# Patient Record
Sex: MALE | Race: BLACK OR AFRICAN AMERICAN | NOT HISPANIC OR LATINO | URBAN - METROPOLITAN AREA
[De-identification: names, ages, dates, MRNs, and addresses within clinical notes are randomized per-mention and may not be internally consistent; named-entity substitution may affect disease eponyms.]

---

## 2017-01-15 ENCOUNTER — INPATIENT (INPATIENT)
Facility: HOSPITAL | Age: 62
LOS: 7 days | Discharge: NURSING, REHAB OR LONG-TERM CARE | DRG: 55 | End: 2017-01-23
Attending: INTERNAL MEDICINE | Admitting: INTERNAL MEDICINE
Payer: COMMERCIAL

## 2017-01-15 VITALS
HEART RATE: 69 BPM | RESPIRATION RATE: 19 BRPM | SYSTOLIC BLOOD PRESSURE: 177 MMHG | DIASTOLIC BLOOD PRESSURE: 92 MMHG | OXYGEN SATURATION: 97 % | TEMPERATURE: 98 F

## 2017-01-15 DIAGNOSIS — Z90.5 ACQUIRED ABSENCE OF KIDNEY: Chronic | ICD-10-CM

## 2017-01-15 PROCEDURE — 71020: CPT | Mod: 26

## 2017-01-15 PROCEDURE — 73620 X-RAY EXAM OF FOOT: CPT | Mod: 26,LT

## 2017-01-15 PROCEDURE — 99284 EMERGENCY DEPT VISIT MOD MDM: CPT | Mod: 25

## 2017-01-15 PROCEDURE — 93010 ELECTROCARDIOGRAM REPORT: CPT | Mod: NC

## 2017-01-15 RX ORDER — SODIUM CHLORIDE 9 MG/ML
3 INJECTION INTRAMUSCULAR; INTRAVENOUS; SUBCUTANEOUS ONCE
Qty: 0 | Refills: 0 | Status: COMPLETED | OUTPATIENT
Start: 2017-01-15 | End: 2017-01-15

## 2017-01-15 RX ADMIN — SODIUM CHLORIDE 3 MILLILITER(S): 9 INJECTION INTRAMUSCULAR; INTRAVENOUS; SUBCUTANEOUS at 21:40

## 2017-01-15 NOTE — ED PROVIDER NOTE - PROGRESS NOTE DETAILS
Attempted to contact Dr. Ya office/cell number no answer. Pt with worsening weakness on Inlyta, labs notable for dehydration. Will discharge home.

## 2017-01-15 NOTE — ED PROVIDER NOTE - OBJECTIVE STATEMENT
61M with hx of renal cell CA with brain mets presenting with weakness, L heel pain, inability to sit up and walk . Pt started oral chemotherapy with Inlyta, was sent in by his heme onc physician Dr. Kishan Ya. Pt denies cp/sob, admits to decreased PO intake.

## 2017-01-15 NOTE — ED ADULT NURSE REASSESSMENT NOTE - NS ED NURSE REASSESS COMMENT FT1
lab results reviewed with patient, dr jimenez at bedside to review plan with patient. urine studies sent

## 2017-01-15 NOTE — ED PROVIDER NOTE - MUSCULOSKELETAL, MLM
Spine appears normal, range of motion is not limited, no muscle or joint tenderness; ttp over L heel

## 2017-01-15 NOTE — ED ADULT NURSE NOTE - PSH
History of nephrectomy, unilateral  right nephrectomy  Other postprocedural status  S/P tonsillectomy  Other postprocedural status  S/P craniotomy

## 2017-01-15 NOTE — ED ADULT NURSE NOTE - CHPI ED SYMPTOMS NEG
no fever/no dizziness/no decreased eating/drinking/no chills/no numbness/no vomiting/no nausea/no tingling

## 2017-01-15 NOTE — ED ADULT NURSE NOTE - PMH
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

## 2017-01-15 NOTE — ED ADULT NURSE NOTE - OBJECTIVE STATEMENT
RN note: aaox2 male with generalized weakness, pain to left heel and left leg X several days, worsening today to the point where "he couldn't walk" "he couldn't sit up straight" as per wife. at baseline, the patient ambulates with a cane. s/p chemo jan 6th (nine days ago), and also on trial drug inlyta 5mg twice a day since jan 4th for renal cancer. pt denies nausea, vomiting, diarrhea, abdominal pain, fevers, chills, hematuria or dysuria. denies cough. unable to recall last seizure date "a long time ago". does not have a mediport. noted with left calf warm, left heel tender to touch. no redness or swelling noted to left heel or foot. pt denies trauma. will monitor and reassess. -ariana hearn RN RN note: aaox4 male with generalized weakness, pain to left heel and left leg weakness X several days, worsening today to the point where "he couldn't walk" "he couldn't sit up straight" as per wife. at baseline, the patient ambulates with a cane. s/p chemo jan 6th (nine days ago), and also on trial drug inlyta 5mg twice a day since jan 4th for renal cancer. pt denies nausea, vomiting, diarrhea, abdominal pain, fevers, chills, hematuria or dysuria. denies cough. unable to recall last seizure date "a long time ago". does not have a mediport. noted with left calf warm, left heel tender to touch. no redness or swelling noted to left heel or foot. pt denies trauma. will monitor and reassess. -ariana hearn RN RN note: aaox4 male with generalized weakness, pain to left heel and left leg weakness X several days, worsening today to the point where "he couldn't walk" "he couldn't sit up straight" as per wife. at baseline, the patient ambulates with a cane. s/p chemo jan 6th (nine days ago), and also on trial drug inlyta 5mg twice a day since jan 4th for renal cancer. pt denies nausea, vomiting, diarrhea, abdominal pain, fevers, chills, hematuria or dysuria. denies cough. unable to recall last seizure date "a long time ago". does not have a mediport. noted with left calf warm, left heel tender to touch. no redness or swelling noted to left heel or foot. pt denies trauma. will monitor and reassess. -ariana hearn RN    addendum: pt noted with high BP upon arrival, and reports not taken his night medications doses. dr. jimenez aware of high BP. OK as per dr. jimenez for pt to take own night medications. pt took own medications at 2230. -ariana hearn RN

## 2017-01-15 NOTE — ED PROVIDER NOTE - MEDICAL DECISION MAKING DETAILS
61M with hx of metstatic kidney ca presenting with weakness, decreased PO intake heel pain. Concern for side effect from new chemotherapy Inlyta. Pt denies fever/cough/chest pain/shortness of breath. Sent in by Dr. Ya, unable to get a hold of Dr. Ya at this time but given pt's degree of weakness will admit for IV fluids as well as heme onc consultation in AM. Labs notable for elevated creatinine, low platelets, no neutropenia, no fever. Will admit for IV fluids given pt's weakness.

## 2017-01-16 DIAGNOSIS — E78.5 HYPERLIPIDEMIA, UNSPECIFIED: ICD-10-CM

## 2017-01-16 DIAGNOSIS — R53.1 WEAKNESS: ICD-10-CM

## 2017-01-16 DIAGNOSIS — Z41.8 ENCOUNTER FOR OTHER PROCEDURES FOR PURPOSES OTHER THAN REMEDYING HEALTH STATE: ICD-10-CM

## 2017-01-16 DIAGNOSIS — H40.9 UNSPECIFIED GLAUCOMA: ICD-10-CM

## 2017-01-16 DIAGNOSIS — C64.1 MALIGNANT NEOPLASM OF RIGHT KIDNEY, EXCEPT RENAL PELVIS: ICD-10-CM

## 2017-01-16 DIAGNOSIS — R63.8 OTHER SYMPTOMS AND SIGNS CONCERNING FOOD AND FLUID INTAKE: ICD-10-CM

## 2017-01-16 DIAGNOSIS — I16.0 HYPERTENSIVE URGENCY: ICD-10-CM

## 2017-01-16 DIAGNOSIS — N17.9 ACUTE KIDNEY FAILURE, UNSPECIFIED: ICD-10-CM

## 2017-01-16 DIAGNOSIS — D64.9 ANEMIA, UNSPECIFIED: ICD-10-CM

## 2017-01-16 DIAGNOSIS — C79.31 SECONDARY MALIGNANT NEOPLASM OF BRAIN: ICD-10-CM

## 2017-01-16 DIAGNOSIS — G40.909 EPILEPSY, UNSPECIFIED, NOT INTRACTABLE, WITHOUT STATUS EPILEPTICUS: ICD-10-CM

## 2017-01-16 DIAGNOSIS — M79.605 PAIN IN LEFT LEG: ICD-10-CM

## 2017-01-16 DIAGNOSIS — N18.9 CHRONIC KIDNEY DISEASE, UNSPECIFIED: ICD-10-CM

## 2017-01-16 LAB
ANION GAP SERPL CALC-SCNC: 11 MMOL/L — SIGNIFICANT CHANGE UP (ref 9–16)
BUN SERPL-MCNC: 36 MG/DL — HIGH (ref 7–23)
CALCIUM SERPL-MCNC: 7.8 MG/DL — LOW (ref 8.5–10.5)
CHLORIDE SERPL-SCNC: 108 MMOL/L — SIGNIFICANT CHANGE UP (ref 96–108)
CHOLEST SERPL-MCNC: 149 MG/DL — SIGNIFICANT CHANGE UP
CO2 SERPL-SCNC: 22 MMOL/L — SIGNIFICANT CHANGE UP (ref 22–31)
CREAT SERPL-MCNC: 1.79 MG/DL — HIGH (ref 0.5–1.3)
FERRITIN SERPL-MCNC: 209 NG/ML — SIGNIFICANT CHANGE UP (ref 26–388)
FOLATE SERPL-MCNC: 6.6 NG/ML — SIGNIFICANT CHANGE UP (ref 4.8–24.2)
GLUCOSE SERPL-MCNC: 91 MG/DL — SIGNIFICANT CHANGE UP (ref 70–99)
HCT VFR BLD CALC: 37.6 % — LOW (ref 39–50)
HDLC SERPL-MCNC: 72 MG/DL — SIGNIFICANT CHANGE UP
HGB BLD-MCNC: 12 G/DL — LOW (ref 13–17)
IRON SATN MFR SERPL: 19 % — LOW (ref 26–39)
IRON SATN MFR SERPL: 50 UG/DL — LOW (ref 65–175)
LIPID PNL WITH DIRECT LDL SERPL: 58 MG/DL — SIGNIFICANT CHANGE UP
MAGNESIUM SERPL-MCNC: 2.1 MG/DL — SIGNIFICANT CHANGE UP (ref 1.6–2.4)
MCHC RBC-ENTMCNC: 24.5 PG — LOW (ref 27–34)
MCHC RBC-ENTMCNC: 31.9 G/DL — LOW (ref 32–36)
MCV RBC AUTO: 76.7 FL — LOW (ref 80–100)
PHOSPHATE SERPL-MCNC: 4.6 MG/DL — HIGH (ref 2.5–4.5)
PLATELET # BLD AUTO: 90 K/UL — LOW (ref 150–400)
POTASSIUM SERPL-MCNC: 4.5 MMOL/L — SIGNIFICANT CHANGE UP (ref 3.5–5.3)
POTASSIUM SERPL-SCNC: 4.5 MMOL/L — SIGNIFICANT CHANGE UP (ref 3.5–5.3)
RBC # BLD: 4.9 M/UL — SIGNIFICANT CHANGE UP (ref 4.2–5.8)
RBC # FLD: 15.3 % — SIGNIFICANT CHANGE UP (ref 10.3–16.9)
SODIUM SERPL-SCNC: 141 MMOL/L — SIGNIFICANT CHANGE UP (ref 135–145)
TIBC SERPL-MCNC: 257 UG/DL — SIGNIFICANT CHANGE UP (ref 250–450)
TOTAL CHOLESTEROL/HDL RATIO MEASUREMENT: 2.1 RATIO — SIGNIFICANT CHANGE UP
TRIGL SERPL-MCNC: 94 MG/DL — SIGNIFICANT CHANGE UP
TSH SERPL-MCNC: 10.3 UIU/ML — HIGH (ref 0.35–4.94)
VIT B12 SERPL-MCNC: 855 PG/ML — SIGNIFICANT CHANGE UP (ref 243–894)
WBC # BLD: 7 K/UL — SIGNIFICANT CHANGE UP (ref 3.8–10.5)
WBC # FLD AUTO: 7 K/UL — SIGNIFICANT CHANGE UP (ref 3.8–10.5)

## 2017-01-16 PROCEDURE — 71250 CT THORAX DX C-: CPT | Mod: 26

## 2017-01-16 PROCEDURE — 99223 1ST HOSP IP/OBS HIGH 75: CPT | Mod: GC

## 2017-01-16 PROCEDURE — 72170 X-RAY EXAM OF PELVIS: CPT | Mod: 26

## 2017-01-16 PROCEDURE — 99282 EMERGENCY DEPT VISIT SF MDM: CPT | Mod: GC

## 2017-01-16 PROCEDURE — 93970 EXTREMITY STUDY: CPT | Mod: 26

## 2017-01-16 PROCEDURE — 70450 CT HEAD/BRAIN W/O DYE: CPT | Mod: 26

## 2017-01-16 PROCEDURE — 70553 MRI BRAIN STEM W/O & W/DYE: CPT | Mod: 26

## 2017-01-16 RX ORDER — QUETIAPINE FUMARATE 200 MG/1
25 TABLET, FILM COATED ORAL EVERY 12 HOURS
Qty: 0 | Refills: 0 | Status: DISCONTINUED | OUTPATIENT
Start: 2017-01-16 | End: 2017-01-23

## 2017-01-16 RX ORDER — CARVEDILOL PHOSPHATE 80 MG/1
25 CAPSULE, EXTENDED RELEASE ORAL EVERY 12 HOURS
Qty: 0 | Refills: 0 | Status: DISCONTINUED | OUTPATIENT
Start: 2017-01-16 | End: 2017-01-23

## 2017-01-16 RX ORDER — DEXAMETHASONE 0.5 MG/5ML
4 ELIXIR ORAL EVERY 6 HOURS
Qty: 0 | Refills: 0 | Status: DISCONTINUED | OUTPATIENT
Start: 2017-01-16 | End: 2017-01-23

## 2017-01-16 RX ORDER — VALPROIC ACID (AS SODIUM SALT) 250 MG/5ML
250 SOLUTION, ORAL ORAL
Qty: 0 | Refills: 0 | Status: DISCONTINUED | OUTPATIENT
Start: 2017-01-16 | End: 2017-01-23

## 2017-01-16 RX ORDER — LEVOTHYROXINE SODIUM 125 MCG
75 TABLET ORAL DAILY
Qty: 0 | Refills: 0 | Status: DISCONTINUED | OUTPATIENT
Start: 2017-01-17 | End: 2017-01-17

## 2017-01-16 RX ORDER — LEVOTHYROXINE SODIUM 125 MCG
50 TABLET ORAL DAILY
Qty: 0 | Refills: 0 | Status: DISCONTINUED | OUTPATIENT
Start: 2017-01-16 | End: 2017-01-16

## 2017-01-16 RX ORDER — INSULIN LISPRO 100/ML
VIAL (ML) SUBCUTANEOUS
Qty: 0 | Refills: 0 | Status: DISCONTINUED | OUTPATIENT
Start: 2017-01-16 | End: 2017-01-23

## 2017-01-16 RX ORDER — LATANOPROST 0.05 MG/ML
1 SOLUTION/ DROPS OPHTHALMIC; TOPICAL AT BEDTIME
Qty: 0 | Refills: 0 | Status: DISCONTINUED | OUTPATIENT
Start: 2017-01-16 | End: 2017-01-23

## 2017-01-16 RX ORDER — LACOSAMIDE 50 MG/1
200 TABLET ORAL
Qty: 0 | Refills: 0 | Status: DISCONTINUED | OUTPATIENT
Start: 2017-01-16 | End: 2017-01-23

## 2017-01-16 RX ORDER — LABETALOL HCL 100 MG
10 TABLET ORAL ONCE
Qty: 0 | Refills: 0 | Status: DISCONTINUED | OUTPATIENT
Start: 2017-01-16 | End: 2017-01-16

## 2017-01-16 RX ORDER — CARVEDILOL PHOSPHATE 80 MG/1
25 CAPSULE, EXTENDED RELEASE ORAL EVERY 12 HOURS
Qty: 0 | Refills: 0 | Status: DISCONTINUED | OUTPATIENT
Start: 2017-01-16 | End: 2017-01-16

## 2017-01-16 RX ORDER — RISPERIDONE 4 MG/1
1 TABLET ORAL AT BEDTIME
Qty: 0 | Refills: 0 | Status: DISCONTINUED | OUTPATIENT
Start: 2017-01-16 | End: 2017-01-17

## 2017-01-16 RX ORDER — FUROSEMIDE 40 MG
20 TABLET ORAL ONCE
Qty: 0 | Refills: 0 | Status: COMPLETED | OUTPATIENT
Start: 2017-01-16 | End: 2017-01-16

## 2017-01-16 RX ORDER — LEVOTHYROXINE SODIUM 125 MCG
25 TABLET ORAL ONCE
Qty: 0 | Refills: 0 | Status: COMPLETED | OUTPATIENT
Start: 2017-01-16 | End: 2017-01-16

## 2017-01-16 RX ORDER — DOCUSATE SODIUM 100 MG
100 CAPSULE ORAL
Qty: 0 | Refills: 0 | Status: DISCONTINUED | OUTPATIENT
Start: 2017-01-16 | End: 2017-01-23

## 2017-01-16 RX ORDER — DEXAMETHASONE 0.5 MG/5ML
10 ELIXIR ORAL ONCE
Qty: 0 | Refills: 0 | Status: COMPLETED | OUTPATIENT
Start: 2017-01-16 | End: 2017-01-16

## 2017-01-16 RX ORDER — SENNA PLUS 8.6 MG/1
2 TABLET ORAL AT BEDTIME
Qty: 0 | Refills: 0 | Status: DISCONTINUED | OUTPATIENT
Start: 2017-01-16 | End: 2017-01-23

## 2017-01-16 RX ORDER — PANTOPRAZOLE SODIUM 20 MG/1
40 TABLET, DELAYED RELEASE ORAL
Qty: 0 | Refills: 0 | Status: DISCONTINUED | OUTPATIENT
Start: 2017-01-16 | End: 2017-01-23

## 2017-01-16 RX ORDER — ATORVASTATIN CALCIUM 80 MG/1
40 TABLET, FILM COATED ORAL AT BEDTIME
Qty: 0 | Refills: 0 | Status: DISCONTINUED | OUTPATIENT
Start: 2017-01-16 | End: 2017-01-23

## 2017-01-16 RX ORDER — ASPIRIN/CALCIUM CARB/MAGNESIUM 324 MG
81 TABLET ORAL DAILY
Qty: 0 | Refills: 0 | Status: DISCONTINUED | OUTPATIENT
Start: 2017-01-16 | End: 2017-01-16

## 2017-01-16 RX ORDER — AMLODIPINE BESYLATE 2.5 MG/1
10 TABLET ORAL DAILY
Qty: 0 | Refills: 0 | Status: DISCONTINUED | OUTPATIENT
Start: 2017-01-16 | End: 2017-01-23

## 2017-01-16 RX ADMIN — Medication 100 MILLIGRAM(S): at 07:19

## 2017-01-16 RX ADMIN — QUETIAPINE FUMARATE 25 MILLIGRAM(S): 200 TABLET, FILM COATED ORAL at 07:16

## 2017-01-16 RX ADMIN — Medication 25 MICROGRAM(S): at 15:35

## 2017-01-16 RX ADMIN — Medication 10 MILLIGRAM(S): at 03:45

## 2017-01-16 RX ADMIN — PANTOPRAZOLE SODIUM 40 MILLIGRAM(S): 20 TABLET, DELAYED RELEASE ORAL at 07:17

## 2017-01-16 RX ADMIN — LATANOPROST 1 DROP(S): 0.05 SOLUTION/ DROPS OPHTHALMIC; TOPICAL at 22:04

## 2017-01-16 RX ADMIN — Medication 100 MILLIGRAM(S): at 18:07

## 2017-01-16 RX ADMIN — ATORVASTATIN CALCIUM 40 MILLIGRAM(S): 80 TABLET, FILM COATED ORAL at 22:04

## 2017-01-16 RX ADMIN — QUETIAPINE FUMARATE 25 MILLIGRAM(S): 200 TABLET, FILM COATED ORAL at 23:34

## 2017-01-16 RX ADMIN — Medication 250 MILLIGRAM(S): at 07:17

## 2017-01-16 RX ADMIN — RISPERIDONE 1 MILLIGRAM(S): 4 TABLET ORAL at 22:04

## 2017-01-16 RX ADMIN — LACOSAMIDE 200 MILLIGRAM(S): 50 TABLET ORAL at 10:29

## 2017-01-16 RX ADMIN — Medication 81 MILLIGRAM(S): at 12:53

## 2017-01-16 RX ADMIN — Medication 250 MILLIGRAM(S): at 22:04

## 2017-01-16 RX ADMIN — Medication 102 MILLIGRAM(S): at 23:34

## 2017-01-16 RX ADMIN — Medication 50 MICROGRAM(S): at 07:17

## 2017-01-16 RX ADMIN — SENNA PLUS 2 TABLET(S): 8.6 TABLET ORAL at 22:04

## 2017-01-16 RX ADMIN — Medication 20 MILLIGRAM(S): at 12:53

## 2017-01-16 RX ADMIN — CARVEDILOL PHOSPHATE 25 MILLIGRAM(S): 80 CAPSULE, EXTENDED RELEASE ORAL at 07:16

## 2017-01-16 RX ADMIN — LACOSAMIDE 200 MILLIGRAM(S): 50 TABLET ORAL at 18:17

## 2017-01-16 RX ADMIN — AMLODIPINE BESYLATE 10 MILLIGRAM(S): 2.5 TABLET ORAL at 07:16

## 2017-01-16 RX ADMIN — CARVEDILOL PHOSPHATE 25 MILLIGRAM(S): 80 CAPSULE, EXTENDED RELEASE ORAL at 18:07

## 2017-01-16 NOTE — H&P ADULT. - PROBLEM SELECTOR PLAN 6
chronic, microcytic; no active bleeding; likely 2/2 chemotherapeutic regimen   - f/u iron studies   - trend CBC No recent reported seizure activity. Con't home anti-epileptic therapy.

## 2017-01-16 NOTE — CONSULT NOTE ADULT - PROBLEM SELECTOR RECOMMENDATION 3
on dilators and alpha/beta blockers but will need to address volume issue. have suggested start with lasix 20 mg iv , follow up re creatinine and , exam and  blood pressure. Most recent bp 175/105. also further eval proteinuria.

## 2017-01-16 NOTE — CONSULT NOTE ADULT - ASSESSMENT
Bp probably related to fluid and distress. May have high- grade proteinuria. This can occur with neoplasm.

## 2017-01-16 NOTE — H&P ADULT. - PROBLEM SELECTOR PLAN 5
s/p R. neprectomy, w/ mets to the brain s/p resection x2   - hold current chemotherapeutic regimen 2/2 side effects   - Dr. Ya contacted, awaiting to hear back regarding plan of care s/p R. nephrectomy, w/ mets to the brain s/p resection x2   - hold current chemotherapeutic regimen 2/2 side effects   - Dr. Ya contacted, awaiting to hear back regarding plan of care

## 2017-01-16 NOTE — PHYSICAL THERAPY INITIAL EVALUATION ADULT - PERTINENT HX OF CURRENT PROBLEM, REHAB EVAL
Pt is a 60yo M w/ PMH renal cell carcinoma w/ mets to the brain (s/p R. nephrectomy) seizure d/o, presenting with weakness and L. groin and heel pain. He recently began a new oral chemotherapy called Inlyta on January 4th, and was taking it twice daily. On January 6th, he began experiencing L. groin pain that did not inhibit his daily activities. At baseline he walks with the occasional assistance of a rollator.

## 2017-01-16 NOTE — H&P ADULT. - PROBLEM SELECTOR PLAN 2
generalized weakness, possibly contributed to by l groin pain.    - holding chemotherapeutic regimen   - check TSH, B12   - f/u head CT   - PT consult generalized weakness, possibly contributed to by chemotherapy and recent immobility from heel and groin pain.   - holding chemotherapeutic regimen   - check TSH, B12   - f/u head CT   - PT consult

## 2017-01-16 NOTE — PATIENT PROFILE ADULT. - VISION (WITH CORRECTIVE LENSES IF THE PATIENT USUALLY WEARS THEM):
wears eyeglasses/Partially impaired: cannot see medication labels or newsprint, but can see obstacles in path, and the surrounding layout; can count fingers at arm's length

## 2017-01-16 NOTE — PHYSICAL THERAPY INITIAL EVALUATION ADULT - IMPAIRMENTS FOUND, PT EVAL
aerobic capacity/endurance/muscle strength/ergonomics and body mechanics/gait, locomotion, and balance

## 2017-01-16 NOTE — PHYSICAL THERAPY INITIAL EVALUATION ADULT - GAIT DEVIATIONS NOTED, PT EVAL
decreased step length/increased time in double stance/increased stride width/decreased stride length/decreased blanka/decreased weight-shifting ability

## 2017-01-16 NOTE — PHYSICAL THERAPY INITIAL EVALUATION ADULT - CRITERIA FOR SKILLED THERAPEUTIC INTERVENTIONS
rehab potential/risk reduction/prevention/therapy frequency/functional limitations in following categories/impairments found

## 2017-01-16 NOTE — PROGRESS NOTE ADULT - SUBJECTIVE AND OBJECTIVE BOX
60 yo m with pmhx of RCC w/ mets to brain s/p R nephrectomy, seizure disorder presenting with L heel and groin pain x3 days. States pain worse when bearing weight on it. States sharp pain, mainly located in heel, non-radiating, 9/10 in severity. Denies any other symptoms, no headache, vision change, CP, SOB/VARGAS, change in urinary habits, hematuria, abdominal pain, or n/v/d/c. Feels that he is urinating the normal amount. Has noted feels that his legs have been more swollen.             	  MEDICATIONS:  carvedilol 25milliGRAM(s) Oral every 12 hours  amLODIPine   Tablet 10milliGRAM(s) Oral daily        valproic  acid Syrup 250milliGRAM(s) Oral two times a day  lacosamide 200milliGRAM(s) Oral two times a day  QUEtiapine 25milliGRAM(s) Oral every 12 hours  risperiDONE   Tablet 1milliGRAM(s) Oral at bedtime  oxyCODONE  5 mG/acetaminophen 325 mG 1Tablet(s) Oral every 4 hours PRN    docusate sodium 100milliGRAM(s) Oral two times a day  senna 2Tablet(s) Oral at bedtime  pantoprazole    Tablet 40milliGRAM(s) Oral before breakfast    atorvastatin 40milliGRAM(s) Oral at bedtime    aspirin  chewable 81milliGRAM(s) Oral daily  latanoprost 0.005% Ophthalmic Solution 1Drop(s) Both EYES at bedtime      Complaint: B/L swelling of LE's    Otherwise 12 point review of systems is normal.    Vital Signs Last 24 Hrs  T(C): 36.9, Max: 36.9 (01-16 @ 18:54)  T(F): 98.4, Max: 98.4 (01-16 @ 18:54)  HR: 89 (70 - 92)  BP: 173/98 (148/81 - 190/119)  BP(mean): 136 (136 - 143)  RR: 19 (16 - 19)  SpO2: 97% (95% - 98%)      ECG:      CHEST X RAY    CT    MRI    INTERPRETATION:  PROCEDURE: MRI Brain with and without contrast    INDICATION: Brain metastases    TECHNIQUE: Sagittal T1, axial T1, T2, FLAIR, diffusion as well as coronal   FLAIR  images of the brain were obtained. Following the intravenous   administration of gadolinium sagittal, axial, and coronalT1-weighted   images of the brain were obtained.    COMPARISON: MRI 5/19/2016    FINDINGS: The MRI examination of the brain demonstrates interval   improvement in the multiple enhancing lesions seen on the prior study   compatible with patient's history of metastases. There has been interval   improvement in the multiple metastases within the left temporal lobe. The   lesion within the anterior superior left temporal lobe just below the   sylvian fissure now measures approximately 0.4 cm whereas it previously   measured approximately 1.2 cm (series 13 image 47 versus series 10 image   47). Many of previously noted lesions demonstrates hypointense signal on   the GRE images which may be secondary to hemorrhagic products.    There is however new ring-enhancing lesion within the posterior left   frontal lobe which measures approximately 0.6 cm width x 0.8 cm AP x 0.5   cm height (series 14 image 25 and series 13 image 70). There is   surrounding FLAIR/T2 signal abnormality compatible with edema.    The patient is status post right parieto-occipital craniotomy with mesh   cranioplasty as well as left frontal craniotomy. The ventricles are   stable in size. The FLAIR/T2-weighted images demonstrate scattered   punctate and confluent foci ofincreased signal within the   periventricular and subcortical white matter which is nonspecific. This   may represent the sequela of small vessel ischemic disease or   alternatively may be treatment related.     There is normal vascular flow-voids. The visualized paranasal sinuses are   free of mucosal disease. The mastoid air cells are well-aerated.    IMPRESSION: Interval improvement in the multiple metastases with new   metastatic lesion within the left frontal lobe.  MRA    CT ANGIO    CAROTID DUPLEX    DUPLEX     Echocardiogram    Catheterization:    Stress Test:     LABS:	 	  CARDIAC MARKERS:                              12.0   7.0   )-----------( 90       ( 16 Jan 2017 11:14 )             37.6     16 Jan 2017 11:14    141    |  108    |  36     ----------------------------<  91     4.5     |  22     |  1.79     Ca    7.8        16 Jan 2017 11:14  Phos  4.6       16 Jan 2017 11:14  Mg     2.1       16 Jan 2017 11:14    TPro  6.5    /  Alb  2.9    /  TBili  0.4    /  DBili  x      /  AST  27     /  ALT  35     /  AlkPhos  166    15 Duc 2017 21:46    TSH: Thyroid Stimulating Hormone, Serum: 10.303 uIU/mL (01-16 @ 11:14)            ASSESSMENT/PLAN: 	  60 yo m with pmhx of RCC w/ mets to brain s/p R nephrectomy, seizure disorder presenting with L heel and groin pain x3 days found to have L heel spur and degenerative changes on pelvic XR also with hypertensive urgency/emergency possibly 2/2 Inlyta.     Problem/Recommendation - 1:  Problem: Hypertensive urgency. Recommendation: SBP peaked at 190. Asymptomatic currently. EKG at baseline. Only sign of potential end organ damage is JULIAN however pt with CKD and with Cr that has fluctuated in previous hospitalizations between 1.3-2. Now Cr 1.8. Would trend Cr and send work-up for nephrotic syndrome given proteinuria and peripheral edema on exam. SBP possibly elevated as well in setting of uncontrolled pain from heel spur and osteoarthritis. Would recommend pain control. Currently after only oral meds, SBP now improved to 174/100.   - Given improvement in SBP and currently asymptomatic, no need for telemetry monitoring at this time.   - Repeat BMP in AM.   - Pain control.

## 2017-01-16 NOTE — H&P ADULT. - ATTENDING COMMENTS
pt seen and examined on 1/16/17  reviewed h&p, vs, labs, ekg, xrays  pt a/f weakness, found to have elevated BP, left plantar pain, proteinuria, JULIAN on CKD ; sxs occuring in setting of inlyta use  PE as above  a/p:   1. hypertensive urgency: given labetolol IV push; c/w Home bp meds; start clonidine 0.1mg bid if BP remains elevated ; appreciate ICU consult; holding inlyta  2. proteinuria/ edema/ JULIAN: possible nephrotic syndrome; plan as above; will need renal consult  3. RCC w/ mets: holding inlyta; unable to reach pt's PCP / oncologist Dr. Ya after numerous attempts overnight. will continue to follow patient for now  4. left leg pain: agreee w/ dopplers  5. left plantar pain: pt w/ a heel spur on xray, official report pending

## 2017-01-16 NOTE — H&P ADULT. - MUSCULOSKELETAL
detailed exam details… decreased ROM due to pain/no joint erythema/no calf tenderness/no joint swelling

## 2017-01-16 NOTE — ED ADULT NURSE REASSESSMENT NOTE - NS ED NURSE REASSESS COMMENT FT1
spoke to dr. black, aware of current BP, will medicate as per emar. spoke to dr. black, aware of current BP, awaiting order for medication

## 2017-01-16 NOTE — CONSULT NOTE ADULT - PROBLEM SELECTOR RECOMMENDATION 9
MRI C/T/L spine w/ w/o contrast STAT  check bladder for post-void residual to r/o retention.  Will follow   aware MRI C/T/L spine w/ w/o contrast STAT  check bladder for post-void residual to r/o retention.  decadron  Will follow   aware MRI C/T/L spine w/ w/o contrast STAT  check bladder for post-void residual to r/o retention.  decadron  neurochecks q4hr.  to review imaging in AM for further plan.   No need to transfer to neurosurgery service overnight.   hold aspirin  Discussed with

## 2017-01-16 NOTE — PHYSICAL THERAPY INITIAL EVALUATION ADULT - ADDITIONAL COMMENTS
Patient has been declining in function over the last week and a half. Patient ambulates with a walker at baseline. Patient has 9 stairs to enter home. Over last week patient has been having trouble getting in and out of bed and also having trouble with transfers. Upon treat patient had pain in Left Groin but pain in Left Heel had diminished.

## 2017-01-16 NOTE — H&P ADULT. - CRANIAL NERVE
pupils with post-surgical changes; EOMI, visual acuity 20/60 B/L, sensation intact B/L, symmetric smile, tongue midline, CN XI and XII intact and equal

## 2017-01-16 NOTE — H&P ADULT. - HISTORY OF PRESENT ILLNESS
Pt is a 60yo M w/ PMH renal cell carcinoma w/ mets to the brain (s/p R. nephrectomy) seizure d/o, presenting with weakness and L. groin and heel pain. He recently began a new oral chemotherapy called Inlyta on January 4th, and was taking it twice daily. On January 6th, he began experiencing L. groin pain that did not inhibit his daily activities. At baseline he walks with the occasional assistance of a rollator. Beginning January 13th, he started experiencing fatigue and left heel pain, and had to stay in bed. His weakness worsening on Saturday, and he was not able to get up to go to the bathroom. He describes the weakness as associated with left groin and heel pain, stating he cannot walk on the left leg, so he stayed in bed. His wife describes a "puffiness" in his feet, hands and face that was not previously present. He denies fever, chills, headache, vision changes, difficulty speaking or swallowing, confusion, chest pain, palpitations, SOB, nausea, change in bowel habits, dysuria, change in urinary color or frequency, or rash.

## 2017-01-16 NOTE — H&P ADULT. - FAMILY HISTORY
<<-----Click on this checkbox to enter Family History Family history of malignant neoplasm of gastrointestinal tract, Family history of gastric cancer     Family history of malignant neoplasm of gastrointestinal tract, Family history of pancreatic cancer     Mother  Still living? Unknown  Family history of malignant neoplasm of breast, Age at diagnosis: Age Unknown

## 2017-01-16 NOTE — H&P ADULT. - MOTOR
increased tone B/L UE; normal tone B/L LE; 2+ brachial and brachioradialis reflexes B/L; 1+ patellar and achilles reflexes B/L, toes downgoing;  strength 5/5 B/L, R hip flexor and extensor 5/5; 5/5 R knee flexion and extension; L hip and knee movements 3/5 with significant pain on exertion; plantarflexion and dorsiflexion 4/5 B/L

## 2017-01-16 NOTE — H&P ADULT. - MUSCULOSKELETAL COMMENTS
pain on active hip flexion absent on passive hip flexion; full ROM of R. foot with point tenderness to palpation of plantar surface of calcaneus

## 2017-01-16 NOTE — H&P ADULT. - PROBLEM SELECTOR PLAN 10
avoid pharmacologic ppx due to brain mets; SCDs once DVT ruled out avoid pharmacologic ppx due to brain mets; SCDs once DVT ruled out    11. Plantar fasciitis - patient with point tenderness to palpation of plantar surface of calcaneus w/ evidence of heel spur on x-ray. Will order PT, pain control. Consider podiatry consult in AM for evaluation for orthotics.

## 2017-01-16 NOTE — H&P ADULT. - PROBLEM SELECTOR PLAN 7
Con't home medications chronic, microcytic; no active bleeding; likely 2/2 chemotherapeutic regimen   - f/u iron studies   - trend CBC

## 2017-01-16 NOTE — H&P ADULT. - NEUROLOGICAL DETAILS
alert and oriented x 3/responds to pain/responds to verbal commands/cranial nerves intact/deep reflexes intact/sensation intact

## 2017-01-16 NOTE — CONSULT NOTE ADULT - PROBLEM SELECTOR RECOMMENDATION 9
SBP peaked at 190. Asymptomatic currently. EKG at baseline. Only sign of potential end organ damage is JULIAN however pt with CKD and with Cr that has fluctuated in previous hospitalizations between 1.3-2. Now Cr 1.8. Would trend Cr and send work-up for nephrotic syndrome given proteinuria and peripheral edema on exam. SBP possibly elevated as well in setting of uncontrolled pain from heel spur and osteoarthritis. Would recommend pain control. Currently after only oral meds, SBP now improved to 174/100.   - Given improvement in SBP and currently asymptomatic, no need for telemetry monitoring at this time.   - Repeat BMP in AM.   - Pain control.

## 2017-01-16 NOTE — H&P ADULT. - PROBLEM SELECTOR PLAN 1
uncontrolled, likely side effect of Inlyta, as patient reports his blood pressure was controlled prior to starting medication. Asymptomatic.   - Admit to RMF   - hold Inlyta   - Consult ICU to r/o need for antihypertensive drip and increased monitoring in setting of elevated creatinine uncontrolled, likely side effect of Inlyta, as patient reports his blood pressure was controlled prior to starting medication. Asymptomatic.   - Admit to RMF   - hold Inlyta   - Start clonidine 0.1mg PO BID with hold parameters; will need to closely monitor BP as effects of Inlyta wear off; half-life elimination reportedly 2.1-6.5 hours uncontrolled, likely side effect of Inlyta, as patient reports his blood pressure was controlled prior to starting medication. Asymptomatic.   - Admit to RMF   - hold Inlyta   - Start clonidine 0.1mg PRN for persistently elevated BP; will need to closely monitor BP as effects of Inlyta wear off; half-life elimination reportedly 2.1-6.5 hours

## 2017-01-16 NOTE — CONSULT NOTE ADULT - SUBJECTIVE AND OBJECTIVE BOX
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 metastases  Malignant neoplasm of kidney: Renal cell cancer  Glaucoma: Dx 2014, s/p b/l surgical repair  Essential hypertension: HTN (hypertension)  History of nephrectomy, unilateral: right nephrectomy  Other postprocedural status: S/P craniotomy  Other postprocedural status: S/P tonsillectomy    FAMILY HISTORY:  Family history of malignant neoplasm of breast (Mother): Family history of breast cancer in sister  Family history of malignant neoplasm of gastrointestinal tract: Family history of gastric cancer  Family history of malignant neoplasm of gastrointestinal tract: Family history of pancreatic cancer      SOCIAL HISTORY:  Tobacco Use:  EtOH use:   Substance:    Allergies    No Known Allergies    Intolerances        REVIEW OF SYSTEMS  General:	  Skin/Breast:  Ophthalmologic:  ENMT:	  Respiratory and Thorax:  Cardiovascular:	  Gastrointestinal:	  Genitourinary:	  Musculoskeletal:	  Neurological:	  Psychiatric:	  Hematology/Lymphatics:	  Endocrine:	  Allergic/Immunologic:	      MEDICATIONS:  Antibiotics:    Neuro:  valproic  acid Syrup 250milliGRAM(s) Oral two times a day  lacosamide 200milliGRAM(s) Oral two times a day  QUEtiapine 25milliGRAM(s) Oral every 12 hours  risperiDONE   Tablet 1milliGRAM(s) Oral at bedtime  oxyCODONE  5 mG/acetaminophen 325 mG 1Tablet(s) Oral every 4 hours PRN    Anticoagulation:  aspirin  chewable 81milliGRAM(s) Oral daily    OTHER:  atorvastatin 40milliGRAM(s) Oral at bedtime  carvedilol 25milliGRAM(s) Oral every 12 hours  amLODIPine   Tablet 10milliGRAM(s) Oral daily  docusate sodium 100milliGRAM(s) Oral two times a day  senna 2Tablet(s) Oral at bedtime  latanoprost 0.005% Ophthalmic Solution 1Drop(s) Both EYES at bedtime  pantoprazole    Tablet 40milliGRAM(s) Oral before breakfast    IVF:      Vital Signs Last 24 Hrs  T(C): 36.9, Max: 36.9 ( @ 18:54)  T(F): 98.4, Max: 98.4 ( @ 18:54)  HR: 89 (70 - 92)  BP: 173/98 (148/81 - 190/119)  BP(mean): 136 (136 - 143)  RR: 19 (16 - 19)  SpO2: 97% (95% - 98%)    PHYSICAL EXAM:  Constitutional:  Eyes:  ENMT:  Neck:  Back:  Respiratory:  Cardiovascular:  Gastrointestinal:  Genitourinary:  Rectal:  Vascular:  Neurological:  Skin:    LABS:                        12.0   7.0   )-----------( 90       ( 2017 11:14 )             37.6     2017 11:14    141    |  108    |  36     ----------------------------<  91     4.5     |  22     |  1.79     Ca    7.8        2017 11:14  Phos  4.6       2017 11:14  Mg     2.1       2017 11:14    TPro  6.5    /  Alb  2.9    /  TBili  0.4    /  DBili  x      /  AST  27     /  ALT  35     /  AlkPhos  166    15 Duc 2017 21:46      Urinalysis Basic - ( 15 Duc 2017 23:15 )    Color: Yellow / Appearance: Clear / S.020 / pH: x  Gluc: x / Ketone: NEGATIVE  / Bili: NEGATIVE / Urobili: 0.2 E.U./dL   Blood: x / Protein: >=300 mg/dL / Nitrite: NEGATIVE   Leuk Esterase: NEGATIVE / RBC: 5-10 /HPF / WBC < 5 /HPF   Sq Epi: x / Non Sq Epi: Few /HPF / Bacteria: x      CULTURES:  Culture Results:   No growth at 12 hours ( @ 06:27)  Culture Results:   No growth at 12 hours ( @ 06:27)      RADIOLOGY & ADDITIONAL STUDIES:  MRI Brain w/wo :  Interval improvement in the multiple metastases with new   metastatic lesion within the left frontal lobe. HISTORY OF PRESENT ILLNESS:   62yo male with renal cell CA metastasis to brain s/p SOC and L occipital craniotomy for tumor resection 5/6/15 by . ,s/p R. nephrectomy, seizure d/o, presenting with weakness, neck pain radiating to b/l shoulders, L. groin and L  heel pain since Saturday. He recently began a new oral chemotherapy called Inlyta on , and was taking it twice daily. On , he began experiencing L. groin pain that did not inhibit his daily activities. At baseline he walks with the occasional assistance of a rollator. Beginning , he started experiencing fatigue and left heel pain, and had to stay in bed. His weakness worsening on Saturday, and he was not able to get up to go to the bathroom. Pt also c/o difficulty writing and using his hands. Denies saddle anesthesia or urinary incontinence.Pt admits to numbness b/l feet. Denies radiculopathy, recent trauma, vision changes, headaches, seizure.    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 metastases  Malignant neoplasm of kidney: Renal cell cancer  Glaucoma: Dx , s/p b/l surgical repair  Essential hypertension: HTN (hypertension)  History of nephrectomy, unilateral: right nephrectomy  Other postprocedural status: S/P craniotomy  Other postprocedural status: S/P tonsillectomy    FAMILY HISTORY:  Family history of malignant neoplasm of breast (Mother): Family history of breast cancer in sister  Family history of malignant neoplasm of gastrointestinal tract: Family history of gastric cancer  Family history of malignant neoplasm of gastrointestinal tract: Family history of pancreatic cancer    Allergies    No Known Allergies    Intolerances      MEDICATIONS:  Antibiotics:    Neuro:  valproic  acid Syrup 250milliGRAM(s) Oral two times a day  lacosamide 200milliGRAM(s) Oral two times a day  QUEtiapine 25milliGRAM(s) Oral every 12 hours  risperiDONE   Tablet 1milliGRAM(s) Oral at bedtime  oxyCODONE  5 mG/acetaminophen 325 mG 1Tablet(s) Oral every 4 hours PRN    Anticoagulation:  aspirin  chewable 81milliGRAM(s) Oral daily    OTHER:  atorvastatin 40milliGRAM(s) Oral at bedtime  carvedilol 25milliGRAM(s) Oral every 12 hours  amLODIPine   Tablet 10milliGRAM(s) Oral daily  docusate sodium 100milliGRAM(s) Oral two times a day  senna 2Tablet(s) Oral at bedtime  latanoprost 0.005% Ophthalmic Solution 1Drop(s) Both EYES at bedtime  pantoprazole    Tablet 40milliGRAM(s) Oral before breakfast      Vital Signs Last 24 Hrs  T(C): 36.9, Max: 36.9 ( @ 18:54)  T(F): 98.4, Max: 98.4 ( @ 18:54)  HR: 89 (70 - 92)  BP: 173/98 (148/81 - 190/119)  BP(mean): 136 (136 - 143)  RR: 19 (16 - 19)  SpO2: 97% (95% - 98%)    PHYSICAL EXAM:  Neurological:  Awake and alert, AxOx3, speech coherent  b/l hands spasticity and increased reflexes b/l elbow. Motor UE 4/5, hands 3/5  B/L LE 3/5 + clonus b/l , increased DTR   No sensory level    LABS:                        12.0   7.0   )-----------( 90       ( 2017 11:14 )             37.6     2017 11:14    141    |  108    |  36     ----------------------------<  91     4.5     |  22     |  1.79     Ca    7.8        2017 11:14  Phos  4.6       2017 11:14  Mg     2.1       2017 11:14    TPro  6.5    /  Alb  2.9    /  TBili  0.4    /  DBili  x      /  AST  27     /  ALT  35     /  AlkPhos  166    15 Duc 2017 21:46      Urinalysis Basic - ( 15 Duc 2017 23:15 )    Color: Yellow / Appearance: Clear / S.020 / pH: x  Gluc: x / Ketone: NEGATIVE  / Bili: NEGATIVE / Urobili: 0.2 E.U./dL   Blood: x / Protein: >=300 mg/dL / Nitrite: NEGATIVE   Leuk Esterase: NEGATIVE / RBC: 5-10 /HPF / WBC < 5 /HPF   Sq Epi: x / Non Sq Epi: Few /HPF / Bacteria: x      CULTURES:  Culture Results:   No growth at 12 hours ( @ 06:27)  Culture Results:   No growth at 12 hours ( @ 06:27)      RADIOLOGY & ADDITIONAL STUDIES:  MRI Brain w/wo :  Interval improvement in the multiple metastases with new   metastatic lesion within the left frontal lobe.

## 2017-01-16 NOTE — H&P ADULT. - PROBLEM SELECTOR PLAN 3
L. groin pain, without evidence of hernia, worse with passive and active hip flexion   - x-ray pelvis to r/o atraumatic fracture   - US lower extremities to r/o DVT L. groin pain, without evidence of hernia, worse with active hip flexion   - x-ray pelvis to r/o atraumatic fracture   - US lower extremities to r/o DVT   - pain control, PT

## 2017-01-16 NOTE — ED ADULT NURSE REASSESSMENT NOTE - NS ED NURSE REASSESS COMMENT FT1
pt medicated with IV labetolol, tolerated well. dr cabral from medicine at bedside. aware of BP. pending ICU c/s

## 2017-01-16 NOTE — H&P ADULT. - ASSESSMENT
60yo M w/ PMH renal cell carcinoma w/ mets to the brain (s/p R. nephrectomy) seizure d/o, presenting with weakness, hypertensive urgency, and proteinuria in setting of Inlyta use.

## 2017-01-16 NOTE — CONSULT NOTE ADULT - SUBJECTIVE AND OBJECTIVE BOX
Patient is a 61y Male admitted for  weakness , hx problems with cancer med. 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 metastases  Malignant neoplasm of kidney: Renal cell cancer  Glaucoma: Dx , s/p b/l surgical repair  Essential hypertension: HTN (hypertension)  History of nephrectomy, unilateral: right nephrectomy  Other postprocedural status: S/P craniotomy  Other postprocedural status: S/P tonsillectomy      MEDICATIONS  (STANDING):  aspirin  chewable 81milliGRAM(s) Oral daily  valproic  acid Syrup 250milliGRAM(s) Oral two times a day  lacosamide 200milliGRAM(s) Oral two times a day  atorvastatin 40milliGRAM(s) Oral at bedtime  QUEtiapine 25milliGRAM(s) Oral every 12 hours  risperiDONE   Tablet 1milliGRAM(s) Oral at bedtime  carvedilol 25milliGRAM(s) Oral every 12 hours  amLODIPine   Tablet 10milliGRAM(s) Oral daily  docusate sodium 100milliGRAM(s) Oral two times a day  senna 2Tablet(s) Oral at bedtime  latanoprost 0.005% Ophthalmic Solution 1Drop(s) Both EYES at bedtime  pantoprazole    Tablet 40milliGRAM(s) Oral before breakfast  levothyroxine 50MICROGram(s) Oral daily    MEDICATIONS  (PRN):  oxyCODONE  5 mG/acetaminophen 325 mG 1Tablet(s) Oral every 4 hours PRN Severe Pain (7 - 10)      Allergies    No Known Allergies    Intolerances        SOCIAL HISTORY:    FAMILY HISTORY:  Family history of malignant neoplasm of breast (Mother): Family history of breast cancer in sister  Family history of malignant neoplasm of gastrointestinal tract: Family history of gastric cancer  Family history of malignant neoplasm of gastrointestinal tract: Family history of pancreatic cancer      T(C): , Max: 36.9 (01-15 @ 20:30)  T(F): , Max: 98.4 (01-15 @ 20:30)  HR: 78  BP: 175/105  BP(mean): 136  RR: 18  SpO2: 96%  Wt(kg): --        LABS:                        12.0   7.0   )-----------( 90       ( 2017 11:14 )             37.6     2017 11:14    141    |  108    |  36     ----------------------------<  91     4.5     |  22     |  1.79     Ca    7.8        2017 11:14  Phos  4.6       2017 11:14  Mg     2.1       2017 11:14    TPro  6.5    /  Alb  2.9    /  TBili  0.4    /  DBili  x      /  AST  27     /  ALT  35     /  AlkPhos  166    15 Duc 2017 21:46    Cholesterol, Serum: 149 mg/dL ( @ 11:14)      Urinalysis Basic - ( 15 Duc 2017 23:15 )    Color: Yellow / Appearance: Clear / S.020 / pH: x  Gluc: x / Ketone: NEGATIVE  / Bili: NEGATIVE / Urobili: 0.2 E.U./dL   Blood: x / Protein: >=300 mg/dL / Nitrite: NEGATIVE   Leuk Esterase: NEGATIVE / RBC: 5-10 /HPF / WBC < 5 /HPF   Sq Epi: x / Non Sq Epi: Few /HPF / Bacteria: x            RADIOLOGY & ADDITIONAL STUDIES:

## 2017-01-16 NOTE — CONSULT NOTE ADULT - ASSESSMENT
60 yo m with pmhx of RCC w/ mets to brain s/p R nephrectomy, seizure disorder presenting with L heel and groin pain x3 days found to have L heel spur and degenerative changes on pelvic XR also with hypertensive urgency. 60 yo m with pmhx of RCC w/ mets to brain s/p R nephrectomy, seizure disorder presenting with L heel and groin pain x3 days found to have L heel spur and degenerative changes on pelvic XR also with hypertensive urgency/emergency possibly 2/2 Inlyta.

## 2017-01-16 NOTE — ED ADULT NURSE REASSESSMENT NOTE - NS ED NURSE REASSESS COMMENT FT1
noted with elevated BP, dr jimenez aware. pt pending CT head noted with elevated BP, dr jimenez aware. pt pending CT head before intervention

## 2017-01-16 NOTE — CONSULT NOTE ADULT - ATTENDING COMMENTS
61M ho nephrectomy for RCC with HTN, pain due to heell spur. elevated Cr due to nephrectomy state. no organ damage due to acute HTN. BP reduced with ivp labetalol and oral HTN meds. no indication for tele/icu at this time.

## 2017-01-16 NOTE — CONSULT NOTE ADULT - ASSESSMENT
60yo male w/ metastatic renal CA. New small L frontal brain tumor, unlikely the cause of quadriparesis. Pt myelopathic R/O upper motor cord compression and spine metastasis 62yo male w/ metastatic renal CA. New small L frontal brain tumor, unlikely the cause of quadriparesis. Pt myelopathic, R/O upper motor cord compression and spine metastasis

## 2017-01-16 NOTE — CONSULT NOTE ADULT - PROBLEM SELECTOR RECOMMENDATION 9
Creatinine elevated due to loss of kidney and probably underlying disease from prior metabolic issues. lytes good. albumin 2.9. not eating, however.

## 2017-01-16 NOTE — CONSULT NOTE ADULT - SUBJECTIVE AND OBJECTIVE BOX
62 yo m with pmhx of RCC w/ mets to brain s/p R nephrectomy, seizure disorder presenting with L heel and groin pain x3 days. States pain worse when bearing weight on it. States sharp pain, mainly located in heel, non-radiating, 9/10 in severity. Denies any other symptoms, no headache, vision change, CP, SOB/VARGAS, change in urinary habits, hematuria. 60 yo m with pmhx of RCC w/ mets to brain s/p R nephrectomy, seizure disorder presenting with L heel and groin pain x3 days. States pain worse when bearing weight on it. States sharp pain, mainly located in heel, non-radiating, 9/10 in severity. Denies any other symptoms, no headache, vision change, CP, SOB/VARGAS, change in urinary habits, hematuria, abdominal pain, or n/v/d/c. Feels that he is urinating the normal amount. Has noted feels that his legs have been more swollen.   In ED, . Pt took own Norvasc and Coreg PO.   PMHx: Above.  MEDICATIONS  (STANDING):  aspirin  chewable 81milliGRAM(s) Oral daily  valproic  acid Syrup 250milliGRAM(s) Oral two times a day  lacosamide 200milliGRAM(s) Oral two times a day  atorvastatin 40milliGRAM(s) Oral at bedtime  QUEtiapine 25milliGRAM(s) Oral every 12 hours  risperiDONE   Tablet 1milliGRAM(s) Oral at bedtime  carvedilol 25milliGRAM(s) Oral every 12 hours  amLODIPine   Tablet 10milliGRAM(s) Oral daily  docusate sodium 100milliGRAM(s) Oral two times a day  senna 2Tablet(s) Oral at bedtime  latanoprost 0.005% Ophthalmic Solution 1Drop(s) Both EYES at bedtime  pantoprazole    Tablet 40milliGRAM(s) Oral before breakfast  levothyroxine 50MICROGram(s) Oral daily  All: NKA.  Sx: Craniotomy, Nephrectomy.     Vital Signs Last 24 Hrs  T(C): 36.4, Max: 36.9 (01-15 @ 20:30)  T(F): 97.5, Max: 98.4 (01-15 @ 20:30)  HR: 82 (69 - 92)  BP: 174/106 (174/106 - 190/119)  BP(mean): 136 (136 - 143)  RR: 18 (16 - 19)  SpO2: 97% (95% - 97%)    GEN: NAD.  HEENT: Dry MM.  Neck: No JVD.  CV: RRR, no g/r/m.  Pulm: CTA b/l.  Abd: Soft, NTND, normoactive BS.  Ext: 1+ pitting edema b/l, symmetric. In tact distal pulses.  Neuro: CN II-XI in tact. 5/5 strength x4 extremities. Sensation in tact x4 extremities.    15 Duc 2017 21:46    141    |  108    |  39     ----------------------------<  94     4.6     |  24     |  1.86     Ca    7.8        15 Duc 2017 21:46    TPro  6.5    /  Alb  2.9    /  TBili  0.4    /  DBili  x      /  AST  27     /  ALT  35     /  AlkPhos  166    15 Duc 2017 21:46                          12.2   7.6   )-----------( 115      ( 15 Duc 2017 21:46 )             37.7

## 2017-01-16 NOTE — H&P ADULT. - PROBLEM SELECTOR PLAN 4
JULIAN on CKD - likely 2/2 dehydration, however need to r/o hypertensive emergency. suspicion for nephrotic syndrome given proteinuria and edema   - protein/Cr ratio   - check lipids   - urine electrolytes   - trend Cr, avoid nephrotoxic medications JULIAN on CKD - likely 2/2 dehydration vs. nephrotic syndrome given proteinuria and edema   - protein/Cr ratio   - check lipids   - urine electrolytes   - trend Cr, avoid nephrotoxic medications

## 2017-01-16 NOTE — CONSULT NOTE ADULT - SUBJECTIVE AND OBJECTIVE BOX
Patient is a 61y old  Male who presents with a chief complaint of weakness (16 Jan 2017 02:17)        HPI:  Pt is a 60yo M w/ PMH renal cell carcinoma w/ mets to the brain (s/p R. nephrectomy) seizure d/o, presenting with weakness and L. groin and heel pain. He recently began a new oral chemotherapy called Inlyta on January 4th, and was taking it twice daily. On January 6th, he began experiencing L. groin pain that did not inhibit his daily activities. At baseline he walks with the occasional assistance of a rollator. Beginning January 13th, he started experiencing fatigue and left heel pain, and had to stay in bed. His weakness worsening on Saturday, and he was not able to get up to go to the bathroom. He describes the weakness as associated with left groin and heel pain, stating he cannot walk on the left leg, so he stayed in bed. His wife describes a "puffiness" in his feet, hands and face that was not previously present. He denies fever, chills, headache, vision changes, difficulty speaking or swallowing, confusion, chest pain, palpitations, SOB, nausea, change in bowel habits, dysuria, change in urinary color or frequency, or rash. (16 Jan 2017 02:17)    Weakness in both lower extremities- no known spinal lesions      Allergies  No Known Allergies      Health Issues  WEAKNESS  No h/o HF  Family history of malignant neoplasm of breast (Mother)  Family history of malignant neoplasm of gastrointestinal tract  Family history of malignant neoplasm of gastrointestinal tract  Handoff  MEWS Score  Secondary malignant neoplasm of brain and spinal cord  Malignant neoplasm of kidney  Glaucoma  Essential hypertension  Weakness  Chronic kidney disease  Seizure disorder  Need for prophylactic measure  Nutrition, metabolism, and development symptoms  Hyperlipidemia  Glaucoma  Anemia  Malignant neoplasm of right kidney  JULIAN (acute kidney injury)  Leg pain, left  Weakness  Hypertensive urgency  History of nephrectomy, unilateral  Other postprocedural status  Other postprocedural status  WEAKNESS  90+        FAMILY HISTORY:  Family history of malignant neoplasm of breast (Mother): Family history of breast cancer in sister  Family history of malignant neoplasm of gastrointestinal tract: Family history of gastric cancer  Family history of malignant neoplasm of gastrointestinal tract: Family history of pancreatic cancer      MEDICATIONS  (STANDING):  aspirin  chewable 81milliGRAM(s) Oral daily  valproic  acid Syrup 250milliGRAM(s) Oral two times a day  lacosamide 200milliGRAM(s) Oral two times a day  atorvastatin 40milliGRAM(s) Oral at bedtime  QUEtiapine 25milliGRAM(s) Oral every 12 hours  risperiDONE   Tablet 1milliGRAM(s) Oral at bedtime  carvedilol 25milliGRAM(s) Oral every 12 hours  amLODIPine   Tablet 10milliGRAM(s) Oral daily  docusate sodium 100milliGRAM(s) Oral two times a day  senna 2Tablet(s) Oral at bedtime  latanoprost 0.005% Ophthalmic Solution 1Drop(s) Both EYES at bedtime  pantoprazole    Tablet 40milliGRAM(s) Oral before breakfast    MEDICATIONS  (PRN):  oxyCODONE  5 mG/acetaminophen 325 mG 1Tablet(s) Oral every 4 hours PRN Severe Pain (7 - 10)      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 metastases  Malignant neoplasm of kidney: Renal cell cancer  Glaucoma: Dx 2014, s/p b/l surgical repair  Essential hypertension: HTN (hypertension)  History of nephrectomy, unilateral: right nephrectomy  Other postprocedural status: S/P craniotomy  Other postprocedural status: S/P tonsillectomy      Labs                          12.0   7.0   )-----------( 90       ( 16 Jan 2017 11:14 )             37.6     16 Jan 2017 11:14    141    |  108    |  36     ----------------------------<  91     4.5     |  22     |  1.79     Ca    7.8        16 Jan 2017 11:14  Phos  4.6       16 Jan 2017 11:14  Mg     2.1       16 Jan 2017 11:14    TPro  6.5    /  Alb  2.9    /  TBili  0.4    /  DBili  x      /  AST  27     /  ALT  35     /  AlkPhos  166    15 Duc 2017 21:46      Radiology:    Physical Exam    MENTAL STATUS  -Level of Consciousness- awake    Orientation- person, place time  Language- aphasia/ dysarthria  Memory- recent and remote      Cranial Nerve 1- 12  Pupils- equal and reactive  Eye movements- full EOM  Facial - no asymmetry   Lower CN-nl    Gait and Station-n/a    MOTOR  Upper-nl  Lower- bilateral LE weakness    Reflexes- absent LE    Sensation- no sensory level    Cerebellar- no tremors    vascular -    Assessment- Bilateral LE weakness-- new since Saturday   MRI brain- new left frontal lesion with the others improved after RT    Plan MR- Thoracic and Lumbar spine STAT   Neurosurgery consult

## 2017-01-17 ENCOUNTER — RESULT REVIEW (OUTPATIENT)
Age: 62
End: 2017-01-17

## 2017-01-17 DIAGNOSIS — I10 ESSENTIAL (PRIMARY) HYPERTENSION: ICD-10-CM

## 2017-01-17 DIAGNOSIS — I42.8 OTHER CARDIOMYOPATHIES: ICD-10-CM

## 2017-01-17 DIAGNOSIS — C64.9 MALIGNANT NEOPLASM OF UNSPECIFIED KIDNEY, EXCEPT RENAL PELVIS: ICD-10-CM

## 2017-01-17 DIAGNOSIS — N18.3 CHRONIC KIDNEY DISEASE, STAGE 3 (MODERATE): ICD-10-CM

## 2017-01-17 LAB
ANION GAP SERPL CALC-SCNC: 11 MMOL/L — SIGNIFICANT CHANGE UP (ref 9–16)
APPEARANCE CSF: CLEAR — SIGNIFICANT CHANGE UP
APPEARANCE SPUN FLD: COLORLESS — SIGNIFICANT CHANGE UP
BUN SERPL-MCNC: 29 MG/DL — HIGH (ref 7–23)
CALCIUM SERPL-MCNC: 8.5 MG/DL — SIGNIFICANT CHANGE UP (ref 8.5–10.5)
CHLORIDE SERPL-SCNC: 107 MMOL/L — SIGNIFICANT CHANGE UP (ref 96–108)
CK SERPL-CCNC: 107 U/L — SIGNIFICANT CHANGE UP (ref 39–308)
CK SERPL-CCNC: 95 U/L — SIGNIFICANT CHANGE UP (ref 39–308)
CO2 SERPL-SCNC: 22 MMOL/L — SIGNIFICANT CHANGE UP (ref 22–31)
COLOR CSF: SIGNIFICANT CHANGE UP
CREAT SERPL-MCNC: 1.8 MG/DL — HIGH (ref 0.5–1.3)
CRYPTOC AG CSF-ACNC: NEGATIVE — SIGNIFICANT CHANGE UP
GLUCOSE CSF-MCNC: 72 MG/DL — HIGH (ref 40–70)
GLUCOSE SERPL-MCNC: 111 MG/DL — HIGH (ref 70–99)
GRAM STN FLD: SIGNIFICANT CHANGE UP
HBA1C BLD-MCNC: 5.7 % — HIGH (ref 4.8–5.6)
HCT VFR BLD CALC: 36.4 % — LOW (ref 39–50)
HGB BLD-MCNC: 11.8 G/DL — LOW (ref 13–17)
LYMPHOCYTES # CSF: 1 % — LOW (ref 40–80)
MAGNESIUM SERPL-MCNC: 2.1 MG/DL — SIGNIFICANT CHANGE UP (ref 1.6–2.4)
MCHC RBC-ENTMCNC: 24.4 PG — LOW (ref 27–34)
MCHC RBC-ENTMCNC: 32.4 G/DL — SIGNIFICANT CHANGE UP (ref 32–36)
MCV RBC AUTO: 75.4 FL — LOW (ref 80–100)
MONOS+MACROS NFR CSF: 1 % — LOW (ref 15–45)
NEUTROPHILS # CSF: 0 % — SIGNIFICANT CHANGE UP (ref 0–6)
NRBC NFR CSF: 2 /UL — SIGNIFICANT CHANGE UP (ref 0–5)
PHOSPHATE SERPL-MCNC: 3.8 MG/DL — SIGNIFICANT CHANGE UP (ref 2.5–4.5)
PLATELET # BLD AUTO: 111 K/UL — LOW (ref 150–400)
POTASSIUM SERPL-MCNC: 4.4 MMOL/L — SIGNIFICANT CHANGE UP (ref 3.5–5.3)
POTASSIUM SERPL-SCNC: 4.4 MMOL/L — SIGNIFICANT CHANGE UP (ref 3.5–5.3)
PROT CSF-MCNC: 50 MG/DL — HIGH (ref 15–45)
RBC # BLD: 4.83 M/UL — SIGNIFICANT CHANGE UP (ref 4.2–5.8)
RBC # CSF: 0 /UL — SIGNIFICANT CHANGE UP (ref 0–0)
RBC # FLD: 15.1 % — SIGNIFICANT CHANGE UP (ref 10.3–16.9)
SODIUM SERPL-SCNC: 140 MMOL/L — SIGNIFICANT CHANGE UP (ref 135–145)
SPECIMEN SOURCE: SIGNIFICANT CHANGE UP
T3FREE SERPL-MCNC: 2.04 PG/ML — SIGNIFICANT CHANGE UP (ref 1.71–3.71)
T4 AB SER-ACNC: 6.32 UG/DL — SIGNIFICANT CHANGE UP (ref 3.17–11.72)
TUBE TYPE: SIGNIFICANT CHANGE UP
URATE SERPL-MCNC: 8.9 MG/DL — HIGH (ref 3.5–7.6)
VIT B12 SERPL-MCNC: 1040 PG/ML — HIGH (ref 243–894)
WBC # BLD: 13 K/UL — HIGH (ref 3.8–10.5)
WBC # FLD AUTO: 13 K/UL — HIGH (ref 3.8–10.5)

## 2017-01-17 PROCEDURE — 99253 IP/OBS CNSLTJ NEW/EST LOW 45: CPT | Mod: GC

## 2017-01-17 PROCEDURE — 72148 MRI LUMBAR SPINE W/O DYE: CPT | Mod: 26

## 2017-01-17 PROCEDURE — 62270 DX LMBR SPI PNXR: CPT

## 2017-01-17 PROCEDURE — 99232 SBSQ HOSP IP/OBS MODERATE 35: CPT | Mod: GC

## 2017-01-17 PROCEDURE — 72146 MRI CHEST SPINE W/O DYE: CPT | Mod: 26

## 2017-01-17 PROCEDURE — 72141 MRI NECK SPINE W/O DYE: CPT | Mod: 26

## 2017-01-17 RX ORDER — LEVOTHYROXINE SODIUM 125 MCG
100 TABLET ORAL DAILY
Qty: 0 | Refills: 0 | Status: DISCONTINUED | OUTPATIENT
Start: 2017-01-18 | End: 2017-01-23

## 2017-01-17 RX ORDER — AMPICILLIN TRIHYDRATE 250 MG
2 CAPSULE ORAL EVERY 6 HOURS
Qty: 0 | Refills: 0 | Status: DISCONTINUED | OUTPATIENT
Start: 2017-01-17 | End: 2017-01-19

## 2017-01-17 RX ORDER — ACYCLOVIR SODIUM 500 MG
650 VIAL (EA) INTRAVENOUS EVERY 12 HOURS
Qty: 0 | Refills: 0 | Status: DISCONTINUED | OUTPATIENT
Start: 2017-01-17 | End: 2017-01-19

## 2017-01-17 RX ORDER — FUROSEMIDE 40 MG
40 TABLET ORAL ONCE
Qty: 0 | Refills: 0 | Status: COMPLETED | OUTPATIENT
Start: 2017-01-17 | End: 2017-01-17

## 2017-01-17 RX ORDER — CEFTRIAXONE 500 MG/1
2 INJECTION, POWDER, FOR SOLUTION INTRAMUSCULAR; INTRAVENOUS EVERY 12 HOURS
Qty: 0 | Refills: 0 | Status: DISCONTINUED | OUTPATIENT
Start: 2017-01-17 | End: 2017-01-19

## 2017-01-17 RX ADMIN — ATORVASTATIN CALCIUM 40 MILLIGRAM(S): 80 TABLET, FILM COATED ORAL at 22:39

## 2017-01-17 RX ADMIN — Medication 4 MILLIGRAM(S): at 13:13

## 2017-01-17 RX ADMIN — AMLODIPINE BESYLATE 10 MILLIGRAM(S): 2.5 TABLET ORAL at 04:33

## 2017-01-17 RX ADMIN — Medication 250 MILLIGRAM(S): at 09:51

## 2017-01-17 RX ADMIN — Medication 4 MILLIGRAM(S): at 06:21

## 2017-01-17 RX ADMIN — CARVEDILOL PHOSPHATE 25 MILLIGRAM(S): 80 CAPSULE, EXTENDED RELEASE ORAL at 05:23

## 2017-01-17 RX ADMIN — CARVEDILOL PHOSPHATE 25 MILLIGRAM(S): 80 CAPSULE, EXTENDED RELEASE ORAL at 18:14

## 2017-01-17 RX ADMIN — LACOSAMIDE 200 MILLIGRAM(S): 50 TABLET ORAL at 06:22

## 2017-01-17 RX ADMIN — QUETIAPINE FUMARATE 25 MILLIGRAM(S): 200 TABLET, FILM COATED ORAL at 09:52

## 2017-01-17 RX ADMIN — QUETIAPINE FUMARATE 25 MILLIGRAM(S): 200 TABLET, FILM COATED ORAL at 22:40

## 2017-01-17 RX ADMIN — SENNA PLUS 2 TABLET(S): 8.6 TABLET ORAL at 22:40

## 2017-01-17 RX ADMIN — Medication 216 GRAM(S): at 19:15

## 2017-01-17 RX ADMIN — Medication 113 MILLIGRAM(S): at 18:18

## 2017-01-17 RX ADMIN — Medication 40 MILLIGRAM(S): at 14:56

## 2017-01-17 RX ADMIN — Medication 4 MILLIGRAM(S): at 18:14

## 2017-01-17 RX ADMIN — PANTOPRAZOLE SODIUM 40 MILLIGRAM(S): 20 TABLET, DELAYED RELEASE ORAL at 07:56

## 2017-01-17 RX ADMIN — LATANOPROST 1 DROP(S): 0.05 SOLUTION/ DROPS OPHTHALMIC; TOPICAL at 22:40

## 2017-01-17 RX ADMIN — LACOSAMIDE 200 MILLIGRAM(S): 50 TABLET ORAL at 18:28

## 2017-01-17 RX ADMIN — Medication 75 MICROGRAM(S): at 06:21

## 2017-01-17 RX ADMIN — Medication 100 MILLIGRAM(S): at 06:21

## 2017-01-17 RX ADMIN — Medication 250 MILLIGRAM(S): at 22:40

## 2017-01-17 RX ADMIN — Medication 100 MILLIGRAM(S): at 18:28

## 2017-01-17 RX ADMIN — CEFTRIAXONE 100 GRAM(S): 500 INJECTION, POWDER, FOR SOLUTION INTRAMUSCULAR; INTRAVENOUS at 22:53

## 2017-01-17 NOTE — CONSULT NOTE ADULT - ASSESSMENT
Met Renal cell CA  On immunotherapy  Concern for CNS infection but no evidence of infectious process.    However pt at risk for relapse of fungal infection, listeriosis and herpes virus reactivation.    RECOMMEND  Agree with plans for LP  Please send CSF for cell count and diff, prt, glu, RPR, crypto Ag, Beta-D-Glucan (Fungitell), PCR for herpes viruses (HSV, CMV, VZV, EBV, HHV-6), bacterial, viral, AFB and Fungal cultures.  Keep some CSF for additional studies if needed  Check blood crypto Ag, Beta-D-Glucan, CPK  Draw fungal blood culture  Check nasopharyngeal swab for RVP  OK to start Cefriaxone 2 gm IV q 12 hours, IV Ampicillin (for listeria) and IV Acyclovir (for CNS Herpes) adjusted to Cr CL  Search for noninfectious causes, e.g. GBS

## 2017-01-17 NOTE — PROGRESS NOTE ADULT - PROBLEM SELECTOR PLAN 3
L. groin pain, without evidence of hernia, worse with active hip flexion  - pain control, PT  - f/u U/S doppler and xray

## 2017-01-17 NOTE — CONSULT NOTE ADULT - SUBJECTIVE AND OBJECTIVE BOX
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 The patient abruptly became confused and weak over the weekend and was sent to the emergency room where he was admitted. Although a CT scan was nondiagnostic an MRI demonstrated a new lesion in the frontal lobe. The patient was confined to bed but was seen by neurology.    Examination of the head and neck demonstrates well-healed surgical scars. There is no palpable lymphadenopathy in the neck. The lungs are clear to percussion and auscultation.The heart sounds are regular withou murmur, rub or ectopy. The patient has no truncal obesity and has no palpable or percussible hepatomegaly or splenomegaly. The patient has no chronic venostasis changes in the lower extremity although there is edema of the foot and ankles. Neurologically he is oriented to person and place but not time. He is confabulating at times. He is diffusely rigid. He has no nuchal rigidity. Deep tendon reflexes are equal. The patient has reduced strength in the lower extremities.

## 2017-01-17 NOTE — CHART NOTE - NSCHARTNOTEFT_GEN_A_CORE
Patient seen and examined at bedside. Patient seen and examined at bedside since I was notified by the neurosurgery PA for Neuro exam concerning for myelopathy of the cervical spine and possible cord compression.  When seen at bedside, patient neuro exam appeared clinically worse than what was originally signed out on admission. PE significant for 3/5 muscle in the LLE with 4/5 muscle strength in the RLE. Patient was hyperreflexive in the Upper extrmites (biceps and brachioradialis) with 3/5 muscle strength in the upper arms B/L with limitations in performing finger to nose testing. Babinskis was normal and anal tone was present. Sensation was intact grossly with no saddle anesthesia or urinary or bowel incontinence reported by the patient. Bladder scan done showed 140 cc of urine with patient having emptied out his bladder voluntarily 400 cc an hour prior. Due to  worsening neuro exam and discussion with Dr. Reynoso, it was decided to start patient on decadron to reduce inflammation for a possible cord compression in the spine and to call in an MRI technician STAT to obtain imaging of the cervix thoracic and lumbar spine. ICU was also consulted regarding more frequent monitoring of  the patients Neuro exam. Furthermore the case was discussed with the Neurosurgery PA on call regarding need to transfer the patient under neurosurgery for possible intervention however neurosurgery felt they needed imaging of the spine before a decision was made.

## 2017-01-17 NOTE — PROGRESS NOTE ADULT - PROBLEM SELECTOR PLAN 1
Uncontrolled, likely side effect of Inlyta, as patient reports his blood pressure was controlled prior to starting medication. Asymptomatic.   - hold Inlyta   - C/w clonidine 0.1mg PRN for persistently elevated BP; will need to closely monitor BP as effects of Inlyta wear off

## 2017-01-17 NOTE — PROGRESS NOTE ADULT - SUBJECTIVE AND OBJECTIVE BOX
Subjective: Neurosurgery consult Follow up.    Admitted with [id number] LE weakness since introduction of new medication -   Inlyta..  No new complaits    T(C): 36.6, Max: 37.2 (01-17 @ 03:31)  HR: 92 (70 - 104)  BP: 169/103 (148/81 - 182/109)  RR: 18 (18 - 19)  SpO2: 96% (94% - 97%)  Wt(kg): --    Exam :Alert  awake  and cooperative. Speech is slow but easily understood   L.E: 2/5 plantar and dorsiflexion bilateral, 2/5  R knee bend, 3/5 L. Knee bend.  CBC Full  -  ( 17 Jan 2017 08:07 )  WBC Count : 13.0 K/uL  Hemoglobin : 11.8 g/dL  Hematocrit : 36.4 %  Platelet Count - Automated : 111 K/uL  Mean Cell Volume : 75.4 fL  Mean Cell Hemoglobin : 24.4 pg  Mean Cell Hemoglobin Concentration : 32.4 g/dL  Auto Neutrophil # : x  Auto Lymphocyte # : x  Auto Monocyte # : x  Auto Eosinophil # : x  Auto Basophil # : x  Auto Neutrophil % : x  Auto Lymphocyte % : x  Auto Monocyte % : x  Auto Eosinophil % : x  Auto Basophil % : x    17 Jan 2017 08:07    140    |  107    |  29     ----------------------------<  111    4.4     |  22     |  1.80     Ca    8.5        17 Jan 2017 08:07  Phos  4.6       16 Jan 2017 11:14  Mg     2.1       17 Jan 2017 08:07    TPro  6.5    /  Alb  2.9    /  TBili  0.4    /  DBili  x      /  AST  27     /  ALT  35     /  AlkPhos  166    15 Duc 2017 21:46          Wound:    Imaging: Spine MRI no cord compression    Assessment/Plan: neurology for LP

## 2017-01-17 NOTE — PROGRESS NOTE ADULT - PROBLEM SELECTOR PLAN 4
JULIAN on CKD - likely 2/2 dehydration vs. nephrotic syndrome given proteinuria and edema   - trend Cr, avoid nephrotoxic medications

## 2017-01-17 NOTE — PROGRESS NOTE ADULT - PROBLEM SELECTOR PLAN 3
L. groin pain, without evidence of hernia, worse with active hip flexion   - x-ray pelvis to r/o atraumatic fracture   - US lower extremities to r/o DVT   - pain control, PT L. groin pain, without evidence of hernia, worse with active hip flexion  - pain control, PT  - f/u U/S doppler and xray

## 2017-01-17 NOTE — PROGRESS NOTE ADULT - PROBLEM SELECTOR PLAN 2
Generalized weakness w/ spasticity and hyperreflexia in UE (B/L).  New mets in brain on MRI; MRI spine negative for acute pathology.  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 Reynoso following  - Dr. Salazar following

## 2017-01-17 NOTE — PROGRESS NOTE ADULT - PROBLEM SELECTOR PLAN 5
s/p R. nephrectomy, w/ mets to the brain s/p resection x2   - hold current chemotherapeutic regimen 2/2 side effects   - Dr. Bhakti kelly

## 2017-01-17 NOTE — CONSULT NOTE ADULT - ATTENDING COMMENTS
This is a 60 y/o male with RCC and mets to the brain treated surgically.  Now on chemo, experiencing hip pain, evaluated by neuro.  Hemodynamically stable, on oxycodone and MRI ordered for further evaluation.  To be followed up by neurosurgery this am.  He does not need ICU or [facility] currently, you may reconsult us as needed. This is a 60 y/o male with RCC and mets to the brain treated surgically.  Now on chemo, experiencing hip pain, evaluated by neuro.  Hemodynamically stable, on oxycodone and MRI ordered for further evaluation.  To be followed up by neurosurgery this am. F/u T3, T4, imaging of thyroid  He does not need ICU or [facility] currently, you may reconsult us as needed.

## 2017-01-17 NOTE — PROGRESS NOTE ADULT - SUBJECTIVE AND OBJECTIVE BOX
Symptoms concerning for spinal cord compression; MRI spine negative; decadron started      VITALS  Vital Signs Last 24 Hrs  T(C): 36.6, Max: 37.2 ( @ 03:31)  T(F): 97.8, Max: 99 ( @ 04:45)  HR: 92 (70 - 104)  BP: 169/103 (148/81 - 182/109)  BP(mean): --  RR: 18 (18 - 19)  SpO2: 96% (94% - 97%)    CAPILLARY BLOOD GLUCOSE  106 (2017 22:23)    PHYSICAL EXAM  General: AAOx3; NAD  Head: NC/AT; MMM  Eyes: EOMI  Neck: Supple;    Respiratory: CTA B/L;   Cardiovascular: Regular rhythm/rate; S1/S2;  Gastrointestinal: Soft; NTND  Extremities: WWP; no edema or cyanosis; radial/pedal pulses palpable  Neurological:  CNII-XII grossly intact; B/L UE spasticity and hyperreflexia w/ 1/5 strength; B/L w/ clonus on ankle jerk, 2/5 strength    MEDICATIONS  (STANDING):  valproic  acid Syrup 250milliGRAM(s) Oral two times a day  lacosamide 200milliGRAM(s) Oral two times a day  atorvastatin 40milliGRAM(s) Oral at bedtime  QUEtiapine 25milliGRAM(s) Oral every 12 hours  carvedilol 25milliGRAM(s) Oral every 12 hours  amLODIPine   Tablet 10milliGRAM(s) Oral daily  docusate sodium 100milliGRAM(s) Oral two times a day  senna 2Tablet(s) Oral at bedtime  latanoprost 0.005% Ophthalmic Solution 1Drop(s) Both EYES at bedtime  pantoprazole    Tablet 40milliGRAM(s) Oral before breakfast  dexamethasone  Injectable 4milliGRAM(s) IV Push every 6 hours  insulin lispro (HumaLOG) corrective regimen sliding scale  SubCutaneous Before meals and at bedtime    MEDICATIONS  (PRN):  oxyCODONE  5 mG/acetaminophen 325 mG 1Tablet(s) Oral every 4 hours PRN Severe Pain (7 - 10)      No Known Allergies      LABS                        11.8   13.0  )-----------( 111      ( 2017 08:07 )             36.4     2017 08:07    140    |  107    |  29     ----------------------------<  111    4.4     |  22     |  1.80     Ca    8.5        2017 08:07  Phos  4.6       2017 11:14  Mg     2.1       2017 08:07    TPro  6.5    /  Alb  2.9    /  TBili  0.4    /  DBili  x      /  AST  27     /  ALT  35     /  AlkPhos  166    15 Duc 2017 21:46      Urinalysis Basic - ( 15 Duc 2017 23:15 )    Color: Yellow / Appearance: Clear / S.020 / pH: x  Gluc: x / Ketone: NEGATIVE  / Bili: NEGATIVE / Urobili: 0.2 E.U./dL   Blood: x / Protein: >=300 mg/dL / Nitrite: NEGATIVE   Leuk Esterase: NEGATIVE / RBC: 5-10 /HPF / WBC < 5 /HPF   Sq Epi: x / Non Sq Epi: Few /HPF / Bacteria: x      CARDIAC MARKERS ( 2017 08:07 )  x     / x     / 107 U/L / x     / x      CARDIAC MARKERS ( 2017 11:14 )  x     / x     / 95 U/L / x     / x            IMAGING/EKG/ETC  MRI: Cervical INTERPRETATION:  1. Slight straightening of the normal curvature of the cervical spine,   which may be on the basis of muscle pain and/or spasm and/or positioning.  2. Multilevel degenerative osteoarthritis with bilateral foraminal   narrowing from the C3-C4 level through to the C6-C7 level inclusive.  3. Multilevel degenerative disc disease: central disc protrusion at C2-C3   level, central disc-osteophyte complex C3-C4 level, left   subarticular-foraminal disc protrusion C4-C5 level, disc-osteophyte   complex eccentric to the right at C5-C6 level, and diffuse   disc-osteophyte complex C6-C7 and C7-T1 levels.   4. If there remains concern for metastatic disease, consideration could   be given to bone scan, PET scan, or postcontrast MR imaging of the   cervicalspine, as these may be more sensitive studies for the detection   of osseous metastatic disease.  5. Mild central canal stenosis C4-C5 level which relates to acquired   etiologies from disc disease and osteoarthritis.    Thoracic INTERPRETATION:  1. There appears to be a rounded, subtle focus of decreased signal   intensity on T1 and T2 within T7. There are couple of benign intraosseous   hemangiomata noted which appear as foci of T1 shortening and T2   prolongation. There is a probable benign intraosseous hemangioma within   T9 demonstrating atypical hypointensity on T1 with some peripheral T1   shortening. Postcontrast throacic spine MR imaging may be helpful for   further evaluation and/or nuclear medicine bone scan and/or PET scan, as   these are more sensitive studies for the detection of osseous metastatic   disease.   2. Evaluation is limited by patient motion, particularly the sagittal   STIR sequence. This creates apparent signal within the thoracic cord at   the T11 level which is not confirmed on the sagittal T2. However, this   appears to be present on the axial T2 images, which are also   significantly degraded by motion. This could represent prominence of the   central canal. Consideration could be given to repeating thoracic spine   MR imaging with contrast when the patient is able to cooperate with the   exam.   3. Multilevel degenerative osteoarthritis including marginal osteophytes   anteriorly.  4. Multilevel degenerative disc disease.  5. Kyphosis in the thoracic spine.  6. Round focus of abnormal soft tissue signal intensity in the right lung   parenchyma (series 7, image #25) may represent a metastatic focus.   Reference should be made to the chest CT performed on 2017.    Lumbar INTERPRETATION:  IMadhu M.D., have reviewed the   images and agree with the report with the following modifications:    1. Multilevel degenerative disc disease including discogenic sclerosis at   L5-S1 level. Given that the patient could not tolerate axial imaging,   disc protrusions cannot be excluded.  2. Multilevel facet arthropathy.  3. Unremarkable conus medullaris and cauda equina.

## 2017-01-17 NOTE — PROGRESS NOTE ADULT - SUBJECTIVE AND OBJECTIVE BOX
INTERVAL HISTORY:  very lethargic, BP remains elevated  	  MEDICATIONS:  carvedilol 25milliGRAM(s) Oral every 12 hours  amLODIPine   Tablet 10milliGRAM(s) Oral daily        valproic  acid Syrup 250milliGRAM(s) Oral two times a day  lacosamide 200milliGRAM(s) Oral two times a day  QUEtiapine 25milliGRAM(s) Oral every 12 hours  oxyCODONE  5 mG/acetaminophen 325 mG 1Tablet(s) Oral every 4 hours PRN    docusate sodium 100milliGRAM(s) Oral two times a day  senna 2Tablet(s) Oral at bedtime  pantoprazole    Tablet 40milliGRAM(s) Oral before breakfast    atorvastatin 40milliGRAM(s) Oral at bedtime  levothyroxine 75MICROGram(s) Oral daily  dexamethasone  Injectable 4milliGRAM(s) IV Push every 6 hours  insulin lispro (HumaLOG) corrective regimen sliding scale  SubCutaneous Before meals and at bedtime    latanoprost 0.005% Ophthalmic Solution 1Drop(s) Both EYES at bedtime        PHYSICAL EXAM:  T(C): 37.2, Max: 37.2 (01-17 @ 03:31)  HR: 104 (70 - 104)  BP: 168/112 (148/81 - 182/109)  RR: 18 (17 - 19)  SpO2: 96% (94% - 97%)  Wt(kg): --  I&O's Summary    Height (cm): 182.2 (01-16 @ 17:18)  Weight (kg): 66.9 (01-16 @ 17:18)  BMI (kg/m2): 20.2 (01-16 @ 17:18)  BSA (m2): 1.87 (01-16 @ 17:18)    Appearance: Lethargic  HEENT:   Normal oral mucosa,   Cardiovascular: Normal S1 S2, No JVD, No murmurs, trace edema  Respiratory: Lungs clear to auscultation	  Psychiatry: Not A & O x 3, very lethargic, can follow commands  Gastrointestinal:  Soft, Non-tender, + BS	  Skin: No rashes, No ecchymoses, No cyanosis  Neurologic: Non-focal  Extremities: Normal range of motion, No clubbing, cyanosis, + edema LLE > RLE  Vascular: Peripheral pulses palpable 2+ bilaterally    TELEMETRY: 	    ECG:  	  RADIOLOGY:   DIAGNOSTIC TESTING:  [ ] Echocardiogram:  [ ]  Catheterization:  [ ] Stress Test:    OTHER: 	    LABS:	 	    CARDIAC MARKERS:                                  11.8   13.0  )-----------( 111      ( 17 Jan 2017 08:07 )             36.4     16 Jan 2017 11:14    141    |  108    |  36     ----------------------------<  91     4.5     |  22     |  1.79     Ca    7.8        16 Jan 2017 11:14  Phos  4.6       16 Jan 2017 11:14  Mg     2.1       16 Jan 2017 11:14    TPro  6.5    /  Alb  2.9    /  TBili  0.4    /  DBili  x      /  AST  27     /  ALT  35     /  AlkPhos  166    15 Duc 2017 21:46    proBNP:   Lipid Profile:   HgA1c:   TSH: Thyroid Stimulating Hormone, Serum: 10.303 uIU/mL (01-16 @ 11:14)      ASSESSMENT/PLAN:

## 2017-01-17 NOTE — CONSULT NOTE ADULT - PROBLEM SELECTOR RECOMMENDATION 9
Unclear etiology. Possible spinal cord compression especially in light of cancer history w/ CNS metastases already however exam and history not entirely consistent as pt with good rectal tone, bladder scan (-) for urinary retention), no bowel/bladder incontinence, no saddle anesthesia, and weakness is more generalized with notable rigidity throughout. Sensory exam normal.   - Appreciate Neurosurgery recs, MRI cervical/thoracic/lumbrosacral spine.   - Pt currently going down to MRI now, f/u results.   - Per Neurosurgical team, ok for q4h neuro checks as symptoms appear chronic in nature.   - Would consider discontinuing Risperdone as many of pt's neurological symptoms exhibited seem Parkinsonian-like in nature, if MRI negative, would consider antipsychotic as possible cause of rigidity.  - Check CK level r/o myopathy.   - TSH also elevated, would consider checking T3/T4 and evaluation for ?thyroid myopathy.   - Plan per Neurosurgical team- no need for medical telemetry monitoring at this time however will f/u MRI results.   - Strict bedrest until MRI obtained. Unclear etiology. Possible spinal cord compression especially in light of cancer history w/ CNS metastases already however exam and history not entirely consistent as pt with good rectal tone, bladder scan (-) for urinary retention), no bowel/bladder incontinence, no saddle anesthesia, and weakness is more generalized with notable rigidity throughout. Sensory exam normal.   - Appreciate Neurosurgery recs, MRI cervical/thoracic/lumbrosacral spine.   - Pt currently going down to MRI now, f/u results.   - Per Neurosurgical team, ok for q4h neuro checks as symptoms appear chronic in nature.   - Would consider discontinuing Risperdone as many of pt's neurological symptoms exhibited seem Parkinsonian-like in nature, if MRI negative, would consider antipsychotic as possible cause of rigidity.  - Check CK level r/o myopathy.   - TSH also elevated, would consider checking T3/T4 and evaluation for ?thyroid myopathy.   - Strict bedrest until MRI obtained.  - Decadron per Neurosurgery.   - Plan per Neurosurgical team- no need for medical telemetry monitoring at this time however will f/u MRI results.

## 2017-01-17 NOTE — PROGRESS NOTE ADULT - PROBLEM SELECTOR PLAN 1
uncontrolled, likely side effect of Inlyta, as patient reports his blood pressure was controlled prior to starting medication. Asymptomatic.   - Admit to [facility]   - hold Inlyta   - Start clonidine 0.1mg PRN for persistently elevated BP; will need to closely monitor BP as effects of Inlyta wear off; half-life elimination reportedly 2.1-6.5 hours Uncontrolled, likely side effect of Inlyta, as patient reports his blood pressure was controlled prior to starting medication. Asymptomatic.   - hold Inlyta   - C/w clonidine 0.1mg PRN for persistently elevated BP; will need to closely monitor BP as effects of Inlyta wear off

## 2017-01-17 NOTE — PROGRESS NOTE ADULT - ASSESSMENT
60yo M w/ PMH renal cell carcinoma w/ mets to the brain (s/p R. nephrectomy) seizure d/o, presenting with weakness, hypertensive urgency, and proteinuria in setting of Inlyta use found to have new brain mets on head MRI and symptoms concerning for meningitis/cord compression.

## 2017-01-17 NOTE — PROGRESS NOTE ADULT - PROBLEM SELECTOR PLAN 5
s/p R. nephrectomy, w/ mets to the brain s/p resection x2   - hold current chemotherapeutic regimen 2/2 side effects   - Dr. Ya contacted, awaiting to hear back regarding plan of care s/p R. nephrectomy, w/ mets to the brain s/p resection x2   - hold current chemotherapeutic regimen 2/2 side effects   - Dr. Bhakti kelly

## 2017-01-17 NOTE — PROGRESS NOTE ADULT - ASSESSMENT
62yo M w/ PMH renal cell carcinoma w/ mets to the brain (s/p R. nephrectomy) seizure d/o, presenting with weakness, hypertensive urgency, and proteinuria in setting of Inlyta use. 60yo M w/ PMH renal cell carcinoma w/ mets to the brain (s/p R. nephrectomy) seizure d/o, presenting with weakness, hypertensive urgency, and proteinuria in setting of Inlyta use found to have new brain mets on head MRI and symptoms concerning for meningitis/cord compression.

## 2017-01-17 NOTE — PROGRESS NOTE ADULT - PROBLEM SELECTOR PLAN 4
JULIAN on CKD - likely 2/2 dehydration vs. nephrotic syndrome given proteinuria and edema   - protein/Cr ratio   - check lipids   - urine electrolytes   - trend Cr, avoid nephrotoxic medications JULIAN on CKD - likely 2/2 dehydration vs. nephrotic syndrome given proteinuria and edema   - trend Cr, avoid nephrotoxic medications

## 2017-01-17 NOTE — PROGRESS NOTE ADULT - PROBLEM SELECTOR PLAN 4
October 2015 pt found to have a reduced EF of 30-35% by echo and Nuc.  Also had anterior and AL ischemia but we thought his presentation was most c/w a Takutsubo CM... He also had MARIZA and a provocable outflow gradient of 50mm Hg with the Valsalva maneuver.  Would update echo.

## 2017-01-17 NOTE — CONSULT NOTE ADULT - ASSESSMENT
62 yo m with pmhx of RCC s/p R nephrectomy w/ brain mets s/p resection on oral chemotherapy (Inlyta), seizure d/o now with UE and LE weakness (roughly 4-/5) with noted rigidity of UE and LEs, concern for possible cord compression.

## 2017-01-17 NOTE — CONSULT NOTE ADULT - SUBJECTIVE AND OBJECTIVE BOX
CORIN PERDOMO    [id number]    Patient is a 61y old  Male who presents with a chief complaint of weakness (16 Jan 2017 02:17)      HPI:  Pt is a 62yo M w/ PMH renal cell carcinoma w/ mets to the brain (s/p R. nephrectomy) seizure d/o, presenting with weakness and L. groin and heel pain. He recently began a new oral chemotherapy called Inlyta on January 4th, and was taking it twice daily. On January 6th, he began experiencing L. groin pain that did not inhibit his daily activities. At baseline he walks with the occasional assistance of a rollator. Beginning January 13th, he started experiencing fatigue and left heel pain, and had to stay in bed. His weakness worsening on Saturday, and he was not able to get up to go to the bathroom. He describes the weakness as associated with left groin and heel pain, stating he cannot walk on the left leg, so he stayed in bed. His wife describes a "puffiness" in his feet, hands and face that was not previously present. He denies fever, chills, headache, vision changes, difficulty speaking or swallowing, confusion, chest pain, palpitations, SOB, nausea, change in bowel habits, dysuria, change in urinary color or frequency, or rash. (16 Jan 2017 02:17)      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 metastases  Malignant neoplasm of kidney: Renal cell cancer  Glaucoma: Dx 2014, s/p b/l surgical repair  Essential hypertension: HTN (hypertension)  History of nephrectomy, unilateral: right nephrectomy  Other postprocedural status: S/P craniotomy  Other postprocedural status: S/P tonsillectomy        Social History:    FAMILY HISTORY:  Family history of malignant neoplasm of breast (Mother): Family history of breast cancer in sister  Family history of malignant neoplasm of gastrointestinal tract: Family history of gastric cancer  Family history of malignant neoplasm of gastrointestinal tract: Family history of pancreatic cancer      Functional Level Prior to Admission:                          11.8   13.0  )-----------( 111      ( 17 Jan 2017 08:07 )             36.4       17 Jan 2017 08:07    140    |  107    |  29     ----------------------------<  111    4.4     |  22     |  1.80     Ca    8.5        17 Jan 2017 08:07  Phos  4.6       16 Jan 2017 11:14  Mg     2.1       17 Jan 2017 08:07    TPro  6.5    /  Alb  2.9    /  TBili  0.4    /  DBili  x      /  AST  27     /  ALT  35     /  AlkPhos  166    15 Duc 2017 21:46      Vital Signs Last 24 Hrs  T(C): 36.6, Max: 37.2 (01-17 @ 03:31)  T(F): 97.8, Max: 99 (01-17 @ 04:45)  HR: 92 (78 - 104)  BP: 169/103 (149/92 - 182/109)  BP(mean): --  RR: 18 (18 - 19)  SpO2: 96% (94% - 97%)      PHYSICAL EXAM:This 61 y-o meale was admitted on 1/16/17 with worsening weakness and limited function. h/o renal Ca, mets to brain. Seizure. Lives with wife. ambulation with rollator, has HHA 8 hrs/5 days. 1/16/17 x-ray pelvis no acute fracture.1/16/17 CT head' No acute pathology. MRI head; interval improvement in the multiple metastases with new lesion within Lt. frontal lobe.      Constitutional:lying in bed. asleep. poorly responsive at this time.    Eyes:eyes closed.    ENMT:    Neck:    Breasts:    Back:    Respiratory:    Cardiovascular:    Gastrointestinal:    Genitourinary:    Rectal:    Extremities: spastic/? rigid in uppers. also, mildly spastic lowers. Muscle strenth, unable to assess at this time due to his mental status.    Vascular:    Neurological:    Skin:    Lymph Nodes:    Musculoskeletal:    Psychiatric:            Impression:1. Deconditioned. 2. Renal Ca, mets to brain.    Recommendations:1. PT initiated. 2. Subacute rehab. placement.

## 2017-01-17 NOTE — CONSULT NOTE ADULT - SUBJECTIVE AND OBJECTIVE BOX
HPI:  Pt is a 62yo M w/ PMH renal cell carcinoma w/ mets to the brain (s/p R. nephrectomy) seizure d/o, presenting with weakness and L. groin and heel pain. He recently began a new oral chemotherapy called Inlyta on , and was taking it twice daily. On , he began experiencing L. groin pain that did not inhibit his daily activities. At baseline he walks with the occasional assistance of a rollator. Beginning , he started experiencing fatigue and left heel pain, and had to stay in bed. His weakness worsening on Saturday, and he was not able to get up to go to the bathroom. He describes the weakness as associated with left groin and heel pain, stating he cannot walk on the left leg, so he stayed in bed. His wife describes a "puffiness" in his feet, hands and face that was not previously present. He denies fever, chills, headache, vision changes, difficulty speaking or swallowing, confusion, chest pain, palpitations, SOB, nausea, change in bowel habits, dysuria, change in urinary color or frequency, or rash. 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  Wife states pt completed Voriconazole therapy last August.  No HA, photophobia or stiff neck.  No sick contacts  Otherwise ROS neg      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 metastases  Malignant neoplasm of kidney: Renal cell cancer  Glaucoma: Dx , s/p b/l surgical repair  Essential hypertension: HTN (hypertension)  History of nephrectomy, unilateral: right nephrectomy  Other postprocedural status: S/P craniotomy  Other postprocedural status: S/P tonsillectomy      MEDICATIONS  (STANDING):  valproic  acid Syrup 250milliGRAM(s) Oral two times a day  lacosamide 200milliGRAM(s) Oral two times a day  atorvastatin 40milliGRAM(s) Oral at bedtime  QUEtiapine 25milliGRAM(s) Oral every 12 hours  carvedilol 25milliGRAM(s) Oral every 12 hours  amLODIPine   Tablet 10milliGRAM(s) Oral daily  docusate sodium 100milliGRAM(s) Oral two times a day  senna 2Tablet(s) Oral at bedtime  latanoprost 0.005% Ophthalmic Solution 1Drop(s) Both EYES at bedtime  pantoprazole    Tablet 40milliGRAM(s) Oral before breakfast  dexamethasone  Injectable 4milliGRAM(s) IV Push every 6 hours  insulin lispro (HumaLOG) corrective regimen sliding scale  SubCutaneous Before meals and at bedtime  cefTRIAXone   IVPB 2Gram(s) IV Intermittent every 24 hours  acyclovir IVPB 650milliGRAM(s) IV Intermittent every 12 hours  ampicillin  IVPB 2Gram(s) IV Intermittent every 6 hours    MEDICATIONS  (PRN):  oxyCODONE  5 mG/acetaminophen 325 mG 1Tablet(s) Oral every 4 hours PRN Severe Pain (7 - 10)      Allergies    No Known Allergies      SOCIAL HISTORY:    FAMILY HISTORY:  Family history of malignant neoplasm of breast (Mother): Family history of breast cancer in sister  Family history of malignant neoplasm of gastrointestinal tract: Family history of gastric cancer  Family history of malignant neoplasm of gastrointestinal tract: Family history of pancreatic cancer    EXAM    Vital Signs Last 24 Hrs  T(C): 36.5, Max: 37.2 ( @ 03:31)  T(F): 97.7, Max: 99 ( @ 04:45)  HR: 89 (89 - 104)  BP: 154/97 (154/97 - 182/109)  BP(mean): --  RR: 18 (18 - 19)  SpO2: 94% (94% - 97%)  Awake and alert  Speech somewhat slurred  Neck supple  Face symmetrical  EOMI  No rash or jaundice/icterus  RRR  Chest CTA  Abd soft NT  LE with slight edema LLE  No groin tenderness  No muscle/focal tenderness      LABS:                        11.8   13.0  )-----------( 111      ( 2017 08:07 )             36.4     2017 08:07    140    |  107    |  29     ----------------------------<  111    4.4     |  22     |  1.80     Ca    8.5        2017 08:07  Phos  3.8       2017 08:07  Mg     2.1       2017 08:07    TPro  6.5    /  Alb  2.9    /  TBili  0.4    /  DBili  x      /  AST  27     /  ALT  35     /  AlkPhos  166    15 Duc 2017 21:46      Urinalysis Basic - ( 15 Duc 2017 23:15 )    Color: Yellow / Appearance: Clear / S.020 / pH: x  Gluc: x / Ketone: NEGATIVE  / Bili: NEGATIVE / Urobili: 0.2 E.U./dL   Blood: x / Protein: >=300 mg/dL / Nitrite: NEGATIVE   Leuk Esterase: NEGATIVE / RBC: 5-10 /HPF / WBC < 5 /HPF   Sq Epi: x / Non Sq Epi: Few /HPF / Bacteria: x    Culture - Urine (17 @ 08:03)    Specimen Source: .Urine Clean Catch (Midstream)    Culture Results:   No growth    Culture - Blood (17 @ 06:27)    Specimen Source: .Blood Blood-Venous    Culture Results:   No growth at 1 day.    Culture - Blood (17 @ 06:27)    Specimen Source: .Blood Blood-Peripheral    Culture Results:   No growth at 1 day.    RADIOLOGY & ADDITIONAL STUDIES:  EXAM:  MR CERVICAL SPINE WO CONTRAST                           PROCEDURE DATE:  2017       INTERPRETATION:  IMadhu M.D., have reviewed the   images and agree with the report with the following modifications:    1. Slight straightening of the normal curvature of the cervical spine,   which may be on the basis of muscle pain and/or spasm and/or positioning.  2. Multilevel degenerative osteoarthritis with bilateral foraminal   narrowing from the C3-C4 level through to the C6-C7 level inclusive.  3. Multilevel degenerative disc disease: central disc protrusion at C2-C3   level, central disc-osteophyte complex C3-C4 level, left   subarticular-foraminal disc protrusion C4-C5 level, disc-osteophyte   complex eccentric to the right at C5-C6 level, and diffuse   disc-osteophyte complex C6-C7 and C7-T1 levels.   4. If there remains concern for metastatic disease, consideration could   be given to bone scan, PET scan, or postcontrast MR imaging of the   cervicalspine, as these may be more sensitive studies for the detection   of osseous metastatic disease.  5. Mild central canal stenosis C4-C5 level which relates to acquired   etiologies from disc disease and osteoarthritis.          [facility]  Preliminary Radiology Report     EXAM:  MR Cervical Spine Without Intravenous Contrast.  CLINICAL HISTORY:    61 years old, male; Signs and symptoms and condition or disease; Cancer,   metastatic/secondary; Bone; Other: New onset weakness. Rcc mets to brain;   Additional info: R/O spinal mets , cord compression    TECHNIQUE:  Magnetic resonance images of the cervical spine without intravenous   contrast in multiple planes.    COMPARISON:  CT CHEST 2017 1:59:45 PM    FINDINGS:  Vertebrae: Unremarkable. No acute fracture.  Marrow: No abnormal bone marrow signal seen to suggest metastatic disease.  Spinal cord: Unremarkable. Normal signal.  Soft tissues: Unremarkable.  DISCS/SPINAL CANAL/NEURAL FORAMINA:  5 mm central disc protrusion at C2-C3 with mild canal stenosis.   Degenerative disc disease with 3- 4mm osteophytosis C3-C4, C4-C5, C5-C6   and C6-C7. No stenosis.    IMPRESSION:  No abnormal bone marrow signal seen to suggest metastatic disease.      EXAM:  MR THORACIC SPINE WO CONTRAST                           PROCEDURE DATE:  2017                 INTERPRETATION:  IMadhu MD, have reviewed the images   and agree with the report with the following modifications:    1. There appears to be a rounded, subtle focus of decreased signal   intensity on T1 and T2 within T7. There are couple of benign intraosseous   hemangiomata noted which appear as foci of T1 shortening and T2   prolongation. There is a probable benign intraosseous hemangioma within   T9 demonstrating atypical hypointensity on T1 with some peripheral T1   shortening. Postcontrast throacic spine MR imaging may be helpful for   further evaluation and/or nuclear medicine bone scan and/or PET scan, as   these are more sensitive studies for the detection of osseous metastatic   disease.   2. Evaluation is limited by patient motion, particularly the sagittal   STIR sequence. This creates apparent signal within the thoracic cord at   the T11 level which is not confirmed on the sagittal T2. However, this   appears to be present on the axial T2 images, which are also   significantly degraded by motion. This could represent prominence of the   central canal. Consideration could be given to repeating thoracic spine   MR imaging with contrast when the patient is able to cooperate with the   exam.   3. Multilevel degenerative osteoarthritis including marginal osteophytes   anteriorly.  4. Multilevel degenerative disc disease.  5. Kyphosis in the thoracic spine.  6. Round focus of abnormal soft tissue signal intensity in the right lung   parenchyma (series 7, image #25) may represent a metastatic focus.   Reference should be made to the chest CT performed on 2017.    [facility]  Preliminary Radiology Report     EXAM:  MR Thoracic Spine Without Intravenous Contrast.    CLINICAL HISTORY:  61 years old, male; Condition or disease; Cancer, metastatic/secondary to   thoracic bone; Patient HX:Spinal mets , cord compression;    TECHNIQUE:  Magnetic resonance images of the thoracic spine without intravenous   contrast in multiple planes.    COMPARISON:  CT CHEST 2017 1:59:45 PM    FINDINGS:  Vertebrae: Unremarkable. No acute fracture.  Marrow: No abnormal bone marrow signal is seen to suggest metastatic   disease.  Discs/spinal canal/neural foramina: No evidence of acute pathology. No   significant disc disease.  No spinal canal stenosis.  Spinal cord: Unremarkable. Normal signal.  Soft tissues: Unremarkable.    IMPRESSION:  No abnormal bone marrow signal is seen to suggest metastatic disease.    EXAM:  MR LUMBAR SPINE WO CONTRAST                           PROCEDURE DATE:  2017                 INTERPRETATION:  Madhu RICH M.D., have reviewed the   images and agree with the report with the following modifications:    1. Multilevel degenerative disc disease including discogenic sclerosis at   L5-S1 level. Given that the patient could not tolerate axial imaging,   disc protrusions cannot be excluded.  2. Multilevel facet arthropathy.  3. Unremarkable conus medullaris and cauda equina.    [facility]  Preliminary Radiology Report     EXAM:  MR Lumbar Spine Without Intravenous Contrast.    CLINICAL HISTORY:  61 years old, male; Condition or disease; Cancer, metastatic (secondary   site lumbar); Additional info:  No ax images du to patient stopped exam.    TECHNIQUE:  Magnetic resonance images of the lumbar spine without intravenous   contrast in multiple planes.    COMPARISON:  CT CHEST ABD PELVIS # 2015 5:37:36 PM    FINDINGS:  Vertebrae: Unremarkable. No acute fracture.  Marrow: No abnormal bone marrow lesion is seen to suggest metastatic   disease.  Spinal cord: Unremarkable. Normal signal.  Soft tissues: Unremarkable.    DISCS/SPINAL CANAL/NEURAL FORAMINA:  4 mm diffuse bulge at L5-S1 with mild loss of disc height. 3 mm diffuse   bulge at L2-L3 and L4-L5.  No disc protrusion or extrusion. No significant canal stenosis.    IMPRESSION:  No evidence of metastatic disease. No axial images available as patient   was unable to tolerate complete study.    EXAM:  MR BRAIN WO - W CONTRAST                           PROCEDURE DATE:  2017    Quantity of Contrast in Vial in ml: 7.5 Contrast Used: Gadovist  Quantity of Contrast Wasted in ml: 0       INTERPRETATION:  PROCEDURE: MRI Brain with and without contrast    INDICATION: Brain metastases    TECHNIQUE: Sagittal T1, axial T1, T2, FLAIR, diffusion as well as coronal   FLAIR  images of the brain were obtained. Following the intravenous   administration of gadolinium sagittal, axial, and coronalT1-weighted   images of the brain were obtained.    COMPARISON: MRI 2016    FINDINGS: The MRI examination of the brain demonstrates interval   improvement in the multiple enhancing lesions seen on the prior study   compatible with patient's history of metastases. There has been interval   improvement in the multiple metastases within the left temporal lobe. The   lesion within the anterior superior left temporal lobe just below the   sylvian fissure now measures approximately 0.4 cm whereas it previously   measured approximately 1.2 cm (series 13 image 47 versus series 10 image   47). Many of previously noted lesions demonstrates hypointense signal on   the GRE images which may be secondary to hemorrhagic products.    There is however new ring-enhancing lesion within the posterior left   frontal lobe which measures approximately 0.6 cm width x 0.8 cm AP x 0.5   cm height (series 14 image 25 and series 13 image 70). There is   surrounding FLAIR/T2 signal abnormality compatible with edema.    The patient is status post right parieto-occipital craniotomy with mesh   cranioplasty as well as left frontal craniotomy. The ventricles are   stable in size. The FLAIR/T2-weighted images demonstrate scattered   punctate and confluent foci ofincreased signal within the   periventricular and subcortical white matter which is nonspecific. This   may represent the sequela of small vessel ischemic disease or   alternatively may be treatment related.     There is normal vascular flow-voids. The visualized paranasal sinuses are   free of mucosal disease. The mastoid air cells are well-aerated.    IMPRESSION: Interval improvement in the multiple metastases with new   metastatic lesion within the left frontal lobe.

## 2017-01-17 NOTE — PROGRESS NOTE ADULT - SUBJECTIVE AND OBJECTIVE BOX
Patient seen and examined by me at bedside.  ROS: No chest pain, no sob, no abd pain. No n/v/d    Interval events:  HTN - BP elevated  Malignancy   CKD - stable cr    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 metastases  Malignant neoplasm of kidney: Renal cell cancer  Glaucoma: Dx , s/p b/l surgical repair  Essential hypertension: HTN (hypertension)  History of nephrectomy, unilateral: right nephrectomy  Other postprocedural status: S/P craniotomy  Other postprocedural status: S/P tonsillectomy    PHYSICAL EXAM:  T(C): 36.6, Max: 37.2 ( @ 03:31)  HR: 92  BP: 169/103 (149/92 - 182/109)  RR: 18  SpO2: 96%  Wt(kg): --    I & Os for current day (as of  @ 13:01)  =============================================  IN:    Total IN: 0 ml  ---------------------------------------------  OUT:    Voided: 400 ml    Total OUT: 400 ml  ---------------------------------------------  Total NET: -400 ml    Weight 66.9 ( @ 17:18)  General: AAO x 3,  NAD.  HEENT: moist mucous membranes, no pallor/cyanosis.  Neck: no JVD visible.  Cardiac: S1, S2. RRR.   Respratory: CTA b/l, no access muscle use.   Abdomen: soft. nontender. nondistended  Extremities: trace LE edema b/l        DATA:                        11.8<L>  13.0<H> )-----------( 111<L>    ( 2017 08:07 )             36.4<L>    Ferritin, Serum: 209.0 ng/mL ( @ 11:14)   Iron Total, Serum: 50 ug/dL <L> ( @ 11:14)     140    |  107    |  29<H>  ----------------------------<  111<H>  Ca:8.5   (2017 08:07)  4.4     |  22     |  1.80<H>      eGFR if Non : 39 <L>  eGFR if : 45 <L>    TPro  6.5 g/dL  /  Alb  2.9 g/dL<L>  /  TBili  0.4 mg/dL  /  DBili  x      /  AST  27 U/L  /  ALT  35 U/L  /  AlkPhos  166 U/L<H>  15 Duc 2017 21:46        Urinalysis Basic - ( 15 Duc 2017 23:15 )  Color: Yellow / Appearance: Clear / S.020 / pH: x  Gluc: x / Ketone: NEGATIVE  / Bili: NEGATIVE / Urobili: 0.2 E.U./dL   Blood: x / Protein: >=300 mg/dL<!!> / Nitrite: NEGATIVE   Leuk Esterase: NEGATIVE / RBC: 5-10 /HPF<!!> / WBC < 5 /HPF   Sq Epi: x / Non Sq Epi: Few /HPF / Bacteria: x                MEDICATIONS  (STANDING):  valproic  acid Syrup 250milliGRAM(s) Oral two times a day  lacosamide 200milliGRAM(s) Oral two times a day  atorvastatin 40milliGRAM(s) Oral at bedtime  QUEtiapine 25milliGRAM(s) Oral every 12 hours  carvedilol 25milliGRAM(s) Oral every 12 hours  amLODIPine   Tablet 10milliGRAM(s) Oral daily  docusate sodium 100milliGRAM(s) Oral two times a day  senna 2Tablet(s) Oral at bedtime  latanoprost 0.005% Ophthalmic Solution 1Drop(s) Both EYES at bedtime  pantoprazole    Tablet 40milliGRAM(s) Oral before breakfast  dexamethasone  Injectable 4milliGRAM(s) IV Push every 6 hours  insulin lispro (HumaLOG) corrective regimen sliding scale  SubCutaneous Before meals and at bedtime    MEDICATIONS  (PRN):  oxyCODONE  5 mG/acetaminophen 325 mG 1Tablet(s) Oral every 4 hours PRN Severe Pain (7 - 10)

## 2017-01-17 NOTE — PROGRESS NOTE ADULT - PROBLEM SELECTOR PLAN 2
generalized weakness, possibly contributed to by chemotherapy and recent immobility from heel and groin pain.   - holding chemotherapeutic regimen   - check TSH, B12   - f/u head CT   - PT consult Generalized weakness w/ spasticity and hyperreflexia in UE (B/L).  New mets in brain on MRI; MRI spine negative for acute pathology.  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 Reynoso following  - Dr. Salazar following

## 2017-01-17 NOTE — PROGRESS NOTE ADULT - PROBLEM SELECTOR PLAN 1
Can add Hydralazine for further BP control.  In 10/2015 he had a reduced EF and a provocable outflow gradient of 50mm Hg with Valsalva maneuver.

## 2017-01-17 NOTE — CONSULT NOTE ADULT - SUBJECTIVE AND OBJECTIVE BOX
ICU Reconsult Note   S: 60 yo m with pmhx of RCC s/p R nephrectomy w/ brain mets s/p resection on oral chemotherapy (Inlyta), seizure d/o presenting initially with L groin and heel pain admitted to [facility] medicine for hypertensive urgency and JULIAN possibly 2/2 Inlyta now with LE weakness/pain. Of note, pt is poor historian however states has been feeling generally weak for a few weeks, worsened over the last week to the point where he has trouble getting out of bed. Denies any focal weakness however does state he has difficulty moving his LLE due to severe 10/10 groin pain and difficulty ambulating/bearing weight on his L foot due to heel pain. Denies any other symptoms, no bowel/bladder incontinence, difficulty urinating or change in urinary habits, numbness, tingling, vision changes, headaches, CP, SOB, abdominal pain, n/v/d/c.   MEDICATIONS  (STANDING):  valproic  acid Syrup 250milliGRAM(s) Oral two times a day  lacosamide 200milliGRAM(s) Oral two times a day  atorvastatin 40milliGRAM(s) Oral at bedtime  QUEtiapine 25milliGRAM(s) Oral every 12 hours  carvedilol 25milliGRAM(s) Oral every 12 hours  amLODIPine   Tablet 10milliGRAM(s) Oral daily  docusate sodium 100milliGRAM(s) Oral two times a day  senna 2Tablet(s) Oral at bedtime  latanoprost 0.005% Ophthalmic Solution 1Drop(s) Both EYES at bedtime  pantoprazole    Tablet 40milliGRAM(s) Oral before breakfast  levothyroxine 75MICROGram(s) Oral daily  dexamethasone  Injectable 4milliGRAM(s) IV Push every 6 hours  insulin lispro (HumaLOG) corrective regimen sliding scale  SubCutaneous Before meals and at bedtime    MEDICATIONS  (PRN):  oxyCODONE  5 mG/acetaminophen 325 mG 1Tablet(s) Oral every 4 hours PRN Severe Pain (7 - 10)    O:  Vital Signs Last 24 Hrs  T(C): 36.6, Max: 36.9 (01-16 @ 18:54)  T(F): 97.9, Max: 98.4 (01-16 @ 18:54)  HR: 100 (70 - 100)  BP: 170/101 (148/81 - 185/111)  BP(mean): --  RR: 18 (16 - 19)  SpO2: 94% (94% - 98%)    GEN: NAD.   HEENT: Dry MM.  Neck: No JVD.  CV: RRR, no g/r/m.   Pulm: CTA b/l.  Abd: Normoactive BS, soft, NTND.  Ext: LLE>RLE edema.  Neuro: CNII-XII in tact. 4-/5 strength RLE (able to raise against some resistance). LLE 1/5 strength 2/2 L groin pain. Patellar reflexes 2+, equal. Sensation to light touch in tact all extremities. R and L UEs with 4-/5 strength (able to raise against some resistance). Brachioradialis and biceps reflexes 2+, symmetric. Cogwheel rigidity noted b/l, more pronounced with UEs.   Rectal: Normal rectal tone.     16 Jan 2017 11:14    141    |  108    |  36     ----------------------------<  91     4.5     |  22     |  1.79     Ca    7.8        16 Jan 2017 11:14  Phos  4.6       16 Jan 2017 11:14  Mg     2.1       16 Jan 2017 11:14    TPro  6.5    /  Alb  2.9    /  TBili  0.4    /  DBili  x      /  AST  27     /  ALT  35     /  AlkPhos  166    15 Duc 2017 21:46                          12.0   7.0   )-----------( 90       ( 16 Jan 2017 11:14 )             37.6

## 2017-01-17 NOTE — PROGRESS NOTE ADULT - SUBJECTIVE AND OBJECTIVE BOX
O/N Events:  Subjective/ROS: Denies HA, CP, SOB, n/v, changes in bowel/urinary habits    VITALS  Vital Signs Last 24 Hrs  T(C): 36.6, Max: 37.2 ( @ 03:31)  T(F): 97.8, Max: 99 ( @ 04:45)  HR: 92 (70 - 104)  BP: 169/103 (148/81 - 182/109)  BP(mean): --  RR: 18 (18 - 19)  SpO2: 96% (94% - 97%)    CAPILLARY BLOOD GLUCOSE  106 (2017 22:23)      PHYSICAL EXAM  General: AAOx3; NAD  Head: NC/AT; MMM  Eyes: PERRL; EOMI  Neck: Supple; no JVD  Respiratory: CTA B/L; no wheezes/crackles/rales auscultated w/ good air movement  Cardiovascular: Regular rhythm/rate; S1/S2; no gallops or murmurs auscultated  Gastrointestinal: Soft; NTND w/out rebound tenderness or guarding; bowel sounds normal  Extremities: WWP; no edema or cyanosis; radial/pedal pulses palpable  Neurological:  CNII-XII grossly intact; no obvious focal deficits    MEDICATIONS  (STANDING):  valproic  acid Syrup 250milliGRAM(s) Oral two times a day  lacosamide 200milliGRAM(s) Oral two times a day  atorvastatin 40milliGRAM(s) Oral at bedtime  QUEtiapine 25milliGRAM(s) Oral every 12 hours  carvedilol 25milliGRAM(s) Oral every 12 hours  amLODIPine   Tablet 10milliGRAM(s) Oral daily  docusate sodium 100milliGRAM(s) Oral two times a day  senna 2Tablet(s) Oral at bedtime  latanoprost 0.005% Ophthalmic Solution 1Drop(s) Both EYES at bedtime  pantoprazole    Tablet 40milliGRAM(s) Oral before breakfast  dexamethasone  Injectable 4milliGRAM(s) IV Push every 6 hours  insulin lispro (HumaLOG) corrective regimen sliding scale  SubCutaneous Before meals and at bedtime    MEDICATIONS  (PRN):  oxyCODONE  5 mG/acetaminophen 325 mG 1Tablet(s) Oral every 4 hours PRN Severe Pain (7 - 10)      No Known Allergies      LABS                        11.8   13.0  )-----------( 111      ( 2017 08:07 )             36.4     2017 08:07    140    |  107    |  29     ----------------------------<  111    4.4     |  22     |  1.80     Ca    8.5        2017 08:07  Phos  4.6       2017 11:14  Mg     2.1       2017 08:07    TPro  6.5    /  Alb  2.9    /  TBili  0.4    /  DBili  x      /  AST  27     /  ALT  35     /  AlkPhos  166    15 Duc 2017 21:46      Urinalysis Basic - ( 15 Duc 2017 23:15 )    Color: Yellow / Appearance: Clear / S.020 / pH: x  Gluc: x / Ketone: NEGATIVE  / Bili: NEGATIVE / Urobili: 0.2 E.U./dL   Blood: x / Protein: >=300 mg/dL / Nitrite: NEGATIVE   Leuk Esterase: NEGATIVE / RBC: 5-10 /HPF / WBC < 5 /HPF   Sq Epi: x / Non Sq Epi: Few /HPF / Bacteria: x      CARDIAC MARKERS ( 2017 08:07 )  x     / x     / 107 U/L / x     / x      CARDIAC MARKERS ( 2017 11:14 )  x     / x     / 95 U/L / x     / x            IMAGING/EKG/ETC  EKG:  Xray:  CT:  MRI: Cervical INTERPRETATION:  1. Slight straightening of the normal curvature of the cervical spine,   which may be on the basis of muscle pain and/or spasm and/or positioning.  2. Multilevel degenerative osteoarthritis with bilateral foraminal   narrowing from the C3-C4 level through to the C6-C7 level inclusive.  3. Multilevel degenerative disc disease: central disc protrusion at C2-C3   level, central disc-osteophyte complex C3-C4 level, left   subarticular-foraminal disc protrusion C4-C5 level, disc-osteophyte   complex eccentric to the right at C5-C6 level, and diffuse   disc-osteophyte complex C6-C7 and C7-T1 levels.   4. If there remains concern for metastatic disease, consideration could   be given to bone scan, PET scan, or postcontrast MR imaging of the   cervicalspine, as these may be more sensitive studies for the detection   of osseous metastatic disease.  5. Mild central canal stenosis C4-C5 level which relates to acquired   etiologies from disc disease and osteoarthritis. O/N Events: Symptoms concerning for spinal cord compression; MRI spine negative; decadron started  Subjective/ROS: Denies HA, CP, SOB, n/v    VITALS  Vital Signs Last 24 Hrs  T(C): 36.6, Max: 37.2 ( @ 03:31)  T(F): 97.8, Max: 99 ( @ 04:45)  HR: 92 (70 - 104)  BP: 169/103 (148/81 - 182/109)  BP(mean): --  RR: 18 (18 - 19)  SpO2: 96% (94% - 97%)    CAPILLARY BLOOD GLUCOSE  106 (2017 22:23)    PHYSICAL EXAM  General: AAOx3; NAD  Head: NC/AT; MMM  Eyes: EOMI  Neck: Supple;    Respiratory: CTA B/L;   Cardiovascular: Regular rhythm/rate; S1/S2;  Gastrointestinal: Soft; NTND  Extremities: WWP; no edema or cyanosis; radial/pedal pulses palpable  Neurological:  CNII-XII grossly intact; B/L UE spasticity and hyperreflexia w/ 1/5 strength; B/L w/ clonus on ankle jerk, 2/5 strength    MEDICATIONS  (STANDING):  valproic  acid Syrup 250milliGRAM(s) Oral two times a day  lacosamide 200milliGRAM(s) Oral two times a day  atorvastatin 40milliGRAM(s) Oral at bedtime  QUEtiapine 25milliGRAM(s) Oral every 12 hours  carvedilol 25milliGRAM(s) Oral every 12 hours  amLODIPine   Tablet 10milliGRAM(s) Oral daily  docusate sodium 100milliGRAM(s) Oral two times a day  senna 2Tablet(s) Oral at bedtime  latanoprost 0.005% Ophthalmic Solution 1Drop(s) Both EYES at bedtime  pantoprazole    Tablet 40milliGRAM(s) Oral before breakfast  dexamethasone  Injectable 4milliGRAM(s) IV Push every 6 hours  insulin lispro (HumaLOG) corrective regimen sliding scale  SubCutaneous Before meals and at bedtime    MEDICATIONS  (PRN):  oxyCODONE  5 mG/acetaminophen 325 mG 1Tablet(s) Oral every 4 hours PRN Severe Pain (7 - 10)      No Known Allergies      LABS                        11.8   13.0  )-----------( 111      ( 2017 08:07 )             36.4     2017 08:07    140    |  107    |  29     ----------------------------<  111    4.4     |  22     |  1.80     Ca    8.5        2017 08:07  Phos  4.6       2017 11:14  Mg     2.1       2017 08:07    TPro  6.5    /  Alb  2.9    /  TBili  0.4    /  DBili  x      /  AST  27     /  ALT  35     /  AlkPhos  166    15 Duc 2017 21:46      Urinalysis Basic - ( 15 Duc 2017 23:15 )    Color: Yellow / Appearance: Clear / S.020 / pH: x  Gluc: x / Ketone: NEGATIVE  / Bili: NEGATIVE / Urobili: 0.2 E.U./dL   Blood: x / Protein: >=300 mg/dL / Nitrite: NEGATIVE   Leuk Esterase: NEGATIVE / RBC: 5-10 /HPF / WBC < 5 /HPF   Sq Epi: x / Non Sq Epi: Few /HPF / Bacteria: x      CARDIAC MARKERS ( 2017 08:07 )  x     / x     / 107 U/L / x     / x      CARDIAC MARKERS ( 2017 11:14 )  x     / x     / 95 U/L / x     / x            IMAGING/EKG/ETC  MRI: Cervical INTERPRETATION:  1. Slight straightening of the normal curvature of the cervical spine,   which may be on the basis of muscle pain and/or spasm and/or positioning.  2. Multilevel degenerative osteoarthritis with bilateral foraminal   narrowing from the C3-C4 level through to the C6-C7 level inclusive.  3. Multilevel degenerative disc disease: central disc protrusion at C2-C3   level, central disc-osteophyte complex C3-C4 level, left   subarticular-foraminal disc protrusion C4-C5 level, disc-osteophyte   complex eccentric to the right at C5-C6 level, and diffuse   disc-osteophyte complex C6-C7 and C7-T1 levels.   4. If there remains concern for metastatic disease, consideration could   be given to bone scan, PET scan, or postcontrast MR imaging of the   cervicalspine, as these may be more sensitive studies for the detection   of osseous metastatic disease.  5. Mild central canal stenosis C4-C5 level which relates to acquired   etiologies from disc disease and osteoarthritis.    Thoracic INTERPRETATION:  1. There appears to be a rounded, subtle focus of decreased signal   intensity on T1 and T2 within T7. There are couple of benign intraosseous   hemangiomata noted which appear as foci of T1 shortening and T2   prolongation. There is a probable benign intraosseous hemangioma within   T9 demonstrating atypical hypointensity on T1 with some peripheral T1   shortening. Postcontrast throacic spine MR imaging may be helpful for   further evaluation and/or nuclear medicine bone scan and/or PET scan, as   these are more sensitive studies for the detection of osseous metastatic   disease.   2. Evaluation is limited by patient motion, particularly the sagittal   STIR sequence. This creates apparent signal within the thoracic cord at   the T11 level which is not confirmed on the sagittal T2. However, this   appears to be present on the axial T2 images, which are also   significantly degraded by motion. This could represent prominence of the   central canal. Consideration could be given to repeating thoracic spine   MR imaging with contrast when the patient is able to cooperate with the   exam.   3. Multilevel degenerative osteoarthritis including marginal osteophytes   anteriorly.  4. Multilevel degenerative disc disease.  5. Kyphosis in the thoracic spine.  6. Round focus of abnormal soft tissue signal intensity in the right lung   parenchyma (series 7, image #25) may represent a metastatic focus.   Reference should be made to the chest CT performed on 2017.    Lumbar INTERPRETATION:  I, Madhu Robles M.D., have reviewed the   images and agree with the report with the following modifications:    1. Multilevel degenerative disc disease including discogenic sclerosis at   L5-S1 level. Given that the patient could not tolerate axial imaging,   disc protrusions cannot be excluded.  2. Multilevel facet arthropathy.  3. Unremarkable conus medullaris and cauda equina.

## 2017-01-17 NOTE — CONSULT NOTE ADULT - ATTENDING COMMENTS
After control of the patient's hypertension the patient should have a lumbar puncture performed at interventional radiology. Radiation therapy should be consulted to treat the new frontal lobe lesion.

## 2017-01-17 NOTE — PROGRESS NOTE ADULT - SUBJECTIVE AND OBJECTIVE BOX
Neurology Follow up note    Name  CORIN PERDOMO    HPI:  Pt is a 60yo M w/ PMH renal cell carcinoma w/ mets to the brain (s/p R. nephrectomy) seizure d/o, presenting with weakness and L. groin and heel pain. He recently began a new oral chemotherapy called Inlyta on January 4th, and was taking it twice daily. On January 6th, he began experiencing L. groin pain that did not inhibit his daily activities. At baseline he walks with the occasional assistance of a rollator. Beginning January 13th, he started experiencing fatigue and left heel pain, and had to stay in bed. His weakness worsening on Saturday, and he was not able to get up to go to the bathroom. He describes the weakness as associated with left groin and heel pain, stating he cannot walk on the left leg, so he stayed in bed. His wife describes a "puffiness" in his feet, hands and face that was not previously present. He denies fever, chills, headache, vision changes, difficulty speaking or swallowing, confusion, chest pain, palpitations, SOB, nausea, change in bowel habits, dysuria, change in urinary color or frequency, or rash. (16 Jan 2017 02:17)      Interval History - continues to have weakness in his legs greater than arms        REVIEW OF SYSTEMS    Vital Signs Last 24 Hrs  T(C): 37.2, Max: 37.2 (01-17 @ 03:31)  T(F): 99, Max: 99 (01-17 @ 04:45)  HR: 104 (70 - 104)  BP: 168/112 (148/81 - 182/109)  BP(mean): --  RR: 18 (17 - 19)  SpO2: 96% (94% - 97%)    Physical Exam-     Mental Status-lethargic    Cranial Nerves- full lateral EOM    Gait and station-n/a    Motor- significant weakness both LE    Reflexes- decreased    Sensation-    Coordination- no tremors    Vascular -    Medications  valproic  acid Syrup 250milliGRAM(s) Oral two times a day  lacosamide 200milliGRAM(s) Oral two times a day  atorvastatin 40milliGRAM(s) Oral at bedtime  QUEtiapine 25milliGRAM(s) Oral every 12 hours  carvedilol 25milliGRAM(s) Oral every 12 hours  amLODIPine   Tablet 10milliGRAM(s) Oral daily  docusate sodium 100milliGRAM(s) Oral two times a day  senna 2Tablet(s) Oral at bedtime  latanoprost 0.005% Ophthalmic Solution 1Drop(s) Both EYES at bedtime  pantoprazole    Tablet 40milliGRAM(s) Oral before breakfast  oxyCODONE  5 mG/acetaminophen 325 mG 1Tablet(s) Oral every 4 hours PRN  levothyroxine 75MICROGram(s) Oral daily  dexamethasone  Injectable 4milliGRAM(s) IV Push every 6 hours  insulin lispro (HumaLOG) corrective regimen sliding scale  SubCutaneous Before meals and at bedtime      Lab      Radiology    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

## 2017-01-18 DIAGNOSIS — E79.0 HYPERURICEMIA WITHOUT SIGNS OF INFLAMMATORY ARTHRITIS AND TOPHACEOUS DISEASE: ICD-10-CM

## 2017-01-18 DIAGNOSIS — C79.31 SECONDARY MALIGNANT NEOPLASM OF BRAIN: ICD-10-CM

## 2017-01-18 LAB
ANION GAP SERPL CALC-SCNC: 9 MMOL/L — SIGNIFICANT CHANGE UP (ref 9–16)
APTT BLD: 31.8 SEC — SIGNIFICANT CHANGE UP (ref 27.5–37.4)
BUN SERPL-MCNC: 39 MG/DL — HIGH (ref 7–23)
CALCIUM SERPL-MCNC: 8.6 MG/DL — SIGNIFICANT CHANGE UP (ref 8.5–10.5)
CHLORIDE SERPL-SCNC: 108 MMOL/L — SIGNIFICANT CHANGE UP (ref 96–108)
CO2 SERPL-SCNC: 23 MMOL/L — SIGNIFICANT CHANGE UP (ref 22–31)
CREAT SERPL-MCNC: 1.94 MG/DL — HIGH (ref 0.5–1.3)
GLUCOSE SERPL-MCNC: 126 MG/DL — HIGH (ref 70–99)
HCT VFR BLD CALC: 33.3 % — LOW (ref 39–50)
HGB BLD-MCNC: 11.1 G/DL — LOW (ref 13–17)
INR BLD: 0.98 — SIGNIFICANT CHANGE UP (ref 0.88–1.16)
LDH CSF L TO P-CCNC: 20 U/L — SIGNIFICANT CHANGE UP
LDH FLD-CCNC: 20 U/L — SIGNIFICANT CHANGE UP
MAGNESIUM SERPL-MCNC: 2.1 MG/DL — SIGNIFICANT CHANGE UP (ref 1.6–2.4)
MCHC RBC-ENTMCNC: 25.1 PG — LOW (ref 27–34)
MCHC RBC-ENTMCNC: 33.3 G/DL — SIGNIFICANT CHANGE UP (ref 32–36)
MCV RBC AUTO: 75.2 FL — LOW (ref 80–100)
NIGHT BLUE STAIN TISS: SIGNIFICANT CHANGE UP
PLATELET # BLD AUTO: 113 K/UL — LOW (ref 150–400)
POTASSIUM SERPL-MCNC: 4.6 MMOL/L — SIGNIFICANT CHANGE UP (ref 3.5–5.3)
POTASSIUM SERPL-SCNC: 4.6 MMOL/L — SIGNIFICANT CHANGE UP (ref 3.5–5.3)
PROTHROM AB SERPL-ACNC: 10.9 SEC — SIGNIFICANT CHANGE UP (ref 10–13.1)
RBC # BLD: 4.43 M/UL — SIGNIFICANT CHANGE UP (ref 4.2–5.8)
RBC # FLD: 15.4 % — SIGNIFICANT CHANGE UP (ref 10.3–16.9)
SODIUM SERPL-SCNC: 140 MMOL/L — SIGNIFICANT CHANGE UP (ref 135–145)
SPECIMEN SOURCE: SIGNIFICANT CHANGE UP
WBC # BLD: 10.6 K/UL — HIGH (ref 3.8–10.5)
WBC # FLD AUTO: 10.6 K/UL — HIGH (ref 3.8–10.5)

## 2017-01-18 PROCEDURE — 99232 SBSQ HOSP IP/OBS MODERATE 35: CPT | Mod: GC

## 2017-01-18 RX ADMIN — PANTOPRAZOLE SODIUM 40 MILLIGRAM(S): 20 TABLET, DELAYED RELEASE ORAL at 07:19

## 2017-01-18 RX ADMIN — ATORVASTATIN CALCIUM 40 MILLIGRAM(S): 80 TABLET, FILM COATED ORAL at 22:42

## 2017-01-18 RX ADMIN — Medication 216 GRAM(S): at 07:18

## 2017-01-18 RX ADMIN — Medication 216 GRAM(S): at 00:45

## 2017-01-18 RX ADMIN — Medication 4 MILLIGRAM(S): at 12:59

## 2017-01-18 RX ADMIN — LACOSAMIDE 200 MILLIGRAM(S): 50 TABLET ORAL at 06:17

## 2017-01-18 RX ADMIN — SENNA PLUS 2 TABLET(S): 8.6 TABLET ORAL at 22:42

## 2017-01-18 RX ADMIN — CARVEDILOL PHOSPHATE 25 MILLIGRAM(S): 80 CAPSULE, EXTENDED RELEASE ORAL at 18:02

## 2017-01-18 RX ADMIN — CARVEDILOL PHOSPHATE 25 MILLIGRAM(S): 80 CAPSULE, EXTENDED RELEASE ORAL at 06:16

## 2017-01-18 RX ADMIN — CEFTRIAXONE 100 GRAM(S): 500 INJECTION, POWDER, FOR SOLUTION INTRAMUSCULAR; INTRAVENOUS at 22:42

## 2017-01-18 RX ADMIN — Medication 4 MILLIGRAM(S): at 18:02

## 2017-01-18 RX ADMIN — Medication 216 GRAM(S): at 18:54

## 2017-01-18 RX ADMIN — Medication 100 MILLIGRAM(S): at 18:02

## 2017-01-18 RX ADMIN — Medication 4 MILLIGRAM(S): at 06:17

## 2017-01-18 RX ADMIN — CEFTRIAXONE 100 GRAM(S): 500 INJECTION, POWDER, FOR SOLUTION INTRAMUSCULAR; INTRAVENOUS at 10:31

## 2017-01-18 RX ADMIN — QUETIAPINE FUMARATE 25 MILLIGRAM(S): 200 TABLET, FILM COATED ORAL at 22:44

## 2017-01-18 RX ADMIN — Medication 1: at 12:59

## 2017-01-18 RX ADMIN — Medication 100 MICROGRAM(S): at 06:16

## 2017-01-18 RX ADMIN — Medication 113 MILLIGRAM(S): at 18:02

## 2017-01-18 RX ADMIN — Medication 100 MILLIGRAM(S): at 06:17

## 2017-01-18 RX ADMIN — LACOSAMIDE 200 MILLIGRAM(S): 50 TABLET ORAL at 18:02

## 2017-01-18 RX ADMIN — Medication 1: at 18:01

## 2017-01-18 RX ADMIN — Medication 250 MILLIGRAM(S): at 10:31

## 2017-01-18 RX ADMIN — Medication 250 MILLIGRAM(S): at 22:44

## 2017-01-18 RX ADMIN — Medication 216 GRAM(S): at 12:59

## 2017-01-18 RX ADMIN — LATANOPROST 1 DROP(S): 0.05 SOLUTION/ DROPS OPHTHALMIC; TOPICAL at 21:22

## 2017-01-18 RX ADMIN — QUETIAPINE FUMARATE 25 MILLIGRAM(S): 200 TABLET, FILM COATED ORAL at 10:31

## 2017-01-18 RX ADMIN — Medication 113 MILLIGRAM(S): at 06:16

## 2017-01-18 RX ADMIN — Medication 4 MILLIGRAM(S): at 00:45

## 2017-01-18 RX ADMIN — AMLODIPINE BESYLATE 10 MILLIGRAM(S): 2.5 TABLET ORAL at 06:16

## 2017-01-18 NOTE — PROGRESS NOTE ADULT - PROBLEM SELECTOR PLAN 2
- elevated  - component could be central as patient with brain mets  -  lasix 40 mg IV x 1 given yesterday. This AM SBP in 130s.   - c/w coreg and norvasc for now  - will monitor bp and add additional BP meds as needed

## 2017-01-18 NOTE — CONSULT NOTE ADULT - ASSESSMENT
61M with PMHx of RCC with brain metastasis who presented for weakness and is now significantly improved after d/c of InLyta and BP control. 61M with PMHx of RCC with brain metastasis who presented for left-sided weakness and is now significantly improved after d/c of InLyta and BP control. MRI brain shows a small left posterior frontal metastasis that is new. There are no symptoms associated with the brain metastases.

## 2017-01-18 NOTE — PROGRESS NOTE ADULT - ASSESSMENT
62yo M w/ PMH renal cell carcinoma w/ mets to the brain (s/p R. nephrectomy) seizure d/o, presenting with weakness, hypertensive urgency, and proteinuria in setting of Inlyta use found to have new brain mets on head MRI and symptoms concerning for meningitis/cord compression.

## 2017-01-18 NOTE — PROGRESS NOTE ADULT - SUBJECTIVE AND OBJECTIVE BOX
Patient seen and examined by me at bedside.  ROS: No chest pain, no sob, no abd pain. 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  HEENT: dry mucous membranes, no pallor/cyanosis.  Neck: no JVD visible.  Cardiac: S1, S2. RRR.   Respratory: CTA b/l, no access muscle use.   Abdomen: soft. nontender. nondistended  Extremities: trace pedal edema b/l      Weight 66.9 ( @ 17:18)      DATA:                        11.1<L>  10.6<H> )-----------( 113<L>    ( 2017 07:13 )             33.3<L>    Ferritin, Serum: 209.0 ng/mL ( @ 11:14)      140    |  108    |  39<H>  ----------------------------<  126<H>  Ca:8.6   (2017 07:13)  4.6     |  23     |  1.94<H>      eGFR if Non : 36 <L>  eGFR if : 41 <L>    TPro  6.5 g/dL  /  Alb  2.9 g/dL<L>  /  TBili  0.4 mg/dL  /  DBili  x      /  AST  27 U/L  /  ALT  35 U/L  /  AlkPhos  166 U/L<H>  15 Duc 2017 21:46        Urinalysis Basic - ( 15 Duc 2017 23:15 )  Color: Yellow / Appearance: Clear / S.020 / pH: x  Gluc: x / Ketone: NEGATIVE  / Bili: NEGATIVE / Urobili: 0.2 E.U./dL   Blood: x / Protein: >=300 mg/dL<!!> / Nitrite: NEGATIVE   Leuk Esterase: NEGATIVE / RBC: 5-10 /HPF<!!> / WBC < 5 /HPF   Sq Epi: x / Non Sq Epi: Few /HPF / Bacteria: x                MEDICATIONS  (STANDING):  valproic  acid Syrup 250milliGRAM(s) Oral two times a day  lacosamide 200milliGRAM(s) Oral two times a day  atorvastatin 40milliGRAM(s) Oral at bedtime  QUEtiapine 25milliGRAM(s) Oral every 12 hours  carvedilol 25milliGRAM(s) Oral every 12 hours  amLODIPine   Tablet 10milliGRAM(s) Oral daily  docusate sodium 100milliGRAM(s) Oral two times a day  senna 2Tablet(s) Oral at bedtime  latanoprost 0.005% Ophthalmic Solution 1Drop(s) Both EYES at bedtime  pantoprazole    Tablet 40milliGRAM(s) Oral before breakfast  dexamethasone  Injectable 4milliGRAM(s) IV Push every 6 hours  insulin lispro (HumaLOG) corrective regimen sliding scale  SubCutaneous Before meals and at bedtime  levothyroxine 100MICROGram(s) Oral daily  cefTRIAXone   IVPB 2Gram(s) IV Intermittent every 12 hours  acyclovir IVPB 650milliGRAM(s) IV Intermittent every 12 hours  ampicillin  IVPB 2Gram(s) IV Intermittent every 6 hours    MEDICATIONS  (PRN):  oxyCODONE  5 mG/acetaminophen 325 mG 1Tablet(s) Oral every 4 hours PRN Severe Pain (7 - 10)

## 2017-01-18 NOTE — CONSULT NOTE ADULT - PROBLEM SELECTOR PROBLEM 1
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
Brain metastasis
Hypertensive urgency
Weakness
Chronic kidney disease

## 2017-01-18 NOTE — CONSULT NOTE ADULT - SUBJECTIVE AND OBJECTIVE BOX
Hematology Oncology Consult Note    The patient was seen and examined    61M with PMHx of RCC with brain metastasis who came to the hospital with complaints of new onset weakness that has significantly improved since discontinuation of Inlyta and control of his blood pressure. He feels like he is back to his baseline strength. He denies numbness/tingling, bowel or urinary incontinence, changes in vision, hearing, taste, smell. No CP or SOB. Endorses a few episodes of mild confusion while in the hospital.     Radiation History: He was initially operated on by Dr. Ibarra in 2015 who surgically resected a left cerebellar RCC met and R occipital meningioma. Nephrectomy was performed by Dr. Nath. He was then referred to Dr. Yevgeniy Simmons for further management of brain metastasis. GK SRS was performed on 8/3/15 when a total of seven brain metastatic sites were treated with a dose of 20 Gy each. Surveillance MRI in April, 2016 showed 5 new lesions while the previously treated lesions were smaller or no longer seen. These new lesions were again treated with GK SRS and lesions are smaller than previous.      ROS is otherwise negative.    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 metastases  Malignant neoplasm of kidney: Renal cell cancer  Glaucoma: Dx 2014, s/p b/l surgical repair  Essential hypertension: HTN (hypertension)  History of nephrectomy, unilateral: right nephrectomy  Other postprocedural status: S/P craniotomy  Other postprocedural status: S/P tonsillectomy      Allergies:      Medications:  MEDICATIONS  (STANDING):  valproic  acid Syrup 250milliGRAM(s) Oral two times a day  lacosamide 200milliGRAM(s) Oral two times a day  atorvastatin 40milliGRAM(s) Oral at bedtime  QUEtiapine 25milliGRAM(s) Oral every 12 hours  carvedilol 25milliGRAM(s) Oral every 12 hours  amLODIPine   Tablet 10milliGRAM(s) Oral daily  docusate sodium 100milliGRAM(s) Oral two times a day  senna 2Tablet(s) Oral at bedtime  latanoprost 0.005% Ophthalmic Solution 1Drop(s) Both EYES at bedtime  pantoprazole    Tablet 40milliGRAM(s) Oral before breakfast  dexamethasone  Injectable 4milliGRAM(s) IV Push every 6 hours  insulin lispro (HumaLOG) corrective regimen sliding scale  SubCutaneous Before meals and at bedtime  levothyroxine 100MICROGram(s) Oral daily  cefTRIAXone   IVPB 2Gram(s) IV Intermittent every 12 hours  acyclovir IVPB 650milliGRAM(s) IV Intermittent every 12 hours  ampicillin  IVPB 2Gram(s) IV Intermittent every 6 hours      Social History:    FAMILY HISTORY:  Family history of malignant neoplasm of breast (Mother): Family history of breast cancer in sister  Family history of malignant neoplasm of gastrointestinal tract: Family history of gastric cancer  Family history of malignant neoplasm of gastrointestinal tract: Family history of pancreatic cancer      PHYSICAL EXAM:    T(F): 98, Max: 98.6 (01-17 @ 22:37)  HR: 88 (87 - 93)  BP: 126/84 (126/84 - 165/101)  RR: 18 (18 - 20)  SpO2: 97% (94% - 97%)  Wt(kg): --    Daily     Daily     Gen: well developed, well nourished, comfortable  HEENT: no conjunctival pallor, no scleral icterus, no oral thrush/mucosal bleeding/mucositis  Neck: supple, no masses, no JVD  Back: nontender  Cardiovascular: RR, nl S1S2, no murmurs/rubs/gallops  Respiratory: clear air entry, CTAB   Gastrointestinal: BS+, soft, NT/ND, no masses, no splenomegaly, no hepatomegaly, no evidence for ascites  Extremities: no clubbing/cyanosis, no edema, no calf tenderness, DP/PT 2+ b/l  Neurological: A&Ox3, CNII-XII intact  Motor: 5/5 in RU and RL extremities, 4/5 in MARIA DEL CARMEN and LL extremities. b/l UE with rigidity L>R and hyperactive biceps reflex  Sensory: normal sensation to light touch  Skin: no rash on visible skin  Lymph Nodes:  no cervical/supraclavicular LAD                            11.1   10.6  )-----------( 113      ( 18 Jan 2017 07:13 )             33.3         CBC Full  -  ( 18 Jan 2017 07:13 )  WBC Count : 10.6 K/uL  Hemoglobin : 11.1 g/dL  Hematocrit : 33.3 %  Platelet Count - Automated : 113 K/uL  Mean Cell Volume : 75.2 fL  Mean Cell Hemoglobin : 25.1 pg  Mean Cell Hemoglobin Concentration : 33.3 g/dL  Auto Neutrophil # : x  Auto Lymphocyte # : x  Auto Monocyte # : x  Auto Eosinophil # : x  Auto Basophil # : x  Auto Neutrophil % : x  Auto Lymphocyte % : x  Auto Monocyte % : x  Auto Eosinophil % : x  Auto Basophil % : x        18 Jan 2017 07:13    140    |  108    |  39     ----------------------------<  126    4.6     |  23     |  1.94     Ca    8.6        18 Jan 2017 07:13  Phos  3.8       17 Jan 2017 08:07  Mg     2.1       18 Jan 2017 07:13          PT/INR - ( 18 Jan 2017 07:13 )   PT: 10.9 sec;   INR: 0.98          PTT - ( 18 Jan 2017 07:13 )  PTT:31.8 sec Radiation Oncology Consult Note      61M with PMHx of RCC with brain metastasis who came to the hospital with complaints of new onset left-sided weakness that has significantly improved since discontinuation of Inlyta (TKI) and control of his blood pressure. He feels like he is back to his baseline, which is mild weakness throughout. He denies numbness/tingling, bowel or urinary incontinence, changes in vision, hearing, taste, smell. No CP or SOB. Endorses a few episodes of mild confusion while in the hospital.     MRI of the brain 1/16/17showed a posterior left frontal lobe which measures approximately 0.6 cm width x 0.8 cm AP x 0.5 cm height (series 14 image 25 and series 13 image 70). There is surrounding FLAIR/T2 signal abnormality compatible with edema. Other metastases had improved. MRI of the spine showed no bone metastases.     Radiation History: He was initially operated on by Dr. Ibarra in 2015 who surgically resected a left cerebellar RCC met and R occipital meningioma. Nephrectomy was performed by Dr. Nath. He was then referred to Dr. Yevgeniy Simmons for further management of brain metastasis. GK SRS was performed on 8/3/15 when a total of seven brain metastatic sites were treated with a dose of 20 Gy each. Surveillance MRI in April, 2016 showed 5 new lesions while the previously treated lesions were smaller or no longer seen. These new lesions were again treated with GK SRS and lesions are smaller than previous.    ROS is otherwise negative.    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 metastases  Malignant neoplasm of kidney: Renal cell cancer  Glaucoma: Dx 2014, s/p b/l surgical repair  Essential hypertension: HTN (hypertension)  History of nephrectomy, unilateral: right nephrectomy  Other postprocedural status: S/P craniotomy  Other postprocedural status: S/P tonsillectomy      Medications:  MEDICATIONS  (STANDING):  valproic  acid Syrup 250milliGRAM(s) Oral two times a day  lacosamide 200milliGRAM(s) Oral two times a day  atorvastatin 40milliGRAM(s) Oral at bedtime  QUEtiapine 25milliGRAM(s) Oral every 12 hours  carvedilol 25milliGRAM(s) Oral every 12 hours  amLODIPine   Tablet 10milliGRAM(s) Oral daily  docusate sodium 100milliGRAM(s) Oral two times a day  senna 2Tablet(s) Oral at bedtime  latanoprost 0.005% Ophthalmic Solution 1Drop(s) Both EYES at bedtime  pantoprazole    Tablet 40milliGRAM(s) Oral before breakfast  dexamethasone  Injectable 4milliGRAM(s) IV Push every 6 hours  insulin lispro (HumaLOG) corrective regimen sliding scale  SubCutaneous Before meals and at bedtime  levothyroxine 100MICROGram(s) Oral daily  cefTRIAXone   IVPB 2Gram(s) IV Intermittent every 12 hours  acyclovir IVPB 650milliGRAM(s) IV Intermittent every 12 hours  ampicillin  IVPB 2Gram(s) IV Intermittent every 6 hours      Social History: Lives with wife in [place]     FAMILY HISTORY:  Family history of malignant neoplasm of breast (Mother): Family history of breast cancer in sister  Family history of malignant neoplasm of gastrointestinal tract: Family history of gastric cancer  Family history of malignant neoplasm of gastrointestinal tract: Family history of pancreatic cancer      PHYSICAL EXAM:    T(F): 98, Max: 98.6 (01-17 @ 22:37)  HR: 88 (87 - 93)  BP: 126/84 (126/84 - 165/101)  RR: 18 (18 - 20)  SpO2: 97% (94% - 97%)  Wt(kg): --    Daily     Daily     Gen: well developed, well nourished, comfortable  HEENT: no conjunctival pallor, no scleral icterus, no oral thrush/mucosal bleeding/mucositis  Neck: supple, no masses, no JVD  Back: nontender  Cardiovascular: RR, nl S1S2, no murmurs/rubs/gallops  Respiratory: clear air entry, CTAB   Gastrointestinal: BS+, soft, NT/ND, no masses, no splenomegaly, no hepatomegaly, no evidence for ascites  Extremities: no clubbing/cyanosis, no edema, no calf tenderness, DP/PT 2+ b/l  Neurological: A&Ox3, CNII-XII intact  Motor: 5/5 in RU and RL extremities, 3-4/5 in MARIA DEL CARMEN and LL extremities. +tremor. b/l UE with rigidity L>R and hyperactive biceps reflex. Gait not able to be tested. Patient requires walker.   Sensory: normal sensation to light touch  Skin: no rash on visible skin  Lymph Nodes:  no cervical/supraclavicular LAD                            11.1   10.6  )-----------( 113      ( 18 Jan 2017 07:13 )             33.3         CBC Full  -  ( 18 Jan 2017 07:13 )  WBC Count : 10.6 K/uL  Hemoglobin : 11.1 g/dL  Hematocrit : 33.3 %  Platelet Count - Automated : 113 K/uL  Mean Cell Volume : 75.2 fL  Mean Cell Hemoglobin : 25.1 pg  Mean Cell Hemoglobin Concentration : 33.3 g/dL  Auto Neutrophil # : x  Auto Lymphocyte # : x  Auto Monocyte # : x  Auto Eosinophil # : x  Auto Basophil # : x  Auto Neutrophil % : x  Auto Lymphocyte % : x  Auto Monocyte % : x  Auto Eosinophil % : x  Auto Basophil % : x        18 Jan 2017 07:13    140    |  108    |  39     ----------------------------<  126    4.6     |  23     |  1.94     Ca    8.6        18 Jan 2017 07:13  Phos  3.8       17 Jan 2017 08:07  Mg     2.1       18 Jan 2017 07:13          PT/INR - ( 18 Jan 2017 07:13 )   PT: 10.9 sec;   INR: 0.98          PTT - ( 18 Jan 2017 07:13 )  PTT:31.8 sec

## 2017-01-18 NOTE — PROGRESS NOTE ADULT - SUBJECTIVE AND OBJECTIVE BOX
O/N Events: No acute events O/N.  Subjective/ROS: Denies HA, CP, SOB, n/v, changes in bowel/urinary habits    VITALS  Vital Signs Last 24 Hrs  T(C): 36.7, Max: 37 (01-17 @ 22:37)  T(F): 98, Max: 98.6 (01-17 @ 22:37)  HR: 88 (87 - 93)  BP: 126/84 (126/84 - 165/101)  BP(mean): --  RR: 18 (18 - 20)  SpO2: 97% (94% - 97%)    CAPILLARY BLOOD GLUCOSE  151 (18 Jan 2017 12:03)  128 (18 Jan 2017 08:00)  103 (17 Jan 2017 21:40)  105 (17 Jan 2017 17:31)  120 (17 Jan 2017 12:36)    PHYSICAL EXAM  General: AAOx3; NAD  Head: NC/AT; MMM  Eyes: EOMI  Neck: Supple;    Respiratory: CTA B/L;   Cardiovascular: Regular rhythm/rate; S1/S2;  Gastrointestinal: Soft; NTND  Extremities: WWP; no edema or cyanosis; radial/pedal pulses palpable  Neurological:  CNII-XII grossly intact; B/L UE spasticity and hyperreflexia w/ 1/5 strength; B/L w/ clonus on ankle jerk, 2/5 strength    MEDICATIONS  (STANDING):  valproic  acid Syrup 250milliGRAM(s) Oral two times a day  lacosamide 200milliGRAM(s) Oral two times a day  atorvastatin 40milliGRAM(s) Oral at bedtime  QUEtiapine 25milliGRAM(s) Oral every 12 hours  carvedilol 25milliGRAM(s) Oral every 12 hours  amLODIPine   Tablet 10milliGRAM(s) Oral daily  docusate sodium 100milliGRAM(s) Oral two times a day  senna 2Tablet(s) Oral at bedtime  latanoprost 0.005% Ophthalmic Solution 1Drop(s) Both EYES at bedtime  pantoprazole    Tablet 40milliGRAM(s) Oral before breakfast  dexamethasone  Injectable 4milliGRAM(s) IV Push every 6 hours  insulin lispro (HumaLOG) corrective regimen sliding scale  SubCutaneous Before meals and at bedtime  levothyroxine 100MICROGram(s) Oral daily  cefTRIAXone   IVPB 2Gram(s) IV Intermittent every 12 hours  acyclovir IVPB 650milliGRAM(s) IV Intermittent every 12 hours  ampicillin  IVPB 2Gram(s) IV Intermittent every 6 hours    MEDICATIONS  (PRN):  oxyCODONE  5 mG/acetaminophen 325 mG 1Tablet(s) Oral every 4 hours PRN Severe Pain (7 - 10)      No Known Allergies      LABS                        11.1   10.6  )-----------( 113      ( 18 Jan 2017 07:13 )             33.3     18 Jan 2017 07:13    140    |  108    |  39     ----------------------------<  126    4.6     |  23     |  1.94     Ca    8.6        18 Jan 2017 07:13  Phos  3.8       17 Jan 2017 08:07  Mg     2.1       18 Jan 2017 07:13      PT/INR - ( 18 Jan 2017 07:13 )   PT: 10.9 sec;   INR: 0.98          PTT - ( 18 Jan 2017 07:13 )  PTT:31.8 sec    CARDIAC MARKERS ( 17 Jan 2017 08:07 )  x     / x     / 107 U/L / x     / x              IMAGING/EKG/ETC  MRI: Cervical INTERPRETATION:  1. Slight straightening of the normal curvature of the cervical spine,   which may be on the basis of muscle pain and/or spasm and/or positioning.  2. Multilevel degenerative osteoarthritis with bilateral foraminal   narrowing from the C3-C4 level through to the C6-C7 level inclusive.  3. Multilevel degenerative disc disease: central disc protrusion at C2-C3   level, central disc-osteophyte complex C3-C4 level, left   subarticular-foraminal disc protrusion C4-C5 level, disc-osteophyte   complex eccentric to the right at C5-C6 level, and diffuse   disc-osteophyte complex C6-C7 and C7-T1 levels.   4. If there remains concern for metastatic disease, consideration could   be given to bone scan, PET scan, or postcontrast MR imaging of the   cervicalspine, as these may be more sensitive studies for the detection   of osseous metastatic disease.  5. Mild central canal stenosis C4-C5 level which relates to acquired   etiologies from disc disease and osteoarthritis.    Thoracic INTERPRETATION:  1. There appears to be a rounded, subtle focus of decreased signal   intensity on T1 and T2 within T7. There are couple of benign intraosseous   hemangiomata noted which appear as foci of T1 shortening and T2   prolongation. There is a probable benign intraosseous hemangioma within   T9 demonstrating atypical hypointensity on T1 with some peripheral T1   shortening. Postcontrast throacic spine MR imaging may be helpful for   further evaluation and/or nuclear medicine bone scan and/or PET scan, as   these are more sensitive studies for the detection of osseous metastatic   disease.   2. Evaluation is limited by patient motion, particularly the sagittal   STIR sequence. This creates apparent signal within the thoracic cord at   the T11 level which is not confirmed on the sagittal T2. However, this   appears to be present on the axial T2 images, which are also   significantly degraded by motion. This could represent prominence of the   central canal. Consideration could be given to repeating thoracic spine   MR imaging with contrast when the patient is able to cooperate with the   exam.   3. Multilevel degenerative osteoarthritis including marginal osteophytes   anteriorly.  4. Multilevel degenerative disc disease.  5. Kyphosis in the thoracic spine.  6. Round focus of abnormal soft tissue signal intensity in the right lung   parenchyma (series 7, image #25) may represent a metastatic focus.   Reference should be made to the chest CT performed on 1/16/2017.    Lumbar INTERPRETATION:  IMadhu M.D., have reviewed the   images and agree with the report with the following modifications:    1. Multilevel degenerative disc disease including discogenic sclerosis at   L5-S1 level. Given that the patient could not tolerate axial imaging,   disc protrusions cannot be excluded.  2. Multilevel facet arthropathy.  3. Unremarkable conus medullaris and cauda equina.

## 2017-01-18 NOTE — PROGRESS NOTE ADULT - PROBLEM SELECTOR PLAN 1
Can add Hydralazine for further BP control.  In 10/2015 he had a reduced EF and a provocable outflow gradient of 50mm Hg with Valsalva maneuver.  Would update echo.

## 2017-01-18 NOTE — PROGRESS NOTE ADULT - SUBJECTIVE AND OBJECTIVE BOX
Vital Signs Last 24 Hrs  T(C): 36.9, Max: 37 (01-17 @ 22:37)  T(F): 98.4, Max: 98.6 (01-17 @ 22:37)  HR: 90 (87 - 93)  BP: 147/93 (126/84 - 165/101)  BP(mean): --  RR: 15 (15 - 20)  SpO2: 97% (94% - 97%)  CAPILLARY BLOOD GLUCOSE  151 (18 Jan 2017 12:03)  128 (18 Jan 2017 08:00)  103 (17 Jan 2017 21:40)  105 (17 Jan 2017 17:31)  120 (17 Jan 2017 12:36)    PHYSICAL EXAM  General: AAOx3; NAD  Head: NC/AT; MMM  Eyes: EOMI  Neck: Supple;    Respiratory: CTA B/L;   Cardiovascular: Regular rhythm/rate; S1/S2;  Gastrointestinal: Soft; NTND  Extremities: WWP; no edema or cyanosis; radial/pedal pulses palpable  Neurological:   B/L UE spasticity and hyperreflexia w/ 1/5 strength; B/L w/ clonus on ankle jerk, 2/5 strength    MEDICATIONS  (STANDING):  valproic  acid Syrup 250milliGRAM(s) Oral two times a day  lacosamide 200milliGRAM(s) Oral two times a day  atorvastatin 40milliGRAM(s) Oral at bedtime  QUEtiapine 25milliGRAM(s) Oral every 12 hours  carvedilol 25milliGRAM(s) Oral every 12 hours  amLODIPine   Tablet 10milliGRAM(s) Oral daily  docusate sodium 100milliGRAM(s) Oral two times a day  senna 2Tablet(s) Oral at bedtime  latanoprost 0.005% Ophthalmic Solution 1Drop(s) Both EYES at bedtime  pantoprazole    Tablet 40milliGRAM(s) Oral before breakfast  dexamethasone  Injectable 4milliGRAM(s) IV Push every 6 hours  insulin lispro (HumaLOG) corrective regimen sliding scale  SubCutaneous Before meals and at bedtime  levothyroxine 100MICROGram(s) Oral daily  cefTRIAXone   IVPB 2Gram(s) IV Intermittent every 12 hours  acyclovir IVPB 650milliGRAM(s) IV Intermittent every 12 hours  ampicillin  IVPB 2Gram(s) IV Intermittent every 6 hours    MEDICATIONS  (PRN):  oxyCODONE  5 mG/acetaminophen 325 mG 1Tablet(s) Oral every 4 hours PRN Severe Pain (7 - 10)    LABS                        11.1   10.6  )-----------( 113      ( 18 Jan 2017 07:13 )             33.3     18 Jan 2017 07:13    140    |  108    |  39     ----------------------------<  126    4.6     |  23     |  1.94     Ca    8.6        18 Jan 2017 07:13  Phos  3.8       17 Jan 2017 08:07  Mg     2.1       18 Jan 2017 07:13      PT/INR - ( 18 Jan 2017 07:13 )   PT: 10.9 sec;   INR: 0.98          PTT - ( 18 Jan 2017 07:13 )  PTT:31.8 sec    CARDIAC MARKERS ( 17 Jan 2017 08:07 )  x     / x     / 107 U/L / x     / x              IMAGING/EKG/ETC  MRI: Cervical INTERPRETATION:  1. Slight straightening of the normal curvature of the cervical spine,   which may be on the basis of muscle pain and/or spasm and/or positioning.  2. Multilevel degenerative osteoarthritis with bilateral foraminal   narrowing from the C3-C4 level through to the C6-C7 level inclusive.  3. Multilevel degenerative disc disease: central disc protrusion at C2-C3   level, central disc-osteophyte complex C3-C4 level, left   subarticular-foraminal disc protrusion C4-C5 level, disc-osteophyte   complex eccentric to the right at C5-C6 level, and diffuse   disc-osteophyte complex C6-C7 and C7-T1 levels.   4. If there remains concern for metastatic disease, consideration could   be given to bone scan, PET scan, or postcontrast MR imaging of the   cervicalspine, as these may be more sensitive studies for the detection   of osseous metastatic disease.  5. Mild central canal stenosis C4-C5 level which relates to acquired   etiologies from disc disease and osteoarthritis.    Thoracic INTERPRETATION:  1. There appears to be a rounded, subtle focus of decreased signal   intensity on T1 and T2 within T7. There are couple of benign intraosseous   hemangiomata noted which appear as foci of T1 shortening and T2   prolongation. There is a probable benign intraosseous hemangioma within   T9 demonstrating atypical hypointensity on T1 with some peripheral T1   shortening. Postcontrast throacic spine MR imaging may be helpful for   further evaluation and/or nuclear medicine bone scan and/or PET scan, as   these are more sensitive studies for the detection of osseous metastatic   disease.   2. Evaluation is limited by patient motion, particularly the sagittal   STIR sequence. This creates apparent signal within the thoracic cord at   the T11 level which is not confirmed on the sagittal T2. However, this   appears to be present on the axial T2 images, which are also   significantly degraded by motion. This could represent prominence of the   central canal. Consideration could be given to repeating thoracic spine   MR imaging with contrast when the patient is able to cooperate with the   exam.   3. Multilevel degenerative osteoarthritis including marginal osteophytes   anteriorly.  4. Multilevel degenerative disc disease.  5. Kyphosis in the thoracic spine.  6. Round focus of abnormal soft tissue signal intensity in the right lung   parenchyma (series 7, image #25) may represent a metastatic focus.   Reference should be made to the chest CT performed on 1/16/2017.    Lumbar INTERPRETATION:  IMadhu M.D., have reviewed the   images and agree with the report with the following modifications:    1. Multilevel degenerative disc disease including discogenic sclerosis at   L5-S1 level. Given that the patient could not tolerate axial imaging,   disc protrusions cannot be excluded.  2. Multilevel facet arthropathy.  3. Unremarkable conus medullaris and cauda equina.

## 2017-01-18 NOTE — PROGRESS NOTE ADULT - ATTENDING COMMENTS
the patient should have his IV fluids stopped and his antibiotics stopped. He should be given no tyrosine kinase inhibitor. The patient should have station and gait evaluated daily by the house staff. The patient should have recommendations for home rehabilitation.

## 2017-01-18 NOTE — PROGRESS NOTE ADULT - SUBJECTIVE AND OBJECTIVE BOX
Neurology Follow up note    Name  CORIN PERDOMO    HPI:  Pt is a 60yo M w/ PMH renal cell carcinoma w/ mets to the brain (s/p R. nephrectomy) seizure d/o, presenting with weakness and L. groin and heel pain. He recently began a new oral chemotherapy called Inlyta on January 4th, and was taking it twice daily. On January 6th, he began experiencing L. groin pain that did not inhibit his daily activities. At baseline he walks with the occasional assistance of a rollator. Beginning January 13th, he started experiencing fatigue and left heel pain, and had to stay in bed. His weakness worsening on Saturday, and he was not able to get up to go to the bathroom. He describes the weakness as associated with left groin and heel pain, stating he cannot walk on the left leg, so he stayed in bed. His wife describes a "puffiness" in his feet, hands and face that was not previously present. He denies fever, chills, headache, vision changes, difficulty speaking or swallowing, confusion, chest pain, palpitations, SOB, nausea, change in bowel habits, dysuria, change in urinary color or frequency, or rash. (16 Jan 2017 02:17)      Interval History - reports improved strength in LE- feels more alert        REVIEW OF SYSTEMS    Vital Signs Last 24 Hrs  T(C): 36.9, Max: 37 (01-17 @ 22:37)  T(F): 98.5, Max: 98.6 (01-17 @ 22:37)  HR: 93 (87 - 93)  BP: 132/91 (132/91 - 169/103)  BP(mean): --  RR: 20 (18 - 20)  SpO2: 96% (94% - 96%)    Physical Exam-     Mental Status- awake    Cranial Nerves- no nystagmus    Gait and station-n/a    Motor- able to lift both legs against gravity    Reflexes- absent LE    Sensation- distant sensory loss    Coordination- no tremors    Vascular -    Medications  valproic  acid Syrup 250milliGRAM(s) Oral two times a day  lacosamide 200milliGRAM(s) Oral two times a day  atorvastatin 40milliGRAM(s) Oral at bedtime  QUEtiapine 25milliGRAM(s) Oral every 12 hours  carvedilol 25milliGRAM(s) Oral every 12 hours  amLODIPine   Tablet 10milliGRAM(s) Oral daily  docusate sodium 100milliGRAM(s) Oral two times a day  senna 2Tablet(s) Oral at bedtime  latanoprost 0.005% Ophthalmic Solution 1Drop(s) Both EYES at bedtime  pantoprazole    Tablet 40milliGRAM(s) Oral before breakfast  oxyCODONE  5 mG/acetaminophen 325 mG 1Tablet(s) Oral every 4 hours PRN  dexamethasone  Injectable 4milliGRAM(s) IV Push every 6 hours  insulin lispro (HumaLOG) corrective regimen sliding scale  SubCutaneous Before meals and at bedtime  levothyroxine 100MICROGram(s) Oral daily  cefTRIAXone   IVPB 2Gram(s) IV Intermittent every 12 hours  acyclovir IVPB 650milliGRAM(s) IV Intermittent every 12 hours  ampicillin  IVPB 2Gram(s) IV Intermittent every 6 hours      Lab      Radiology    Assessment- CSF essentially normal - await cytology    Plan Bone scan to r/o met vs hemangioma

## 2017-01-18 NOTE — PROGRESS NOTE ADULT - SUBJECTIVE AND OBJECTIVE BOX
INTERVAL HISTORY:  Elevated BP  	  MEDICATIONS:  carvedilol 25milliGRAM(s) Oral every 12 hours  amLODIPine   Tablet 10milliGRAM(s) Oral daily  cefTRIAXone   IVPB 2Gram(s) IV Intermittent every 12 hours  acyclovir IVPB 650milliGRAM(s) IV Intermittent every 12 hours  ampicillin  IVPB 2Gram(s) IV Intermittent every 6 hours  valproic  acid Syrup 250milliGRAM(s) Oral two times a day  lacosamide 200milliGRAM(s) Oral two times a day  QUEtiapine 25milliGRAM(s) Oral every 12 hours  oxyCODONE  5 mG/acetaminophen 325 mG 1Tablet(s) Oral every 4 hours PRN    docusate sodium 100milliGRAM(s) Oral two times a day  senna 2Tablet(s) Oral at bedtime  pantoprazole    Tablet 40milliGRAM(s) Oral before breakfast    atorvastatin 40milliGRAM(s) Oral at bedtime  dexamethasone  Injectable 4milliGRAM(s) IV Push every 6 hours  insulin lispro (HumaLOG) corrective regimen sliding scale  SubCutaneous Before meals and at bedtime  levothyroxine 100MICROGram(s) Oral daily    latanoprost 0.005% Ophthalmic Solution 1Drop(s) Both EYES at bedtime        PHYSICAL EXAM:  T(C): 36.9, Max: 37 (01-17 @ 22:37)  HR: 93 (87 - 93)  BP: 132/91 (132/91 - 169/103)  RR: 20 (18 - 20)  SpO2: 96% (94% - 96%)  Wt(kg): --  I&O's Summary        Appearance: Normal	  HEENT:   Normal oral mucosa, PERRL, EOMI	  Cardiovascular: Normal S1 S2, No JVD, No murmurs, trace edema  Respiratory: Lungs clear to auscultation	  Psychiatry: A & O x 3, Mood & affect appropriate  Gastrointestinal:  Soft, Non-tender, + BS	  Skin: No rashes, No ecchymoses, No cyanosis  Extremities: No clubbing, cyanosis. + edema  Vascular: Peripheral pulses palpable 2+ bilaterally    TELEMETRY: 	    ECG:  	  RADIOLOGY:   DIAGNOSTIC TESTING:  [ ] Echocardiogram:  [ ]  Catheterization:  [ ] Stress Test:    OTHER: 	    LABS:	 	    CARDIAC MARKERS:                                  11.1   10.6  )-----------( 113      ( 18 Jan 2017 07:13 )             33.3     18 Jan 2017 07:13    140    |  108    |  39     ----------------------------<  126    4.6     |  23     |  1.94     Ca    8.6        18 Jan 2017 07:13  Phos  3.8       17 Jan 2017 08:07  Mg     2.1       18 Jan 2017 07:13      proBNP:   Lipid Profile:   HgA1c:   TSH:     ASSESSMENT/PLAN:

## 2017-01-18 NOTE — CONSULT NOTE ADULT - CONSULT REASON
Hypertension
Meningitis
New brain met seen on MRI
Patient with renal cancer and son loss of strength.
R/o cord compression
Rehabilitation
metastatic renal CA r/o spine metastasis
weakness in LE
Hypertension, one kidney

## 2017-01-18 NOTE — CONSULT NOTE ADULT - PROBLEM SELECTOR RECOMMENDATION 9
Neurologic symptoms have returned to baseline according to patient and wife at bedside. No spinal cord compression on MRI. No need for acute radiation intervention. New brain metastasis seen on MRI brain may be candidate for further GK SRS. Please have him follow up with Dr. Simmons on discharge. Neurologic symptoms have returned to baseline according to patient and wife at bedside. No spinal cord compression on MRI. No need for acute radiation intervention. New brain metastasis seen on MRI brain may be candidate for further GK SRS. The patient has been instructed to follow up with Dr. Simmons on discharge. He understands the plan.

## 2017-01-18 NOTE — PROGRESS NOTE ADULT - SUBJECTIVE AND OBJECTIVE BOX
the patient feels substantially better today. He is now able to feed himself. He is able to walk to the bathroom without a walker but with assistance.    Examination of the head and neck demonstrates well-healed surgical scars. There is no palpable lymphadenopathy in the neck. The lungs are clear to percussion and auscultation.The heart sounds are regular withou murmur, rub or ectopy. The patient has no truncal obesity and has no palpable or percussible hepatomegaly or splenomegaly. The patient has no chronic venostasis changes in the lower extremity although there is edema of the foot and ankles. Neurologically he is oriented to person and place and to time. He is no longer confabulating at times. He is no longer diffusely rigid but has some cogwheeling left. He has no nuchal rigidity. Deep tendon reflexes are equal. The patient has increased strength in the lower extremities.

## 2017-01-19 PROCEDURE — 99232 SBSQ HOSP IP/OBS MODERATE 35: CPT | Mod: GC

## 2017-01-19 RX ORDER — HEPARIN SODIUM 5000 [USP'U]/ML
5000 INJECTION INTRAVENOUS; SUBCUTANEOUS EVERY 8 HOURS
Qty: 0 | Refills: 0 | Status: DISCONTINUED | OUTPATIENT
Start: 2017-01-19 | End: 2017-01-23

## 2017-01-19 RX ADMIN — Medication 100 MILLIGRAM(S): at 06:51

## 2017-01-19 RX ADMIN — Medication 4 MILLIGRAM(S): at 00:58

## 2017-01-19 RX ADMIN — Medication 113 MILLIGRAM(S): at 06:51

## 2017-01-19 RX ADMIN — CEFTRIAXONE 100 GRAM(S): 500 INJECTION, POWDER, FOR SOLUTION INTRAMUSCULAR; INTRAVENOUS at 10:21

## 2017-01-19 RX ADMIN — CARVEDILOL PHOSPHATE 25 MILLIGRAM(S): 80 CAPSULE, EXTENDED RELEASE ORAL at 06:51

## 2017-01-19 RX ADMIN — QUETIAPINE FUMARATE 25 MILLIGRAM(S): 200 TABLET, FILM COATED ORAL at 22:05

## 2017-01-19 RX ADMIN — AMLODIPINE BESYLATE 10 MILLIGRAM(S): 2.5 TABLET ORAL at 06:51

## 2017-01-19 RX ADMIN — Medication 4 MILLIGRAM(S): at 17:19

## 2017-01-19 RX ADMIN — Medication 1: at 13:04

## 2017-01-19 RX ADMIN — QUETIAPINE FUMARATE 25 MILLIGRAM(S): 200 TABLET, FILM COATED ORAL at 12:07

## 2017-01-19 RX ADMIN — LATANOPROST 1 DROP(S): 0.05 SOLUTION/ DROPS OPHTHALMIC; TOPICAL at 22:02

## 2017-01-19 RX ADMIN — ATORVASTATIN CALCIUM 40 MILLIGRAM(S): 80 TABLET, FILM COATED ORAL at 21:53

## 2017-01-19 RX ADMIN — Medication 216 GRAM(S): at 00:58

## 2017-01-19 RX ADMIN — Medication 250 MILLIGRAM(S): at 10:21

## 2017-01-19 RX ADMIN — PANTOPRAZOLE SODIUM 40 MILLIGRAM(S): 20 TABLET, DELAYED RELEASE ORAL at 06:51

## 2017-01-19 RX ADMIN — Medication 1: at 22:01

## 2017-01-19 RX ADMIN — SENNA PLUS 2 TABLET(S): 8.6 TABLET ORAL at 21:53

## 2017-01-19 RX ADMIN — Medication 216 GRAM(S): at 06:52

## 2017-01-19 RX ADMIN — CARVEDILOL PHOSPHATE 25 MILLIGRAM(S): 80 CAPSULE, EXTENDED RELEASE ORAL at 17:18

## 2017-01-19 RX ADMIN — Medication 100 MICROGRAM(S): at 06:51

## 2017-01-19 RX ADMIN — Medication 100 MILLIGRAM(S): at 17:18

## 2017-01-19 RX ADMIN — Medication 4 MILLIGRAM(S): at 07:02

## 2017-01-19 RX ADMIN — HEPARIN SODIUM 5000 UNIT(S): 5000 INJECTION INTRAVENOUS; SUBCUTANEOUS at 21:53

## 2017-01-19 RX ADMIN — Medication 216 GRAM(S): at 12:13

## 2017-01-19 RX ADMIN — LACOSAMIDE 200 MILLIGRAM(S): 50 TABLET ORAL at 07:02

## 2017-01-19 RX ADMIN — LACOSAMIDE 200 MILLIGRAM(S): 50 TABLET ORAL at 17:18

## 2017-01-19 RX ADMIN — Medication 250 MILLIGRAM(S): at 21:53

## 2017-01-19 RX ADMIN — Medication 4 MILLIGRAM(S): at 12:06

## 2017-01-19 NOTE — DIETITIAN INITIAL EVALUATION ADULT. - OTHER INFO
pt with hx of CKD 3, Renal Cell carcinoma + brain METS s/p R nephrectomy is now admitted for HTN urgency with concern for meningitis. pt is currently on DASH diet, aide @ bedside reports pt is with good PO intake however lunch tray observed & pt consumed ~50% of the meal. Aide reports UBW of 165 pounds and feels the pt was this wt within the last few weeks. Aide reports pt had lost wt 2/2 "getting sick" however feels he has gained some back. per prior RD note May 2016 pt is noted with wt of 189.5#, pt is with current wt of 147 pounds which suggests wt loss of ~42 pounds (22% body wt loss) x 8 months based on prior RD note wt & current EMR wt. will continue to monitor. NKFA. Skin Intact. High Nutrition Risk.

## 2017-01-19 NOTE — DIETITIAN INITIAL EVALUATION ADULT. - PROBLEM SELECTOR PLAN 7
chronic, microcytic; no active bleeding; likely 2/2 chemotherapeutic regimen   - f/u iron studies   - trend CBC

## 2017-01-19 NOTE — PROGRESS NOTE ADULT - SUBJECTIVE AND OBJECTIVE BOX
INTERVAL HPI/OVERNIGHT EVENTS:  Reviewed  Clinically improved with improved MS and mobility    MEDICATIONS  (STANDING):  valproic  acid Syrup 250milliGRAM(s) Oral two times a day  lacosamide 200milliGRAM(s) Oral two times a day  atorvastatin 40milliGRAM(s) Oral at bedtime  QUEtiapine 25milliGRAM(s) Oral every 12 hours  carvedilol 25milliGRAM(s) Oral every 12 hours  amLODIPine   Tablet 10milliGRAM(s) Oral daily  docusate sodium 100milliGRAM(s) Oral two times a day  senna 2Tablet(s) Oral at bedtime  latanoprost 0.005% Ophthalmic Solution 1Drop(s) Both EYES at bedtime  pantoprazole    Tablet 40milliGRAM(s) Oral before breakfast  dexamethasone  Injectable 4milliGRAM(s) IV Push every 6 hours  insulin lispro (HumaLOG) corrective regimen sliding scale  SubCutaneous Before meals and at bedtime  levothyroxine 100MICROGram(s) Oral daily  heparin  Injectable 5000Unit(s) SubCutaneous every 8 hours    MEDICATIONS  (PRN):  oxyCODONE  5 mG/acetaminophen 325 mG 1Tablet(s) Oral every 4 hours PRN Severe Pain (7 - 10)      Allergies    No Known Allergies      EXAM    Vital Signs Last 24 Hrs  T(C): 37.2, Max: 37.2 (01-19 @ 09:31)  T(F): 98.9, Max: 98.9 (01-19 @ 09:31)  HR: 87 (80 - 90)  BP: 157/98 (147/93 - 157/98)  BP(mean): --  RR: 18 (15 - 18)  SpO2: 95% (95% - 99%)  resting now but more active and ambulatory with a walker earlier  RRR  Chest with rhonchi  Abd soft NT    LABS:                        11.1   10.6  )-----------( 113      ( 18 Jan 2017 07:13 )             33.3     18 Jan 2017 07:13    140    |  108    |  39     ----------------------------<  126    4.6     |  23     |  1.94     Ca    8.6        18 Jan 2017 07:13  Mg     2.1       18 Jan 2017 07:13      PT/INR - ( 18 Jan 2017 07:13 )   PT: 10.9 sec;   INR: 0.98          PTT - ( 18 Jan 2017 07:13 )  PTT:31.8 sec    CSF with 2 WBC only; prt 50; glu 72  CSF cx neg; crypto Ag neg    MICROBIOLOGY:    Culture - Acid Fast - CSF (01.18.17 @ 07:50)    Specimen Source: .CSF CSF    Acid Fast Bacilli Smear:   Less than 5 cc of CSF received,  unable to perform AFB smear.    Culture - CSF with Gram Stain . (01.17.17 @ 18:31)    Gram Stain:   No organisms seen  No WBC's seen.    Specimen Source: .CSF CSF    Culture Results:   No growth to date    Culture - Blood (01.16.17 @ 06:27)    Specimen Source: .Blood Blood-Peripheral    Culture Results:   No growth at 3 days.    Culture - Blood (01.16.17 @ 06:27)    Specimen Source: .Blood Blood-Peripheral    Culture Results:   No growth at 3 days.    Culture - Fungal, Blood (06.03.16 @ 02:18)    Specimen Source: .Blood Blood-Venous    Culture Results:   No fungus isolated at 2 weeks.

## 2017-01-19 NOTE — DIETITIAN INITIAL EVALUATION ADULT. - PROBLEM SELECTOR PLAN 1
uncontrolled, likely side effect of Inlyta, as patient reports his blood pressure was controlled prior to starting medication. Asymptomatic.   - Admit to [facility]   - hold Inlyta   - Start clonidine 0.1mg PRN for persistently elevated BP; will need to closely monitor BP as effects of Inlyta wear off; half-life elimination reportedly 2.1-6.5 hours

## 2017-01-19 NOTE — PROGRESS NOTE ADULT - SUBJECTIVE AND OBJECTIVE BOX
O/N Events:  Subjective/ROS: Denies HA, CP, SOB, n/v, changes in bowel/urinary habits    VITALS  Vital Signs Last 24 Hrs  T(C): 36.3, Max: 37.1 (01-18 @ 22:28)  T(F): 97.4, Max: 98.7 (01-18 @ 22:28)  HR: 80 (80 - 90)  BP: 148/90 (147/93 - 152/93)  BP(mean): --  RR: 18 (15 - 18)  SpO2: 96% (96% - 99%)    CAPILLARY BLOOD GLUCOSE  127 (19 Jan 2017 07:43)  150 (18 Jan 2017 21:11)  154 (18 Jan 2017 16:44)  151 (18 Jan 2017 12:03)      PHYSICAL EXAM  General: AAOx3; NAD  Head: NC/AT; MMM  Eyes: PERRL; EOMI  Neck: Supple; no JVD  Respiratory: CTA B/L; no wheezes/crackles/rales auscultated w/ good air movement  Cardiovascular: Regular rhythm/rate; S1/S2; no gallops or murmurs auscultated  Gastrointestinal: Soft; NTND w/out rebound tenderness or guarding; bowel sounds normal  Extremities: WWP; no edema or cyanosis; radial/pedal pulses palpable  Neurological:  CNII-XII grossly intact; no obvious focal deficits    MEDICATIONS  (STANDING):  valproic  acid Syrup 250milliGRAM(s) Oral two times a day  lacosamide 200milliGRAM(s) Oral two times a day  atorvastatin 40milliGRAM(s) Oral at bedtime  QUEtiapine 25milliGRAM(s) Oral every 12 hours  carvedilol 25milliGRAM(s) Oral every 12 hours  amLODIPine   Tablet 10milliGRAM(s) Oral daily  docusate sodium 100milliGRAM(s) Oral two times a day  senna 2Tablet(s) Oral at bedtime  latanoprost 0.005% Ophthalmic Solution 1Drop(s) Both EYES at bedtime  pantoprazole    Tablet 40milliGRAM(s) Oral before breakfast  dexamethasone  Injectable 4milliGRAM(s) IV Push every 6 hours  insulin lispro (HumaLOG) corrective regimen sliding scale  SubCutaneous Before meals and at bedtime  levothyroxine 100MICROGram(s) Oral daily  cefTRIAXone   IVPB 2Gram(s) IV Intermittent every 12 hours  acyclovir IVPB 650milliGRAM(s) IV Intermittent every 12 hours  ampicillin  IVPB 2Gram(s) IV Intermittent every 6 hours    MEDICATIONS  (PRN):  oxyCODONE  5 mG/acetaminophen 325 mG 1Tablet(s) Oral every 4 hours PRN Severe Pain (7 - 10)      No Known Allergies      LABS      IMAGING/EKG/ETC  MRI: Cervical INTERPRETATION:  1. Slight straightening of the normal curvature of the cervical spine,   which may be on the basis of muscle pain and/or spasm and/or positioning.  2. Multilevel degenerative osteoarthritis with bilateral foraminal   narrowing from the C3-C4 level through to the C6-C7 level inclusive.  3. Multilevel degenerative disc disease: central disc protrusion at C2-C3   level, central disc-osteophyte complex C3-C4 level, left   subarticular-foraminal disc protrusion C4-C5 level, disc-osteophyte   complex eccentric to the right at C5-C6 level, and diffuse   disc-osteophyte complex C6-C7 and C7-T1 levels.   4. If there remains concern for metastatic disease, consideration could   be given to bone scan, PET scan, or postcontrast MR imaging of the   cervicalspine, as these may be more sensitive studies for the detection   of osseous metastatic disease.  5. Mild central canal stenosis C4-C5 level which relates to acquired   etiologies from disc disease and osteoarthritis.    Thoracic INTERPRETATION:  1. There appears to be a rounded, subtle focus of decreased signal   intensity on T1 and T2 within T7. There are couple of benign intraosseous   hemangiomata noted which appear as foci of T1 shortening and T2   prolongation. There is a probable benign intraosseous hemangioma within   T9 demonstrating atypical hypointensity on T1 with some peripheral T1   shortening. Postcontrast throacic spine MR imaging may be helpful for   further evaluation and/or nuclear medicine bone scan and/or PET scan, as   these are more sensitive studies for the detection of osseous metastatic   disease.   2. Evaluation is limited by patient motion, particularly the sagittal   STIR sequence. This creates apparent signal within the thoracic cord at   the T11 level which is not confirmed on the sagittal T2. However, this   appears to be present on the axial T2 images, which are also   significantly degraded by motion. This could represent prominence of the   central canal. Consideration could be given to repeating thoracic spine   MR imaging with contrast when the patient is able to cooperate with the   exam.   3. Multilevel degenerative osteoarthritis including marginal osteophytes   anteriorly.  4. Multilevel degenerative disc disease.  5. Kyphosis in the thoracic spine.  6. Round focus of abnormal soft tissue signal intensity in the right lung   parenchyma (series 7, image #25) may represent a metastatic focus.   Reference should be made to the chest CT performed on 1/16/2017.    Lumbar INTERPRETATION:  IMadhu M.D., have reviewed the   images and agree with the report with the following modifications:    1. Multilevel degenerative disc disease including discogenic sclerosis at   L5-S1 level. Given that the patient could not tolerate axial imaging,   disc protrusions cannot be excluded.  2. Multilevel facet arthropathy.  3. Unremarkable conus medullaris and cauda equina. O/N Events: RUBY  Subjective/ROS: Denies HA, CP, SOB, n/v, changes in bowel/urinary habits    VITALS  Vital Signs Last 24 Hrs  T(C): 36.3, Max: 37.1 (01-18 @ 22:28)  T(F): 97.4, Max: 98.7 (01-18 @ 22:28)  HR: 80 (80 - 90)  BP: 148/90 (147/93 - 152/93)  BP(mean): --  RR: 18 (15 - 18)  SpO2: 96% (96% - 99%)    CAPILLARY BLOOD GLUCOSE  127 (19 Jan 2017 07:43)  150 (18 Jan 2017 21:11)  154 (18 Jan 2017 16:44)  151 (18 Jan 2017 12:03)    PHYSICAL EXAM  General: AAOx3; NAD  Head: NC/AT; MMM  Eyes: EOMI  Neck: Supple;    Respiratory: CTA B/L;   Cardiovascular: Regular rhythm/rate; S1/S2;  Gastrointestinal: Soft; NTND  Extremities: WWP; no edema or cyanosis; radial/pedal pulses palpable  Neurological:  CNII-XII grossly intact; improving B/L UE spasticity and weakness;     MEDICATIONS  (STANDING):  valproic  acid Syrup 250milliGRAM(s) Oral two times a day  lacosamide 200milliGRAM(s) Oral two times a day  atorvastatin 40milliGRAM(s) Oral at bedtime  QUEtiapine 25milliGRAM(s) Oral every 12 hours  carvedilol 25milliGRAM(s) Oral every 12 hours  amLODIPine   Tablet 10milliGRAM(s) Oral daily  docusate sodium 100milliGRAM(s) Oral two times a day  senna 2Tablet(s) Oral at bedtime  latanoprost 0.005% Ophthalmic Solution 1Drop(s) Both EYES at bedtime  pantoprazole    Tablet 40milliGRAM(s) Oral before breakfast  dexamethasone  Injectable 4milliGRAM(s) IV Push every 6 hours  insulin lispro (HumaLOG) corrective regimen sliding scale  SubCutaneous Before meals and at bedtime  levothyroxine 100MICROGram(s) Oral daily  cefTRIAXone   IVPB 2Gram(s) IV Intermittent every 12 hours  acyclovir IVPB 650milliGRAM(s) IV Intermittent every 12 hours  ampicillin  IVPB 2Gram(s) IV Intermittent every 6 hours    MEDICATIONS  (PRN):  oxyCODONE  5 mG/acetaminophen 325 mG 1Tablet(s) Oral every 4 hours PRN Severe Pain (7 - 10)      No Known Allergies      LABS  No AM labs      IMAGING/EKG/ETC  MRI: Cervical INTERPRETATION:  1. Slight straightening of the normal curvature of the cervical spine,   which may be on the basis of muscle pain and/or spasm and/or positioning.  2. Multilevel degenerative osteoarthritis with bilateral foraminal   narrowing from the C3-C4 level through to the C6-C7 level inclusive.  3. Multilevel degenerative disc disease: central disc protrusion at C2-C3   level, central disc-osteophyte complex C3-C4 level, left   subarticular-foraminal disc protrusion C4-C5 level, disc-osteophyte   complex eccentric to the right at C5-C6 level, and diffuse   disc-osteophyte complex C6-C7 and C7-T1 levels.   4. If there remains concern for metastatic disease, consideration could   be given to bone scan, PET scan, or postcontrast MR imaging of the   cervicalspine, as these may be more sensitive studies for the detection   of osseous metastatic disease.  5. Mild central canal stenosis C4-C5 level which relates to acquired   etiologies from disc disease and osteoarthritis.    Thoracic INTERPRETATION:  1. There appears to be a rounded, subtle focus of decreased signal   intensity on T1 and T2 within T7. There are couple of benign intraosseous   hemangiomata noted which appear as foci of T1 shortening and T2   prolongation. There is a probable benign intraosseous hemangioma within   T9 demonstrating atypical hypointensity on T1 with some peripheral T1   shortening. Postcontrast throacic spine MR imaging may be helpful for   further evaluation and/or nuclear medicine bone scan and/or PET scan, as   these are more sensitive studies for the detection of osseous metastatic   disease.   2. Evaluation is limited by patient motion, particularly the sagittal   STIR sequence. This creates apparent signal within the thoracic cord at   the T11 level which is not confirmed on the sagittal T2. However, this   appears to be present on the axial T2 images, which are also   significantly degraded by motion. This could represent prominence of the   central canal. Consideration could be given to repeating thoracic spine   MR imaging with contrast when the patient is able to cooperate with the   exam.   3. Multilevel degenerative osteoarthritis including marginal osteophytes   anteriorly.  4. Multilevel degenerative disc disease.  5. Kyphosis in the thoracic spine.  6. Round focus of abnormal soft tissue signal intensity in the right lung   parenchyma (series 7, image #25) may represent a metastatic focus.   Reference should be made to the chest CT performed on 1/16/2017.    Lumbar INTERPRETATION:  IMadhu M.D., have reviewed the   images and agree with the report with the following modifications:    1. Multilevel degenerative disc disease including discogenic sclerosis at   L5-S1 level. Given that the patient could not tolerate axial imaging,   disc protrusions cannot be excluded.  2. Multilevel facet arthropathy.  3. Unremarkable conus medullaris and cauda equina.

## 2017-01-19 NOTE — PROGRESS NOTE ADULT - ASSESSMENT
The patient has continued to recover from a neurologic event of uncertain etiology but perhaps related to his TKI. The patient had difficulty with cardiac performance while on Sutent. Other than hypothyroidism corrected by replacement therapy, the patient has tolerated immunotherapy well but has evidence of progression in the skull and lungs. Note that it may take as long as 16 weeks to provide adequate immunologic therapy for this disease.

## 2017-01-19 NOTE — PROGRESS NOTE ADULT - SUBJECTIVE AND OBJECTIVE BOX
The patient feels well today although he feels that there is greater tremor in his left hand. He would like to go to a rehabilitation center to recover before having home rehabilitation.      Examination of the head and neck demonstrates well-healed surgical scars. There is no palpable lymphadenopathy in the neck. The lungs are clear to percussion and auscultation.The heart sounds are regular withou murmur, rub or ectopy. The patient has no truncal obesity and has no palpable or percussible hepatomegaly or splenomegaly. The patient has no chronic venostasis changes in the lower extremity although there is edema of the foot and ankles. Neurologically he is oriented to person and place and to time. He is no longer confabulating at times. He is no longer diffusely rigid but has some cogwheeling which is greater in the left arm than the right arm. He has no nuchal rigidity. Deep tendon reflexes are equal. The patient has increased strength in the lower extremities.

## 2017-01-19 NOTE — PROGRESS NOTE ADULT - ATTENDING COMMENTS
Arrangements for rehabilitation at a rehabilitation center prior to rehabilitation at home is the patient's wish. The patient can continue his immunotherapy when he completes the rehabilitation at the center. He should be given an appointment to see me in 2-3 weeks.    Clyde Ya M.D.  [place] [phone]  Office [phone]

## 2017-01-19 NOTE — DIETITIAN INITIAL EVALUATION ADULT. - PROBLEM SELECTOR PLAN 4
JULIAN on CKD - likely 2/2 dehydration vs. nephrotic syndrome given proteinuria and edema   - protein/Cr ratio   - check lipids   - urine electrolytes   - trend Cr, avoid nephrotoxic medications

## 2017-01-19 NOTE — PROGRESS NOTE ADULT - SUBJECTIVE AND OBJECTIVE BOX
Neurology Follow up note    Name  CORIN PERDOMO    HPI:  Pt is a 60yo M w/ PMH renal cell carcinoma w/ mets to the brain (s/p R. nephrectomy) seizure d/o, presenting with weakness and L. groin and heel pain. He recently began a new oral chemotherapy called Inlyta on January 4th, and was taking it twice daily. On January 6th, he began experiencing L. groin pain that did not inhibit his daily activities. At baseline he walks with the occasional assistance of a rollator. Beginning January 13th, he started experiencing fatigue and left heel pain, and had to stay in bed. His weakness worsening on Saturday, and he was not able to get up to go to the bathroom. He describes the weakness as associated with left groin and heel pain, stating he cannot walk on the left leg, so he stayed in bed. His wife describes a "puffiness" in his feet, hands and face that was not previously present. He denies fever, chills, headache, vision changes, difficulty speaking or swallowing, confusion, chest pain, palpitations, SOB, nausea, change in bowel habits, dysuria, change in urinary color or frequency, or rash. (16 Jan 2017 02:17)      Interval History - improved mental status- better movement in legs        REVIEW OF SYSTEMS    Vital Signs Last 24 Hrs  T(C): 36.3, Max: 37.1 (01-18 @ 22:28)  T(F): 97.4, Max: 98.7 (01-18 @ 22:28)  HR: 80 (80 - 90)  BP: 148/90 (126/84 - 152/93)  BP(mean): --  RR: 18 (15 - 18)  SpO2: 96% (96% - 99%)    Physical Exam-     Mental Status- awake    Cranial Nerves-nl    Gait and station-unsteady    Motor- improved strength in LE    Reflexes- decreased    Sensation- no sensory level    Coordination- no tremors    Vascular -    Medications  valproic  acid Syrup 250milliGRAM(s) Oral two times a day  lacosamide 200milliGRAM(s) Oral two times a day  atorvastatin 40milliGRAM(s) Oral at bedtime  QUEtiapine 25milliGRAM(s) Oral every 12 hours  carvedilol 25milliGRAM(s) Oral every 12 hours  amLODIPine   Tablet 10milliGRAM(s) Oral daily  docusate sodium 100milliGRAM(s) Oral two times a day  senna 2Tablet(s) Oral at bedtime  latanoprost 0.005% Ophthalmic Solution 1Drop(s) Both EYES at bedtime  pantoprazole    Tablet 40milliGRAM(s) Oral before breakfast  oxyCODONE  5 mG/acetaminophen 325 mG 1Tablet(s) Oral every 4 hours PRN  dexamethasone  Injectable 4milliGRAM(s) IV Push every 6 hours  insulin lispro (HumaLOG) corrective regimen sliding scale  SubCutaneous Before meals and at bedtime  levothyroxine 100MICROGram(s) Oral daily  cefTRIAXone   IVPB 2Gram(s) IV Intermittent every 12 hours  acyclovir IVPB 650milliGRAM(s) IV Intermittent every 12 hours  ampicillin  IVPB 2Gram(s) IV Intermittent every 6 hours      Lab      Radiology    Assessment- no clear explanation for his weakness and mental status- it appears that he is back to baseline    Plan- Rehab and PT

## 2017-01-19 NOTE — PROGRESS NOTE ADULT - ASSESSMENT
No apparent infectious process  R/O relapse of Exophiala - doubt as pt improved    D/C all anatimicrobials

## 2017-01-19 NOTE — DIETITIAN INITIAL EVALUATION ADULT. - NS AS NUTRI INTERV ED CONTENT
Encouraged PO intake during meals & reviewed importance. Understadning stated, provide additional motivation as needed.

## 2017-01-19 NOTE — PROGRESS NOTE ADULT - SUBJECTIVE AND OBJECTIVE BOX
INTERVAL HISTORY:  resting comfortably,   	  MEDICATIONS:  carvedilol 25milliGRAM(s) Oral every 12 hours  amLODIPine   Tablet 10milliGRAM(s) Oral daily    cefTRIAXone   IVPB 2Gram(s) IV Intermittent every 12 hours  acyclovir IVPB 650milliGRAM(s) IV Intermittent every 12 hours  ampicillin  IVPB 2Gram(s) IV Intermittent every 6 hours      valproic  acid Syrup 250milliGRAM(s) Oral two times a day  lacosamide 200milliGRAM(s) Oral two times a day  QUEtiapine 25milliGRAM(s) Oral every 12 hours  oxyCODONE  5 mG/acetaminophen 325 mG 1Tablet(s) Oral every 4 hours PRN    docusate sodium 100milliGRAM(s) Oral two times a day  senna 2Tablet(s) Oral at bedtime  pantoprazole    Tablet 40milliGRAM(s) Oral before breakfast    atorvastatin 40milliGRAM(s) Oral at bedtime  dexamethasone  Injectable 4milliGRAM(s) IV Push every 6 hours  insulin lispro (HumaLOG) corrective regimen sliding scale  SubCutaneous Before meals and at bedtime  levothyroxine 100MICROGram(s) Oral daily    latanoprost 0.005% Ophthalmic Solution 1Drop(s) Both EYES at bedtime        PHYSICAL EXAM:  T(C): 36.3, Max: 37.1 (01-18 @ 22:28)  HR: 80 (80 - 90)  BP: 148/90 (147/93 - 152/93)  RR: 18 (15 - 18)  SpO2: 96% (96% - 99%)  Wt(kg): --  I&O's Summary        Appearance: Normal	  HEENT:   Normal oral mucosa, PERRL, EOMI	  Cardiovascular: Normal S1 S2, No JVD, No murmurs, trace edema  Respiratory: Lungs clear to auscultation	  Psychiatry: A & O x 3, Mood & affect appropriate  Gastrointestinal:  Soft, Non-tender, + BS	  Skin: No rashes, No ecchymoses, No cyanosis  Neurologic: Non-focal  Extremities: Normal range of motion, No clubbing, cyanosis, trace edema  Vascular: Peripheral pulses palpable 2+ bilaterally    TELEMETRY: 	    ECG:  	  RADIOLOGY:   DIAGNOSTIC TESTING:  [ ] Echocardiogram:  [ ]  Catheterization:  [ ] Stress Test:    OTHER: 	    LABS:	 	    CARDIAC MARKERS:                                  11.1   10.6  )-----------( 113      ( 18 Jan 2017 07:13 )             33.3     18 Jan 2017 07:13    140    |  108    |  39     ----------------------------<  126    4.6     |  23     |  1.94     Ca    8.6        18 Jan 2017 07:13  Mg     2.1       18 Jan 2017 07:13      proBNP:   Lipid Profile:   HgA1c:   TSH:     ASSESSMENT/PLAN: INTERVAL HISTORY:  resting comfortably, HTN improving  	  MEDICATIONS:  carvedilol 25milliGRAM(s) Oral every 12 hours  amLODIPine   Tablet 10milliGRAM(s) Oral daily    cefTRIAXone   IVPB 2Gram(s) IV Intermittent every 12 hours  acyclovir IVPB 650milliGRAM(s) IV Intermittent every 12 hours  ampicillin  IVPB 2Gram(s) IV Intermittent every 6 hours      valproic  acid Syrup 250milliGRAM(s) Oral two times a day  lacosamide 200milliGRAM(s) Oral two times a day  QUEtiapine 25milliGRAM(s) Oral every 12 hours  oxyCODONE  5 mG/acetaminophen 325 mG 1Tablet(s) Oral every 4 hours PRN    docusate sodium 100milliGRAM(s) Oral two times a day  senna 2Tablet(s) Oral at bedtime  pantoprazole    Tablet 40milliGRAM(s) Oral before breakfast    atorvastatin 40milliGRAM(s) Oral at bedtime  dexamethasone  Injectable 4milliGRAM(s) IV Push every 6 hours  insulin lispro (HumaLOG) corrective regimen sliding scale  SubCutaneous Before meals and at bedtime  levothyroxine 100MICROGram(s) Oral daily    latanoprost 0.005% Ophthalmic Solution 1Drop(s) Both EYES at bedtime        PHYSICAL EXAM:  T(C): 36.3, Max: 37.1 (01-18 @ 22:28)  HR: 80 (80 - 90)  BP: 148/90 (147/93 - 152/93)  RR: 18 (15 - 18)  SpO2: 96% (96% - 99%)  Wt(kg): --  I&O's Summary        Appearance: Normal	  HEENT:   Normal oral mucosa, PERRL, EOMI	  Cardiovascular: Normal S1 S2, No JVD, No murmurs, trace edema  Respiratory: Lungs clear to auscultation	  Psychiatry: A & O x 3, Mood & affect appropriate  Gastrointestinal:  Soft, Non-tender, + BS	  Skin: No rashes, No ecchymoses, No cyanosis  Neurologic: Non-focal  Extremities: Normal range of motion, No clubbing, cyanosis, trace edema  Vascular: Peripheral pulses palpable 2+ bilaterally    TELEMETRY: 	    ECG:  	  RADIOLOGY:   DIAGNOSTIC TESTING:  [ ] Echocardiogram:  [ ]  Catheterization:  [ ] Stress Test:    OTHER: 	    LABS:	 	    CARDIAC MARKERS:                                  11.1   10.6  )-----------( 113      ( 18 Jan 2017 07:13 )             33.3     18 Jan 2017 07:13    140    |  108    |  39     ----------------------------<  126    4.6     |  23     |  1.94     Ca    8.6        18 Jan 2017 07:13  Mg     2.1       18 Jan 2017 07:13      proBNP:   Lipid Profile:   HgA1c:   TSH:     ASSESSMENT/PLAN:

## 2017-01-19 NOTE — PROGRESS NOTE ADULT - PROBLEM SELECTOR PLAN 4
October 2015 pt found to have a reduced EF of 30-35% by echo and Nuc.  Also had anterior and AL ischemia but we thought his presentation was most c/w a Takutsubo CM... He also had MARIZA and a provocable outflow gradient of 50mm Hg with the Valsalva maneuver.  Resolved on echo 2016.

## 2017-01-19 NOTE — PROGRESS NOTE ADULT - SUBJECTIVE AND OBJECTIVE BOX
Pt S&E@BS in A.M.  Vital Signs Last 24 Hrs  T(C): 36.9, Max: 37.2 (01-19 @ 09:31)  T(F): 98.5, Max: 98.9 (01-19 @ 09:31)  HR: 77 (77 - 87)  BP: 144/77 (144/77 - 157/98)  BP(mean): --  RR: 14 (14 - 18)  SpO2: 96% (95% - 99%)  PHYSICAL EXAM  General: AAOx3; NAD  Head: NC/AT; MMM  Eyes: EOMI  Neck: Supple;    Respiratory: CTA B/L;   Cardiovascular: Regular rhythm/rate; S1/S2;  Gastrointestinal: Soft; NTND  Extremities: WWP; no edema or cyanosis; radial/pedal pulses palpable  Neurological:  CNII-XII grossly intact; improving B/L UE spasticity and weakness;     MEDICATIONS  (STANDING):  valproic  acid Syrup 250milliGRAM(s) Oral two times a day  lacosamide 200milliGRAM(s) Oral two times a day  atorvastatin 40milliGRAM(s) Oral at bedtime  QUEtiapine 25milliGRAM(s) Oral every 12 hours  carvedilol 25milliGRAM(s) Oral every 12 hours  amLODIPine   Tablet 10milliGRAM(s) Oral daily  docusate sodium 100milliGRAM(s) Oral two times a day  senna 2Tablet(s) Oral at bedtime  latanoprost 0.005% Ophthalmic Solution 1Drop(s) Both EYES at bedtime  pantoprazole    Tablet 40milliGRAM(s) Oral before breakfast  dexamethasone  Injectable 4milliGRAM(s) IV Push every 6 hours  insulin lispro (HumaLOG) corrective regimen sliding scale  SubCutaneous Before meals and at bedtime  levothyroxine 100MICROGram(s) Oral daily  cefTRIAXone   IVPB 2Gram(s) IV Intermittent every 12 hours  acyclovir IVPB 650milliGRAM(s) IV Intermittent every 12 hours  ampicillin  IVPB 2Gram(s) IV Intermittent every 6 hours    MEDICATIONS  (PRN):  oxyCODONE  5 mG/acetaminophen 325 mG 1Tablet(s) Oral every 4 hours PRN Severe Pain (7 - 10)      No Known Allergies      LABS  No AM labs      IMAGING/EKG/ETC  MRI: Cervical INTERPRETATION:  1. Slight straightening of the normal curvature of the cervical spine,   which may be on the basis of muscle pain and/or spasm and/or positioning.  2. Multilevel degenerative osteoarthritis with bilateral foraminal   narrowing from the C3-C4 level through to the C6-C7 level inclusive.  3. Multilevel degenerative disc disease: central disc protrusion at C2-C3   level, central disc-osteophyte complex C3-C4 level, left   subarticular-foraminal disc protrusion C4-C5 level, disc-osteophyte   complex eccentric to the right at C5-C6 level, and diffuse   disc-osteophyte complex C6-C7 and C7-T1 levels.   4. If there remains concern for metastatic disease, consideration could   be given to bone scan, PET scan, or postcontrast MR imaging of the   cervicalspine, as these may be more sensitive studies for the detection   of osseous metastatic disease.  5. Mild central canal stenosis C4-C5 level which relates to acquired   etiologies from disc disease and osteoarthritis.    Thoracic INTERPRETATION:  1. There appears to be a rounded, subtle focus of decreased signal   intensity on T1 and T2 within T7. There are couple of benign intraosseous   hemangiomata noted which appear as foci of T1 shortening and T2   prolongation. There is a probable benign intraosseous hemangioma within   T9 demonstrating atypical hypointensity on T1 with some peripheral T1   shortening. Postcontrast throacic spine MR imaging may be helpful for   further evaluation and/or nuclear medicine bone scan and/or PET scan, as   these are more sensitive studies for the detection of osseous metastatic   disease.   2. Evaluation is limited by patient motion, particularly the sagittal   STIR sequence. This creates apparent signal within the thoracic cord at   the T11 level which is not confirmed on the sagittal T2. However, this   appears to be present on the axial T2 images, which are also   significantly degraded by motion. This could represent prominence of the   central canal. Consideration could be given to repeating thoracic spine   MR imaging with contrast when the patient is able to cooperate with the   exam.   3. Multilevel degenerative osteoarthritis including marginal osteophytes   anteriorly.  4. Multilevel degenerative disc disease.  5. Kyphosis in the thoracic spine.  6. Round focus of abnormal soft tissue signal intensity in the right lung   parenchyma (series 7, image #25) may represent a metastatic focus.   Reference should be made to the chest CT performed on 1/16/2017.    Lumbar INTERPRETATION:  IMadhu M.D., have reviewed the   images and agree with the report with the following modifications:    1. Multilevel degenerative disc disease including discogenic sclerosis at   L5-S1 level. Given that the patient could not tolerate axial imaging,   disc protrusions cannot be excluded.  2. Multilevel facet arthropathy.  3. Unremarkable conus medullaris and cauda equina.

## 2017-01-19 NOTE — DIETITIAN INITIAL EVALUATION ADULT. - PERTINENT MEDS FT
Synthroid, lipitor, Coreg, Norvasc, Decadron, Humalog corrective sliding scale, Seroquel, Senna, Protonix

## 2017-01-19 NOTE — DIETITIAN INITIAL EVALUATION ADULT. - ENERGY NEEDS
Height: 5 feet 11 inches, Weight (Current): 147 pounds,  pounds +/-10%, %IBW 85%, BMI 20.1    current body wt used for energy calculations as pt falls within % IBW  EER based on Metastatic CA, wt loss PTA, CKD 3

## 2017-01-19 NOTE — PROGRESS NOTE ADULT - PROBLEM SELECTOR PLAN 1
Can add Hydralazine for further BP control.  In 10/2015 he had a reduced EF and a provocable outflow gradient of 50mm Hg with Valsalva maneuver. f/u echo in 2016 had normalized.  BPS now around 150.

## 2017-01-19 NOTE — PROGRESS NOTE ADULT - SUBJECTIVE AND OBJECTIVE BOX
Patient seen and examined at bedside.       valproic  acid Syrup 250 two times a day  lacosamide 200 two times a day  atorvastatin 40 at bedtime  QUEtiapine 25 every 12 hours  carvedilol 25 every 12 hours  amLODIPine   Tablet 10 daily  docusate sodium 100 two times a day  senna 2 at bedtime  latanoprost 0.005% Ophthalmic Solution 1 at bedtime  pantoprazole    Tablet 40 before breakfast  oxyCODONE  5 mG/acetaminophen 325 mG 1 every 4 hours PRN  dexamethasone  Injectable 4 every 6 hours  insulin lispro (HumaLOG) corrective regimen sliding scale  Before meals and at bedtime  levothyroxine 100 daily  cefTRIAXone   IVPB 2 every 12 hours  acyclovir IVPB 650 every 12 hours  ampicillin  IVPB 2 every 6 hours      Allergies    No Known Allergies    Intolerances        T(C): , Max: 37.2 (01-19 @ 09:31)  T(F): , Max: 98.9 (01-19 @ 09:31)  HR: 87  BP: 157/98  BP(mean): --  RR: 18  SpO2: 95%  Wt(kg): --    I & Os for current day (as of 01-19 @ 09:42)  =============================================  IN:    IV PiggyBack: 350 ml    Total IN: 350 ml  ---------------------------------------------  OUT:    Voided: 2 ml    Total OUT: 2 ml  ---------------------------------------------  Total NET: 348 ml          LABS:                        11.1   10.6  )-----------( 113      ( 18 Jan 2017 07:13 )             33.3     18 Jan 2017 07:13    140    |  108    |  39     ----------------------------<  126    4.6     |  23     |  1.94     Ca    8.6        18 Jan 2017 07:13  Mg     2.1       18 Jan 2017 07:13        PT/INR - ( 18 Jan 2017 07:13 )   PT: 10.9 sec;   INR: 0.98          PTT - ( 18 Jan 2017 07:13 )  PTT:31.8 sec          RADIOLOGY & ADDITIONAL STUDIES:

## 2017-01-19 NOTE — DIETITIAN INITIAL EVALUATION ADULT. - PROBLEM SELECTOR PLAN 10
avoid pharmacologic ppx due to brain mets; SCDs once DVT ruled out    11. Plantar fasciitis - patient with point tenderness to palpation of plantar surface of calcaneus w/ evidence of heel spur on x-ray. Will order PT, pain control. Consider podiatry consult in AM for evaluation for orthotics.

## 2017-01-20 LAB
ANION GAP SERPL CALC-SCNC: 9 MMOL/L — SIGNIFICANT CHANGE UP (ref 9–16)
BUN SERPL-MCNC: 34 MG/DL — HIGH (ref 7–23)
CALCIUM SERPL-MCNC: 8.1 MG/DL — LOW (ref 8.5–10.5)
CHLORIDE SERPL-SCNC: 111 MMOL/L — HIGH (ref 96–108)
CO2 SERPL-SCNC: 25 MMOL/L — SIGNIFICANT CHANGE UP (ref 22–31)
CREAT SERPL-MCNC: 1.74 MG/DL — HIGH (ref 0.5–1.3)
GLUCOSE SERPL-MCNC: 95 MG/DL — SIGNIFICANT CHANGE UP (ref 70–99)
HCT VFR BLD CALC: 33.5 % — LOW (ref 39–50)
HGB BLD-MCNC: 10.6 G/DL — LOW (ref 13–17)
MAGNESIUM SERPL-MCNC: 2.2 MG/DL — SIGNIFICANT CHANGE UP (ref 1.6–2.4)
MCHC RBC-ENTMCNC: 24.5 PG — LOW (ref 27–34)
MCHC RBC-ENTMCNC: 31.6 G/DL — LOW (ref 32–36)
MCV RBC AUTO: 77.5 FL — LOW (ref 80–100)
MISCELLANEOUS TEST NAME: SIGNIFICANT CHANGE UP
NON-GYNECOLOGICAL CYTOLOGY STUDY: SIGNIFICANT CHANGE UP
PLATELET # BLD AUTO: 190 K/UL — SIGNIFICANT CHANGE UP (ref 150–400)
POTASSIUM SERPL-MCNC: 4.4 MMOL/L — SIGNIFICANT CHANGE UP (ref 3.5–5.3)
POTASSIUM SERPL-SCNC: 4.4 MMOL/L — SIGNIFICANT CHANGE UP (ref 3.5–5.3)
RBC # BLD: 4.32 M/UL — SIGNIFICANT CHANGE UP (ref 4.2–5.8)
RBC # FLD: 15.9 % — SIGNIFICANT CHANGE UP (ref 10.3–16.9)
SODIUM SERPL-SCNC: 145 MMOL/L — SIGNIFICANT CHANGE UP (ref 135–145)
VDRL CSF-TITR: NEGATIVE — SIGNIFICANT CHANGE UP
WBC # BLD: 12.6 K/UL — HIGH (ref 3.8–10.5)
WBC # FLD AUTO: 12.6 K/UL — HIGH (ref 3.8–10.5)

## 2017-01-20 PROCEDURE — 99232 SBSQ HOSP IP/OBS MODERATE 35: CPT | Mod: GC

## 2017-01-20 RX ADMIN — LACOSAMIDE 200 MILLIGRAM(S): 50 TABLET ORAL at 17:04

## 2017-01-20 RX ADMIN — Medication 250 MILLIGRAM(S): at 23:06

## 2017-01-20 RX ADMIN — HEPARIN SODIUM 5000 UNIT(S): 5000 INJECTION INTRAVENOUS; SUBCUTANEOUS at 07:04

## 2017-01-20 RX ADMIN — HEPARIN SODIUM 5000 UNIT(S): 5000 INJECTION INTRAVENOUS; SUBCUTANEOUS at 13:18

## 2017-01-20 RX ADMIN — HEPARIN SODIUM 5000 UNIT(S): 5000 INJECTION INTRAVENOUS; SUBCUTANEOUS at 23:06

## 2017-01-20 RX ADMIN — SENNA PLUS 2 TABLET(S): 8.6 TABLET ORAL at 23:06

## 2017-01-20 RX ADMIN — PANTOPRAZOLE SODIUM 40 MILLIGRAM(S): 20 TABLET, DELAYED RELEASE ORAL at 07:05

## 2017-01-20 RX ADMIN — ATORVASTATIN CALCIUM 40 MILLIGRAM(S): 80 TABLET, FILM COATED ORAL at 23:06

## 2017-01-20 RX ADMIN — AMLODIPINE BESYLATE 10 MILLIGRAM(S): 2.5 TABLET ORAL at 07:05

## 2017-01-20 RX ADMIN — Medication 100 MICROGRAM(S): at 07:05

## 2017-01-20 RX ADMIN — Medication 4 MILLIGRAM(S): at 11:11

## 2017-01-20 RX ADMIN — Medication 4 MILLIGRAM(S): at 17:04

## 2017-01-20 RX ADMIN — Medication 250 MILLIGRAM(S): at 10:17

## 2017-01-20 RX ADMIN — QUETIAPINE FUMARATE 25 MILLIGRAM(S): 200 TABLET, FILM COATED ORAL at 10:17

## 2017-01-20 RX ADMIN — Medication 4 MILLIGRAM(S): at 07:05

## 2017-01-20 RX ADMIN — Medication 100 MILLIGRAM(S): at 07:04

## 2017-01-20 RX ADMIN — QUETIAPINE FUMARATE 25 MILLIGRAM(S): 200 TABLET, FILM COATED ORAL at 23:06

## 2017-01-20 RX ADMIN — Medication 4 MILLIGRAM(S): at 01:21

## 2017-01-20 RX ADMIN — CARVEDILOL PHOSPHATE 25 MILLIGRAM(S): 80 CAPSULE, EXTENDED RELEASE ORAL at 07:05

## 2017-01-20 RX ADMIN — Medication 100 MILLIGRAM(S): at 17:04

## 2017-01-20 RX ADMIN — CARVEDILOL PHOSPHATE 25 MILLIGRAM(S): 80 CAPSULE, EXTENDED RELEASE ORAL at 17:04

## 2017-01-20 RX ADMIN — LACOSAMIDE 200 MILLIGRAM(S): 50 TABLET ORAL at 07:44

## 2017-01-20 RX ADMIN — Medication 1: at 23:05

## 2017-01-20 RX ADMIN — LATANOPROST 1 DROP(S): 0.05 SOLUTION/ DROPS OPHTHALMIC; TOPICAL at 23:08

## 2017-01-20 NOTE — PROGRESS NOTE ADULT - SUBJECTIVE AND OBJECTIVE BOX
Physical Medicine and Rehabilitation Progress Note    CORIN PERDOMO    MRN-[id number]    Patient is a 61y old  Male who presents with a chief complaint of weakness (16 Jan 2017 02:17)      Vital Signs Last 24 Hrs  T(C): 36.6, Max: 36.9 (01-19 @ 16:49)  T(F): 97.9, Max: 98.5 (01-19 @ 16:49)  HR: 64 (64 - 85)  BP: 126/90 (126/90 - 155/98)  BP(mean): --  RR: 17 (14 - 18)  SpO2: 99% (95% - 99%)    Current Functional Status in Physical Therapy:lying bed, alert, fairly oriented and cooperative. stable. ROM, wnl in 4 ext., no spasticity. 3+-4/5 in strenght.    Bed Mobility:mod. assistance.    Transfers:mod. assistance.woth walker, poor balance, needs minimal assistance.    Ambulation:with walker, minimal assistance.      Impression:1. Deconditioned. 2. Renal Ca mets to brain.      Recommendations:1. Continue PT. 2. Subacute rehab. placement.

## 2017-01-20 NOTE — PROGRESS NOTE ADULT - ATTENDING COMMENTS
Have the patient do bedside ambulation in place with a walker and have him practice standing and sitting from the bed and eventually from a chair. If he continues to rapidly improve, the patient could be discharged to home rehabilitation. He should see me next week if he is discharged to home.He will need [facility] radiation therapy at [facility]. This can be arranged by radiation therapy department here.

## 2017-01-20 NOTE — PROGRESS NOTE ADULT - SUBJECTIVE AND OBJECTIVE BOX
INTERVAL HISTORY:  HTN  	  MEDICATIONS:  carvedilol 25milliGRAM(s) Oral every 12 hours  amLODIPine   Tablet 10milliGRAM(s) Oral daily        valproic  acid Syrup 250milliGRAM(s) Oral two times a day  lacosamide 200milliGRAM(s) Oral two times a day  QUEtiapine 25milliGRAM(s) Oral every 12 hours  oxyCODONE  5 mG/acetaminophen 325 mG 1Tablet(s) Oral every 4 hours PRN    docusate sodium 100milliGRAM(s) Oral two times a day  senna 2Tablet(s) Oral at bedtime  pantoprazole    Tablet 40milliGRAM(s) Oral before breakfast    atorvastatin 40milliGRAM(s) Oral at bedtime  dexamethasone  Injectable 4milliGRAM(s) IV Push every 6 hours  insulin lispro (HumaLOG) corrective regimen sliding scale  SubCutaneous Before meals and at bedtime  levothyroxine 100MICROGram(s) Oral daily    latanoprost 0.005% Ophthalmic Solution 1Drop(s) Both EYES at bedtime  heparin  Injectable 5000Unit(s) SubCutaneous every 8 hours        PHYSICAL EXAM:  T(C): 36.7, Max: 37.2 (01-19 @ 09:31)  HR: 82 (77 - 87)  BP: 155/98 (141/80 - 157/98)  RR: 16 (14 - 18)  SpO2: 97% (95% - 97%)  Wt(kg): --  I&O's Summary        Appearance: Normal	  HEENT:   Normal oral mucosa, PERRL, EOMI	  Cardiovascular: Normal S1 S2, No JVD, No murmurs, trace edema  Respiratory: Lungs clear to auscultation	  Psychiatry: A & O x 3, Mood & affect appropriate  Gastrointestinal:  Soft, Non-tender, + BS	  Skin: No rashes, No ecchymoses, No cyanosis  Neurologic: Non-focal  Extremities: Normal range of motion, No clubbing, cyanosis   Vascular: Peripheral pulses palpable 2+ bilaterally    TELEMETRY: 	    ECG:  	  RADIOLOGY:   DIAGNOSTIC TESTING:  [ ] Echocardiogram:  [ ]  Catheterization:  [ ] Stress Test:    OTHER: 	    LABS:	 	    CARDIAC MARKERS:                                  10.6   12.6  )-----------( 190      ( 20 Jan 2017 08:15 )             33.5           proBNP:   Lipid Profile:   HgA1c:   TSH:     ASSESSMENT/PLAN:

## 2017-01-20 NOTE — PROGRESS NOTE ADULT - SUBJECTIVE AND OBJECTIVE BOX
Neurology Follow up note    Name  CORIN PERDOMO    HPI:  Pt is a 60yo M w/ PMH renal cell carcinoma w/ mets to the brain (s/p R. nephrectomy) seizure d/o, presenting with weakness and L. groin and heel pain. He recently began a new oral chemotherapy called Inlyta on January 4th, and was taking it twice daily. On January 6th, he began experiencing L. groin pain that did not inhibit his daily activities. At baseline he walks with the occasional assistance of a rollator. Beginning January 13th, he started experiencing fatigue and left heel pain, and had to stay in bed. His weakness worsening on Saturday, and he was not able to get up to go to the bathroom. He describes the weakness as associated with left groin and heel pain, stating he cannot walk on the left leg, so he stayed in bed. His wife describes a "puffiness" in his feet, hands and face that was not previously present. He denies fever, chills, headache, vision changes, difficulty speaking or swallowing, confusion, chest pain, palpitations, SOB, nausea, change in bowel habits, dysuria, change in urinary color or frequency, or rash. (16 Jan 2017 02:17)      Interval History - improved mental status- able to get up with walker        REVIEW OF SYSTEMS    Vital Signs Last 24 Hrs  T(C): 36.6, Max: 37.2 (01-19 @ 09:31)  T(F): 97.9, Max: 98.9 (01-19 @ 09:31)  HR: 64 (64 - 87)  BP: 126/90 (126/90 - 157/98)  BP(mean): --  RR: 17 (14 - 18)  SpO2: 99% (95% - 99%)    Physical Exam-     Mental Status- awake    Cranial Nerves- full EOM    Gait and station- weakness    Motor- weakness in LE    Reflexes- absent LE reflexes    Sensation- no sensory level    Coordination- no tremors    Vascular -    Medications  valproic  acid Syrup 250milliGRAM(s) Oral two times a day  lacosamide 200milliGRAM(s) Oral two times a day  atorvastatin 40milliGRAM(s) Oral at bedtime  QUEtiapine 25milliGRAM(s) Oral every 12 hours  carvedilol 25milliGRAM(s) Oral every 12 hours  amLODIPine   Tablet 10milliGRAM(s) Oral daily  docusate sodium 100milliGRAM(s) Oral two times a day  senna 2Tablet(s) Oral at bedtime  latanoprost 0.005% Ophthalmic Solution 1Drop(s) Both EYES at bedtime  pantoprazole    Tablet 40milliGRAM(s) Oral before breakfast  oxyCODONE  5 mG/acetaminophen 325 mG 1Tablet(s) Oral every 4 hours PRN  dexamethasone  Injectable 4milliGRAM(s) IV Push every 6 hours  insulin lispro (HumaLOG) corrective regimen sliding scale  SubCutaneous Before meals and at bedtime  levothyroxine 100MICROGram(s) Oral daily  heparin  Injectable 5000Unit(s) SubCutaneous every 8 hours      Lab      Radiology    Assessment- No evidence of acute neuro disease    Plan- Rehab

## 2017-01-20 NOTE — DISCHARGE NOTE ADULT - CARE PLAN
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  Please follow up with Dr. Ya and Dr. Tarango for further management  Secondary Diagnosis:	Other cardiomyopathy  Instructions for follow-up, activity and diet:	Please continue to take all of your cardiac medications and follow up with Dr. Tarango upon discharge.  Secondary Diagnosis:	Hyperlipidemia  Instructions for follow-up, activity and diet:	Please continue to take your medication as directed.  The dosing/timing of these drugs did not change. Please follow up with your primary care physician for further management.  Secondary Diagnosis:	HTN (hypertension)  Instructions for follow-up, activity and diet:	Please continue to take your medication as directed.  The dosing/timing of these drugs did not change. Please follow up with your primary care physician for further management.  Secondary Diagnosis:	Glaucoma  Instructions for follow-up, activity and diet:	Please continue to take your medication as directed.  The dosing/timing of these drugs did not change. Please follow up with your primary care physician for further management. 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  You are being discharged on Prednisone.  You need to taper this medication.  Starting tomorrow, please take 6 tabs (60mg) once per day for 2 days.  Then decrease by 1 tab and take 5 tabs (50mg) for two days.  Continue this until you take 1 tab (10 mg) for two days and then stop taking the medication (on 2/4/2017).  Please follow up with Dr. Ya and Dr. Tarango for further management  Secondary Diagnosis:	Other cardiomyopathy  Instructions for follow-up, activity and diet:	Please continue to take all of your cardiac medications and follow up with Dr. Tarango upon discharge.  Secondary Diagnosis:	Hyperlipidemia  Instructions for follow-up, activity and diet:	Please continue to take your medication as directed.  The dosing/timing of these drugs did not change. Please follow up with your primary care physician for further management.  Secondary Diagnosis:	HTN (hypertension)  Instructions for follow-up, activity and diet:	Please continue to take your medication as directed.  The dosing/timing of these drugs did not change. Please follow up with your primary care physician for further management.  Secondary Diagnosis:	Glaucoma  Instructions for follow-up, activity and diet:	Please continue to take your medication as directed.  The dosing/timing of these drugs did not change. Please follow up with your primary care physician for further management.

## 2017-01-20 NOTE — PROGRESS NOTE ADULT - SUBJECTIVE AND OBJECTIVE BOX
The patient is more oriented today and when I approached the patient he remembered my name and my father's name. His only complaint is frequent urination despite the presence of a Henley catheter.        Examination of the head and neck demonstrates well-healed surgical scars. There is no palpable lymphadenopathy in the neck. The lungs are clear to percussion and auscultation. The heart sounds are regular without murmur, rub or ectopy. The patient has no truncal obesity and has no palpable or percussible hepatomegaly or splenomegaly. The patient has no chronic venostasis changes in the lower extremity although there is edema of the foot and ankles. Neurologically he is oriented to person and place and to time. He is no longer confabulating at times. He is no longer diffusely rigid but has some cogwheeling which is greater in the left arm than the right arm. He has no nuchal rigidity. Deep tendon reflexes are equal. The patient has increased strength in the lower extremities. And he is able to get himself from the bed to a walker and walk in place with no difficulty. He has some difficulty getting from a chair to a standing position. He has some tendency to fall backwards. He gets better balance after several minutes of walking in place.

## 2017-01-20 NOTE — DISCHARGE NOTE ADULT - NS MD DC FALL RISK RISK
For information on Fall & Injury Prevention, visit [email or web address]

## 2017-01-20 NOTE — PROGRESS NOTE ADULT - ASSESSMENT
The patient is rapidly improving. The patient may be able to go to a rehabilitation center when one is available but may improve quickly and be able to go home as demonstrated today.

## 2017-01-20 NOTE — PROGRESS NOTE ADULT - SUBJECTIVE AND OBJECTIVE BOX
Patient seen and examined by me at bedside.  ROS: No chest pain, no sob, no abd pain. No n/v/d    Interval events:  HTN - stable bp  Malignancy  Anemia - stable    PAST MEDICAL & SURGICAL HISTORY:  Stented coronary artery  BPH (benign prostatic hypertrophy)  H/O Moh&#x27;s micrographic surgery for skin cancer  Cataracts, bilateral: s/p prior surgery  No significant past surgical history    PHYSICAL EXAM:  T(C): 36.4, Max: 36.9 ( @ 01:02)  HR: 78  BP: 149/73 (149/73 - 174/-)  RR: 16  SpO2: 99%  Wt(kg): --    General: AAO x 3,  NAD.  HEENT: dry mucous membranes, no pallor/cyanosis.  Neck: no JVD visible.  Cardiac: S1, S2. RRR.   Respratory: CTA b/l, no access muscle use.   Abdomen: soft. nontender. nondistended  Extremities: trace pedal edema b/l    I & Os for current day (as of  @ 10:38)  =============================================  IN:    Oral Fluid: 420 ml    Solution: 150 ml    Total IN: 570 ml  ---------------------------------------------  OUT:    Voided: 1050 ml    Total OUT: 1050 ml  ---------------------------------------------  Total NET: -480 ml    Weight 77 ( @ 22:02)      DATA:                    Urinalysis Basic - ( 2017 22:28 )  Color: Yellow / Appearance: SL CLOUDY / S.020 / pH: x  Gluc: x / Ketone: NEGATIVE  / Bili: NEGATIVE / Urobili: 0.2 E.U./dL   Blood: x / Protein: NEGATIVE mg/dL / Nitrite: NEGATIVE   Leuk Esterase: NEGATIVE / RBC: x / WBC x   Sq Epi: x / Non Sq Epi: x / Bacteria: x                MEDICATIONS  (STANDING):  heparin  Injectable 5000Unit(s) SubCutaneous every 8 hours  docusate sodium 100milliGRAM(s) Oral three times a day  ticagrelor 90milliGRAM(s) Oral two times a day  atorvastatin 10milliGRAM(s) Oral at bedtime  tamsulosin 0.4milliGRAM(s) Oral at bedtime  ceFAZolin   IVPB  IV Intermittent   ceFAZolin   IVPB 1000milliGRAM(s) IV Intermittent every 8 hours  silver sulfADIAZINE 1% Cream 1Application(s) Topical daily  BACItracin   Ointment 1Application(s) Topical daily  lisinopril 40milliGRAM(s) Oral daily  amLODIPine   Tablet 10milliGRAM(s) Oral daily  heparin  flush 100 Units/mL Injectable 100Unit(s) IV Push every other day    MEDICATIONS  (PRN):  oxyCODONE  5 mG/acetaminophen 325 mG 2Tablet(s) Oral every 6 hours PRN Severe Pain (7 - 10)  oxyCODONE  5 mG/acetaminophen 325 mG 1Tablet(s) Oral every 4 hours PRN Moderate Pain (4 - 6)

## 2017-01-20 NOTE — DISCHARGE NOTE ADULT - VISION (WITH CORRECTIVE LENSES IF THE PATIENT USUALLY WEARS THEM):
Partially impaired: cannot see medication labels or newsprint, but can see obstacles in path, and the surrounding layout; can count fingers at arm's length/wears eyeglasses

## 2017-01-20 NOTE — PROGRESS NOTE ADULT - SUBJECTIVE AND OBJECTIVE BOX
Subjective: Neurosurgery consult follow up    Patient evaluated at bedside, new complaint of clear fluid discharge from right ear. No other complaint. Pending ENT consult.  LP results negative for infection.    T(C): 36.6, Max: 36.9 (01-19 @ 16:49)  HR: 64 (64 - 85)  BP: 126/90 (126/90 - 155/98)  RR: 17 (14 - 18)  SpO2: 99% (95% - 99%)  Wt(kg): --    Exam: A&O x 3 NAD no focal motor deficit    CBC Full  -  ( 20 Jan 2017 08:15 )  WBC Count : 12.6 K/uL  Hemoglobin : 10.6 g/dL  Hematocrit : 33.5 %  Platelet Count - Automated : 190 K/uL  Mean Cell Volume : 77.5 fL  Mean Cell Hemoglobin : 24.5 pg  Mean Cell Hemoglobin Concentration : 31.6 g/dL  Auto Neutrophil # : x  Auto Lymphocyte # : x  Auto Monocyte # : x  Auto Eosinophil # : x  Auto Basophil # : x  Auto Neutrophil % : x  Auto Lymphocyte % : x  Auto Monocyte % : x  Auto Eosinophil % : x  Auto Basophil % : x    20 Jan 2017 08:15    145    |  111    |  34     ----------------------------<  95     4.4     |  25     |  1.74     Ca    8.1        20 Jan 2017 08:15  Mg     2.2       20 Jan 2017 08:15            Wound:    Imaging:    Assessment/Plan: 61 male with hx renal cell carcinoma with mets to brain post craniotomy.  Plan: No neurosurgery intervention at this time. Plan as per primary team. Reconsult PRN.

## 2017-01-20 NOTE — PROGRESS NOTE ADULT - SUBJECTIVE AND OBJECTIVE BOX
O/N Events:  Subjective/ROS: Denies HA, CP, SOB, n/v, changes in bowel/urinary habits    VITALS  Vital Signs Last 24 Hrs  T(C): 36.7, Max: 37.2 (01-19 @ 09:31)  T(F): 98.1, Max: 98.9 (01-19 @ 09:31)  HR: 82 (77 - 87)  BP: 155/98 (141/80 - 157/98)  BP(mean): --  RR: 16 (14 - 18)  SpO2: 97% (95% - 97%)    CAPILLARY BLOOD GLUCOSE  164 (19 Jan 2017 20:49)  130 (19 Jan 2017 16:49)  192 (19 Jan 2017 12:30)  127 (19 Jan 2017 07:43)    PHYSICAL EXAM  General: AAOx3; NAD  Head: NC/AT; MMM  Eyes: EOMI  Neck: Supple;    Respiratory: CTA B/L;   Cardiovascular: Regular rhythm/rate; S1/S2;  Gastrointestinal: Soft; NTND  Extremities: WWP; no edema or cyanosis; radial/pedal pulses palpable  Neurological:  CNII-XII grossly intact; improving B/L UE spasticity and weakness;     MEDICATIONS  (STANDING):  valproic  acid Syrup 250milliGRAM(s) Oral two times a day  lacosamide 200milliGRAM(s) Oral two times a day  atorvastatin 40milliGRAM(s) Oral at bedtime  QUEtiapine 25milliGRAM(s) Oral every 12 hours  carvedilol 25milliGRAM(s) Oral every 12 hours  amLODIPine   Tablet 10milliGRAM(s) Oral daily  docusate sodium 100milliGRAM(s) Oral two times a day  senna 2Tablet(s) Oral at bedtime  latanoprost 0.005% Ophthalmic Solution 1Drop(s) Both EYES at bedtime  pantoprazole    Tablet 40milliGRAM(s) Oral before breakfast  dexamethasone  Injectable 4milliGRAM(s) IV Push every 6 hours  insulin lispro (HumaLOG) corrective regimen sliding scale  SubCutaneous Before meals and at bedtime  levothyroxine 100MICROGram(s) Oral daily  heparin  Injectable 5000Unit(s) SubCutaneous every 8 hours    MEDICATIONS  (PRN):  oxyCODONE  5 mG/acetaminophen 325 mG 1Tablet(s) Oral every 4 hours PRN Severe Pain (7 - 10)      No Known Allergies      LABS          IMAGING/EKG/ETC  MRI: Cervical INTERPRETATION:  1. Slight straightening of the normal curvature of the cervical spine,   which may be on the basis of muscle pain and/or spasm and/or positioning.  2. Multilevel degenerative osteoarthritis with bilateral foraminal   narrowing from the C3-C4 level through to the C6-C7 level inclusive.  3. Multilevel degenerative disc disease: central disc protrusion at C2-C3   level, central disc-osteophyte complex C3-C4 level, left   subarticular-foraminal disc protrusion C4-C5 level, disc-osteophyte   complex eccentric to the right at C5-C6 level, and diffuse   disc-osteophyte complex C6-C7 and C7-T1 levels.   4. If there remains concern for metastatic disease, consideration could   be given to bone scan, PET scan, or postcontrast MR imaging of the   cervicalspine, as these may be more sensitive studies for the detection   of osseous metastatic disease.  5. Mild central canal stenosis C4-C5 level which relates to acquired   etiologies from disc disease and osteoarthritis.    Thoracic INTERPRETATION:  1. There appears to be a rounded, subtle focus of decreased signal   intensity on T1 and T2 within T7. There are couple of benign intraosseous   hemangiomata noted which appear as foci of T1 shortening and T2   prolongation. There is a probable benign intraosseous hemangioma within   T9 demonstrating atypical hypointensity on T1 with some peripheral T1   shortening. Postcontrast throacic spine MR imaging may be helpful for   further evaluation and/or nuclear medicine bone scan and/or PET scan, as   these are more sensitive studies for the detection of osseous metastatic   disease.   2. Evaluation is limited by patient motion, particularly the sagittal   STIR sequence. This creates apparent signal within the thoracic cord at   the T11 level which is not confirmed on the sagittal T2. However, this   appears to be present on the axial T2 images, which are also   significantly degraded by motion. This could represent prominence of the   central canal. Consideration could be given to repeating thoracic spine   MR imaging with contrast when the patient is able to cooperate with the   exam.   3. Multilevel degenerative osteoarthritis including marginal osteophytes   anteriorly.  4. Multilevel degenerative disc disease.  5. Kyphosis in the thoracic spine.  6. Round focus of abnormal soft tissue signal intensity in the right lung   parenchyma (series 7, image #25) may represent a metastatic focus.   Reference should be made to the chest CT performed on 1/16/2017.    Lumbar INTERPRETATION:  IMadhu M.D., have reviewed the   images and agree with the report with the following modifications:    1. Multilevel degenerative disc disease including discogenic sclerosis at   L5-S1 level. Given that the patient could not tolerate axial imaging,   disc protrusions cannot be excluded.  2. Multilevel facet arthropathy.  3. Unremarkable conus medullaris and cauda equina.

## 2017-01-20 NOTE — DISCHARGE NOTE ADULT - PLAN OF CARE
Prevention of spread/prevention of symptoms You were admitted to the hospital for weakness, likely secondary to new brain metastasis.  Please follow up with Dr. Ya and Dr. Tarango for further management Please continue to take all of your cardiac medications and follow up with Dr. Tarango upon discharge. Please continue to take your medication as directed.  The dosing/timing of these drugs did not change. Please follow up with your primary care physician for further management. You were admitted to the hospital for weakness, likely secondary to new brain metastasis.  You are being discharged on Prednisone.  You need to taper this medication.  Starting tomorrow, please take 6 tabs (60mg) once per day for 2 days.  Then decrease by 1 tab and take 5 tabs (50mg) for two days.  Continue this until you take 1 tab (10 mg) for two days and then stop taking the medication (on 2/4/2017).  Please follow up with Dr. Ya and Dr. Tarango for further management

## 2017-01-20 NOTE — DISCHARGE NOTE ADULT - PATIENT PORTAL LINK FT
“You can access the [facility] Patient Portal, offered by [facility], by registering with the following website: [email or web address]”

## 2017-01-20 NOTE — DISCHARGE NOTE ADULT - CARE PROVIDER_API CALL
Clyde Ya), Internal Medicine; Medical Oncology  [address] [place] [address]  Phone: [phone]  Fax: [phone]    Michael Tarango), Medicine  [address] [place] [address]  Phone: [phone]  Fax: [phone]

## 2017-01-20 NOTE — DISCHARGE NOTE ADULT - MEDICATION SUMMARY - MEDICATIONS TO TAKE
I will START or STAY ON the medications listed below when I get home from the hospital:    aspirin 81 mg oral tablet, chewable  -- 1 tab(s) by mouth once a day  -- Indication: For Other cardiomyopathy    Opdivo 10 mg/mL intravenous solution  --  intravenous   -- Indication: For Malignant neoplasm of right kidney    Depakene 250 mg/5 mL oral syrup  -- 5 milliliter(s) by mouth 2 times a day  -- Indication: For Malignant neoplasm of right kidney    Vimpat 200 mg oral tablet  -- 1 tab(s) by mouth 2 times a day  -- Indication: For Malignant neoplasm of right kidney    atorvastatin 40 mg oral tablet  -- 1 tab(s) by mouth once a day  -- Indication: For Hyperlipidemia    RisperDAL 1 mg oral tablet  --  by mouth once a day (at bedtime)  -- Indication: For Brain metastasis    QUEtiapine 25 mg oral tablet  -- 1 tab(s) by mouth every 12 hours  -- Indication: For Brain metastasis    carvedilol 25 mg oral tablet  -- 1 tab(s) by mouth 2 times a day  -- Indication: For Other cardiomyopathy    amLODIPine 10 mg oral tablet  -- 1 tab(s) by mouth once a day  -- Indication: For HTN (hypertension)    senna 8.6 mg oral tablet  -- 2 tab(s) by mouth once a day (at bedtime)  -- Indication: For Nutrition, metabolism, and development symptoms    docusate sodium 100 mg oral capsule  -- 1 cap(s) by mouth 2 times a day  -- Indication: For Nutrition, metabolism, and development symptoms    Inlyta 5 mg oral tablet  --  by mouth 2 times a day  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 I will START or STAY ON the medications listed below when I get home from the hospital:    predniSONE 10 mg oral tablet  -- 6 tab(s) by mouth daily for the first two days; decrease by one tab every other day until 2/4/2017  -- It is very important that you take or use this exactly as directed.  Do not skip doses or discontinue unless directed by your doctor.  Obtain medical advice before taking any non-prescription drugs as some may affect the action of this medication.  Take with food or milk.    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    aspirin 81 mg oral tablet, chewable  -- 1 tab(s) by mouth once a day  -- Indication: For Other cardiomyopathy    Opdivo 10 mg/mL intravenous solution  --  intravenous   -- Indication: For Malignant neoplasm of right kidney    Depakene 250 mg/5 mL oral syrup  -- 5 milliliter(s) by mouth 2 times a day  -- Indication: For Malignant neoplasm of right kidney    Vimpat 200 mg oral tablet  -- 1 tab(s) by mouth 2 times a day  -- Indication: For Malignant neoplasm of right kidney    atorvastatin 40 mg oral tablet  -- 1 tab(s) by mouth once a day  -- Indication: For Hyperlipidemia    RisperDAL 1 mg oral tablet  --  by mouth once a day (at bedtime)  -- Indication: For Brain metastasis    QUEtiapine 25 mg oral tablet  -- 1 tab(s) by mouth every 12 hours  -- Indication: For Brain metastasis    carvedilol 25 mg oral tablet  -- 1 tab(s) by mouth 2 times a day  -- Indication: For Other cardiomyopathy    amLODIPine 10 mg oral tablet  -- 1 tab(s) by mouth once a day  -- Indication: For HTN (hypertension)    senna 8.6 mg oral tablet  -- 2 tab(s) by mouth once a day (at bedtime)  -- Indication: For Nutrition, metabolism, and development symptoms    docusate sodium 100 mg oral capsule  -- 1 cap(s) by mouth 2 times a day  -- Indication: For Nutrition, metabolism, and development symptoms    Inlyta 5 mg oral tablet  --  by mouth 2 times a day  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

## 2017-01-20 NOTE — PROGRESS NOTE ADULT - PROBLEM SELECTOR PLAN 1
Can add Hydralazine or HCTZ for further BP control.  In 10/2015 he had a reduced EF and a provocable outflow gradient of 50mm Hg with Valsalva maneuver. f/u echo in 2016 had normalized.  BPS now around 150.

## 2017-01-21 LAB
ANION GAP SERPL CALC-SCNC: 7 MMOL/L — LOW (ref 9–16)
BUN SERPL-MCNC: 29 MG/DL — HIGH (ref 7–23)
CALCIUM SERPL-MCNC: 7.8 MG/DL — LOW (ref 8.5–10.5)
CHLORIDE SERPL-SCNC: 110 MMOL/L — HIGH (ref 96–108)
CO2 SERPL-SCNC: 27 MMOL/L — SIGNIFICANT CHANGE UP (ref 22–31)
CREAT SERPL-MCNC: 1.53 MG/DL — HIGH (ref 0.5–1.3)
GLUCOSE SERPL-MCNC: 104 MG/DL — HIGH (ref 70–99)
HCT VFR BLD CALC: 33.8 % — LOW (ref 39–50)
HGB BLD-MCNC: 10.7 G/DL — LOW (ref 13–17)
MAGNESIUM SERPL-MCNC: 2.1 MG/DL — SIGNIFICANT CHANGE UP (ref 1.6–2.4)
MCHC RBC-ENTMCNC: 24.7 PG — LOW (ref 27–34)
MCHC RBC-ENTMCNC: 31.7 G/DL — LOW (ref 32–36)
MCV RBC AUTO: 77.9 FL — LOW (ref 80–100)
PLATELET # BLD AUTO: 206 K/UL — SIGNIFICANT CHANGE UP (ref 150–400)
POTASSIUM SERPL-MCNC: 4.2 MMOL/L — SIGNIFICANT CHANGE UP (ref 3.5–5.3)
POTASSIUM SERPL-SCNC: 4.2 MMOL/L — SIGNIFICANT CHANGE UP (ref 3.5–5.3)
RBC # BLD: 4.34 M/UL — SIGNIFICANT CHANGE UP (ref 4.2–5.8)
RBC # FLD: 16.1 % — SIGNIFICANT CHANGE UP (ref 10.3–16.9)
SODIUM SERPL-SCNC: 144 MMOL/L — SIGNIFICANT CHANGE UP (ref 135–145)
WBC # BLD: 11.4 K/UL — HIGH (ref 3.8–10.5)
WBC # FLD AUTO: 11.4 K/UL — HIGH (ref 3.8–10.5)

## 2017-01-21 RX ADMIN — CARVEDILOL PHOSPHATE 25 MILLIGRAM(S): 80 CAPSULE, EXTENDED RELEASE ORAL at 17:20

## 2017-01-21 RX ADMIN — PANTOPRAZOLE SODIUM 40 MILLIGRAM(S): 20 TABLET, DELAYED RELEASE ORAL at 06:14

## 2017-01-21 RX ADMIN — Medication 250 MILLIGRAM(S): at 22:16

## 2017-01-21 RX ADMIN — Medication 2: at 22:15

## 2017-01-21 RX ADMIN — CARVEDILOL PHOSPHATE 25 MILLIGRAM(S): 80 CAPSULE, EXTENDED RELEASE ORAL at 06:14

## 2017-01-21 RX ADMIN — Medication 4 MILLIGRAM(S): at 06:15

## 2017-01-21 RX ADMIN — Medication 100 MILLIGRAM(S): at 06:14

## 2017-01-21 RX ADMIN — QUETIAPINE FUMARATE 25 MILLIGRAM(S): 200 TABLET, FILM COATED ORAL at 22:17

## 2017-01-21 RX ADMIN — SENNA PLUS 2 TABLET(S): 8.6 TABLET ORAL at 22:14

## 2017-01-21 RX ADMIN — LACOSAMIDE 200 MILLIGRAM(S): 50 TABLET ORAL at 17:20

## 2017-01-21 RX ADMIN — Medication 4 MILLIGRAM(S): at 11:16

## 2017-01-21 RX ADMIN — QUETIAPINE FUMARATE 25 MILLIGRAM(S): 200 TABLET, FILM COATED ORAL at 11:16

## 2017-01-21 RX ADMIN — Medication 100 MICROGRAM(S): at 06:14

## 2017-01-21 RX ADMIN — AMLODIPINE BESYLATE 10 MILLIGRAM(S): 2.5 TABLET ORAL at 06:14

## 2017-01-21 RX ADMIN — Medication 100 MILLIGRAM(S): at 17:21

## 2017-01-21 RX ADMIN — LATANOPROST 1 DROP(S): 0.05 SOLUTION/ DROPS OPHTHALMIC; TOPICAL at 22:15

## 2017-01-21 RX ADMIN — HEPARIN SODIUM 5000 UNIT(S): 5000 INJECTION INTRAVENOUS; SUBCUTANEOUS at 22:16

## 2017-01-21 RX ADMIN — Medication 4 MILLIGRAM(S): at 17:20

## 2017-01-21 RX ADMIN — ATORVASTATIN CALCIUM 40 MILLIGRAM(S): 80 TABLET, FILM COATED ORAL at 22:14

## 2017-01-21 RX ADMIN — Medication 250 MILLIGRAM(S): at 09:22

## 2017-01-21 RX ADMIN — Medication 4 MILLIGRAM(S): at 00:14

## 2017-01-21 RX ADMIN — HEPARIN SODIUM 5000 UNIT(S): 5000 INJECTION INTRAVENOUS; SUBCUTANEOUS at 06:14

## 2017-01-21 RX ADMIN — LACOSAMIDE 200 MILLIGRAM(S): 50 TABLET ORAL at 06:14

## 2017-01-21 RX ADMIN — HEPARIN SODIUM 5000 UNIT(S): 5000 INJECTION INTRAVENOUS; SUBCUTANEOUS at 13:29

## 2017-01-21 RX ADMIN — Medication 4 MILLIGRAM(S): at 23:24

## 2017-01-21 NOTE — PROGRESS NOTE ADULT - PROBLEM SELECTOR PLAN 4
Resolving. Etiology likely 2/2 brain metastasis  Plantar fasciitis - patient with point tenderness to palpation of plantar surface of calcaneus w/ evidence of heel spur on x-ray. Will order PT, pain control. Consider podiatry consult in AM for evaluation for orthotics.

## 2017-01-21 NOTE — PROGRESS NOTE ADULT - PROBLEM SELECTOR PLAN 1
Can add Hydralazine or HCTZ for further BP control.  In 10/2015 he had a reduced EF (? [place]utsubo CM) and a provocable outflow gradient of 50mm Hg with Valsalva maneuver. f/u echo in 2016 had normalized.  BPS now around 150.

## 2017-01-21 NOTE — PROGRESS NOTE ADULT - SUBJECTIVE AND OBJECTIVE BOX
Patient seen and examined at bedside.   reports feeling better this am  notes resting well overnight.     valproic  acid Syrup 250milliGRAM(s) two times a day  lacosamide 200milliGRAM(s) two times a day  atorvastatin 40milliGRAM(s) at bedtime  QUEtiapine 25milliGRAM(s) every 12 hours  carvedilol 25milliGRAM(s) every 12 hours  amLODIPine   Tablet 10milliGRAM(s) daily  docusate sodium 100milliGRAM(s) two times a day  senna 2Tablet(s) at bedtime  latanoprost 0.005% Ophthalmic Solution 1Drop(s) at bedtime  pantoprazole    Tablet 40milliGRAM(s) before breakfast  oxyCODONE  5 mG/acetaminophen 325 mG 1Tablet(s) every 4 hours PRN  dexamethasone  Injectable 4milliGRAM(s) every 6 hours  insulin lispro (HumaLOG) corrective regimen sliding scale  Before meals and at bedtime  levothyroxine 100MICROGram(s) daily  heparin  Injectable 5000Unit(s) every 8 hours      Allergies    No Known Allergies    Intolerances        T(C): , Max: 36.8 (01-20 @ 22:43)  T(F): , Max: 98.3 (01-20 @ 22:43)  HR: 65  BP: 157/83  BP(mean): --  RR: 17  SpO2: 95%  Wt(kg): --        PHYSICAL EXAM:  Constitutional: ill appearning.  No acute distress  ENMT: Moist mucous membrane.  No cyanosis.  Neck: Supple. No JVD.  Back: No CVA tenderness  Respiratory: Clear to auscultation   Cardiovascular: S1, S2.  Regular rate and rhythm.    Gastrointestinal: soft, non-tender, non-distended, normal bowel sounds  Extremities: Warm.  No lower extremity edema.    Neurological: No focal deficits.  Skin: Warm. Dry.    Lymph Nodes:  No cervical lymph nodes.    Psychiatric: Normal affect.    ACCESS:     LABS:                        10.7   11.4  )-----------( 206      ( 21 Jan 2017 07:56 )             33.8     21 Jan 2017 07:56    144    |  110    |  29     ----------------------------<  104    4.2     |  27     |  1.53     Ca    7.8        21 Jan 2017 07:56  Mg     2.1       21 Jan 2017 07:56                  RADIOLOGY & ADDITIONAL STUDIES:

## 2017-01-21 NOTE — PROGRESS NOTE ADULT - PROBLEM SELECTOR PLAN 5
Resolving.  JULIAN on CKD - likely 2/2 dehydration vs. nephrotic syndrome given proteinuria and edema   - trend Cr, avoid nephrotoxic medications

## 2017-01-21 NOTE — PROGRESS NOTE ADULT - PROBLEM SELECTOR PLAN 1
Uncontrolled, likely side effect of Inlyta, as patient reports his blood pressure was controlled prior to starting medication. Asymptomatic.   - hold Inlyta

## 2017-01-21 NOTE — PROGRESS NOTE ADULT - SUBJECTIVE AND OBJECTIVE BOX
O/N Events: No acute events O/n.  Subjective/ROS: Denies HA, CP, SOB, n/v, changes in bowel/urinary habits;  no ear discharge complaints this AM.      VITALS  Vital Signs Last 24 Hrs  T(C): 36.6, Max: 36.8 (01-20 @ 22:43)  T(F): 97.9, Max: 98.3 (01-20 @ 22:43)  HR: 65 (63 - 77)  BP: 157/83 (126/90 - 161/94)  BP(mean): --  RR: 17 (17 - 18)  SpO2: 95% (95% - 100%)    CAPILLARY BLOOD GLUCOSE  105 (21 Jan 2017 07:50)  187 (20 Jan 2017 21:03)  133 (20 Jan 2017 18:22)  141 (20 Jan 2017 11:48)    PHYSICAL EXAM  General: AAOx3; NAD  Head: NC/AT; MMM; R. pinna non-tender w/out drainage.    Eyes: EOMI  Neck: Supple;    Respiratory: CTA B/L;   Cardiovascular: Regular rhythm/rate; S1/S2;  Gastrointestinal: Soft; NTND  Extremities: WWP; no edema or cyanosis; radial/pedal pulses palpable  Neurological:  CNII-XII grossly intact; improving B/L UE spasticity and weakness;     MEDICATIONS  (STANDING):  valproic  acid Syrup 250milliGRAM(s) Oral two times a day  lacosamide 200milliGRAM(s) Oral two times a day  atorvastatin 40milliGRAM(s) Oral at bedtime  QUEtiapine 25milliGRAM(s) Oral every 12 hours  carvedilol 25milliGRAM(s) Oral every 12 hours  amLODIPine   Tablet 10milliGRAM(s) Oral daily  docusate sodium 100milliGRAM(s) Oral two times a day  senna 2Tablet(s) Oral at bedtime  latanoprost 0.005% Ophthalmic Solution 1Drop(s) Both EYES at bedtime  pantoprazole    Tablet 40milliGRAM(s) Oral before breakfast  dexamethasone  Injectable 4milliGRAM(s) IV Push every 6 hours  insulin lispro (HumaLOG) corrective regimen sliding scale  SubCutaneous Before meals and at bedtime  levothyroxine 100MICROGram(s) Oral daily  heparin  Injectable 5000Unit(s) SubCutaneous every 8 hours    MEDICATIONS  (PRN):  oxyCODONE  5 mG/acetaminophen 325 mG 1Tablet(s) Oral every 4 hours PRN Severe Pain (7 - 10)      No Known Allergies      LABS        IMAGING/EKG/ETC  MRI: Cervical INTERPRETATION:  1. Slight straightening of the normal curvature of the cervical spine,   which may be on the basis of muscle pain and/or spasm and/or positioning.  2. Multilevel degenerative osteoarthritis with bilateral foraminal   narrowing from the C3-C4 level through to the C6-C7 level inclusive.  3. Multilevel degenerative disc disease: central disc protrusion at C2-C3   level, central disc-osteophyte complex C3-C4 level, left   subarticular-foraminal disc protrusion C4-C5 level, disc-osteophyte   complex eccentric to the right at C5-C6 level, and diffuse   disc-osteophyte complex C6-C7 and C7-T1 levels.   4. If there remains concern for metastatic disease, consideration could   be given to bone scan, PET scan, or postcontrast MR imaging of the   cervicalspine, as these may be more sensitive studies for the detection   of osseous metastatic disease.  5. Mild central canal stenosis C4-C5 level which relates to acquired   etiologies from disc disease and osteoarthritis.    Thoracic INTERPRETATION:  1. There appears to be a rounded, subtle focus of decreased signal   intensity on T1 and T2 within T7. There are couple of benign intraosseous   hemangiomata noted which appear as foci of T1 shortening and T2   prolongation. There is a probable benign intraosseous hemangioma within   T9 demonstrating atypical hypointensity on T1 with some peripheral T1   shortening. Postcontrast throacic spine MR imaging may be helpful for   further evaluation and/or nuclear medicine bone scan and/or PET scan, as   these are more sensitive studies for the detection of osseous metastatic   disease.   2. Evaluation is limited by patient motion, particularly the sagittal   STIR sequence. This creates apparent signal within the thoracic cord at   the T11 level which is not confirmed on the sagittal T2. However, this   appears to be present on the axial T2 images, which are also   significantly degraded by motion. This could represent prominence of the   central canal. Consideration could be given to repeating thoracic spine   MR imaging with contrast when the patient is able to cooperate with the   exam.   3. Multilevel degenerative osteoarthritis including marginal osteophytes   anteriorly.  4. Multilevel degenerative disc disease.  5. Kyphosis in the thoracic spine.  6. Round focus of abnormal soft tissue signal intensity in the right lung   parenchyma (series 7, image #25) may represent a metastatic focus.   Reference should be made to the chest CT performed on 1/16/2017.    Lumbar INTERPRETATION:  IMadhu M.D., have reviewed the   images and agree with the report with the following modifications:    1. Multilevel degenerative disc disease including discogenic sclerosis at   L5-S1 level. Given that the patient could not tolerate axial imaging,   disc protrusions cannot be excluded.  2. Multilevel facet arthropathy.  3. Unremarkable conus medullaris and cauda equina. O/N Events: No acute events O/n.  Subjective/ROS: Denies HA, CP, SOB, n/v, changes in bowel/urinary habits;  no ear discharge complaints this AM.      VITALS  Vital Signs Last 24 Hrs  T(C): 36.6, Max: 36.8 (01-20 @ 22:43)  T(F): 97.9, Max: 98.3 (01-20 @ 22:43)  HR: 65 (63 - 77)  BP: 157/83 (126/90 - 161/94)  BP(mean): --  RR: 17 (17 - 18)  SpO2: 95% (95% - 100%)    CAPILLARY BLOOD GLUCOSE  105 (21 Jan 2017 07:50)  187 (20 Jan 2017 21:03)  133 (20 Jan 2017 18:22)  141 (20 Jan 2017 11:48)    PHYSICAL EXAM  General: AAOx3; NAD  Head: NC/AT; MMM; R. pinna non-tender w/out drainage.    Eyes: EOMI  Neck: Supple;    Respiratory: CTA B/L;   Cardiovascular: Regular rhythm/rate; S1/S2;  Gastrointestinal: Soft; NTND  Extremities: WWP; no edema or cyanosis; radial/pedal pulses palpable  Neurological:  CNII-XII grossly intact; improving B/L UE spasticity and weakness;     MEDICATIONS  (STANDING):  valproic  acid Syrup 250milliGRAM(s) Oral two times a day  lacosamide 200milliGRAM(s) Oral two times a day  atorvastatin 40milliGRAM(s) Oral at bedtime  QUEtiapine 25milliGRAM(s) Oral every 12 hours  carvedilol 25milliGRAM(s) Oral every 12 hours  amLODIPine   Tablet 10milliGRAM(s) Oral daily  docusate sodium 100milliGRAM(s) Oral two times a day  senna 2Tablet(s) Oral at bedtime  latanoprost 0.005% Ophthalmic Solution 1Drop(s) Both EYES at bedtime  pantoprazole    Tablet 40milliGRAM(s) Oral before breakfast  dexamethasone  Injectable 4milliGRAM(s) IV Push every 6 hours  insulin lispro (HumaLOG) corrective regimen sliding scale  SubCutaneous Before meals and at bedtime  levothyroxine 100MICROGram(s) Oral daily  heparin  Injectable 5000Unit(s) SubCutaneous every 8 hours    MEDICATIONS  (PRN):  oxyCODONE  5 mG/acetaminophen 325 mG 1Tablet(s) Oral every 4 hours PRN Severe Pain (7 - 10)      No Known Allergies      LABS  LABS                        10.7   11.4  )-----------( 206      ( 21 Jan 2017 07:56 )             33.8     21 Jan 2017 07:56    144    |  110    |  29     ----------------------------<  104    4.2     |  27     |  1.53     Ca    7.8        21 Jan 2017 07:56  Mg     2.1       21 Jan 2017 07:56    IMAGING/EKG/ETC  MRI: Cervical INTERPRETATION:  1. Slight straightening of the normal curvature of the cervical spine,   which may be on the basis of muscle pain and/or spasm and/or positioning.  2. Multilevel degenerative osteoarthritis with bilateral foraminal   narrowing from the C3-C4 level through to the C6-C7 level inclusive.  3. Multilevel degenerative disc disease: central disc protrusion at C2-C3   level, central disc-osteophyte complex C3-C4 level, left   subarticular-foraminal disc protrusion C4-C5 level, disc-osteophyte   complex eccentric to the right at C5-C6 level, and diffuse   disc-osteophyte complex C6-C7 and C7-T1 levels.   4. If there remains concern for metastatic disease, consideration could   be given to bone scan, PET scan, or postcontrast MR imaging of the   cervicalspine, as these may be more sensitive studies for the detection   of osseous metastatic disease.  5. Mild central canal stenosis C4-C5 level which relates to acquired   etiologies from disc disease and osteoarthritis.    Thoracic INTERPRETATION:  1. There appears to be a rounded, subtle focus of decreased signal   intensity on T1 and T2 within T7. There are couple of benign intraosseous   hemangiomata noted which appear as foci of T1 shortening and T2   prolongation. There is a probable benign intraosseous hemangioma within   T9 demonstrating atypical hypointensity on T1 with some peripheral T1   shortening. Postcontrast throacic spine MR imaging may be helpful for   further evaluation and/or nuclear medicine bone scan and/or PET scan, as   these are more sensitive studies for the detection of osseous metastatic   disease.   2. Evaluation is limited by patient motion, particularly the sagittal   STIR sequence. This creates apparent signal within the thoracic cord at   the T11 level which is not confirmed on the sagittal T2. However, this   appears to be present on the axial T2 images, which are also   significantly degraded by motion. This could represent prominence of the   central canal. Consideration could be given to repeating thoracic spine   MR imaging with contrast when the patient is able to cooperate with the   exam.   3. Multilevel degenerative osteoarthritis including marginal osteophytes   anteriorly.  4. Multilevel degenerative disc disease.  5. Kyphosis in the thoracic spine.  6. Round focus of abnormal soft tissue signal intensity in the right lung   parenchyma (series 7, image #25) may represent a metastatic focus.   Reference should be made to the chest CT performed on 1/16/2017.    Lumbar INTERPRETATION:  IMadhu M.D., have reviewed the   images and agree with the report with the following modifications:    1. Multilevel degenerative disc disease including discogenic sclerosis at   L5-S1 level. Given that the patient could not tolerate axial imaging,   disc protrusions cannot be excluded.  2. Multilevel facet arthropathy.  3. Unremarkable conus medullaris and cauda equina.

## 2017-01-21 NOTE — PROGRESS NOTE ADULT - SUBJECTIVE AND OBJECTIVE BOX
INTERVAL HISTORY:  stronger, following BP	    MEDICATIONS:  carvedilol 25milliGRAM(s) Oral every 12 hours  amLODIPine   Tablet 10milliGRAM(s) Oral daily        valproic  acid Syrup 250milliGRAM(s) Oral two times a day  lacosamide 200milliGRAM(s) Oral two times a day  QUEtiapine 25milliGRAM(s) Oral every 12 hours  oxyCODONE  5 mG/acetaminophen 325 mG 1Tablet(s) Oral every 4 hours PRN    docusate sodium 100milliGRAM(s) Oral two times a day  senna 2Tablet(s) Oral at bedtime  pantoprazole    Tablet 40milliGRAM(s) Oral before breakfast    atorvastatin 40milliGRAM(s) Oral at bedtime  dexamethasone  Injectable 4milliGRAM(s) IV Push every 6 hours  insulin lispro (HumaLOG) corrective regimen sliding scale  SubCutaneous Before meals and at bedtime  levothyroxine 100MICROGram(s) Oral daily    latanoprost 0.005% Ophthalmic Solution 1Drop(s) Both EYES at bedtime  heparin  Injectable 5000Unit(s) SubCutaneous every 8 hours        PHYSICAL EXAM:  T(C): 36.6, Max: 36.8 (01-20 @ 22:43)  HR: 65 (63 - 77)  BP: 157/83 (153/89 - 161/94)  RR: 17 (17 - 18)  SpO2: 95% (95% - 100%)  Wt(kg): --  I&O's Summary        Appearance: Normal	  HEENT:   Normal oral mucosa, PERRL, EOMI	  Cardiovascular: Normal S1 S2, No JVD, No murmurs, trace edema  Respiratory: Lungs clear to auscultation	  Psychiatry: A & O x 3, Mood & affect appropriate  Gastrointestinal:  Soft, Non-tender, + BS	  Skin: No rashes, No ecchymoses, No cyanosis  Neurologic: Non-focal  Extremities: Normal range of motion, No clubbing, cyanosis trace edema  Vascular: Peripheral pulses palpable 2+ bilaterally    TELEMETRY: 	    ECG:  	  RADIOLOGY:   DIAGNOSTIC TESTING:  [ ] Echocardiogram:  [ ]  Catheterization:  [ ] Stress Test:    OTHER: 	    LABS:	 	    CARDIAC MARKERS:                                  10.7   11.4  )-----------( 206      ( 21 Jan 2017 07:56 )             33.8     21 Jan 2017 07:56    144    |  110    |  29     ----------------------------<  104    4.2     |  27     |  1.53     Ca    7.8        21 Jan 2017 07:56  Mg     2.1       21 Jan 2017 07:56      proBNP:   Lipid Profile:   HgA1c:   TSH:     ASSESSMENT/PLAN:

## 2017-01-22 RX ORDER — ACETAMINOPHEN 500 MG
650 TABLET ORAL EVERY 6 HOURS
Qty: 0 | Refills: 0 | Status: DISCONTINUED | OUTPATIENT
Start: 2017-01-22 | End: 2017-01-23

## 2017-01-22 RX ADMIN — Medication 4 MILLIGRAM(S): at 11:07

## 2017-01-22 RX ADMIN — LACOSAMIDE 200 MILLIGRAM(S): 50 TABLET ORAL at 06:15

## 2017-01-22 RX ADMIN — LACOSAMIDE 200 MILLIGRAM(S): 50 TABLET ORAL at 17:19

## 2017-01-22 RX ADMIN — Medication 4 MILLIGRAM(S): at 06:15

## 2017-01-22 RX ADMIN — QUETIAPINE FUMARATE 25 MILLIGRAM(S): 200 TABLET, FILM COATED ORAL at 10:13

## 2017-01-22 RX ADMIN — Medication 650 MILLIGRAM(S): at 13:22

## 2017-01-22 RX ADMIN — HEPARIN SODIUM 5000 UNIT(S): 5000 INJECTION INTRAVENOUS; SUBCUTANEOUS at 13:36

## 2017-01-22 RX ADMIN — PANTOPRAZOLE SODIUM 40 MILLIGRAM(S): 20 TABLET, DELAYED RELEASE ORAL at 06:15

## 2017-01-22 RX ADMIN — HEPARIN SODIUM 5000 UNIT(S): 5000 INJECTION INTRAVENOUS; SUBCUTANEOUS at 06:19

## 2017-01-22 RX ADMIN — Medication 650 MILLIGRAM(S): at 12:22

## 2017-01-22 RX ADMIN — CARVEDILOL PHOSPHATE 25 MILLIGRAM(S): 80 CAPSULE, EXTENDED RELEASE ORAL at 06:15

## 2017-01-22 RX ADMIN — Medication 100 MILLIGRAM(S): at 17:19

## 2017-01-22 RX ADMIN — Medication 4 MILLIGRAM(S): at 17:19

## 2017-01-22 RX ADMIN — Medication 100 MICROGRAM(S): at 06:19

## 2017-01-22 RX ADMIN — Medication 100 MILLIGRAM(S): at 06:15

## 2017-01-22 RX ADMIN — Medication 250 MILLIGRAM(S): at 10:13

## 2017-01-22 RX ADMIN — AMLODIPINE BESYLATE 10 MILLIGRAM(S): 2.5 TABLET ORAL at 06:15

## 2017-01-22 RX ADMIN — CARVEDILOL PHOSPHATE 25 MILLIGRAM(S): 80 CAPSULE, EXTENDED RELEASE ORAL at 17:19

## 2017-01-22 NOTE — PROGRESS NOTE ADULT - PROBLEM SELECTOR PLAN 6
No recent reported seizure activity. Con't home anti-epileptic therapy.

## 2017-01-22 NOTE — PROGRESS NOTE ADULT - PROBLEM SELECTOR PROBLEM 4
Other cardiomyopathy
Other cardiomyopathy
JULIAN (acute kidney injury)
Leg pain, left
Other cardiomyopathy
Anemia
JULIAN (acute kidney injury)
JULIAN (acute kidney injury)
Other cardiomyopathy
Hyperuricemia
JULIAN (acute kidney injury)
Leg pain, left
Other cardiomyopathy

## 2017-01-22 NOTE — PROGRESS NOTE ADULT - SUBJECTIVE AND OBJECTIVE BOX
Patient seen and examined at bedside.   sitting up in bed   eating breakfast, conversant, noting go to rehab facility in on 1/23/ 17    valproic  acid Syrup 250milliGRAM(s) two times a day  lacosamide 200milliGRAM(s) two times a day  atorvastatin 40milliGRAM(s) at bedtime  QUEtiapine 25milliGRAM(s) every 12 hours  carvedilol 25milliGRAM(s) every 12 hours  amLODIPine   Tablet 10milliGRAM(s) daily  docusate sodium 100milliGRAM(s) two times a day  senna 2Tablet(s) at bedtime  latanoprost 0.005% Ophthalmic Solution 1Drop(s) at bedtime  pantoprazole    Tablet 40milliGRAM(s) before breakfast  oxyCODONE  5 mG/acetaminophen 325 mG 1Tablet(s) every 4 hours PRN  dexamethasone  Injectable 4milliGRAM(s) every 6 hours  insulin lispro (HumaLOG) corrective regimen sliding scale  Before meals and at bedtime  levothyroxine 100MICROGram(s) daily  heparin  Injectable 5000Unit(s) every 8 hours      Allergies    No Known Allergies    Intolerances        T(C): , Max: 37.2 (01-22 @ 05:32)  T(F): , Max: 98.9 (01-22 @ 05:32)  HR: 54  BP: 153/94  BP(mean): --  RR: 16  SpO2: 100%  Wt(kg): --    I & Os for current day (as of 01-22 @ 09:54)  =============================================  IN:    Total IN: 0 ml  ---------------------------------------------  OUT:    Voided: 500 ml    Total OUT: 500 ml  ---------------------------------------------  Total NET: -500 ml        PHYSICAL EXAM:  Constitutional: Well appearning.  slowness in thought.   ENMT: Moist mucous membrane.  No cyanosis.  Neck: Supple. No JVD.  Back: No CVA tenderness  Respiratory: Clear to auscultation   Cardiovascular: S1, S2.  Regular rate and rhythm.    Gastrointestinal: soft, non-tender, non-distended, normal bowel sounds  Extremities: Warm.  No lower extremity edema.    Neurological: No focal deficits.  Skin: Warm. Dry.    Lymph Nodes:  No cervical lymph nodes.    Psychiatric: Normal affect.        LABS:                        10.7   11.4  )-----------( 206      ( 21 Jan 2017 07:56 )             33.8     21 Jan 2017 07:56    144    |  110    |  29     ----------------------------<  104    4.2     |  27     |  1.53     Ca    7.8        21 Jan 2017 07:56  Mg     2.1       21 Jan 2017 07:56                  RADIOLOGY & ADDITIONAL STUDIES:

## 2017-01-22 NOTE — PROGRESS NOTE ADULT - SUBJECTIVE AND OBJECTIVE BOX
INTERVAL HISTORY:61yMalePatient is a 61y old  Male who presents with a chief complaint of weakness (20 Jan 2017 14:23)    	  MEDICATIONS:  carvedilol 25milliGRAM(s) Oral every 12 hours  amLODIPine   Tablet 10milliGRAM(s) Oral daily        valproic  acid Syrup 250milliGRAM(s) Oral two times a day  lacosamide 200milliGRAM(s) Oral two times a day  QUEtiapine 25milliGRAM(s) Oral every 12 hours  acetaminophen   Tablet. 650milliGRAM(s) Oral every 6 hours PRN    docusate sodium 100milliGRAM(s) Oral two times a day  senna 2Tablet(s) Oral at bedtime  pantoprazole    Tablet 40milliGRAM(s) Oral before breakfast    atorvastatin 40milliGRAM(s) Oral at bedtime  dexamethasone  Injectable 4milliGRAM(s) IV Push every 6 hours  insulin lispro (HumaLOG) corrective regimen sliding scale  SubCutaneous Before meals and at bedtime  levothyroxine 100MICROGram(s) Oral daily    latanoprost 0.005% Ophthalmic Solution 1Drop(s) Both EYES at bedtime  heparin  Injectable 5000Unit(s) SubCutaneous every 8 hours      Complaint:     Otherwise 12 point review of systems is normal.    PHYSICAL EXAM:    Constitutional: good  Eyes: PERRL, EOMI, sclera non-icteric  Neck: supple, no masses, no JVD  Respiratory: CTA b/l, good air entry b/l, no wheezing, rhonchi, rales,  Cardiovascular: RRR, normal S1S2, no M/R/G  Gastrointestinal: soft, NTND, no masses palpable, BS normal in all four quadrants,   Extremities:  no c/c/e  Neurological: AAOx3    Vital Signs Last 24 Hrs  T(C): 36.9, Max: 37.2 (01-22 @ 05:32)  T(F): 98.4, Max: 98.9 (01-22 @ 05:32)  HR: 64 (54 - 76)  BP: 136/76 (136/76 - 159/73)  BP(mean): --  RR: 16 (16 - 17)  SpO2: 99% (97% - 100%)    ECG:      CHEST X RAY    CT    MRI    MRA    CT ANGIO    CAROTID DUPLEX    DUPLEX     Echocardiogram    Catheterization:    Stress Test:     LABS:	 	  CARDIAC MARKERS:                              10.7   11.4  )-----------( 206      ( 21 Jan 2017 07:56 )             33.8     21 Jan 2017 07:56    144    |  110    |  29     ----------------------------<  104    4.2     |  27     |  1.53     Ca    7.8        21 Jan 2017 07:56  Mg     2.1       21 Jan 2017 07:56            ASSESSMENT/PLAN:

## 2017-01-22 NOTE — PROGRESS NOTE ADULT - PROBLEM SELECTOR PLAN 7
chronic, microcytic; no active bleeding; likely 2/2 chemotherapeutic regimen
chronic, microcytic; no active bleeding; likely 2/2 chemotherapeutic regimen   - f/u iron studies   - trend CBC
chronic, microcytic; no active bleeding; likely 2/2 chemotherapeutic regimen
chronic, microcytic; no active bleeding; likely 2/2 chemotherapeutic regimen   - f/u iron studies   - trend CBC

## 2017-01-22 NOTE — PROGRESS NOTE ADULT - PROBLEM SELECTOR PLAN 8
check lipid panel; con't atorvastatin

## 2017-01-22 NOTE — PROGRESS NOTE ADULT - PROBLEM SELECTOR PLAN 10
avoid pharmacologic ppx due to brain mets; SCDs once DVT ruled out    11. Plantar fasciitis - patient with point tenderness to palpation of plantar surface of calcaneus w/ evidence of heel spur on x-ray. Will order PT, pain control. Consider podiatry consult in AM for evaluation for orthotics.
avoid pharmacologic ppx due to brain mets; SCDs once DVT ruled out  Dispo: D/c to [facility] on Monday
avoid pharmacologic ppx due to brain mets; SCDs once DVT ruled out    11. Plantar fasciitis - patient with point tenderness to palpation of plantar surface of calcaneus w/ evidence of heel spur on x-ray. Will order PT, pain control. Consider podiatry consult in AM for evaluation for orthotics.
avoid pharmacologic ppx due to brain mets; SCDs once DVT ruled out  Dispo: D/c to [facility] on Monday

## 2017-01-22 NOTE — PROGRESS NOTE ADULT - PROBLEM SELECTOR PROBLEM 5
JULIAN (acute kidney injury)
Malignant neoplasm of right kidney
JULIAN (acute kidney injury)
Malignant neoplasm of right kidney

## 2017-01-22 NOTE — PROGRESS NOTE ADULT - PROBLEM SELECTOR PROBLEM 10
Need for prophylactic measure

## 2017-01-22 NOTE — PROGRESS NOTE ADULT - PROBLEM SELECTOR PROBLEM 8
Hyperlipidemia

## 2017-01-22 NOTE — PROGRESS NOTE ADULT - PROBLEM SELECTOR PLAN 9
DASH diet, replete electrolytes PRN K >4 Mg >2
DASH diet, replete electrolytes PRN K >4 Mg >2    #S/p LP, on ceftriaxone/ampicillin/acyclovir -- LP results negative to date. -- F/u ID (Dr Logan) recs.
DASH diet, replete electrolytes PRN K >4 Mg >2
DASH diet, replete electrolytes PRN K >4 Mg >2    #S/p LP, on ceftriaxone/ampicillin/acyclovir -- LP results negative to date. -- F/u ID (Dr Logan) recs.
DASH diet, replete electrolytes PRN K >4 Mg >2
DASH diet, replete electrolytes PRN K >4 Mg >2

## 2017-01-22 NOTE — PROGRESS NOTE ADULT - PROBLEM SELECTOR PROBLEM 6
Seizure disorder

## 2017-01-23 VITALS
DIASTOLIC BLOOD PRESSURE: 91 MMHG | RESPIRATION RATE: 17 BRPM | HEART RATE: 79 BPM | TEMPERATURE: 98 F | OXYGEN SATURATION: 97 % | SYSTOLIC BLOOD PRESSURE: 148 MMHG

## 2017-01-23 LAB — FUNGITELL: SIGNIFICANT CHANGE UP PG/ML (ref 0–59)

## 2017-01-23 PROCEDURE — 83036 HEMOGLOBIN GLYCOSYLATED A1C: CPT

## 2017-01-23 PROCEDURE — 82550 ASSAY OF CK (CPK): CPT

## 2017-01-23 PROCEDURE — 83735 ASSAY OF MAGNESIUM: CPT

## 2017-01-23 PROCEDURE — 81003 URINALYSIS AUTO W/O SCOPE: CPT

## 2017-01-23 PROCEDURE — 87070 CULTURE OTHR SPECIMN AEROBIC: CPT

## 2017-01-23 PROCEDURE — 83615 LACTATE (LD) (LDH) ENZYME: CPT

## 2017-01-23 PROCEDURE — 72148 MRI LUMBAR SPINE W/O DYE: CPT

## 2017-01-23 PROCEDURE — 70450 CT HEAD/BRAIN W/O DYE: CPT

## 2017-01-23 PROCEDURE — 70553 MRI BRAIN STEM W/O & W/DYE: CPT

## 2017-01-23 PROCEDURE — 85730 THROMBOPLASTIN TIME PARTIAL: CPT

## 2017-01-23 PROCEDURE — 72146 MRI CHEST SPINE W/O DYE: CPT

## 2017-01-23 PROCEDURE — 86592 SYPHILIS TEST NON-TREP QUAL: CPT

## 2017-01-23 PROCEDURE — 82728 ASSAY OF FERRITIN: CPT

## 2017-01-23 PROCEDURE — 82607 VITAMIN B-12: CPT

## 2017-01-23 PROCEDURE — 87798 DETECT AGENT NOS DNA AMP: CPT

## 2017-01-23 PROCEDURE — 82746 ASSAY OF FOLIC ACID SERUM: CPT

## 2017-01-23 PROCEDURE — 93970 EXTREMITY STUDY: CPT

## 2017-01-23 PROCEDURE — 84443 ASSAY THYROID STIM HORMONE: CPT

## 2017-01-23 PROCEDURE — 84157 ASSAY OF PROTEIN OTHER: CPT

## 2017-01-23 PROCEDURE — 72170 X-RAY EXAM OF PELVIS: CPT

## 2017-01-23 PROCEDURE — 36415 COLL VENOUS BLD VENIPUNCTURE: CPT

## 2017-01-23 PROCEDURE — 99232 SBSQ HOSP IP/OBS MODERATE 35: CPT | Mod: GC

## 2017-01-23 PROCEDURE — 87086 URINE CULTURE/COLONY COUNT: CPT

## 2017-01-23 PROCEDURE — 93005 ELECTROCARDIOGRAM TRACING: CPT

## 2017-01-23 PROCEDURE — 87116 MYCOBACTERIA CULTURE: CPT

## 2017-01-23 PROCEDURE — 73620 X-RAY EXAM OF FOOT: CPT

## 2017-01-23 PROCEDURE — 88108 CYTOPATH CONCENTRATE TECH: CPT

## 2017-01-23 PROCEDURE — 80053 COMPREHEN METABOLIC PANEL: CPT

## 2017-01-23 PROCEDURE — 81001 URINALYSIS AUTO W/SCOPE: CPT

## 2017-01-23 PROCEDURE — 84550 ASSAY OF BLOOD/URIC ACID: CPT

## 2017-01-23 PROCEDURE — 83550 IRON BINDING TEST: CPT

## 2017-01-23 PROCEDURE — 97001: CPT

## 2017-01-23 PROCEDURE — 71046 X-RAY EXAM CHEST 2 VIEWS: CPT

## 2017-01-23 PROCEDURE — 85027 COMPLETE CBC AUTOMATED: CPT

## 2017-01-23 PROCEDURE — 87102 FUNGUS ISOLATION CULTURE: CPT

## 2017-01-23 PROCEDURE — 84100 ASSAY OF PHOSPHORUS: CPT

## 2017-01-23 PROCEDURE — 85025 COMPLETE CBC W/AUTO DIFF WBC: CPT

## 2017-01-23 PROCEDURE — 83605 ASSAY OF LACTIC ACID: CPT

## 2017-01-23 PROCEDURE — 84436 ASSAY OF TOTAL THYROXINE: CPT

## 2017-01-23 PROCEDURE — 84481 FREE ASSAY (FT-3): CPT

## 2017-01-23 PROCEDURE — A9585: CPT

## 2017-01-23 PROCEDURE — 80061 LIPID PANEL: CPT

## 2017-01-23 PROCEDURE — 87205 SMEAR GRAM STAIN: CPT

## 2017-01-23 PROCEDURE — 97116 GAIT TRAINING THERAPY: CPT

## 2017-01-23 PROCEDURE — 72141 MRI NECK SPINE W/O DYE: CPT

## 2017-01-23 PROCEDURE — 85610 PROTHROMBIN TIME: CPT

## 2017-01-23 PROCEDURE — 82945 GLUCOSE OTHER FLUID: CPT

## 2017-01-23 PROCEDURE — 99285 EMERGENCY DEPT VISIT HI MDM: CPT | Mod: 25

## 2017-01-23 PROCEDURE — 71250 CT THORAX DX C-: CPT

## 2017-01-23 PROCEDURE — 89051 BODY FLUID CELL COUNT: CPT

## 2017-01-23 PROCEDURE — 87040 BLOOD CULTURE FOR BACTERIA: CPT

## 2017-01-23 PROCEDURE — 80048 BASIC METABOLIC PNL TOTAL CA: CPT

## 2017-01-23 PROCEDURE — 86403 PARTICLE AGGLUT ANTBDY SCRN: CPT

## 2017-01-23 RX ADMIN — PANTOPRAZOLE SODIUM 40 MILLIGRAM(S): 20 TABLET, DELAYED RELEASE ORAL at 06:59

## 2017-01-23 RX ADMIN — ATORVASTATIN CALCIUM 40 MILLIGRAM(S): 80 TABLET, FILM COATED ORAL at 00:04

## 2017-01-23 RX ADMIN — QUETIAPINE FUMARATE 25 MILLIGRAM(S): 200 TABLET, FILM COATED ORAL at 00:04

## 2017-01-23 RX ADMIN — Medication 4 MILLIGRAM(S): at 00:05

## 2017-01-23 RX ADMIN — SENNA PLUS 2 TABLET(S): 8.6 TABLET ORAL at 00:04

## 2017-01-23 RX ADMIN — LACOSAMIDE 200 MILLIGRAM(S): 50 TABLET ORAL at 06:59

## 2017-01-23 RX ADMIN — Medication 4 MILLIGRAM(S): at 07:00

## 2017-01-23 RX ADMIN — Medication 4 MILLIGRAM(S): at 12:14

## 2017-01-23 RX ADMIN — Medication 250 MILLIGRAM(S): at 10:04

## 2017-01-23 RX ADMIN — AMLODIPINE BESYLATE 10 MILLIGRAM(S): 2.5 TABLET ORAL at 06:59

## 2017-01-23 RX ADMIN — HEPARIN SODIUM 5000 UNIT(S): 5000 INJECTION INTRAVENOUS; SUBCUTANEOUS at 00:04

## 2017-01-23 RX ADMIN — Medication 2: at 00:05

## 2017-01-23 RX ADMIN — Medication 100 MICROGRAM(S): at 06:59

## 2017-01-23 RX ADMIN — HEPARIN SODIUM 5000 UNIT(S): 5000 INJECTION INTRAVENOUS; SUBCUTANEOUS at 07:00

## 2017-01-23 RX ADMIN — QUETIAPINE FUMARATE 25 MILLIGRAM(S): 200 TABLET, FILM COATED ORAL at 10:04

## 2017-01-23 RX ADMIN — CARVEDILOL PHOSPHATE 25 MILLIGRAM(S): 80 CAPSULE, EXTENDED RELEASE ORAL at 06:59

## 2017-01-23 RX ADMIN — Medication 250 MILLIGRAM(S): at 00:04

## 2017-01-23 RX ADMIN — Medication 100 MILLIGRAM(S): at 06:59

## 2017-01-23 NOTE — PROGRESS NOTE ADULT - ASSESSMENT
The patient is ready for discharge for rehabilitation. 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 I will recommend radiation therapy to his brain by CyberKnife and to his pulmonary nodule along with immunotherapy. He does not tolerate TKI targeted therapies.

## 2017-01-23 NOTE — PROGRESS NOTE ADULT - SUBJECTIVE AND OBJECTIVE BOX
Patient seen and examined at bedside. Followup clinical status and vital signs.      valproic  acid Syrup 250 two times a day  lacosamide 200 two times a day  atorvastatin 40 at bedtime  QUEtiapine 25 every 12 hours  carvedilol 25 every 12 hours  amLODIPine   Tablet 10 daily  docusate sodium 100 two times a day  senna 2 at bedtime  latanoprost 0.005% Ophthalmic Solution 1 at bedtime  pantoprazole    Tablet 40 before breakfast  dexamethasone  Injectable 4 every 6 hours  insulin lispro (HumaLOG) corrective regimen sliding scale  Before meals and at bedtime  levothyroxine 100 daily  heparin  Injectable 5000 every 8 hours  acetaminophen   Tablet. 650 every 6 hours PRN      Allergies    No Known Allergies    Intolerances        T(C): , Max: 37 (01-23 @ 00:03)  T(F): , Max: 98.6 (01-23 @ 00:03)  HR: 79  BP: 148/91  BP(mean): --  RR: 17  SpO2: 97%  Wt(kg): --    I & Os for current day (as of 01-23 @ 10:28)  =============================================  IN:    Total IN: 0 ml  ---------------------------------------------  OUT:    Voided: 900 ml    Total OUT: 900 ml  ---------------------------------------------  Total NET: -900 ml          LABS:                      RADIOLOGY & ADDITIONAL STUDIES:

## 2017-01-23 NOTE — PROGRESS NOTE ADULT - ATTENDING COMMENTS
Discharge to physical therapy. Arranged with radiation therapy for CyberKnife radiation to the brain at [facility]. Arrange for the patient to see me in 2 weeks in my office at [address]    Clyde Ya M.D.  [place] [phone]  Office [phone]

## 2017-01-23 NOTE — PROGRESS NOTE ADULT - PROBLEM SELECTOR PROBLEM 2
Weakness
Malignant neoplasm of kidney
HTN (hypertension)
Malignant neoplasm of right kidney
Malignant neoplasm of right kidney
Weakness
Malignant neoplasm of kidney
HTN (hypertension)

## 2017-01-23 NOTE — PROGRESS NOTE ADULT - PROVIDER SPECIALTY LIST ADULT
Cardiology
Heme/Onc
Infectious Disease
Internal Medicine
Nephrology
Neurology
Neurosurgery
Neurosurgery
Rehab Medicine
Internal Medicine

## 2017-01-23 NOTE — PROGRESS NOTE ADULT - PROBLEM SELECTOR PROBLEM 1
Hypertensive urgency
Stage 3 chronic kidney disease
Hypertensive urgency
Stage 3 chronic kidney disease
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
Chronic kidney disease

## 2017-01-23 NOTE — PROGRESS NOTE ADULT - PROBLEM SELECTOR PROBLEM 3
Stage 3 chronic kidney disease
Stage 3 chronic kidney disease
Leg pain, left
Stage 3 chronic kidney disease
Weakness
Hyperuricemia
Leg pain, left
Stage 3 chronic kidney disease
Anemia
Anemia
Leg pain, left
Malignant neoplasm of kidney
Weakness
Stage 3 chronic kidney disease
Malignant neoplasm of kidney
Weakness
Brain metastasis

## 2017-01-23 NOTE — PROGRESS NOTE ADULT - PROBLEM SELECTOR PLAN 2
Bp runs a bit high from time to time. Consider start low dose po lasix 20 mg po daily. May have volume component to bp. On  other bp meds now.

## 2017-01-23 NOTE — PROGRESS NOTE ADULT - SUBJECTIVE AND OBJECTIVE BOX
Neurology Follow up note    Name  CORIN PERDOMO    HPI:  Pt is a 62yo M w/ PMH renal cell carcinoma w/ mets to the brain (s/p R. nephrectomy) seizure d/o, presenting with weakness and L. groin and heel pain. He recently began a new oral chemotherapy called Inlyta on January 4th, and was taking it twice daily. On January 6th, he began experiencing L. groin pain that did not inhibit his daily activities. At baseline he walks with the occasional assistance of a rollator. Beginning January 13th, he started experiencing fatigue and left heel pain, and had to stay in bed. His weakness worsening on Saturday, and he was not able to get up to go to the bathroom. He describes the weakness as associated with left groin and heel pain, stating he cannot walk on the left leg, so he stayed in bed. His wife describes a "puffiness" in his feet, hands and face that was not previously present. He denies fever, chills, headache, vision changes, difficulty speaking or swallowing, confusion, chest pain, palpitations, SOB, nausea, change in bowel habits, dysuria, change in urinary color or frequency, or rash. (16 Jan 2017 02:17)      Interval History - gait improved- mental status back to baseline- no seizures        REVIEW OF SYSTEMS    Vital Signs Last 24 Hrs  T(C): 36.6, Max: 37.1 (01-22 @ 09:42)  T(F): 97.9, Max: 98.7 (01-22 @ 09:42)  HR: 79 (54 - 79)  BP: 148/91 (136/76 - 166/88)  BP(mean): --  RR: 17 (16 - 18)  SpO2: 97% (96% - 100%)    Physical Exam-     Mental Status- awake     Cranial Nerves- full lateral eye movements    Gait and station- unsteady    Motor- weakness in LE    Reflexes- decreased    Sensation-    Coordination-no tremors    Vascular -    Medications  valproic  acid Syrup 250milliGRAM(s) Oral two times a day  lacosamide 200milliGRAM(s) Oral two times a day  atorvastatin 40milliGRAM(s) Oral at bedtime  QUEtiapine 25milliGRAM(s) Oral every 12 hours  carvedilol 25milliGRAM(s) Oral every 12 hours  amLODIPine   Tablet 10milliGRAM(s) Oral daily  docusate sodium 100milliGRAM(s) Oral two times a day  senna 2Tablet(s) Oral at bedtime  latanoprost 0.005% Ophthalmic Solution 1Drop(s) Both EYES at bedtime  pantoprazole    Tablet 40milliGRAM(s) Oral before breakfast  dexamethasone  Injectable 4milliGRAM(s) IV Push every 6 hours  insulin lispro (HumaLOG) corrective regimen sliding scale  SubCutaneous Before meals and at bedtime  levothyroxine 100MICROGram(s) Oral daily  heparin  Injectable 5000Unit(s) SubCutaneous every 8 hours  acetaminophen   Tablet. 650milliGRAM(s) Oral every 6 hours PRN      Lab      Radiology    Assessment- Renal Ca- no acute neuro events    Plan- Rehab and taper steroids

## 2017-01-23 NOTE — PROGRESS NOTE ADULT - SUBJECTIVE AND OBJECTIVE BOX
The patient feels well today and felt he made progress with rehabilitation  over the weekend. He has no complaints.    Examination of the head and neck demonstrates well-healed surgical scars. There is no palpable lymphadenopathy in the neck. The lungs are clear to percussion and auscultation. The heart sounds are regular without murmur, rub or ectopy. The patient has no truncal obesity and has no palpable or percussible hepatomegaly or splenomegaly. The patient has no chronic venostasis changes in the lower extremity although there is edema of the foot and ankles. Neurologically he is oriented to person and place and to time. He is no longer confabulating at times. He is no longer diffusely rigid but has some cogwheeling which is greater in the left arm than the right arm. He has no nuchal rigidity. Deep tendon reflexes are equal. The patient has increased strength in the lower extremities. And he is able to get himself from the bed to a walker and walk in place with no difficulty. He has some difficulty getting from a chair to a standing position. He has some tendency to fall backwards. He gets better balance after several minutes of walking in place.

## 2017-01-25 DIAGNOSIS — R80.9 PROTEINURIA, UNSPECIFIED: ICD-10-CM

## 2017-01-25 DIAGNOSIS — R10.32 LEFT LOWER QUADRANT PAIN: ICD-10-CM

## 2017-01-25 DIAGNOSIS — T45.1X5A ADVERSE EFFECT OF ANTINEOPLASTIC AND IMMUNOSUPPRESSIVE DRUGS, INITIAL ENCOUNTER: ICD-10-CM

## 2017-01-25 DIAGNOSIS — I16.0 HYPERTENSIVE URGENCY: ICD-10-CM

## 2017-01-25 DIAGNOSIS — G40.909 EPILEPSY, UNSPECIFIED, NOT INTRACTABLE, WITHOUT STATUS EPILEPTICUS: ICD-10-CM

## 2017-01-25 DIAGNOSIS — Z28.21 IMMUNIZATION NOT CARRIED OUT BECAUSE OF PATIENT REFUSAL: ICD-10-CM

## 2017-01-25 DIAGNOSIS — M72.2 PLANTAR FASCIAL FIBROMATOSIS: ICD-10-CM

## 2017-01-25 DIAGNOSIS — I42.8 OTHER CARDIOMYOPATHIES: ICD-10-CM

## 2017-01-25 DIAGNOSIS — R53.1 WEAKNESS: ICD-10-CM

## 2017-01-25 DIAGNOSIS — D64.9 ANEMIA, UNSPECIFIED: ICD-10-CM

## 2017-01-25 DIAGNOSIS — I10 ESSENTIAL (PRIMARY) HYPERTENSION: ICD-10-CM

## 2017-01-25 DIAGNOSIS — Z90.5 ACQUIRED ABSENCE OF KIDNEY: ICD-10-CM

## 2017-01-25 DIAGNOSIS — I12.9 HYPERTENSIVE CHRONIC KIDNEY DISEASE WITH STAGE 1 THROUGH STAGE 4 CHRONIC KIDNEY DISEASE, OR UNSPECIFIED CHRONIC KIDNEY DISEASE: ICD-10-CM

## 2017-01-25 DIAGNOSIS — Z85.528 PERSONAL HISTORY OF OTHER MALIGNANT NEOPLASM OF KIDNEY: ICD-10-CM

## 2017-01-25 DIAGNOSIS — N05.9 UNSPECIFIED NEPHRITIC SYNDROME WITH UNSPECIFIED MORPHOLOGIC CHANGES: ICD-10-CM

## 2017-01-25 DIAGNOSIS — C79.31 SECONDARY MALIGNANT NEOPLASM OF BRAIN: ICD-10-CM

## 2017-01-25 DIAGNOSIS — N17.9 ACUTE KIDNEY FAILURE, UNSPECIFIED: ICD-10-CM

## 2017-01-25 DIAGNOSIS — E86.0 DEHYDRATION: ICD-10-CM

## 2017-01-25 DIAGNOSIS — E78.5 HYPERLIPIDEMIA, UNSPECIFIED: ICD-10-CM

## 2017-01-25 DIAGNOSIS — N18.9 CHRONIC KIDNEY DISEASE, UNSPECIFIED: ICD-10-CM

## 2017-01-25 DIAGNOSIS — Z79.82 LONG TERM (CURRENT) USE OF ASPIRIN: ICD-10-CM

## 2017-01-25 DIAGNOSIS — H40.9 UNSPECIFIED GLAUCOMA: ICD-10-CM

## 2017-01-30 LAB
WNV RNA SPEC QL NAA+PROBE: NEGATIVE — SIGNIFICANT CHANGE UP
WNV RNA SPEC QL NAA+PROBE: SIGNIFICANT CHANGE UP

## 2017-02-01 LAB
CULTURE RESULTS: NO GROWTH — SIGNIFICANT CHANGE UP
SPECIMEN SOURCE: SIGNIFICANT CHANGE UP

## 2017-02-14 ENCOUNTER — OUTPATIENT (OUTPATIENT)
Dept: OUTPATIENT SERVICES | Facility: HOSPITAL | Age: 62
LOS: 1 days | Discharge: HOME | End: 2017-02-14

## 2017-02-14 ENCOUNTER — APPOINTMENT (OUTPATIENT)
Dept: RADIATION ONCOLOGY | Facility: CLINIC | Age: 62
End: 2017-02-14

## 2017-02-14 VITALS
HEART RATE: 66 BPM | DIASTOLIC BLOOD PRESSURE: 72 MMHG | OXYGEN SATURATION: 99 % | RESPIRATION RATE: 14 BRPM | SYSTOLIC BLOOD PRESSURE: 133 MMHG | BODY MASS INDEX: 23.37 KG/M2 | WEIGHT: 167.55 LBS

## 2017-02-14 DIAGNOSIS — C64.9 MALIGNANT NEOPLASM OF UNSPECIFIED KIDNEY, EXCEPT RENAL PELVIS: ICD-10-CM

## 2017-02-14 DIAGNOSIS — C79.31 SECONDARY MALIGNANT NEOPLASM OF BRAIN: ICD-10-CM

## 2017-02-14 DIAGNOSIS — Z90.5 ACQUIRED ABSENCE OF KIDNEY: Chronic | ICD-10-CM

## 2017-02-14 RX ORDER — BEVACIZUMAB 400 MG/16ML
INJECTION, SOLUTION INTRAVENOUS
Refills: 0 | Status: ACTIVE | COMMUNITY

## 2017-02-14 RX ORDER — LACOSAMIDE 10 MG/ML
SOLUTION ORAL
Refills: 0 | Status: ACTIVE | COMMUNITY

## 2017-02-14 RX ORDER — NIVOLUMAB 10 MG/ML
INJECTION INTRAVENOUS
Refills: 0 | Status: ACTIVE | COMMUNITY

## 2017-02-15 LAB
CULTURE RESULTS: SIGNIFICANT CHANGE UP
SPECIMEN SOURCE: SIGNIFICANT CHANGE UP

## 2017-03-01 ENCOUNTER — FORM ENCOUNTER (OUTPATIENT)
Age: 62
End: 2017-03-01

## 2017-03-01 LAB
CULTURE RESULTS: SIGNIFICANT CHANGE UP
SPECIMEN SOURCE: SIGNIFICANT CHANGE UP

## 2017-03-02 ENCOUNTER — APPOINTMENT (OUTPATIENT)
Dept: NEUROSURGERY | Facility: CLINIC | Age: 62
End: 2017-03-02

## 2017-03-02 ENCOUNTER — APPOINTMENT (OUTPATIENT)
Dept: MRI IMAGING | Facility: IMAGING CENTER | Age: 62
End: 2017-03-02

## 2017-03-02 ENCOUNTER — OUTPATIENT (OUTPATIENT)
Dept: OUTPATIENT SERVICES | Facility: HOSPITAL | Age: 62
LOS: 1 days | End: 2017-03-02
Payer: COMMERCIAL

## 2017-03-02 DIAGNOSIS — D42.0 NEOPLASM OF UNCERTAIN BEHAVIOR OF CEREBRAL MENINGES: ICD-10-CM

## 2017-03-02 DIAGNOSIS — C79.31 SECONDARY MALIGNANT NEOPLASM OF BRAIN: ICD-10-CM

## 2017-03-02 DIAGNOSIS — Z90.5 ACQUIRED ABSENCE OF KIDNEY: Chronic | ICD-10-CM

## 2017-03-02 PROCEDURE — 76498 UNLISTED MR PROCEDURE: CPT

## 2017-03-02 PROCEDURE — 82565 ASSAY OF CREATININE: CPT

## 2017-03-02 PROCEDURE — A9585: CPT

## 2017-03-02 RX ORDER — VALPROIC ACID 100 %
LIQUID (ML) MISCELLANEOUS
Refills: 0 | Status: ACTIVE | COMMUNITY

## 2017-03-02 RX ORDER — CARVEDILOL 3.12 MG/1
3.12 TABLET, FILM COATED ORAL
Refills: 0 | Status: ACTIVE | COMMUNITY

## 2017-03-02 RX ORDER — AMLODIPINE BESYLATE 5 MG/1
TABLET ORAL
Refills: 0 | Status: ACTIVE | COMMUNITY

## 2017-03-02 RX ORDER — AXITINIB 5 MG/1
TABLET, FILM COATED ORAL
Refills: 0 | Status: DISCONTINUED | COMMUNITY
End: 2017-03-02

## 2017-03-02 RX ORDER — TRIAMTERENE AND HYDROCHLOROTHIAZIDE 37.5; 25 MG/1; MG/1
37.5-25 CAPSULE ORAL
Refills: 0 | Status: ACTIVE | COMMUNITY

## 2017-03-08 ENCOUNTER — INPATIENT (INPATIENT)
Facility: HOSPITAL | Age: 62
LOS: 1 days | Discharge: HOME HEALTH | DRG: 683 | End: 2017-03-10
Attending: INTERNAL MEDICINE | Admitting: INTERNAL MEDICINE
Payer: COMMERCIAL

## 2017-03-08 VITALS
RESPIRATION RATE: 18 BRPM | TEMPERATURE: 97 F | HEART RATE: 76 BPM | DIASTOLIC BLOOD PRESSURE: 80 MMHG | OXYGEN SATURATION: 99 % | SYSTOLIC BLOOD PRESSURE: 115 MMHG

## 2017-03-08 DIAGNOSIS — Z90.5 ACQUIRED ABSENCE OF KIDNEY: Chronic | ICD-10-CM

## 2017-03-08 LAB
ALBUMIN SERPL ELPH-MCNC: 2.5 G/DL — LOW (ref 3.4–5)
ALP SERPL-CCNC: 80 U/L — SIGNIFICANT CHANGE UP (ref 40–120)
ALT FLD-CCNC: 10 U/L — LOW (ref 12–42)
ANION GAP SERPL CALC-SCNC: 6 MMOL/L — LOW (ref 9–16)
ANION GAP SERPL CALC-SCNC: 7 MMOL/L — LOW (ref 9–16)
APPEARANCE UR: CLEAR — SIGNIFICANT CHANGE UP
APTT BLD: 40.4 SEC — HIGH (ref 27.5–37.4)
AST SERPL-CCNC: 11 U/L — LOW (ref 15–37)
BILIRUB SERPL-MCNC: 0.2 MG/DL — SIGNIFICANT CHANGE UP (ref 0.2–1.2)
BILIRUB UR-MCNC: NEGATIVE — SIGNIFICANT CHANGE UP
BLD GP AB SCN SERPL QL: NEGATIVE — SIGNIFICANT CHANGE UP
BUN SERPL-MCNC: 23 MG/DL — SIGNIFICANT CHANGE UP (ref 7–23)
BUN SERPL-MCNC: 24 MG/DL — HIGH (ref 7–23)
CALCIUM SERPL-MCNC: 8.8 MG/DL — SIGNIFICANT CHANGE UP (ref 8.5–10.5)
CALCIUM SERPL-MCNC: 9.4 MG/DL — SIGNIFICANT CHANGE UP (ref 8.5–10.5)
CHLORIDE SERPL-SCNC: 101 MMOL/L — SIGNIFICANT CHANGE UP (ref 96–108)
CHLORIDE SERPL-SCNC: 99 MMOL/L — SIGNIFICANT CHANGE UP (ref 96–108)
CHOLEST SERPL-MCNC: 166 MG/DL — SIGNIFICANT CHANGE UP
CK MB CFR SERPL CALC: <1 NG/ML — SIGNIFICANT CHANGE UP (ref 0.5–3.6)
CK MB CFR SERPL CALC: <1 NG/ML — SIGNIFICANT CHANGE UP (ref 0.5–3.6)
CK SERPL-CCNC: 37 U/L — LOW (ref 39–308)
CK SERPL-CCNC: 42 U/L — SIGNIFICANT CHANGE UP (ref 39–308)
CO2 SERPL-SCNC: 27 MMOL/L — SIGNIFICANT CHANGE UP (ref 22–31)
CO2 SERPL-SCNC: 29 MMOL/L — SIGNIFICANT CHANGE UP (ref 22–31)
COLOR SPEC: YELLOW — SIGNIFICANT CHANGE UP
CREAT ?TM UR-MCNC: 131 MG/DL — SIGNIFICANT CHANGE UP
CREAT SERPL-MCNC: 1.56 MG/DL — HIGH (ref 0.5–1.3)
CREAT SERPL-MCNC: 1.85 MG/DL — HIGH (ref 0.5–1.3)
DIFF PNL FLD: (no result)
FERRITIN SERPL-MCNC: 232.1 NG/ML — SIGNIFICANT CHANGE UP (ref 26–388)
GLUCOSE SERPL-MCNC: 107 MG/DL — HIGH (ref 70–99)
GLUCOSE SERPL-MCNC: 97 MG/DL — SIGNIFICANT CHANGE UP (ref 70–99)
GLUCOSE UR QL: NEGATIVE — SIGNIFICANT CHANGE UP
HBA1C BLD-MCNC: 6.1 % — HIGH (ref 4.8–5.6)
HCT VFR BLD CALC: 39.1 % — SIGNIFICANT CHANGE UP (ref 39–50)
HDLC SERPL-MCNC: 44 MG/DL — SIGNIFICANT CHANGE UP
HGB BLD-MCNC: 12.5 G/DL — LOW (ref 13–17)
INR BLD: 1.19 — HIGH (ref 0.88–1.16)
IRON SATN MFR SERPL: 15 % — LOW (ref 26–39)
IRON SATN MFR SERPL: 32 UG/DL — LOW (ref 65–175)
KETONES UR-MCNC: (no result) MG/DL
LEUKOCYTE ESTERASE UR-ACNC: NEGATIVE — SIGNIFICANT CHANGE UP
LIPID PNL WITH DIRECT LDL SERPL: 102 MG/DL — HIGH
MAGNESIUM SERPL-MCNC: 2.2 MG/DL — SIGNIFICANT CHANGE UP (ref 1.6–2.4)
MCHC RBC-ENTMCNC: 25.4 PG — LOW (ref 27–34)
MCHC RBC-ENTMCNC: 32 G/DL — SIGNIFICANT CHANGE UP (ref 32–36)
MCV RBC AUTO: 79.3 FL — LOW (ref 80–100)
NITRITE UR-MCNC: NEGATIVE — SIGNIFICANT CHANGE UP
OSMOLALITY SERPL: 285 MOSM/KG — SIGNIFICANT CHANGE UP (ref 280–301)
OSMOLALITY UR: 454 MOSMOL/KG — SIGNIFICANT CHANGE UP (ref 100–650)
PH UR: 7 — SIGNIFICANT CHANGE UP (ref 4–8)
PHOSPHATE SERPL-MCNC: 3.8 MG/DL — SIGNIFICANT CHANGE UP (ref 2.5–4.5)
PLATELET # BLD AUTO: 507 K/UL — HIGH (ref 150–400)
POTASSIUM SERPL-MCNC: 5.1 MMOL/L — SIGNIFICANT CHANGE UP (ref 3.5–5.3)
POTASSIUM SERPL-MCNC: 5.4 MMOL/L — HIGH (ref 3.5–5.3)
POTASSIUM SERPL-SCNC: 5.1 MMOL/L — SIGNIFICANT CHANGE UP (ref 3.5–5.3)
POTASSIUM SERPL-SCNC: 5.4 MMOL/L — HIGH (ref 3.5–5.3)
PROT SERPL-MCNC: 7 G/DL — SIGNIFICANT CHANGE UP (ref 6.4–8.2)
PROT UR-MCNC: 100 MG/DL
PROTHROM AB SERPL-ACNC: 13.2 SEC — HIGH (ref 10–13.1)
RBC # BLD: 4.93 M/UL — SIGNIFICANT CHANGE UP (ref 4.2–5.8)
RBC # FLD: 17.2 % — HIGH (ref 10.3–16.9)
RH IG SCN BLD-IMP: POSITIVE — SIGNIFICANT CHANGE UP
SODIUM SERPL-SCNC: 134 MMOL/L — LOW (ref 135–145)
SODIUM SERPL-SCNC: 135 MMOL/L — SIGNIFICANT CHANGE UP (ref 135–145)
SODIUM UR-SCNC: 76 MMOL/L — SIGNIFICANT CHANGE UP
SP GR SPEC: 1.01 — SIGNIFICANT CHANGE UP (ref 1–1.03)
TIBC SERPL-MCNC: 212 UG/DL — LOW (ref 250–450)
TOTAL CHOLESTEROL/HDL RATIO MEASUREMENT: 3.8 RATIO — SIGNIFICANT CHANGE UP
TRIGL SERPL-MCNC: 98 MG/DL — SIGNIFICANT CHANGE UP
TROPONIN I SERPL-MCNC: <0.015 NG/ML — SIGNIFICANT CHANGE UP (ref 0.01–0.04)
TROPONIN I SERPL-MCNC: <0.015 NG/ML — SIGNIFICANT CHANGE UP (ref 0.01–0.04)
TSH SERPL-MCNC: 1.88 UIU/ML — SIGNIFICANT CHANGE UP (ref 0.35–4.94)
UROBILINOGEN FLD QL: 0.2 E.U./DL — SIGNIFICANT CHANGE UP
UUN UR-MCNC: 564 MG/DL — SIGNIFICANT CHANGE UP
WBC # BLD: 10.9 K/UL — HIGH (ref 3.8–10.5)
WBC # FLD AUTO: 10.9 K/UL — HIGH (ref 3.8–10.5)

## 2017-03-08 PROCEDURE — 71010: CPT | Mod: NC

## 2017-03-08 PROCEDURE — 99223 1ST HOSP IP/OBS HIGH 75: CPT

## 2017-03-08 PROCEDURE — 71010: CPT | Mod: 26

## 2017-03-08 PROCEDURE — 99285 EMERGENCY DEPT VISIT HI MDM: CPT | Mod: 25

## 2017-03-08 PROCEDURE — 93010 ELECTROCARDIOGRAM REPORT: CPT | Mod: NC

## 2017-03-08 PROCEDURE — 70450 CT HEAD/BRAIN W/O DYE: CPT | Mod: 26

## 2017-03-08 PROCEDURE — 74000: CPT | Mod: 26

## 2017-03-08 RX ORDER — LACOSAMIDE 50 MG/1
200 TABLET ORAL
Qty: 0 | Refills: 0 | Status: DISCONTINUED | OUTPATIENT
Start: 2017-03-08 | End: 2017-03-10

## 2017-03-08 RX ORDER — LEVOTHYROXINE SODIUM 125 MCG
50 TABLET ORAL DAILY
Qty: 0 | Refills: 0 | Status: DISCONTINUED | OUTPATIENT
Start: 2017-03-08 | End: 2017-03-10

## 2017-03-08 RX ORDER — ATORVASTATIN CALCIUM 80 MG/1
1 TABLET, FILM COATED ORAL
Qty: 0 | Refills: 0 | COMMUNITY

## 2017-03-08 RX ORDER — DOCUSATE SODIUM 100 MG
1 CAPSULE ORAL
Qty: 0 | Refills: 0 | COMMUNITY

## 2017-03-08 RX ORDER — SODIUM CHLORIDE 9 MG/ML
500 INJECTION INTRAMUSCULAR; INTRAVENOUS; SUBCUTANEOUS ONCE
Qty: 0 | Refills: 0 | Status: COMPLETED | OUTPATIENT
Start: 2017-03-08 | End: 2017-03-08

## 2017-03-08 RX ORDER — LATANOPROST 0.05 MG/ML
1 SOLUTION/ DROPS OPHTHALMIC; TOPICAL AT BEDTIME
Qty: 0 | Refills: 0 | Status: DISCONTINUED | OUTPATIENT
Start: 2017-03-08 | End: 2017-03-10

## 2017-03-08 RX ORDER — AXITINIB 1 MG/1
0 TABLET, FILM COATED ORAL
Qty: 0 | Refills: 0 | COMMUNITY

## 2017-03-08 RX ORDER — VALPROIC ACID (AS SODIUM SALT) 250 MG/5ML
250 SOLUTION, ORAL ORAL
Qty: 0 | Refills: 0 | Status: DISCONTINUED | OUTPATIENT
Start: 2017-03-08 | End: 2017-03-10

## 2017-03-08 RX ORDER — SODIUM CHLORIDE 9 MG/ML
1000 INJECTION INTRAMUSCULAR; INTRAVENOUS; SUBCUTANEOUS
Qty: 0 | Refills: 0 | Status: DISCONTINUED | OUTPATIENT
Start: 2017-03-08 | End: 2017-03-09

## 2017-03-08 RX ORDER — OMEPRAZOLE 10 MG/1
1 CAPSULE, DELAYED RELEASE ORAL
Qty: 0 | Refills: 0 | COMMUNITY

## 2017-03-08 RX ORDER — CARVEDILOL PHOSPHATE 80 MG/1
3.12 CAPSULE, EXTENDED RELEASE ORAL EVERY 12 HOURS
Qty: 0 | Refills: 0 | Status: DISCONTINUED | OUTPATIENT
Start: 2017-03-09 | End: 2017-03-10

## 2017-03-08 RX ORDER — ACETAMINOPHEN 500 MG
650 TABLET ORAL EVERY 6 HOURS
Qty: 0 | Refills: 0 | Status: DISCONTINUED | OUTPATIENT
Start: 2017-03-08 | End: 2017-03-10

## 2017-03-08 RX ORDER — RISPERIDONE 4 MG/1
0 TABLET ORAL
Qty: 30 | Refills: 0 | COMMUNITY

## 2017-03-08 RX ORDER — SENNA PLUS 8.6 MG/1
2 TABLET ORAL
Qty: 0 | Refills: 0 | COMMUNITY

## 2017-03-08 RX ORDER — AMLODIPINE BESYLATE 2.5 MG/1
5 TABLET ORAL ONCE
Qty: 0 | Refills: 0 | Status: COMPLETED | OUTPATIENT
Start: 2017-03-08 | End: 2017-03-08

## 2017-03-08 RX ADMIN — LACOSAMIDE 200 MILLIGRAM(S): 50 TABLET ORAL at 23:41

## 2017-03-08 RX ADMIN — LATANOPROST 1 DROP(S): 0.05 SOLUTION/ DROPS OPHTHALMIC; TOPICAL at 23:41

## 2017-03-08 RX ADMIN — SODIUM CHLORIDE 1000 MILLILITER(S): 9 INJECTION INTRAMUSCULAR; INTRAVENOUS; SUBCUTANEOUS at 14:10

## 2017-03-08 RX ADMIN — SODIUM CHLORIDE 60 MILLILITER(S): 9 INJECTION INTRAMUSCULAR; INTRAVENOUS; SUBCUTANEOUS at 23:59

## 2017-03-08 RX ADMIN — AMLODIPINE BESYLATE 5 MILLIGRAM(S): 2.5 TABLET ORAL at 23:41

## 2017-03-08 RX ADMIN — Medication 250 MILLIGRAM(S): at 23:41

## 2017-03-08 NOTE — ED PROVIDER NOTE - OBJECTIVE STATEMENT
63 yo hx of per records of renal cell carcinoma w/ mets to the brain (s/p R. nephrectomy) seizure d/o presenting after episode of syncope while at the outpt office. Pt does not recollect events but per PCP, aide and wife, pt was vomiting multiple times today even at the office, after which pt had episode of syncope. Per PCP, pt had 2 minutes of LOC, raised pt's legs up, and after 2 mins regained consciousness. Pt has no complaints right now, no cp, sob and otherwise feels fine.

## 2017-03-08 NOTE — H&P ADULT - PROBLEM SELECTOR PLAN 9
BP Stable. Normotensive. Patient with History of Very Elevated BP, and in Setting of Brain Metastases Want to Keep BP Under Control. However, also, with Decreased PO Intake, and Normotensive Despite Missing Evening Doses of Home Medications.   -Will Resume Home Norvasc and Carvedilol, However, at Decreased Doses due to Decreased PO Intake, Dehydration.   -Continue Norvasc 5mg PO Q daily, Home Dose is 10mg.  -Continue Carvedilol 3.125mg PO Q12 hours, Home Dose is 25mg.  -Monitor BP Closely. BP Stable. Normotensive. Patient with History of Very Elevated BP, and in Setting of Brain Metastases Want to Keep BP Under Control. However, also, with Decreased PO Intake, and Normotensive Despite Missing Evening Doses of Home Medications.   -Will Resume Home Norvasc and Carvedilol, However, at Decreased Doses due to Decreased PO Intake, Dehydration.   -Continue Norvasc 5mg PO Q daily, Home Dose is 10mg.  -Continue Carvedilol 3.125mg PO Q12 hours, Home Dose is 25mg.  -Patient on HCTZ-Triamterine, HOLDING in Setting of Decreased PO Intake, and Suspected Dehydration.   -Monitor BP Closely.

## 2017-03-08 NOTE — ED PROVIDER NOTE - MEDICAL DECISION MAKING DETAILS
Pt with episode of syncope at outpt office, reported 2 minutes in duration, states otherwise feeling well. CT with possible mets to the brain, discussed care with PCP, neurologist; known from before but will need follow up MRI with carlee. EKG reviewed, possible vasovagal today but will need cont'd monitoring on tele, cardiac eval given age/comorbidities. Discussed with Dr. Diaz and Dr. Ho to admit pt after cardiac cleared, pt likely to obtain neuro eval also eeg/mri for contd evaluation. Pt with episode of syncope at outpt office, reported 2 minutes in duration, states otherwise feeling well. CT with possible mets to the brain, discussed care with PCP, neurologist; known from before but will need follow up MRI with carlee. EKG reviewed. Possible vasovagal today but will need cont'd monitoring on tele, cardiac eval given age/comorbidities. Discussed care with Dr. Diaz, Dr. Mendel and Dr. Ho, to admit pt,  after cardiac cleared, pt likely to obtain neuro eval also eeg/mri for contd evaluation.

## 2017-03-08 NOTE — H&P ADULT - NSHPOUTPATIENTPROVIDERS_GEN_ALL_CORE
Dr. Tarango, Dr. Ya (Hematology/Oncology), Dr. Simmons (Radiation Oncology at [facility]), Dr. Vazquez (Nephrology), Dr. Reynoso (Neurology)

## 2017-03-08 NOTE — H&P ADULT - ASSESSMENT
Patient is a 62 year old male with past medical history of Renal Cell Carcinoma (S/P Right Nephrectomy, with Brain Metastasis S/P Craniotomy x 2 For Lesion Removal, Currently on Chemotherapy Opdivo and Avastin Q2 Weeks with Dr. Ya and Receiving Cyberknife Radiation Therapy with Dr. Simmons at [facility] Last Session on 3/2/17), Seizures (Due To Brain Metastasis), Hypothyroidism (Related To Chemotherapy Agent, Inlyta, Which Patient Stopped), Hypertension, CKD Stage 3, Glaucoma presenting to ED after Syncopal Episode at Dr. Butt Office.

## 2017-03-08 NOTE — ED ADULT NURSE NOTE - OBJECTIVE STATEMENT
Pt states he vomited once at home this morning, went to see Dr. Ya his oncologist for a routine visit and vomited in the office, then had syncopal episode while he was sitting, witnessed by wife. No fall. Pt does not recall episode. Pt denies CP, dizziness, SOB, N/V or any other pain at this time. Pt has kidney cancer with metastasis to brain, has been receiving Avastin treatment and had radiation surgery last week. Hx of R nephrectomy. Pt is awake and alert, NAD. Will continue to monitor.

## 2017-03-08 NOTE — ED PROVIDER NOTE - DIAGNOSTIC INTERPRETATION
ER Physician: Mercy Driver MD  CHEST XRAY INTERPRETATION: lungs clear, heart shadow normal, bony structures intact    EKG: NSR, normal IL, QRS, QTc intervals, no acute ST/T wave changes.

## 2017-03-08 NOTE — H&P ADULT - NSHPPHYSICALEXAM_GEN_ALL_CORE
General:  HEENT:  Neck:  Heart:  Lungs:  Abdomen:  Extremities:  Neurological: General: NAD, Non-Toxic, Pleasant  HEENT: NCAT, PEERLA B/L, EOMI B/L, No Nystagmus, Dry Mucous Membranes, No Oropharyngeal Erythema or Exudates.   Neck: Supple, Normal Thyroid, No JVD, No Cervical or Supraclavicular Lymphadenopathy, No Cervical Spinal Tenderness.   Heart: Normal S1+S2, RRR, No M/R/G  Lungs: Decreased Breath Sounds Lung Bases Bilaterally, No Wheezes, Rales, or Rhonchi. No Respiratory Distress.   Abdomen: Soft, Non-Tender/Non-Distended, Hypoactive Bowel Sounds, No Rebound Tenderness, No Guarding, No Rigidity.   Back: No Spinal Tenderness, No CVA Tenderness.   Extremities: Warm, Well Perfused, 2+ Radial and DP Pulses Bilaterally, No Edema, No Calf Tenderness   Neurological: AAOx3, CN II-XII Intact, 5/5 Strength Bilateral Upper Extremities, 5/5 Strength Bilateral Lower Extremities, Sensation Intact, Normal Finger To Nose, Normal Babinski. No Ankle Clonus.

## 2017-03-08 NOTE — H&P ADULT - PROBLEM SELECTOR PLAN 2
Patient with WBC Count of 10.9. Patient Afebrile. Only Reporting Infrequent Cough, with Green/White Sputum Production. Still Present, but Reports Dr. Ya prescribed Antibiotic 1 Week Ago, and Completed a 5 Day Course. Improved. CXR with Possibly RLL Infiltrate, However, Also Present on Prior CXR. UA Negative.   -Continue To Monitor, if Leukocytosis Trending Up or Develops Signs or Symptoms of Active Infection, Will Start Antibiotics.

## 2017-03-08 NOTE — H&P ADULT - PROBLEM SELECTOR PLAN 8
No Signs or Symptoms of Seizures Related to Syncopal Episode.   -As per Neurology (Dr. Reynoso), Ordered for Video EEG to Rule Out Subclinical Seizure Activity. Also Ordered for AM Lacosamide and Valproic Acid Levels to Determine if Medications are Therapeutic.   -Continue Lacosamide 200mg PO Twice Daily.   -Continue Valproic Acid 250mg PO Twice Daily.

## 2017-03-08 NOTE — H&P ADULT - PROBLEM SELECTOR PLAN 1
Patient with Syncopal Episode at Dr. Ya's Office Earlier Today, with Return of Consciousness Within 2 Minutes. Patient with Nausea and Vomiting Immediately Preceding Episode. Admitted for Work-Up Patient with Syncopal Episode at Dr. Ya's Office Earlier Today, with Return of Consciousness Within 2 Minutes. Patient with Nausea and Vomiting Immediately Preceding Episode. Etiology Likely Vasovagal, However, Admitted for Work-Up to Rule Out Cardiac, Neurologic, and/or Metabolic Causes.   -Likely Vasovagal as Patient with Prodromal Symptom of Nausea/Vomiting Prior to Episode.   -EKG NSR. Admitted to [facility] for Telemetry Monitoring to Rule Out Abnormal Heart Rhythm as Etiology. No Signs of Ischemia on ECG, Troponin <0.015 x 2.   -Ordered for Echocardiogram, Bilateral Carotid Dopplers. Last Echocardiogram in May 2016 with EF 65-70% and Mild AR.   -Ordered for Lipid Panel. , HDL 44, Total Cholesterol 166, TG 98. Patient Currently Not on Cholesterol Lowering Medication, Was On Statin in Past.   -Ordered for TSH, Normal at 1.878.   -Neurology (Dr. Reynoso) Consulted, Who Will See Patient in AM. Patient with CT Head demonstrating Likely Metastatic Lesions in Left Front and Temporal Lobes, Consistent with Prior Imaging. Currently Undergoing Cyberknife Radiation Therapy and Chemotherapy, and As Per Patient is Responding. As per Dr. Reynoso, Recommending Video EEG to Rule Out Seizure Activity, and Check AM Valproic Acid and Lacosamide Levels. No Further Imaging For Now. Follow-Up Neurology Recommendations in AM.

## 2017-03-08 NOTE — PROGRESS NOTE ADULT - SUBJECTIVE AND OBJECTIVE BOX
INTERVAL HISTORY:62yMalePatient is a 62y old  Male who presents with a chief complaint of   	  MEDICATIONS:                  Complaint:     Otherwise 12 point review of systems is normal.    PHYSICAL EXAM:    Constitutional: good  Eyes: PERRL, EOMI, sclera non-icteric  Neck: supple, no masses, no JVD  Respiratory: CTA b/l, good air entry b/l, no wheezing, rhonchi, rales, with normal respiratory effort and no intercostal retractions  Cardiovascular: RRR, normal S1S2, no M/R/G  Gastrointestinal: soft, NTND, no masses palpable, BS normal in all four quadrants,   Extremities:  no c/c/e  Neurological: AAOx3  Temp:Afebrile    Vital Signs Last 24 Hrs  T(C): 36.5, Max: 36.5 (03-08 @ 15:11)  T(F): 97.7, Max: 97.7 (03-08 @ 15:11)  HR: 74 (71 - 76)  BP: 113/74 (113/74 - 126/78)  BP(mean): --  RR: 18 (18 - 18)  SpO2: 98% (98% - 99%)      ECG:      CHEST X RAY    CT    MRI    MRA    CT ANGIO    CAROTID DUPLEX    DUPLEX     Echocardiogram    Catheterization:    Stress Test:     LABS:	 	  CARDIAC MARKERS:  Troponin I, Serum: <0.015 ng/mL [0.015 - 0.045] (03-08 @ 13:50)                              12.5   10.9  )-----------( 507      ( 08 Mar 2017 13:50 )             39.1     08 Mar 2017 13:50    134    |  99     |  23     ----------------------------<  107    5.4     |  29     |  1.85     Ca    9.4        08 Mar 2017 13:50    TPro  7.5    /  Alb  2.6    /  TBili  0.2    /  DBili  0.10   /  AST  10     /  ALT  11     /  AlkPhos  87     08 Mar 2017 13:50    proBNP:   Lipid Profile:   HgA1c:   TSH:           ASSESSMENT/PLAN: Patient is a 62 year old male with past medical history of Renal Cell Carcinoma (S/P Right Nephrectomy, with Brain Metastasis S/P Craniotomy x 2 For Lesion Removal, Currently on Chemotherapy Opdivo and Avastin Q2 Weeks with Dr. Ya and Receiving Cyberknife Radiation Therapy with Dr. Simmons at [facility] Last Session on 3/2/17), Seizures (Due To Brain Metastasis), Hypothyroidism (Related To Chemotherapy Agent, Inlyta, Which Patient Stopped), Hypertension, CKD Stage 3, Glaucoma presenting to ED after Syncopal Episode at Dr. Butt Office.     Patient and Wife reporting that in AM, patient was in Bathroom, Standing at Sink, when he Suddenly Became Unsteady, Nauseous, and Vomited (No Bloody/Nonbillious Emesis) Once. Patient Reports Immediately Feeling Better After Vomiting. Denied Associated Dizziness/Lightheadedness, Visions Changes, Diaphoresis, Palpitations, Chest Pain, SOB. Patient went to see Dr. Ya, and as Dr. Ya was Placing IV for Opdivo and Avastin Infusion, Patient Suddenly Became Nausea, Vomited Again, and Syncopized. Patient Regained Consciousness Within In 2 Minutes, and Reports Feeling Well Upon Awakening, but Does Not Remember All The Events of What Happened. Patient Denies Associated Symptoms Above Prior to Syncope. Patient denies Convulsions, Tongue Biting, Bowel or Bladder Incontinence, or Post-Ictal  State. Patient's Wife reports he went for Cyberknife Radiation Treatment at [facility] on 3/2/17, and Had Similar Episode Prior to Procedure where he Vomited, Passed Out.     Patient denies Fever, Chills. Patient's Wife Reports Decreased Appetite and Decreased PO Intake Over Last 1 Week. Patient denies Sinus Congestion, Rhinorrhea. Patient reports Slight Cough, Productive of Green Sputum, which He and Wife Report was Treated with 5 Days of "Antibiotic" by Dr. Ya. As Above, Patient denies Chest Pain, SOB, Palpitations, Dizziness/Lightheadedness. Patient denies Abdominal Pain, Diarrhea, Constipation, Melena, Hematochezia, Dysuria, Hematuria, Pain/Swelling of Lower Extremities. Patient denies any Focal Motor Deficits, Sensory Loss, Vision Changes, Hearing Changes, Tinnitus.     In the ED, Vitals were TMax 98, HR 71-81, -126/72-80, RR 17-18, SpO2 97-99% on RA. Labs Notable for Leukocytosis of 10.9, Anemia Hemoglobin 12.5, Hyponatremia 134, Hyperkalemia 5.4, Elevated Creatinine 1.85  	  MEDICATIONS:                  Complaint:     Otherwise 12 point review of systems is normal.    PHYSICAL EXAM:    Constitutional: NAD  Eyes: PERRL, EOMI, sclera non-icteric  Neck: supple, no masses, no JVD  Respiratory: CTA b/l, good air entry b/l, no wheezing, rhonchi, rales, with normal respiratory effort and no intercostal retractions  Cardiovascular: RRR, normal S1S2,   Gastrointestinal: soft, NTND, no masses palpable, BS normal in all four quadrants,   Extremities:  no c/c/e  Neurological: AAOx3      Vital Signs Last 24 Hrs  T(C): 36.5, Max: 36.5 (03-08 @ 15:11)  T(F): 97.7, Max: 97.7 (03-08 @ 15:11)  HR: 74 (71 - 76)  BP: 113/74 (113/74 - 126/78)  BP(mean): --  RR: 18 (18 - 18)  SpO2: 98% (98% - 99%)      ECG:      CHEST X RAY    CT    MRI    MRA      INTERPRETATION:    Clinical history: Syncope.    Technique: CT of the brain was performed utilizing multiple axial   contiguous images from the base of the skull through the vertex.    Comparison is made to a prior CT performed on 1/16/2017    Findings:  The patient is status post right parietal occipital   craniectomy and cranioplasty. The metallic mesh overlying the right   parietal occipital craniectomy site is no indented inward. There is a   second midline occipital craniectomy and cranioplasty. There are   encephalomalacic changes seen in the left cerebellar hemisphere. The   patient is also status post left frontal craniotomy. There is a small   focus of encephalomalacic changes seen in the left frontal cortex.    There has been interval appearance of a vague hyperdense focus seen in   the left frontal subcortical white matter (axial image #10/30). There is   a 0.5 cm focus of hyperdensity seen in the left anterior temporal lobe   (axial image #19/30). There are patchy hyperdensity seen throughout the   periventricular white matter that may represent microvascular disease   versus treatment changes. There is enlargement the sulci, cisterns and   ventricles consistent with mild diffuse cerebral and cerebellar volume   loss. There is no evidence of hydrocephalus.  . There is no evidence of   midline shift, mass-effect or intra or extra-axial fluid collection.      The visualized paranasal sinuses and bilateral mastoid air cells are   clear.    Impression: Interval deformity of the right parietal occipital   cranioplasty mesh. Vague hyperdensity seen in the left frontal   subcortical white matter. Second smaller hyperdensity seen in the left   anterior temporal lobe; findings may represent metastatic disease. If   there is further clinical concern, CT or MRI with gadolinium. Moderate to   severe microvascular disease and/or treatment changes.      CT ANGIO    CAROTID DUPLEX    DUPLEX     Echocardiogram    Catheterization:    Stress Test:     LABS:	 	  CARDIAC MARKERS:  Troponin I, Serum: <0.015 ng/mL [0.015 - 0.045] (03-08 @ 13:50)                              12.5   10.9  )-----------( 507      ( 08 Mar 2017 13:50 )             39.1     08 Mar 2017 13:50    134    |  99     |  23     ----------------------------<  107    5.4     |  29     |  1.85     Ca    9.4        08 Mar 2017 13:50    TPro  7.5    /  Alb  2.6    /  TBili  0.2    /  DBili  0.10   /  AST  10     /  ALT  11     /  AlkPhos  87     08 Mar 2017 13:50          ASSESSMENT/PLAN: 	  Patient is a 62 year old male with past medical history of Renal Cell Carcinoma (S/P Right Nephrectomy, with Brain Metastasis S/P Craniotomy x 2 For Lesion Removal, Currently on Chemotherapy Opdivo and Avastin Q2 Weeks with Dr. Ya and Receiving Cyberknife Radiation Therapy with Dr. Simmons at [facility] Last Session on 3/2/17), Seizures (Due To Brain Metastasis), Hypothyroidism (Related To Chemotherapy Agent, Inlyta, Which Patient Stopped), Hypertension, CKD Stage 3, Glaucoma presenting to ED after Syncopal Episode at Dr. Butt Office.       Problem/Plan - 1:  ·  Problem: Syncope, unspecified syncope type.  Plan: Patient with Syncopal Episode at Dr. Ya's Office Earlier Today, with Return of Consciousness Within 2 Minutes. Patient with Nausea   and Vomiting Immediately Preceding Episode. Etiology Likely Vasovagal, However, Admitted for Work-Up to Rule Out Cardiac, Neurologic, and/or Metabolic Causes.   -Likely Vasovagal as Patient with Prodromal Symptom of Nausea/Vomiting Prior to Episode.   -EKG NSR. Admitted to [facility] for Telemetry Monitoring to Rule Out Abnormal Heart Rhythm as Etiology. No Signs of Ischemia on ECG, Troponin <0.015 x 2.   -Ordered for Echocardiogram, Bilateral Carotid Dopplers. Last Echocardiogram in May 2016 with EF 65-70% and Mild AR.   -Ordered for Lipid Panel. , HDL 44, Total Cholesterol 166, TG 98. Patient Currently Not on Cholesterol Lowering Medication, Was On Statin in Past.   -Ordered for TSH, Normal at 1.878.   -Neurology (Dr. Reynoso) Consulted, Who Will See Patient in AM. Patient with CT Head demonstrating Likely Metastatic Lesions in Left Front and Temporal Lobes, Consistent with Prior Imaging. Currently Undergoing Cyberknife Radiation Therapy and Chemotherapy, and As Per Patient is Responding. As per Dr. Reynoso, Recommending Video EEG to Rule Out Seizure Activity, and Check AM Valproic Acid and Lacosamide Levels. No Further Imaging For Now. Follow-Up Neurology Recommendations in AM.     Problem/Plan - 2:  ·  Problem: Leukocytosis.  Plan: Patient with WBC Count of 10.9. Patient Afebrile. Only Reporting Infrequent Cough, with Green/White Sputum Production. Still Present, but Reports Dr. Ya prescribed Antibiotic 1 Week Ago, and Completed a 5 Day Course. Improved. CXR with Possibly RLL Infiltrate, However, Also Present on Prior CXR. UA Negative.   -Continue To Monitor, if Leukocytosis Trending Up or Develops Signs or Symptoms of Active Infection, Will Start Antibiotics.     Problem/Plan - 3:  ·  Problem: Anemia.  Plan: Hemoglobin 12.5. (Baseline 10-12). Stable. Microcytic. No Black or Bloody Bowel Movements.   -Ordered for Iron Studies, Appear to Be Consistent with Anemia of Chronic Disease. Anemia May Also Be Related to Immunotherapy and Chemotherapy. Ordered for Reticulocyte Count in AM to Determine if Bone Marrow Suppression Occurring.   -Follow-Up Vitamin B12, Folic Acid.   -Continue to Monitor CBC Daily.   -Keep Active Type and Screen.   ·  Problem: Hyponatremia.  Plan: Sodium 134 on Presentation. No Serum Osm, Urine Osm, Urine Na Initially, and Patient Given IV Fluids. Patient with History of Decrease PO Intake, Episodes of Nausea and Vomiting. Dry MM on Examination. Likely Hypovolemic Hyponatremia. Sodium Improved to 135 with IV Fluids.   -Start Patient on Normal Saline 60mL/hour. Repeat CMP in AM and Monitor Sodium.   -Urine Studies and Serum Osmolarity Performed After IV Fluid Intervention.     Problem/Plan - 5:  ·  Problem: Hyperkalemia.  Plan: Patient with Potassium of 5.4 on Presentation. Improved to 5.1 without Intervention. No EKG Changes.   -Continue to Monitor Potassium Level.     Problem/Plan - 6:  Problem: JULIAN (acute kidney injury). Plan: Patient with Creatinine 1.85 on Presentation. Baseline 1.2-1.4. Improved to 1.56 with IV Fluids. Likely JULIAN on CKD, and Pre-Renal Etiology in Setting of Decreased PO Intake Over Last 1 Week. Also, FENA 0.7% Consistent with Pre-Renal Etiology. Patient also with 1 Kidney, had Right Nephrectomy due to Renal Cell Carcinoma.   -Continue to Monitor BUN/Creatinine.    Problem/Plan - 7:  ·  Problem: Renal cell carcinoma.  Plan: Patient with History of RCC with Brain Metastases (S/P Right Nephrectomy, Craniotomy x 2 For Lesion Removal, Currently on Chemotherapy/Immunotherapy with Dr. Ya, and Radiation Therapy Cyberkife with Dr. Simmons at [facility] CT Head demonstrating Hyperdensities in Left Frontal and Temporal Lobes, consistent with Location of Metastases on Prior Imaging. As per Patient, reports he had Cyberknife Radiation Therapy on 3/2/17, and was Told That He is Responding to Treatment. Patient was Due for Avastin and Opdivo Today in Dr. Ya's Office when he Syncopized.   -Follow-Up Hematology/Oncology (Dr. Ya) Recommendations in AM.   -Follow-Up Neurology (Dr. Reynoso) Recommendations in AM.   -Follow-Up with Patient's Radiation Oncologist (Dr. Simmons) at [facility] in AM.   -In Setting of Brain Metastases, HELD Aspirin 81mg PO Q daily, as well as Heparin Subcutaneous. Ordered for SCDs for DVT Prophylaxis. Discuss with Patient's Outpatient Treatment Team in AM and Determine if Safe to Continue on Aspirin and Heparin Subcutaneous During Admission.     Problem/Plan - 8:  ·  Problem: Seizure.  Plan: No Signs or Symptoms of Seizures Related to Syncopal Episode.   -As per Neurology (Dr. Reynoso), Ordered for Video EEG to Rule Out Subclinical Seizure Activity. Also Ordered for AM Lacosamide and Valproic Acid Levels to Determine if Medications are Therapeutic.   -Continue Lacosamide 200mg PO Twice Daily.   -Continue Valproic Acid 250mg PO Twice Daily.     Problem/Plan - 9:  ·  Problem: Hypertension.  Plan: BP Stable. Normotensive. Patient with History of Very Elevated BP, and in Setting of Brain Metastases Want to Keep BP Under Control. However, also, with Decreased PO Intake, and Normotensive Despite Missing Evening Doses of Home Medications.   -Will Resume Home Norvasc and Carvedilol, However, at Decreased Doses due to Decreased PO Intake, Dehydration.   -Continue Norvasc 5mg PO Q daily, Home Dose is 10mg.  -Continue Carvedilol 3.125mg PO Q12 hours, Home Dose is 25mg.  -Patient on HCTZ-Triamterine, HOLDING in Setting of Decreased PO Intake, and Suspected Dehydration.   -Monitor BP Closely.     Problem/Plan - 10:  Problem: Hypothyroidism. Plan; TSH Normal.   -Continue Synthroid 50mcg PO Q daily.     -Transfuse Irradiated Blood for Hemoglobin <7 or Active Bleeding.

## 2017-03-08 NOTE — H&P ADULT - PROBLEM SELECTOR PLAN 10
11. Glaucoma:     12. Prophylaxis:    13. FEN:    Admit to [facility] Under Dr. Harris.   FULL CODE 11. Glaucoma: Continue Latanoprost Drops Bilateral Eyes QHS.     12. Prophylaxis: SCDs    13. FEN: Cardiac Diet, NS at 60mL/hour, Reevaluate Need for IVF in AM. Replete Potassium if <4 and Magnesium if <2.     Admit to [facility] Under Dr. Harris.   FULL CODE TSH Normal.   -Continue Synthroid 50mcg PO Q daily.     11. Glaucoma: Continue Latanoprost Drops Bilateral Eyes QHS.     12. Prophylaxis: SCDs    13. FEN: Cardiac Diet, NS at 60mL/hour, Reevaluate Need for IVF in AM. Replete Potassium if <4 and Magnesium if <2.     Admit to [facility] Under Dr. Harris.   FULL CODE TSH Normal.   -Continue Synthroid 50mcg PO Q daily.     11. Glaucoma: Continue Latanoprost Drops Bilateral Eyes QHS.     12. Prophylaxis: SCDs    13. FEN: Cardiac Diet, NS at 60mL/hour, Reevaluate Need for IVF in AM. Replete Potassium if <4 and Magnesium if <2.     Admit to [facility] Under Dr. Harris.   DNR/DNI, Discussed with Patient. Called Wife to Inform of His Wishes, and She Agrees and Will Go With What He Wants, but Will Also Discuss with Their Children As Well.

## 2017-03-08 NOTE — H&P ADULT - PROBLEM SELECTOR PLAN 5
Patient with Potassium of 5.4 on Presentation. Improved to 5.1 without Intervention. No EKG Changes.   -Continue to Monitor Potassium Level.

## 2017-03-08 NOTE — H&P ADULT - NSHPLABSRESULTS_GEN_ALL_CORE
Labs/Imaging:   CBC Full  -  ( 08 Mar 2017 13:50 )  WBC Count : 10.9 K/uL  Hemoglobin : 12.5 g/dL  Hematocrit : 39.1 %  Platelet Count - Automated : 507 K/uL  Mean Cell Volume : 79.3 fL  Mean Cell Hemoglobin : 25.4 pg  Mean Cell Hemoglobin Concentration : 32.0 g/dL  Auto Neutrophil # : x  Auto Lymphocyte # : x  Auto Monocyte # : x  Auto Eosinophil # : x  Auto Basophil # : x  Auto Neutrophil % : x  Auto Lymphocyte % : x  Auto Monocyte % : x  Auto Eosinophil % : x  Auto Basophil % : x    08 Mar 2017 22:52    135    |  101    |  24     ----------------------------<  97     5.1     |  27     |  1.56     Ca    8.8        08 Mar 2017 22:52  Phos  3.8       08 Mar 2017 22:52  Mg     2.2       08 Mar 2017 22:52    TPro  7.0    /  Alb  2.5    /  TBili  0.2    /  DBili  x      /  AST  11     /  ALT  10     /  AlkPhos  80     08 Mar 2017 22:52    PT/INR - ( 08 Mar 2017 13:50 )   PT: 13.2 sec;   INR: 1.19          PTT - ( 08 Mar 2017 22:52 )  PTT:40.4 sec    CARDIAC MARKERS ( 08 Mar 2017 22:52 )  <0.015 ng/mL / x     / 37 U/L / x     / <1.0 ng/mL  CARDIAC MARKERS ( 08 Mar 2017 13:50 )  <0.015 ng/mL / x     / 42 U/L / x     / <1.0 ng/mL    Urinalysis Basic - ( 08 Mar 2017 23:07 )    Color: Yellow / Appearance: Clear / S.015 / pH: x  Gluc: x / Ketone: Trace mg/dL  / Bili: NEGATIVE / Urobili: 0.2 E.U./dL   Blood: x / Protein: 100 mg/dL / Nitrite: NEGATIVE   Leuk Esterase: NEGATIVE / RBC: x / WBC x   Sq Epi: x / Non Sq Epi: x / Bacteria: x    CT Head: Interval deformity of the right parietal occipital cranioplasty mesh. Vague hyperdensity seen in the left frontal subcortical white matter. Second smaller hyperdensity seen in the left anterior temporal lobe; findings may represent metastatic disease. If there is further clinical concern, CT or MRI with gadolinium. Moderate to severe microvascular disease and/or treatment changes.

## 2017-03-08 NOTE — H&P ADULT - HISTORY OF PRESENT ILLNESS
Patient is a 62 year old male with past medical history of Renal Cell Carcinoma (S/P Right Nephrectomy, with Brain Metastasis S/P Craniotomy x 2 For Lesion Removal, Currently on Chemotherapy Opdivo and Avastin Q2 Weeks with Dr. Ya and Receiving Cyberknife Radiation Therapy with Dr. Simmons at [facility] Last Session on 3/2/17), Seizures (Due To Brain Metastasis), Hypothyroidism (Related To Chemotherapy Agent, Inlyta, Which Patient Stopped), Hypertension, CKD Stage 3, Glaucoma presenting to ED after Syncopal Episode at Dr. Butt Office.     Patient and Wife reporting that in AM, patient was in Bathroom, Standing at Sink, when he Suddenly Became Unsteady, Nauseous, and Vomited (No Bloody/Nonbillious Emesis) Once. Patient Reports Immediately Feeling Better After Vomiting. Denied Associated Dizziness/Lightheadedness, Visions Changes, Diaphoresis, Palpitations, Chest Pain, SOB. Patient went to see Dr. Ya, and as Dr. Ya was Placing IV for Opdivo and Avastin Infusion, Patient Suddenly Became Nausea, Vomited Again, and Syncopized. Patient Regained Consciousness Within In 2 Minutes, and Reports Feeling Well Upon Awakening, but Does Not Remember All The Events of What Happened. Patient Denies Associated Symptoms Above Prior to Syncope. Patient denies Convulsions, Tongue Biting, Bowel or Bladder Incontinence, or Post-Ictal  State. Patient's Wife reports he went for Cyberknife Radiation Treatment at [facility] on 3/2/17, and Had Similar Episode Prior to Procedure where he Vomited, Passed Out.     Patient denies Fever, Chills. Patient's Wife Reports Decreased Appetite and Decreased PO Intake Over Last 1 Week. Patient denies Sinus Congestion, Rhinorrhea. Patient reports Slight Cough, Productive of Green Sputum, which He and Wife Report was Treated with 5 Days of "Antibiotic" by Dr. Ya. As Above, Patient denies Chest Pain, SOB, Palpitations, Dizziness/Lightheadedness. Patient denies Abdominal Pain, Diarrhea, Constipation, Melena, Hematochezia, Dysuria, Hematuria, Pain/Swelling of Lower Extremities. Patient denies any Focal Motor Deficits, Sensory Loss, Vision Changes, Hearing Changes, Tinnitus.     In the ED, Vitals were TMax 98, HR 71-81, -126/72-80, RR 17-18, SpO2 97-99% on RA. Labs Notable for Leukocytosis of 10.9, Anemia Hemoglobin 12.5, Hyponatremia 134, Hyperkalemia 5.4, Elevated Creatinine 1.85. Patient had a CT Head which demonstrated interval deformity of the right parietal occipital cranioplasty mesh, vague hyperdensity seen in the left frontal   subcortical white matter, second smaller hyperdensity seen in the left anterior temporal lobe. CXR Performed, demonstrating Possible RLL Infiltrate, Present on Previous CXR. Patient given Normal Saline 500mL Bolus in ED.

## 2017-03-08 NOTE — H&P ADULT - PROBLEM SELECTOR PLAN 3
Hemoglobin 12.5. (Baseline 10-12). Stable. Microcytic. No Black or Bloody Bowel Movements.   -Ordered for Iron Studies, Appear to Be Consistent with Anemia of Chronic Disease. Anemia May Also Be Related to Immunotherapy and Chemotherapy. Ordered for Reticulocyte Count in AM to Determine if Bone Marrow Suppression Occurring.   -Follow-Up Vitamin B12, Folic Acid.   -Continue to Monitor CBC Daily.   -Keep Active Type and Screen.   -Transfuse Irradiated Blood for Hemoglobin <7 or Active Bleeding.

## 2017-03-08 NOTE — H&P ADULT - PROBLEM SELECTOR PLAN 7
Patient with History of RCC with Brain Metastases (S/P Right Nephrectomy, Craniotomy x 2 For Lesion Removal, Currently on Chemotherapy/Immunotherapy with Dr. Ya, and Radiation Therapy Cyberkife with Dr. Simmons at [facility]). CT Head demonstrating Hyperdensities in Left Frontal and Temporal Lobes, consistent with Location of Metastases on Prior Imaging. As per Patient, reports he had Cyberknife Radiation Therapy on 3/2/17, and was Told That He is Responding to Treatment. Patient was Due for Avastin and Opdivo Today in Dr. Ya's Office when he Syncopized.   -Follow-Up Hematology/Oncology (Dr. Ya) Recommendations in AM.   -Follow-Up Neurology (Dr. Reynoso) Recommendations in AM.   -Follow-Up with Patient's Radiation Oncologist (Dr. Simmons) at [facility] in AM.   -In Setting of Brain Metastases, HELD Aspirin 81mg PO Q daily, as well as Heparin Subcutaneous. Ordered for SCDs for DVT Prophylaxis. Discuss with Patient's Outpatient Treatment Team in AM and Determine if Safe to Continue on Aspirin and Heparin Subcutaneous During Admission.

## 2017-03-08 NOTE — H&P ADULT - PROBLEM SELECTOR PLAN 6
Patient with Creatinine 1.85 on Presentation. Baseline 1.2-1.4. Improved to 1.56 with IV Fluids. Likely JULIAN on CKD, and Pre-Renal Etiology in Setting of Decreased PO Intake Over Last 1 Week. Also, FENA 0.7% Consistent with Pre-Renal Etiology. Patient also with 1 Kidney, had Right Nephrectomy due to Renal Cell Carcinoma.   -Continue to Monitor BUN/Creatinine.

## 2017-03-09 DIAGNOSIS — R63.8 OTHER SYMPTOMS AND SIGNS CONCERNING FOOD AND FLUID INTAKE: ICD-10-CM

## 2017-03-09 DIAGNOSIS — D72.829 ELEVATED WHITE BLOOD CELL COUNT, UNSPECIFIED: ICD-10-CM

## 2017-03-09 DIAGNOSIS — Z41.8 ENCOUNTER FOR OTHER PROCEDURES FOR PURPOSES OTHER THAN REMEDYING HEALTH STATE: ICD-10-CM

## 2017-03-09 DIAGNOSIS — E87.1 HYPO-OSMOLALITY AND HYPONATREMIA: ICD-10-CM

## 2017-03-09 DIAGNOSIS — E03.9 HYPOTHYROIDISM, UNSPECIFIED: ICD-10-CM

## 2017-03-09 DIAGNOSIS — D64.9 ANEMIA, UNSPECIFIED: ICD-10-CM

## 2017-03-09 DIAGNOSIS — R55 SYNCOPE AND COLLAPSE: ICD-10-CM

## 2017-03-09 DIAGNOSIS — E87.5 HYPERKALEMIA: ICD-10-CM

## 2017-03-09 DIAGNOSIS — N17.9 ACUTE KIDNEY FAILURE, UNSPECIFIED: ICD-10-CM

## 2017-03-09 DIAGNOSIS — I10 ESSENTIAL (PRIMARY) HYPERTENSION: ICD-10-CM

## 2017-03-09 DIAGNOSIS — C64.9 MALIGNANT NEOPLASM OF UNSPECIFIED KIDNEY, EXCEPT RENAL PELVIS: ICD-10-CM

## 2017-03-09 DIAGNOSIS — R56.9 UNSPECIFIED CONVULSIONS: ICD-10-CM

## 2017-03-09 LAB
ALBUMIN SERPL ELPH-MCNC: 2.5 G/DL — LOW (ref 3.4–5)
ALP SERPL-CCNC: 81 U/L — SIGNIFICANT CHANGE UP (ref 40–120)
ALT FLD-CCNC: 8 U/L — LOW (ref 12–42)
ANION GAP SERPL CALC-SCNC: 8 MMOL/L — LOW (ref 9–16)
APTT BLD: 40.8 SEC — HIGH (ref 27.5–37.4)
AST SERPL-CCNC: 14 U/L — LOW (ref 15–37)
BASOPHILS NFR BLD AUTO: 0.2 % — SIGNIFICANT CHANGE UP (ref 0–2)
BILIRUB SERPL-MCNC: 0.2 MG/DL — SIGNIFICANT CHANGE UP (ref 0.2–1.2)
BUN SERPL-MCNC: 22 MG/DL — SIGNIFICANT CHANGE UP (ref 7–23)
CALCIUM SERPL-MCNC: 8.4 MG/DL — LOW (ref 8.5–10.5)
CHLORIDE SERPL-SCNC: 102 MMOL/L — SIGNIFICANT CHANGE UP (ref 96–108)
CO2 SERPL-SCNC: 24 MMOL/L — SIGNIFICANT CHANGE UP (ref 22–31)
CREAT SERPL-MCNC: 1.47 MG/DL — HIGH (ref 0.5–1.3)
EOSINOPHIL NFR BLD AUTO: 1.8 % — SIGNIFICANT CHANGE UP (ref 0–6)
FOLATE SERPL-MCNC: 2.9 NG/ML — LOW (ref 4.8–24.2)
GLUCOSE SERPL-MCNC: 85 MG/DL — SIGNIFICANT CHANGE UP (ref 70–99)
HCT VFR BLD CALC: 34.8 % — LOW (ref 39–50)
HGB BLD-MCNC: 11.4 G/DL — LOW (ref 13–17)
INR BLD: 1.13 — SIGNIFICANT CHANGE UP (ref 0.88–1.16)
LYMPHOCYTES # BLD AUTO: 22.6 % — SIGNIFICANT CHANGE UP (ref 13–44)
MAGNESIUM SERPL-MCNC: 2.2 MG/DL — SIGNIFICANT CHANGE UP (ref 1.6–2.4)
MCHC RBC-ENTMCNC: 26.5 PG — LOW (ref 27–34)
MCHC RBC-ENTMCNC: 32.8 G/DL — SIGNIFICANT CHANGE UP (ref 32–36)
MCV RBC AUTO: 80.7 FL — SIGNIFICANT CHANGE UP (ref 80–100)
MONOCYTES NFR BLD AUTO: 9.8 % — SIGNIFICANT CHANGE UP (ref 2–14)
NEUTROPHILS NFR BLD AUTO: 65.6 % — SIGNIFICANT CHANGE UP (ref 43–77)
PHOSPHATE SERPL-MCNC: 3.8 MG/DL — SIGNIFICANT CHANGE UP (ref 2.5–4.5)
PLATELET # BLD AUTO: 402 K/UL — HIGH (ref 150–400)
POTASSIUM SERPL-MCNC: 5 MMOL/L — SIGNIFICANT CHANGE UP (ref 3.5–5.3)
POTASSIUM SERPL-SCNC: 5 MMOL/L — SIGNIFICANT CHANGE UP (ref 3.5–5.3)
PROT SERPL-MCNC: 6.8 G/DL — SIGNIFICANT CHANGE UP (ref 6.4–8.2)
PROTHROM AB SERPL-ACNC: 12.6 SEC — SIGNIFICANT CHANGE UP (ref 10–13.1)
RBC # BLD: 4.31 M/UL — SIGNIFICANT CHANGE UP (ref 4.2–5.8)
RBC # BLD: 4.31 M/UL — SIGNIFICANT CHANGE UP (ref 4.2–5.8)
RBC # FLD: 17.3 % — HIGH (ref 10.3–16.9)
RETICS/RBC NFR: 1 % — SIGNIFICANT CHANGE UP (ref 0.5–2.5)
SODIUM SERPL-SCNC: 134 MMOL/L — LOW (ref 135–145)
VALPROATE SERPL-MCNC: 57 UG/ML — SIGNIFICANT CHANGE UP (ref 50–100)
VIT B12 SERPL-MCNC: 907 PG/ML — HIGH (ref 243–894)
WBC # BLD: 12.5 K/UL — HIGH (ref 3.8–10.5)
WBC # FLD AUTO: 12.5 K/UL — HIGH (ref 3.8–10.5)

## 2017-03-09 PROCEDURE — 99233 SBSQ HOSP IP/OBS HIGH 50: CPT

## 2017-03-09 PROCEDURE — 93306 TTE W/DOPPLER COMPLETE: CPT | Mod: 26

## 2017-03-09 PROCEDURE — 93010 ELECTROCARDIOGRAM REPORT: CPT

## 2017-03-09 PROCEDURE — 95819 EEG AWAKE AND ASLEEP: CPT | Mod: 26

## 2017-03-09 PROCEDURE — 93880 EXTRACRANIAL BILAT STUDY: CPT | Mod: 26

## 2017-03-09 RX ORDER — SODIUM CHLORIDE 9 MG/ML
500 INJECTION INTRAMUSCULAR; INTRAVENOUS; SUBCUTANEOUS ONCE
Qty: 0 | Refills: 0 | Status: COMPLETED | OUTPATIENT
Start: 2017-03-09 | End: 2017-03-09

## 2017-03-09 RX ORDER — SODIUM CHLORIDE 9 MG/ML
1000 INJECTION INTRAMUSCULAR; INTRAVENOUS; SUBCUTANEOUS
Qty: 0 | Refills: 0 | Status: DISCONTINUED | OUTPATIENT
Start: 2017-03-09 | End: 2017-03-10

## 2017-03-09 RX ADMIN — SODIUM CHLORIDE 100 MILLILITER(S): 9 INJECTION INTRAMUSCULAR; INTRAVENOUS; SUBCUTANEOUS at 14:00

## 2017-03-09 RX ADMIN — CARVEDILOL PHOSPHATE 3.12 MILLIGRAM(S): 80 CAPSULE, EXTENDED RELEASE ORAL at 06:43

## 2017-03-09 RX ADMIN — LACOSAMIDE 200 MILLIGRAM(S): 50 TABLET ORAL at 18:21

## 2017-03-09 RX ADMIN — SODIUM CHLORIDE 100 MILLILITER(S): 9 INJECTION INTRAMUSCULAR; INTRAVENOUS; SUBCUTANEOUS at 21:10

## 2017-03-09 RX ADMIN — SODIUM CHLORIDE 2000 MILLILITER(S): 9 INJECTION INTRAMUSCULAR; INTRAVENOUS; SUBCUTANEOUS at 17:12

## 2017-03-09 RX ADMIN — LACOSAMIDE 200 MILLIGRAM(S): 50 TABLET ORAL at 06:43

## 2017-03-09 RX ADMIN — Medication 50 MICROGRAM(S): at 06:43

## 2017-03-09 RX ADMIN — LATANOPROST 1 DROP(S): 0.05 SOLUTION/ DROPS OPHTHALMIC; TOPICAL at 21:10

## 2017-03-09 RX ADMIN — Medication 250 MILLIGRAM(S): at 19:17

## 2017-03-09 RX ADMIN — CARVEDILOL PHOSPHATE 3.12 MILLIGRAM(S): 80 CAPSULE, EXTENDED RELEASE ORAL at 18:19

## 2017-03-09 RX ADMIN — Medication 250 MILLIGRAM(S): at 06:43

## 2017-03-09 NOTE — PROGRESS NOTE ADULT - PROBLEM SELECTOR PLAN 1
Syncope was likely vasovagal, as patient had prodrome with nausea and vomiting before episode. EKG and telemetry monitoring showed no evidence of arrythmia. Echo 3/9 showed hyperdyanamic LV which points to dehydration or vasovagal episode as etiology of syncope. Patient received additional 500cc NS bolus.  -Troponins negative x2  -Carotid duplex showed small bilateral atherosclerotic plaque and no hemodynamically significant stenosis.  -Dr. Reynoso (Neurology) knows patient well. Recommended 1 hour EEG.

## 2017-03-09 NOTE — CONSULT NOTE ADULT - ASSESSMENT
Cardiopulmonary collapse of uncertain etiology but possible  vasovagal reaction secondary to hyperemesis and possible dehydration. The patient has new onset left hemispheric or signs secondary to radiosurgery to a left hemispheric metastasis. The patient has no cardiac history other than cardiomyopathy from tyrosine kinase inhibitors in the past. I suspect the patient has vasogenic edema from radiosurgery in the left hemisphere.

## 2017-03-09 NOTE — PROGRESS NOTE ADULT - PROBLEM SELECTOR PLAN 8
TSH 1.878, no need to adjust synthroid dose.   -continue Synthroid 50mcg daily      12. Prophylaxis: SCDs    13. FEN: Cardiac Diet, NS at 60mL/hour, Reevaluate Need for IVF in AM. Replete Potassium if <4 and Magnesium if <2.     Admit to [address] [place], [place] [address] Under Dr. Harris.   DNR/DNI, Discussed with Patient. Called Wife to Inform of His Wishes, and She Agrees and Will Go With What He Wants, but Will Also Discuss with Their Children As Well. TSH 1.878, no need to adjust synthroid dose.   -continue Synthroid 50mcg daily      12. Prophylaxis: SCDs    13. FEN: Cardiac Diet, NS at 60mL/hour, Reevaluate Need for IVF in AM. Replete Potassium if <4 and Magnesium if <2.   DNR/DNI

## 2017-03-09 NOTE — PROGRESS NOTE ADULT - ASSESSMENT
62 year old male with PMH as above with episode of syncope, admitted for syncope workup and continued medical treatment of RCC and for placement of chemoport

## 2017-03-09 NOTE — PROGRESS NOTE ADULT - SUBJECTIVE AND OBJECTIVE BOX
INTERVAL HPI/OVERNIGHT EVENTS:  62/M PMH RCC s/p R nephrectomy, with brain metastasis s/p craniotomy x 2,  on chemotherapy (Dr. Ya) Opdivo and Avastin every 2 weeks and currently receiving Cyberknife Radiation with Dr. Simmons at [facility] (last session on 3/2/17), Seizures 2/2 mets, iatrogenic hypothyroidism from chemo, Hypertension, CKD Stage 3, Glaucoma who presented to ED after episode of syncope  (and subjective apnea/pulselessness per Dr. Ya note) admitted for syncope workup. (Details of Syncopal event in H&P/ Dr. Ya Note). Vitals and clinical status on admission were stable and patient received 500cc bolus. Echo showed hyperdynamic LV, likely due to hypovolemia. CT head showed interval deformity of the right parietal occipital cranioplasty mesh, vague hyperdensity seen in the left frontal   subcortical white matter, second smaller hyperdensity seen in the left anterior temporal lobe. Pending doppler U/S. He will be transferred to medicine for continued medical follow up under Dr. Tarango, and Dr. Ya. The patient is to receive a chemo-port tomorrow by IR. He is HD stable for transfer to [facility] under medical service.     VITAL SIGNS:  T(F): 98.9  HR: 110  BP: 129/53  RR: 16  SpO2: 95%  Wt(kg): --    PHYSICAL EXAM:    General: NAD, Non-Toxic, Pleasant  HEENT: NCAT, PEERLA B/L, EOMI B/L, No Nystagmus, Dry Mucous Membranes, No Oropharyngeal Erythema or Exudates.   Neck: Supple, Normal Thyroid, No JVD, No Cervical or Supraclavicular Lymphadenopathy, No Cervical Spinal Tenderness.   Heart: Normal S1+S2, RRR, No M/R/G  Lungs: Decreased Breath Sounds Lung Bases Bilaterally, No Wheezes, Rales, or Rhonchi. No Respiratory Distress.   Abdomen: Soft, Non-Tender/Non-Distended, Hypoactive Bowel Sounds, No Rebound Tenderness, No Guarding, No Rigidity.   Back: No Spinal Tenderness, No CVA Tenderness.   Extremities: Warm, Well Perfused, 2+ Radial and DP Pulses Bilaterally, No Edema, No Calf Tenderness   Neurological: AAOx3, CN II-XII Intact, 5/5 Strength Bilateral Upper Extremities, 5/5 Strength Bilateral Lower Extremities, Sensation Intact, Normal Finger To Nose, Normal Babinski. No Ankle Clonus.    MEDICATIONS  (STANDING):  valproic  acid Syrup 250milliGRAM(s) Oral two times a day  lacosamide 200milliGRAM(s) Oral two times a day  latanoprost 0.005% Ophthalmic Solution 1Drop(s) Both EYES at bedtime  levothyroxine 50MICROGram(s) Oral daily  carvedilol 3.125milliGRAM(s) Oral every 12 hours    MEDICATIONS  (PRN):  acetaminophen   Tablet. 650milliGRAM(s) Oral every 6 hours PRN Moderate Pain (4 - 6)      Allergies    No Known Allergies    Intolerances        LABS:                        11.4   12.5  )-----------( 402      ( 09 Mar 2017 11:15 )             34.8     09 Mar 2017 11:15    134    |  102    |  22     ----------------------------<  85     5.0     |  24     |  1.47     Ca    8.4        09 Mar 2017 11:15  Phos  3.8       09 Mar 2017 11:15  Mg     2.2       09 Mar 2017 11:15    TPro  6.8    /  Alb  2.5    /  TBili  0.2    /  DBili  x      /  AST  14     /  ALT  8      /  AlkPhos  81     09 Mar 2017 11:15    PT/INR - ( 09 Mar 2017 11:15 )   PT: 12.6 sec;   INR: 1.13          PTT - ( 09 Mar 2017 11:15 )  PTT:40.8 sec  Urinalysis Basic - ( 08 Mar 2017 23:07 )    Color: Yellow / Appearance: Clear / S.015 / pH: x  Gluc: x / Ketone: Trace mg/dL  / Bili: NEGATIVE / Urobili: 0.2 E.U./dL   Blood: x / Protein: 100 mg/dL / Nitrite: NEGATIVE   Leuk Esterase: NEGATIVE / RBC: < 5 /HPF / WBC < 5 /HPF   Sq Epi: x / Non Sq Epi: Rare /HPF / Bacteria: Present /HPF        RADIOLOGY & ADDITIONAL TESTS:

## 2017-03-09 NOTE — PROGRESS NOTE ADULT - PROBLEM SELECTOR PLAN 2
Likely residual from URI last week in which patient was prescribed antibiotics. Afebrile, with minor leukocytosis. CXR with questionable RLL infiltration. Not compelled to start abx at this time. Will closely monitor clinically.

## 2017-03-09 NOTE — PROGRESS NOTE ADULT - PROBLEM SELECTOR PLAN 4
Creatinine improved to 1.47 with fluids around baseline. Increase in Cr was likely due to hypovolemia, patient is euvolemic at this time.   -monitor bun/cr Creatinine improved to 1.47 with fluids and is around baseline. Increase in Cr was likely due to prerenal azotemia due to hypovolemia. Patient is euvolemic at this time and JULIAN is likely resolved.   -monitor bun/cr

## 2017-03-09 NOTE — PHYSICAL THERAPY INITIAL EVALUATION ADULT - RANGE OF MOTION EXAMINATION, REHAB EVAL
trunk was WFL (within functional limits)/bilateral lower extremity ROM was WFL (within functional limits)/bilateral UE limited by 50% into flexion and abduction

## 2017-03-09 NOTE — PROGRESS NOTE ADULT - PROBLEM SELECTOR PLAN 6
Unlikely due to seizure as history and findings provide more evidence for syncopal episode  -EEG ordered  -Continue Lacosamide 200mg bid, Valproic Acid 250mg bid

## 2017-03-09 NOTE — PROGRESS NOTE ADULT - PROBLEM SELECTOR PLAN 7
Continued on Norvasc and Carvedilol at decreased doses on admission due to hypovolemia.   Norvasc 5mg PO Q daily, Home Dose is 10mg.  Carvedilol 3.125mg PO Q12 hours, Home Dose is 25mg.  Patient on HCTZ-Triamterine, HOLDING at this time. Can consider restarting later today or tomorrow.   Monitor BP Closely.

## 2017-03-09 NOTE — PROGRESS NOTE ADULT - PROBLEM SELECTOR PLAN 3
Hemoglobin 11.4 which is around baseline for patient. Likely due to ACD as patient has malignancy and is on chemotherapy. Retic count 1%.   -Continue Vitamin B12, Folic Acid.   -monitor CBC  -Maintain Active T/S (3/8)  -Transfuse for HgB <7 or if suspicion for active bleed

## 2017-03-09 NOTE — PROGRESS NOTE ADULT - PROBLEM SELECTOR PLAN 2
Possibly due to URI last week which was treated with antibiotics. Patient is currently afebrile, with WBC 12.5. CXR awaiting official read. Will closely monitor clinically.  - No need for antibiotics at this time.

## 2017-03-09 NOTE — PROGRESS NOTE ADULT - PROBLEM SELECTOR PLAN 10
-NS @ 100ml IV prn  -replete electrolytes prn Keep K>4, Mg>2,   -diet DASH -NS @ 100ml IV prn  -replete electrolytes prn Keep K>4, Mg>2,   -diet NPO after midnight for chemoport placement -NS @ 100ml IV   -replete electrolytes prn Keep K>4, Mg>2,   -diet NPO after midnight for chemoport placement

## 2017-03-09 NOTE — PROGRESS NOTE ADULT - PROBLEM SELECTOR PLAN 9
Holding HSQ in setting of brain mets and risk of bleeding.  -SCDs for DVT prophylaxis  -Encourage to get out of bed and sit in chair

## 2017-03-09 NOTE — CONSULT NOTE ADULT - ATTENDING COMMENTS
The patient should have an MRI of the brain with gadolinium today.  The patient should have ordered a deep venous access device today for tomorrow.  Physical therapy Dr. Dawson Mckeon should see the patient today.

## 2017-03-09 NOTE — PROGRESS NOTE ADULT - PROBLEM SELECTOR PLAN 3
Hemoglobin 12.5- baseline around 12. Likely ACD, in setting of malignancy and long term chemo use. Will follow up retic count. Continue Vitamin B12, Folic Acid. Maintain Active T/S   Transfuse for HgB <7 or if suspicion for active bleed

## 2017-03-09 NOTE — CONSULT NOTE ADULT - SUBJECTIVE AND OBJECTIVE BOX
Patient is a 62y old  Male who presents with a chief complaint of Nausea, Vomiting, Syncopal Episode (08 Mar 2017 22:57)        HPI:  Patient is a 62 year old male with past medical history of Renal Cell Carcinoma (S/P Right Nephrectomy, with Brain Metastasis S/P Craniotomy x 2 For Lesion Removal, Currently on Chemotherapy Opdivo and Avastin Q2 Weeks with Dr. Ya and Receiving Cyberknife Radiation Therapy with Dr. Simmons at [facility] Last Session on 3/2/17), Seizures (Due To Brain Metastasis), Hypothyroidism (Related To Chemotherapy Agent, Inlyta, Which Patient Stopped), Hypertension, CKD Stage 3, Glaucoma presenting to ED after Syncopal Episode at Dr. Butt Office.     Patient and Wife reporting that in AM, patient was in Bathroom, Standing at Sink, when he Suddenly Became Unsteady, Nauseous, and Vomited (No Bloody/Nonbillious Emesis) Once. Patient Reports Immediately Feeling Better After Vomiting. Denied Associated Dizziness/Lightheadedness, Visions Changes, Diaphoresis, Palpitations, Chest Pain, SOB. Patient went to see Dr. Ya, and as Dr. Ya was Placing IV for Opdivo and Avastin Infusion, Patient Suddenly Became Nausea, Vomited Again, and Syncopized. Patient Regained Consciousness Within In 2 Minutes, and Reports Feeling Well Upon Awakening, but Does Not Remember All The Events of What Happened. Patient Denies Associated Symptoms Above Prior to Syncope. Patient denies Convulsions, Tongue Biting, Bowel or Bladder Incontinence, or Post-Ictal  State. Patient's Wife reports he went for Cyberknife Radiation Treatment at [facility] on 3/2/17, and Had Similar Episode Prior to Procedure where he Vomited, Passed Out.     Patient denies Fever, Chills. Patient's Wife Reports Decreased Appetite and Decreased PO Intake Over Last 1 Week. Patient denies Sinus Congestion, Rhinorrhea. Patient reports Slight Cough, Productive of Green Sputum, which He and Wife Report was Treated with 5 Days of "Antibiotic" by Dr. Ya. As Above, Patient denies Chest Pain, SOB, Palpitations, Dizziness/Lightheadedness. Patient denies Abdominal Pain, Diarrhea, Constipation, Melena, Hematochezia, Dysuria, Hematuria, Pain/Swelling of Lower Extremities. Patient denies any Focal Motor Deficits, Sensory Loss, Vision Changes, Hearing Changes, Tinnitus.     In the ED, Vitals were TMax 98, HR 71-81, -126/72-80, RR 17-18, SpO2 97-99% on RA. Labs Notable for Leukocytosis of 10.9, Anemia Hemoglobin 12.5, Hyponatremia 134, Hyperkalemia 5.4, Elevated Creatinine 1.85. Patient had a CT Head which demonstrated interval deformity of the right parietal occipital cranioplasty mesh, vague hyperdensity seen in the left frontal   subcortical white matter, second smaller hyperdensity seen in the left anterior temporal lobe. CXR Performed, demonstrating Possible RLL Infiltrate, Present on Previous CXR. Patient given Normal Saline 500mL Bolus in ED. (08 Mar 2017 22:57)    Currently- no headaches- nausea- no new numbness in hands or feet      Allergies  No Known Allergies      Health Issues  SYNCOPE  No h/o HF  Yes  Family history of malignant neoplasm of gastrointestinal tract  Family history of malignant neoplasm of gastrointestinal tract  Family history of malignant neoplasm of breast (Mother)  Handoff  MEWS Score  Secondary malignant neoplasm of brain and spinal cord  Malignant neoplasm of kidney  Glaucoma  Essential hypertension  Syncope, unspecified syncope type  Hypothyroidism  Hypertension  Seizure  Renal cell carcinoma  JULIAN (acute kidney injury)  Hyperkalemia  Hyponatremia  Anemia  Leukocytosis  Syncope, unspecified syncope type  History of nephrectomy, unilateral  Other postprocedural status  Other postprocedural status  SYNCOPY  44        FAMILY HISTORY:  Family history of malignant neoplasm of gastrointestinal tract: Family history of gastric cancer  Family history of malignant neoplasm of gastrointestinal tract: Family history of pancreatic cancer  Family history of malignant neoplasm of breast (Mother): Family history of breast cancer in sister      MEDICATIONS  (STANDING):  valproic  acid Syrup 250milliGRAM(s) Oral two times a day  lacosamide 200milliGRAM(s) Oral two times a day  latanoprost 0.005% Ophthalmic Solution 1Drop(s) Both EYES at bedtime  levothyroxine 50MICROGram(s) Oral daily  carvedilol 3.125milliGRAM(s) Oral every 12 hours  sodium chloride 0.9%. 1000milliLiter(s) IV Continuous <Continuous>    MEDICATIONS  (PRN):  acetaminophen   Tablet. 650milliGRAM(s) Oral every 6 hours PRN Moderate Pain (4 - 6)      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 metastases  Malignant neoplasm of kidney: Renal cell cancer  Glaucoma: Dx 2014, s/p b/l surgical repair  Essential hypertension: HTN (hypertension)  History of nephrectomy, unilateral: right nephrectomy  Other postprocedural status: S/P craniotomy  Other postprocedural status: S/P tonsillectomy      Labs                          12.5   10.9  )-----------( 507      ( 08 Mar 2017 13:50 )             39.1     08 Mar 2017 22:52    135    |  101    |  24     ----------------------------<  97     5.1     |  27     |  1.56     Ca    8.8        08 Mar 2017 22:52  Phos  3.8       08 Mar 2017 22:52  Mg     2.2       08 Mar 2017 22:52    TPro  7.0    /  Alb  2.5    /  TBili  0.2    /  DBili  x      /  AST  11     /  ALT  10     /  AlkPhos  80     08 Mar 2017 22:52      Radiology:    Physical Exam    MENTAL STATUS  -Level of Consciousness- awake    Orientation- person, place time  Language- aphasia/ dysarthria  Memory- recent and remote      Cranial Nerve 1- 12  Pupils- equal and reactive  Eye movements-nl  Facial - no asymmetry   Lower CN-nl    Gait and Station-unsteady    MOTOR  Upper-nl  Lower-mild LE    Reflexes- decreased    Sensation- no sensory loss    Cerebellar- no tremors    vascular - no bruits    Assessment- Brain mets-   EEG- Valproic level     Plan PORT- scheduled
CHIEF COMPLAINT: Syncope.    HISTORY OF PRESENT ILLNESS: 61 yo M  with history of Renal Cell Carcinoma, S/P Right Nephrectomy,  Brain Mets,  S/P Craniotomy x 2,on chemotherapy ( Opdivo and Avastin Q2 weeks with Dr. Ya)  and  Cyberknife Radiation Therapy with Dr. Simmons at [facility] Last Session on 3/2/17), seizures secondary to brain mets, hypothyroidism (Related To Chemotherapy Agent, Inlyta, Which Patient Stopped), HTN, CKD Stage 3, glaucoma, admitted after a syncopal episode at Dr. Butt office.   Patient states that he was feeling nauseas at his doctor's office during incretion of  iv , vomited and passed out. Patient states that he felt better after he woke up but didn't remember that happend. Denied chest pain, palpitations, SOB, dizziness.    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 metastases  Malignant neoplasm of kidney: Renal cell cancer  Glaucoma: Dx 2014, s/p b/l surgical repair  Essential hypertension: HTN (hypertension)  History of nephrectomy, unilateral: right nephrectomy  Other postprocedural status: S/P craniotomy  Other postprocedural status: S/P tonsillectomy        PERTINENT DIAGNOSTIC TESTING:    [ ] Echocardiogram:A complete two-dimensional transthoracic echocardiogram was performed (2D,   M-mode, spectral and color flow doppler).  Study Quality: Good.There is   mild   concentric left ventricular hypertrophy.The left ventricular wall motion   is   normal.The left ventricle is hyperdynamic and the overall ejection   fraction is   increased (>75%).  The left atrial size is normal.Right atrial size is   normal.The right ventricle is normal in size and function.Structurally   normal   aortic valve.There is mild aortic regurgitation.Structurally normal   mitral   valve.No mitral regurgitation noted.Structurally normal tricuspid   valve.Borderline aortic root dilatation.The aortic root measures 3.9 cm   at   sinuses(normal less than 4 cm for men, less than 3.6 cm for women).  The   aortic root measures 2.0 cm/m2 at sinuses (normal less than 2.1 cm/m2   for men,   less than 2.2 cm/m2 for women).  The pulmonary artery is normal size.      Allergies    No Known Allergies    Intolerances    	    MEDICATIONS:  carvedilol 3.125milliGRAM(s) Oral every 12 hours  acetaminophen   Tablet. 650milliGRAM(s) Oral every 6 hours PRN  valproic  acid Syrup 250milliGRAM(s) Oral two times a day  lacosamide 200milliGRAM(s) Oral two times a day  levothyroxine 50MICROGram(s) Oral daily  latanoprost 0.005% Ophthalmic Solution 1Drop(s) Both EYES at bedtime      FAMILY HISTORY:  Family history of malignant neoplasm of gastrointestinal tract: Family history of gastric cancer  Family history of malignant neoplasm of gastrointestinal tract: Family history of pancreatic cancer  Family history of malignant neoplasm of breast (Mother): Family history of breast cancer in sister      SOCIAL HISTORY:    [x ] Non-smoker        REVIEW OF SYSTEMS:    CONSTITUTIONAL: No fever, weight loss, or fatigue  EYES: No eye pain, visual disturbances, or discharge  ENMT:  No difficulty hearing, tinnitus, vertigo; No sinus or throat pain  NECK: No pain or stiffness  RESPIRATORY: No cough, wheezing, chills or hemoptysis; No Shortness of Breath  CARDIOVASCULAR: No chest pain, palpitations, dizziness, or leg swelling  GASTROINTESTINAL: No abdominal or epigastric pain. No nausea, vomiting, or hematemesis; No diarrhea or constipation.  GENITOURINARY: No dysuria, frequency, hematuria, or incontinence  NEUROLOGICAL: No headaches, memory loss, loss of strength, numbness, or tremors  SKIN: No itching, burning, rashes, or lesions     PHYSICAL EXAM:  T(C): 37.2, Max: 37.2 (03-09 @ 05:24)  HR: 110 (68 - 110)  BP: 129/53 (103/72 - 136/76)  RR: 16 (16 - 18)  SpO2: 95% (94% - 99%)  Wt(kg): --  I&O's Summary  I & Os for 24h ending 09 Mar 2017 07:00  =============================================  IN: 280 ml / OUT: 325 ml / NET: -45 ml    I & Os for current day (as of 09 Mar 2017 12:35)  =============================================  IN: 180 ml / OUT: 350 ml / NET: -170 ml      TELEMETRY: 	  SR  ECG: Ventricular Rate 73 BPM    Atrial Rate 73 BPM    P-R Interval 182 ms    QRS Duration 86 ms    Q-T Interval 372 ms    QTC Calculation(Bezet) 409 ms    P Axis 64 degrees    R Axis -4 degrees    T Axis 36 degrees    Diagnosis Line Normal sinus rhythm    Orthostatic BP  146/81 -supine ,130/78 sitting , 103/61 standing    Appearance: Normal	  HEENT:   PERRL, EOMI	  Cardiovascular: S1 S2, No JVD, No edema  Respiratory: Lungs clear to auscultation	  Gastrointestinal:  Soft, Non-tender, + BS	  Neurologic: A&O x 3  Extremities: no edema      	  LABS:	 	    CARDIAC MARKERS:  Troponin I, Serum: <0.015 ng/mL (03-08 @ 22:52)  Troponin I, Serum: <0.015 ng/mL (03-08 @ 13:50)                              11.4   12.5  )-----------( 402      ( 09 Mar 2017 11:15 )             34.8     09 Mar 2017 11:15    134    |  102    |  22     ----------------------------<  85     5.0     |  24     |  1.47     Ca    8.4        09 Mar 2017 11:15  Phos  3.8       09 Mar 2017 11:15  Mg     2.2       09 Mar 2017 11:15    TPro  6.8    /  Alb  2.5    /  TBili  0.2    /  DBili  x      /  AST  14     /  ALT  8      /  AlkPhos  81     09 Mar 2017 11:15    proBNP:   Lipid Profile:   HgA1c: Hemoglobin A1C, Whole Blood: 6.1 % (03-08 @ 22:52)    TSH: Thyroid Stimulating Hormone, Serum: 1.878 uIU/mL (03-08 @ 22:52)      ASSESSMENT/PLAN: 	   61 yo M  with history of Renal Cell Carcinoma, S/P Right Nephrectomy,  Brain Mets,  S/P Craniotomy x 2,on chemotherapy ( Opdivo and Avastin Q2 weeks with Dr. Ya)  and  Cyberknife Radiation Therapy with Dr. Simmons at [facility] Last Session on 3/2/17), seizures secondary to brain mets, hypothyroidism (Related To Chemotherapy Agent, Inlyta, Which Patient Stopped), HTN, CKD Stage 3, glaucoma, admitted after a syncopal episode at Dr. Butt office.   Syncopal episode most likely has vaso-vagal etiology, will continue  to monitor telemetry for any episodes of  arrythmia, continue hydration, avoid prolong standing.
CORIN PERDOMO    [id number]    Patient is a 62y old  Male who presents with a chief complaint of Nausea, Vomiting, Syncopal Episode (08 Mar 2017 22:57)      HPI:  Patient is a 62 year old male with past medical history of Renal Cell Carcinoma (S/P Right Nephrectomy, with Brain Metastasis S/P Craniotomy x 2 For Lesion Removal, Currently on Chemotherapy Opdivo and Avastin Q2 Weeks with Dr. Ya and Receiving Cyberknife Radiation Therapy with Dr. Simmons at [facility] Last Session on 3/2/17), Seizures (Due To Brain Metastasis), Hypothyroidism (Related To Chemotherapy Agent, Inlyta, Which Patient Stopped), Hypertension, CKD Stage 3, Glaucoma presenting to ED after Syncopal Episode at Dr. Butt Office.     Patient and Wife reporting that in AM, patient was in Bathroom, Standing at Sink, when he Suddenly Became Unsteady, Nauseous, and Vomited (No Bloody/Nonbillious Emesis) Once. Patient Reports Immediately Feeling Better After Vomiting. Denied Associated Dizziness/Lightheadedness, Visions Changes, Diaphoresis, Palpitations, Chest Pain, SOB. Patient went to see Dr. Ya, and as Dr. Ya was Placing IV for Opdivo and Avastin Infusion, Patient Suddenly Became Nausea, Vomited Again, and Syncopized. Patient Regained Consciousness Within In 2 Minutes, and Reports Feeling Well Upon Awakening, but Does Not Remember All The Events of What Happened. Patient Denies Associated Symptoms Above Prior to Syncope. Patient denies Convulsions, Tongue Biting, Bowel or Bladder Incontinence, or Post-Ictal  State. Patient's Wife reports he went for Cyberknife Radiation Treatment at [facility] on 3/2/17, and Had Similar Episode Prior to Procedure where he Vomited, Passed Out.     Patient denies Fever, Chills. Patient's Wife Reports Decreased Appetite and Decreased PO Intake Over Last 1 Week. Patient denies Sinus Congestion, Rhinorrhea. Patient reports Slight Cough, Productive of Green Sputum, which He and Wife Report was Treated with 5 Days of "Antibiotic" by Dr. Ya. As Above, Patient denies Chest Pain, SOB, Palpitations, Dizziness/Lightheadedness. Patient denies Abdominal Pain, Diarrhea, Constipation, Melena, Hematochezia, Dysuria, Hematuria, Pain/Swelling of Lower Extremities. Patient denies any Focal Motor Deficits, Sensory Loss, Vision Changes, Hearing Changes, Tinnitus.     In the ED, Vitals were TMax 98, HR 71-81, -126/72-80, RR 17-18, SpO2 97-99% on RA. Labs Notable for Leukocytosis of 10.9, Anemia Hemoglobin 12.5, Hyponatremia 134, Hyperkalemia 5.4, Elevated Creatinine 1.85. Patient had a CT Head which demonstrated interval deformity of the right parietal occipital cranioplasty mesh, vague hyperdensity seen in the left frontal   subcortical white matter, second smaller hyperdensity seen in the left anterior temporal lobe. CXR Performed, demonstrating Possible RLL Infiltrate, Present on Previous CXR. Patient given Normal Saline 500mL Bolus in ED. (08 Mar 2017 22:57)      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 metastases  Malignant neoplasm of kidney: Renal cell cancer  Glaucoma: Dx 2014, s/p b/l surgical repair  Essential hypertension: HTN (hypertension)  History of nephrectomy, unilateral: right nephrectomy  Other postprocedural status: S/P craniotomy  Other postprocedural status: S/P tonsillectomy        Social History:    FAMILY HISTORY:  Family history of malignant neoplasm of gastrointestinal tract: Family history of gastric cancer  Family history of malignant neoplasm of gastrointestinal tract: Family history of pancreatic cancer  Family history of malignant neoplasm of breast (Mother): Family history of breast cancer in sister      Functional Level Prior to Admission:                          11.4   12.5  )-----------( 402      ( 09 Mar 2017 11:15 )             34.8       09 Mar 2017 11:15    134    |  102    |  22     ----------------------------<  85     5.0     |  24     |  1.47     Ca    8.4        09 Mar 2017 11:15  Phos  3.8       09 Mar 2017 11:15  Mg     2.2       09 Mar 2017 11:15    TPro  6.8    /  Alb  2.5    /  TBili  0.2    /  DBili  x      /  AST  14     /  ALT  8      /  AlkPhos  81     09 Mar 2017 11:15      Vital Signs Last 24 Hrs  T(C): 37.2, Max: 37.2 (03-09 @ 05:24)  T(F): 98.9, Max: 98.9 (03-09 @ 05:24)  HR: 64 (64 - 110)  BP: 116/70 (103/72 - 136/76)  BP(mean): 89 (88 - 102)  RR: 14 (14 - 18)  SpO2: 96% (94% - 98%)      PHYSICAL EXAM: This 62 y-o male was admitted on 3/8/17 with n/v and syncopal episode and unsteady. h/o renal cell Ca, mets to brain. s/p Rt. nephrectomy and craniectomy with resection of brain mass. CKD Lives with wife. Ambulation with walker. Hs HHA.      Constitutional: lying in bed. sleepy, returned from lab test. easily arousable. confused.    Eyes:h/o glaucoma.    ENMT: neg.    Neck: supple. no neck pain.    Breasts:    Back: no back pain.    Respiratory:    Cardiovascular:    Gastrointestinal:    Genitourinary:    Rectal:    Extremities: rom wnlin 4 ext. no joint pain. no edema.    Vascular:    Neurological: sleepy. deconditioned. unable to asses muscle strenth at this time.    Skin:    Lymph Nodes:    Musculoskeletal:    Psychiatric:            Impression: 1. Syncopal episode. 2. Mets brain tumor from renal cell Ca.     Recommendations: 1. PT for therapeutic ex. and gait training with device. 2. Home PT/HHA.
The patient's initial consultation is available for review on the alpha section of the EMR.  The patient came for immunotherapy 24 hours after radiosurgery for a recurrent metastasis to the left cerebral hemisphere. The patient reported having nausea and having vomited his breakfast. He found that he was unsteady on his feet and had strikingly diminished performance but felt it was important to come to my office rather than to the emergency room. On getting into the examining room the patient began to emese. I went to get an emesis basin and when I returned the patient was unconscious with no visible respirations and no audible or palpable heartbeat. The patient has extraordinarily poor venous access and was scheduled for a port this week. The patient was placed in reverse Trendelenburg and over a minute regained consciousness while an attempt at venous access was made. 911 was called and arrived soon after the patient had recovered consciousness, spontaneous respiration and audible and palpable heartbeat. He was placed in a chemotherapy chair with reverse Trendelenburg and ultimately transferred to the [facility] for the transfer to the [facility] Emergency room.    Physical exam revealed after resuscitation normal vital signs with a regular heart rate.  There was no external evidence of trauma on examination of the head. The neck was free of palpable lymphadenopathy, jugular venous distention or thyromegaly.  The lungs were not clear with coarse rhonchi in the right lower lobe posteriorly. The left lung was clearTo percussion and auscultation.  The heart sounds were regular at 77 with no auscultatory evidence for murmur, rub or gallop.  The patient had no truncal obesity or palpable organomegaly.  There was no chronic venous stasis changes in the lower extremities.  Neurologically the patient was awake, aware and alert with no retrograde amnesia. He was oriented to person place and time.  The patient was unsteady on his feet with a right pronator drift, deviation of the tongue to the right and weakness of the right leg with a positive Babinski on the right side.  His skin was warm and dry.

## 2017-03-09 NOTE — PROGRESS NOTE ADULT - PROBLEM SELECTOR PLAN 5
RCC with brain mets. Had right nephrectomy and craniotomy for removal of lesions. Being treated by Dr Ya and Cyberknife with Dr. Simmons at [facility]- last RT 3/2  Patient was at Dr. Ya office to receive Avastin and Opdivo- he should receive dose while inpatient after chemo port placed as patient has very poor venous access

## 2017-03-09 NOTE — PROGRESS NOTE ADULT - PROBLEM SELECTOR PLAN 5
RCC with brain mets. Had right nephrectomy and craniotomy for removal of lesions. Being treated by Dr Ya and Cyberknife with Dr. Simmons at [facility]- last RT 3/2  Patient was at Dr. Ya office to receive Avastin and Opdivo  -Patient to receive chemo while on 7 [place]  -Plan to place chemoport 3/10 Patient has a history of Renal Cell Carcinoma with brain mets. Patient is s/p right nephrectomy and craniotomy for removal of lesions. Dr Ya is his oncologist and treating him with Opdivo/Avastin, and receiving Cyberknife therapy with Dr. Simmons at [facility] (last RT 3/2). Patient was at Dr. Ya office to receive Avastin and Opdivo, but experienced syncopal episode and was unable to receive treatment at the time.   -Patient to receive chemo while on [address] [place]  -Plan to place chemoport 3/10

## 2017-03-09 NOTE — PROGRESS NOTE ADULT - PROBLEM SELECTOR PLAN 8
TSH WNL, Synthroid 50mcg daily      12. Prophylaxis: SCDs    13. FEN: Cardiac Diet, NS at 60mL/hour, Reevaluate Need for IVF in AM. Replete Potassium if <4 and Magnesium if <2.     Admit to [address] [facility] Under Dr. Harris.   DNR/DNI, Discussed with Patient. Called Wife to Inform of His Wishes, and She Agrees and Will Go With What He Wants, but Will Also Discuss with Their Children As Well. TSH WNL, Synthroid 50mcg daily

## 2017-03-09 NOTE — PHYSICAL THERAPY INITIAL EVALUATION ADULT - PERTINENT HX OF CURRENT PROBLEM, REHAB EVAL
62 year old male who went to see Dr. Ya, and as Dr. Ya was Placing IV for Opdivo and Avastin Infusion, Patient Suddenly Became Nausea, Vomited Again, and Syncopized. Patient Regained Consciousness Within In 2 Minutes, and Reports Feeling Well Upon Awakening, but Does Not Remember All The Events of What Happened. Patient Denies Associated Symptoms Above Prior to Syncope.

## 2017-03-09 NOTE — PROGRESS NOTE ADULT - SUBJECTIVE AND OBJECTIVE BOX
Transfer acceptance to [address] [place] [address] course:  62 year old male with PMH RCC s/p R nephrectomy, with brain metastasis s/p craniotomy x 2, on chemotherapy (Dr. Ya) Opdivo and Avastin every 2 weeks and currently receiving Cyberknife Radiation with Dr. Simmons at [facility] (last session on 3/2/17), Seizures 2/2 mets, iatrogenic hypothyroidism from chemo, Hypertension, CKD Stage 3, Glaucoma who presented to ED after episode of syncope  (and subjective apnea/pulselessness per Dr. Ya note) admitted for syncope workup. (Details of Syncopal event in H&P/ Dr. Ya Note). Vitals and clinical status on admission were stable and patient received 500cc NS bolus. Echo (3/9) showed hyperdynamic LV, likely due to hypovolemia. CT head showed interval deformity of the right parietal occipital cranioplasty mesh, vague hyperdensity seen in the left frontal subcortical white matter, second smaller hyperdensity seen in the left anterior temporal lobe. Carotid duplex (3/9) showed no hemodynamically significant stenosis. He will be transferred to medicine for continued medical follow up under Dr. Tarango, and Dr. Ya. The patient is to receive a chemo-port tomorrow by IR. He is HD stable for transfer to [facility] under medical service.     INTERVAL HPI: Patient was seen and examined at bedside resting comfortably. Patient reports decreased appetite. Denies any SOB, chest pain, fever, nausea, vomiting, or diarrhea. Last BM was yesterday morning. Denies any headache, change in vision, dizziness, or other neurologic symptoms.    VITAL SIGNS:  T(F): 97.2  HR: 64  BP: 116/70  RR: 14  SpO2: 96%      PHYSICAL EXAM:    Constitutional: No acute distress,   HEENT: NCAT, PERRLA, EOMI, oropharynx moist with no exudates.   Neck: supple, no JVD, no lymphadenopathy, midline vertical scar on posterior neck  Respiratory: Regular rate, depth, and effort. No crackles, wheezes, or ronchi  Cardiovascular: RRR, no murmurs rubs or gallops  Gastrointestinal: Bowel sounds present, NTND,   Extremities: no peripheral edema, WWP  Vascular: DP and PT pulses 2+  Neurological: CN 2-12 intact, motor 5/5 b/l upper and lower, sensation intact      MEDICATIONS  (STANDING):  valproic  acid Syrup 250milliGRAM(s) Oral two times a day  lacosamide 200milliGRAM(s) Oral two times a day  latanoprost 0.005% Ophthalmic Solution 1Drop(s) Both EYES at bedtime  levothyroxine 50MICROGram(s) Oral daily  carvedilol 3.125milliGRAM(s) Oral every 12 hours  sodium chloride 0.9%. 1000milliLiter(s) IV Continuous <Continuous>    MEDICATIONS  (PRN):  acetaminophen   Tablet. 650milliGRAM(s) Oral every 6 hours PRN Moderate Pain (4 - 6)      Allergies    No Known Allergies    Intolerances        LABS:                        11.4   12.5  )-----------( 402      ( 09 Mar 2017 11:15 )             34.8     09 Mar 2017 11:15    134    |  102    |  22     ----------------------------<  85     5.0     |  24     |  1.47     Ca    8.4        09 Mar 2017 11:15  Phos  3.8       09 Mar 2017 11:15  Mg     2.2       09 Mar 2017 11:15    TPro  6.8    /  Alb  2.5    /  TBili  0.2    /  DBili  x      /  AST  14     /  ALT  8      /  AlkPhos  81     09 Mar 2017 11:15    PT/INR - ( 09 Mar 2017 11:15 )   PT: 12.6 sec;   INR: 1.13          PTT - ( 09 Mar 2017 11:15 )  PTT:40.8 sec  Urinalysis Basic - ( 08 Mar 2017 23:07 )    Color: Yellow / Appearance: Clear / S.015 / pH: x  Gluc: x / Ketone: Trace mg/dL  / Bili: NEGATIVE / Urobili: 0.2 E.U./dL   Blood: x / Protein: 100 mg/dL / Nitrite: NEGATIVE   Leuk Esterase: NEGATIVE / RBC: < 5 /HPF / WBC < 5 /HPF   Sq Epi: x / Non Sq Epi: Rare /HPF / Bacteria: Present /HPF        RADIOLOGY & ADDITIONAL TESTS:  Carotid Duplex 3/9: No hemodynamically significant stenosis.  Echo 3/9: LV hyperdynamic with increased EF (>75%)

## 2017-03-09 NOTE — PROGRESS NOTE ADULT - PROBLEM SELECTOR PLAN 4
Creatinine 1.85 (baseline 1.2-1.4) improved to 1.47 with fluids. Patient is euvolemic at this time. If UOP declines, will start maintenance fluids. Tolerating PO intake.

## 2017-03-09 NOTE — PROGRESS NOTE ADULT - PROBLEM SELECTOR PLAN 6
Unlikely seizure for reasons stated in problem #1  EEG ordered  Continue Lacosamide 200mg bid, Valproic Acid 250mg bid

## 2017-03-09 NOTE — PROGRESS NOTE ADULT - PROBLEM SELECTOR PLAN 1
Syncope was likely vasovagal, as patient had prodrome, and nausea/vomiting before episode. Reported episode of "pulselessness" highly unlikely- but patient may have become bradycardic at time due to parasympathetic surge.   EKG and telemetry without any evidence of arrythmia. Echo hyperdyanamic which further leads to dehydration/vasovagal.   Trops negative x2  Awaiting carotid doppler.    Dr. Reynoso, Neurology, knows patient well. Recommended 1 hour EEG for today.

## 2017-03-09 NOTE — PROGRESS NOTE ADULT - ASSESSMENT
62 year old male with PMH of Renal cell carcinoma(with brain mets s/p R nephrectomy, craniotomyx2, currently on chemotherapy and receiving cyberknife RT), HTN, glaucoma, admitted for syncope workup and continued medical treatment of RCC.

## 2017-03-09 NOTE — PROGRESS NOTE ADULT - PROBLEM SELECTOR PLAN 7
Patient had an episode of syncope and BP meds will need to be carefully reinstated prn.  -Continue Carvedilol 3.125mg PO Q12 hours, Home Dose is 25mg.  -Novasc d/c due to normotensive BP  -Home HCTZ-Triamterine being held at this time due to normotensive BP  -Monitor BP Closely. Patient had an episode of syncope likely due to hypovolemia. BP meds will need to be carefully reinstated prn.  -Continue Carvedilol 3.125mg PO Q12 hours, Home Dose is 25mg.  -Novasc d/c due to normotensive BP  -Home HCTZ-Triamterine being held at this time due to normotensive BP  -Monitor BP Closely.

## 2017-03-09 NOTE — PHYSICAL THERAPY INITIAL EVALUATION ADULT - CRITERIA FOR SKILLED THERAPEUTIC INTERVENTIONS
therapy frequency/anticipated discharge recommendation/impairments found/rehab potential/risk reduction/prevention/functional limitations in following categories

## 2017-03-09 NOTE — PHYSICAL THERAPY INITIAL EVALUATION ADULT - ADDITIONAL COMMENTS
Patient has 3 steps to enter to elevator building. Prior use of rolling walker. Patient has 3 steps to enter to elevator building. Prior use of rolling walker. Patient has HHA 5days for 8 hours a day.

## 2017-03-10 ENCOUNTER — TRANSCRIPTION ENCOUNTER (OUTPATIENT)
Age: 62
End: 2017-03-10

## 2017-03-10 VITALS
RESPIRATION RATE: 15 BRPM | HEART RATE: 90 BPM | OXYGEN SATURATION: 98 % | TEMPERATURE: 98 F | SYSTOLIC BLOOD PRESSURE: 121 MMHG | DIASTOLIC BLOOD PRESSURE: 78 MMHG

## 2017-03-10 LAB
ANION GAP SERPL CALC-SCNC: 9 MMOL/L — SIGNIFICANT CHANGE UP (ref 9–16)
APTT BLD: 38.2 SEC — HIGH (ref 27.5–37.4)
BASOPHILS NFR BLD AUTO: 0.2 % — SIGNIFICANT CHANGE UP (ref 0–2)
BUN SERPL-MCNC: 14 MG/DL — SIGNIFICANT CHANGE UP (ref 7–23)
CALCIUM SERPL-MCNC: 8.4 MG/DL — LOW (ref 8.5–10.5)
CHLORIDE SERPL-SCNC: 106 MMOL/L — SIGNIFICANT CHANGE UP (ref 96–108)
CO2 SERPL-SCNC: 25 MMOL/L — SIGNIFICANT CHANGE UP (ref 22–31)
CREAT SERPL-MCNC: 1.16 MG/DL — SIGNIFICANT CHANGE UP (ref 0.5–1.3)
EOSINOPHIL NFR BLD AUTO: 2.7 % — SIGNIFICANT CHANGE UP (ref 0–6)
GLUCOSE SERPL-MCNC: 74 MG/DL — SIGNIFICANT CHANGE UP (ref 70–99)
HCT VFR BLD CALC: 33.8 % — LOW (ref 39–50)
HGB BLD-MCNC: 10.7 G/DL — LOW (ref 13–17)
INR BLD: 1.2 — HIGH (ref 0.88–1.16)
LYMPHOCYTES # BLD AUTO: 24.6 % — SIGNIFICANT CHANGE UP (ref 13–44)
MAGNESIUM SERPL-MCNC: 1.9 MG/DL — SIGNIFICANT CHANGE UP (ref 1.6–2.4)
MCHC RBC-ENTMCNC: 25.8 PG — LOW (ref 27–34)
MCHC RBC-ENTMCNC: 31.7 G/DL — LOW (ref 32–36)
MCV RBC AUTO: 81.4 FL — SIGNIFICANT CHANGE UP (ref 80–100)
MONOCYTES NFR BLD AUTO: 10.7 % — SIGNIFICANT CHANGE UP (ref 2–14)
NEUTROPHILS NFR BLD AUTO: 61.8 % — SIGNIFICANT CHANGE UP (ref 43–77)
PLATELET # BLD AUTO: 357 K/UL — SIGNIFICANT CHANGE UP (ref 150–400)
POTASSIUM SERPL-MCNC: 4.5 MMOL/L — SIGNIFICANT CHANGE UP (ref 3.5–5.3)
POTASSIUM SERPL-SCNC: 4.5 MMOL/L — SIGNIFICANT CHANGE UP (ref 3.5–5.3)
PROTHROM AB SERPL-ACNC: 13.4 SEC — HIGH (ref 10–13.1)
RBC # BLD: 4.15 M/UL — LOW (ref 4.2–5.8)
RBC # FLD: 17.5 % — HIGH (ref 10.3–16.9)
SODIUM SERPL-SCNC: 140 MMOL/L — SIGNIFICANT CHANGE UP (ref 135–145)
WBC # BLD: 10.7 K/UL — HIGH (ref 3.8–10.5)
WBC # FLD AUTO: 10.7 K/UL — HIGH (ref 3.8–10.5)

## 2017-03-10 PROCEDURE — 77001 FLUOROGUIDE FOR VEIN DEVICE: CPT

## 2017-03-10 PROCEDURE — 85027 COMPLETE CBC AUTOMATED: CPT

## 2017-03-10 PROCEDURE — 99232 SBSQ HOSP IP/OBS MODERATE 35: CPT

## 2017-03-10 PROCEDURE — 95819 EEG AWAKE AND ASLEEP: CPT

## 2017-03-10 PROCEDURE — 80235 DRUG ASSAY LACOSAMIDE: CPT

## 2017-03-10 PROCEDURE — 80048 BASIC METABOLIC PNL TOTAL CA: CPT

## 2017-03-10 PROCEDURE — 86900 BLOOD TYPING SEROLOGIC ABO: CPT

## 2017-03-10 PROCEDURE — 83935 ASSAY OF URINE OSMOLALITY: CPT

## 2017-03-10 PROCEDURE — 93005 ELECTROCARDIOGRAM TRACING: CPT

## 2017-03-10 PROCEDURE — 83550 IRON BINDING TEST: CPT

## 2017-03-10 PROCEDURE — 80061 LIPID PANEL: CPT

## 2017-03-10 PROCEDURE — 84540 ASSAY OF URINE/UREA-N: CPT

## 2017-03-10 PROCEDURE — 76937 US GUIDE VASCULAR ACCESS: CPT | Mod: 26

## 2017-03-10 PROCEDURE — 84100 ASSAY OF PHOSPHORUS: CPT

## 2017-03-10 PROCEDURE — 82550 ASSAY OF CK (CPK): CPT

## 2017-03-10 PROCEDURE — 83036 HEMOGLOBIN GLYCOSYLATED A1C: CPT

## 2017-03-10 PROCEDURE — 76937 US GUIDE VASCULAR ACCESS: CPT

## 2017-03-10 PROCEDURE — 81003 URINALYSIS AUTO W/O SCOPE: CPT

## 2017-03-10 PROCEDURE — 85730 THROMBOPLASTIN TIME PARTIAL: CPT

## 2017-03-10 PROCEDURE — 99285 EMERGENCY DEPT VISIT HI MDM: CPT | Mod: 25

## 2017-03-10 PROCEDURE — 86901 BLOOD TYPING SEROLOGIC RH(D): CPT

## 2017-03-10 PROCEDURE — 36415 COLL VENOUS BLD VENIPUNCTURE: CPT

## 2017-03-10 PROCEDURE — 84484 ASSAY OF TROPONIN QUANT: CPT

## 2017-03-10 PROCEDURE — 85610 PROTHROMBIN TIME: CPT

## 2017-03-10 PROCEDURE — 74018 RADEX ABDOMEN 1 VIEW: CPT

## 2017-03-10 PROCEDURE — 80164 ASSAY DIPROPYLACETIC ACD TOT: CPT

## 2017-03-10 PROCEDURE — 93880 EXTRACRANIAL BILAT STUDY: CPT

## 2017-03-10 PROCEDURE — 84300 ASSAY OF URINE SODIUM: CPT

## 2017-03-10 PROCEDURE — 85045 AUTOMATED RETICULOCYTE COUNT: CPT

## 2017-03-10 PROCEDURE — 70450 CT HEAD/BRAIN W/O DYE: CPT

## 2017-03-10 PROCEDURE — 93306 TTE W/DOPPLER COMPLETE: CPT

## 2017-03-10 PROCEDURE — 80053 COMPREHEN METABOLIC PANEL: CPT

## 2017-03-10 PROCEDURE — 82553 CREATINE MB FRACTION: CPT

## 2017-03-10 PROCEDURE — 83930 ASSAY OF BLOOD OSMOLALITY: CPT

## 2017-03-10 PROCEDURE — 36561 INSERT TUNNELED CV CATH: CPT

## 2017-03-10 PROCEDURE — 80076 HEPATIC FUNCTION PANEL: CPT

## 2017-03-10 PROCEDURE — C1769: CPT

## 2017-03-10 PROCEDURE — C1788: CPT

## 2017-03-10 PROCEDURE — 82607 VITAMIN B-12: CPT

## 2017-03-10 PROCEDURE — 82570 ASSAY OF URINE CREATININE: CPT

## 2017-03-10 PROCEDURE — 77001 FLUOROGUIDE FOR VEIN DEVICE: CPT | Mod: 26

## 2017-03-10 PROCEDURE — 82728 ASSAY OF FERRITIN: CPT

## 2017-03-10 PROCEDURE — 83605 ASSAY OF LACTIC ACID: CPT

## 2017-03-10 PROCEDURE — 71045 X-RAY EXAM CHEST 1 VIEW: CPT

## 2017-03-10 PROCEDURE — 97161 PT EVAL LOW COMPLEX 20 MIN: CPT

## 2017-03-10 PROCEDURE — 81001 URINALYSIS AUTO W/SCOPE: CPT

## 2017-03-10 PROCEDURE — 84443 ASSAY THYROID STIM HORMONE: CPT

## 2017-03-10 PROCEDURE — 85025 COMPLETE CBC W/AUTO DIFF WBC: CPT

## 2017-03-10 PROCEDURE — 86850 RBC ANTIBODY SCREEN: CPT

## 2017-03-10 PROCEDURE — C1887: CPT

## 2017-03-10 PROCEDURE — 82746 ASSAY OF FOLIC ACID SERUM: CPT

## 2017-03-10 PROCEDURE — 83735 ASSAY OF MAGNESIUM: CPT

## 2017-03-10 RX ADMIN — LACOSAMIDE 200 MILLIGRAM(S): 50 TABLET ORAL at 06:49

## 2017-03-10 RX ADMIN — Medication 650 MILLIGRAM(S): at 15:04

## 2017-03-10 RX ADMIN — Medication 50 MICROGRAM(S): at 06:49

## 2017-03-10 RX ADMIN — SODIUM CHLORIDE 100 MILLILITER(S): 9 INJECTION INTRAMUSCULAR; INTRAVENOUS; SUBCUTANEOUS at 06:59

## 2017-03-10 RX ADMIN — CARVEDILOL PHOSPHATE 3.12 MILLIGRAM(S): 80 CAPSULE, EXTENDED RELEASE ORAL at 06:49

## 2017-03-10 RX ADMIN — Medication 250 MILLIGRAM(S): at 06:49

## 2017-03-10 NOTE — PROGRESS NOTE ADULT - SUBJECTIVE AND OBJECTIVE BOX
Chief Complaint/Reason for Consult: syncope  INTERVAL HPI: no cp palp dizziness syncope  	  MEDICATIONS:  carvedilol 3.125milliGRAM(s) Oral every 12 hours        acetaminophen   Tablet. 650milliGRAM(s) Oral every 6 hours PRN  valproic  acid Syrup 250milliGRAM(s) Oral two times a day  lacosamide 200milliGRAM(s) Oral two times a day      levothyroxine 50MICROGram(s) Oral daily    latanoprost 0.005% Ophthalmic Solution 1Drop(s) Both EYES at bedtime  sodium chloride 0.9%. 1000milliLiter(s) IV Continuous <Continuous>      REVIEW OF SYSTEMS:  [x] As per HPI  CONSTITUTIONAL: No fever, weight loss, or fatigue  RESPIRATORY: No cough, wheezing, chills or hemoptysis; No Shortness of Breath  CARDIOVASCULAR: No chest pain, palpitations, dizziness, or leg swelling  GASTROINTESTINAL: No abdominal or epigastric pain. No nausea, vomiting, or hematemesis; No diarrhea or constipation. No melena or hematochezia.  MUSCULOSKELETAL: No joint pain or swelling; No muscle, back, or extremity pain  [x] All others negative	  [ ] Unable to obtain    PHYSICAL EXAM:  T(C): 36.8, Max: 36.9 (03-09 @ 18:46)  HR: 90 (72 - 94)  BP: 121/78 (121/78 - 128/79)  RR: 15 (15 - 16)  SpO2: 98% (96% - 98%)  Wt(kg): --  I&O's Summary    I & Os for current day (as of 10 Mar 2017 14:10)  =============================================  IN: 1440 ml / OUT: 1550 ml / NET: -110 ml        Appearance: Normal	  HEENT:   Normal oral mucosa  Cardiovascular: Normal S1 S2, No JVD, No murmurs, No edema  Respiratory: Lungs clear to auscultation	  Gastrointestinal:  Soft, Non-tender, + BS	  Extremities: Normal range of motion, No clubbing, cyanosis or edema  Vascular: Peripheral pulses palpable 2+ bilaterally    TELEMETRY: 	    ECG:    	  RADIOLOGY:   CXR:  CT:  US:    CARDIAC TESTING:  Echocardiogram:  Catheterization:  Stress Test:      LABS:	 	    CARDIAC MARKERS:                                  10.7   10.7  )-----------( 357      ( 10 Mar 2017 07:08 )             33.8     10 Mar 2017 07:08    140    |  106    |  14     ----------------------------<  74     4.5     |  25     |  1.16     Ca    8.4        10 Mar 2017 07:08  Phos  3.8       09 Mar 2017 11:15  Mg     1.9       10 Mar 2017 07:08    TPro  6.8    /  Alb  2.5    /  TBili  0.2    /  DBili  x      /  AST  14     /  ALT  8      /  AlkPhos  81     09 Mar 2017 11:15    proBNP:   Lipid Profile:   HgA1c:   TSH:     ASSESSMENT/PLAN: 	    ·  Problem: Syncope, unspecified syncope type.  Plan: Syncope was likely vasovagal, as patient had prodrome, and nausea/vomiting before episode. Reported episode of "pulselessness" highly unlikely- but patient may have become bradycardic at time due to parasympathetic surge.   EKG and telemetry without any evidence of arrythmia. Echo hyperdyanamic which further leads to dehydration/vasovagal.

## 2017-03-10 NOTE — DISCHARGE NOTE ADULT - MEDICATION SUMMARY - MEDICATIONS TO TAKE
I will START or STAY ON the medications listed below when I get home from the hospital:    aspirin 81 mg oral tablet, chewable  -- 1 tab(s) by mouth once a day  -- Indication: For Need for prophylactic measure    Opdivo 10 mg/mL intravenous solution  --  intravenous   -- Indication: For Renal cell carcinoma    Depakene 250 mg/5 mL oral syrup  -- 5 milliliter(s) by mouth 2 times a day  -- Indication: For Seizure    Vimpat 200 mg oral tablet  -- 1 tab(s) by mouth 2 times a day  -- Indication: For Seizure    triamterene-hydroCHLOROthiazide 37.5mg-25mg oral capsule  -- 1 cap(s) by mouth once a day  -- Indication: For Hypertension    carvedilol 25 mg oral tablet  -- 1 tab(s) by mouth 2 times a day  -- Indication: For Hypertension    amLODIPine 10 mg oral tablet  -- 1 tab(s) by mouth once a day  -- Indication: For Hypertension    latanoprost 0.005% ophthalmic solution  -- 1 drop(s) to each affected eye once a day (at bedtime)  -- Indication: For Glaucoma    levothyroxine 50 mcg (0.05 mg) oral tablet  -- 1 tab(s) by mouth once a day  -- Indication: For Hypothyroidism    Avastin 25 mg/mL intravenous solution  --  intravenous   -- Indication: For Renal cell carcinoma

## 2017-03-10 NOTE — PROVIDER CONTACT NOTE (OTHER) - SITUATION
Pt is A+OX3 able to articulate needs and ambulate as usualy with walker. Pt found to have a low sodium of 118...MD notified. awaiting response for treatment. Pt advised to stay in bed for safety

## 2017-03-10 NOTE — PROGRESS NOTE ADULT - ATTENDING COMMENTS
The patient should be discharged to home. The patient should be given an appointment to see me next on Tuesday.

## 2017-03-10 NOTE — DISCHARGE NOTE ADULT - PLAN OF CARE
No further episodes of syncope. Follow up with Dr. Ya on Tuesday 3/14. Continue taking medications as instructed. If you have another episode of loss of consciousness please return to the Emergency Department for evaluation. Continue with chemotherapy treatment with Dr. Ya A chemoport was placed on 3/10 for easier administration of chemotherapy. Follow up with Dr. Ya on Tuesday 3/14 for further treatment. Maintain a blood pressure less than 130/90 Continue Norvasc 5mg daily.  Continue Carvedilol 3.125mg twice daily Remain seizure free. Continue taking Lacosamide 200mg twice daily   Continue Valproic Acid 250mg twice daily  If you experience any seizures return to the Emergency Department for evaluation Maintain normal thyroid hormone levels Continue taking Synthroid 50mcg daily Maintain normal intraocular pressure Continue your current home regimen Your hemoglobin was at your baseline. Please follow-up with your primary care physician for a further work-up. You likely had a syncopal episode at Dr. Ya's office. You were admitted and ruled out for a neurologic and cardiac cause of this syncope. This even was likely vasovagal. Please follow up with Dr. Ya on Tuesday 3/14. Continue taking medications as instructed. If you have another episode of loss of consciousness please return to the Emergency Department for evaluation. Continue your current home regimen. You initially had an elevation in your Creatinine which resolved with fluids. This was likely due to dehydration

## 2017-03-10 NOTE — DISCHARGE NOTE ADULT - HOSPITAL COURSE
62 year old male with PMH RCC s/p R nephrectomy, with brain metastasis s/p craniotomy x 2, on chemotherapy (Dr. Ya) Opdivo and Avastin every 2 weeks and currently receiving Cyberknife Radiation with Dr. Simmons at [facility] (last session on 3/2/17), Seizures 2/2 mets, iatrogenic hypothyroidism from chemo, Hypertension, CKD Stage 3, Glaucoma who presented to ED after episode of syncope  (and subjective apnea/pulselessness per Dr. Ya note) admitted for syncope workup. Vitals and clinical status on admission were stable and patient received 500cc NS bolus. Echo (3/9) showed hyperdynamic LV, likely due to hypovolemia. CT head showed interval deformity of the right parietal occipital cranioplasty mesh, vague hyperdensity seen in the left frontal subcortical white matter, second smaller hyperdensity seen in the left anterior temporal lobe. Carotid duplex (3/9) showed no hemodynamically significant stenosis. Pt was seen by neurology and cardiology during his hospital admission for evaluation of syncope. Syncopal event was likely vasovagal. He was transferred to medicine for continued medical follow up under Dr. Tarango, and Dr. Ya. The patient received a chemoport and is now HD stable to be discharged with close follow-up with his oncologist Dr. Ya.

## 2017-03-10 NOTE — DISCHARGE NOTE ADULT - PATIENT PORTAL LINK FT
“You can access the [employer] Patient Portal, offered by [facility], by registering with the following website: [email or web address]”

## 2017-03-10 NOTE — PROGRESS NOTE ADULT - SUBJECTIVE AND OBJECTIVE BOX
Neurology Follow up note    Name  CORIN PERDOMO    HPI:  Patient is a 62 year old male with past medical history of Renal Cell Carcinoma (S/P Right Nephrectomy, with Brain Metastasis S/P Craniotomy x 2 For Lesion Removal, Currently on Chemotherapy Opdivo and Avastin Q2 Weeks with Dr. Ya and Receiving Cyberknife Radiation Therapy with Dr. Simmons at [facility] Last Session on 3/2/17), Seizures (Due To Brain Metastasis), Hypothyroidism (Related To Chemotherapy Agent, Inlyta, Which Patient Stopped), Hypertension, CKD Stage 3, Glaucoma presenting to ED after Syncopal Episode at Dr. Butt Office.     Patient and Wife reporting that in AM, patient was in Bathroom, Standing at Sink, when he Suddenly Became Unsteady, Nauseous, and Vomited (No Bloody/Nonbillious Emesis) Once. Patient Reports Immediately Feeling Better After Vomiting. Denied Associated Dizziness/Lightheadedness, Visions Changes, Diaphoresis, Palpitations, Chest Pain, SOB. Patient went to see Dr. Ya, and as Dr. Ya was Placing IV for Opdivo and Avastin Infusion, Patient Suddenly Became Nausea, Vomited Again, and Syncopized. Patient Regained Consciousness Within In 2 Minutes, and Reports Feeling Well Upon Awakening, but Does Not Remember All The Events of What Happened. Patient Denies Associated Symptoms Above Prior to Syncope. Patient denies Convulsions, Tongue Biting, Bowel or Bladder Incontinence, or Post-Ictal  State. Patient's Wife reports he went for Cyberknife Radiation Treatment at [facility] on 3/2/17, and Had Similar Episode Prior to Procedure where he Vomited, Passed Out.     Patient denies Fever, Chills. Patient's Wife Reports Decreased Appetite and Decreased PO Intake Over Last 1 Week. Patient denies Sinus Congestion, Rhinorrhea. Patient reports Slight Cough, Productive of Green Sputum, which He and Wife Report was Treated with 5 Days of "Antibiotic" by Dr. Ya. As Above, Patient denies Chest Pain, SOB, Palpitations, Dizziness/Lightheadedness. Patient denies Abdominal Pain, Diarrhea, Constipation, Melena, Hematochezia, Dysuria, Hematuria, Pain/Swelling of Lower Extremities. Patient denies any Focal Motor Deficits, Sensory Loss, Vision Changes, Hearing Changes, Tinnitus.     In the ED, Vitals were TMax 98, HR 71-81, -126/72-80, RR 17-18, SpO2 97-99% on RA. Labs Notable for Leukocytosis of 10.9, Anemia Hemoglobin 12.5, Hyponatremia 134, Hyperkalemia 5.4, Elevated Creatinine 1.85. Patient had a CT Head which demonstrated interval deformity of the right parietal occipital cranioplasty mesh, vague hyperdensity seen in the left frontal   subcortical white matter, second smaller hyperdensity seen in the left anterior temporal lobe. CXR Performed, demonstrating Possible RLL Infiltrate, Present on Previous CXR. Patient given Normal Saline 500mL Bolus in ED. (08 Mar 2017 22:57)      Interval History - more awake no seizures- reports no pain        REVIEW OF SYSTEMS    Vital Signs Last 24 Hrs  T(C): 36.8, Max: 36.9 (03-09 @ 18:46)  T(F): 98.2, Max: 98.4 (03-09 @ 18:46)  HR: 90 (64 - 110)  BP: 121/78 (116/70 - 129/53)  BP(mean): 103 (88 - 103)  RR: 15 (14 - 16)  SpO2: 98% (95% - 98%)    Physical Exam-     Mental Status- awake- no aphasia    Cranial Nerves-no field cut    Gait and station- unsteady    Motor- moves all 4 extremities    Reflexes- decreased    Sensation- distal sensory  loss    Coordination- mild tremors    Vascular - no bruits    Medications  acetaminophen   Tablet. 650milliGRAM(s) Oral every 6 hours PRN  valproic  acid Syrup 250milliGRAM(s) Oral two times a day  lacosamide 200milliGRAM(s) Oral two times a day  latanoprost 0.005% Ophthalmic Solution 1Drop(s) Both EYES at bedtime  levothyroxine 50MICROGram(s) Oral daily  carvedilol 3.125milliGRAM(s) Oral every 12 hours  sodium chloride 0.9%. 1000milliLiter(s) IV Continuous <Continuous>      Lab      Radiology    Assessment- Brain mets- PORT today then d/c home    Plan

## 2017-03-10 NOTE — DISCHARGE NOTE ADULT - SECONDARY DIAGNOSIS.
Renal cell carcinoma Essential hypertension Seizure Hypothyroidism Glaucoma Anemia JULIAN (acute kidney injury)

## 2017-03-10 NOTE — PROGRESS NOTE ADULT - SUBJECTIVE AND OBJECTIVE BOX
The patient feels very well. He is able to take himself to the bathroom with a walker and no assistance. His ability to think has returned to normal. He would like to go home.    Physical exam revealed after resuscitation normal vital signs with a regular heart rate.  There was no external evidence of trauma on examination of the head. The neck was free of palpable lymphadenopathy, jugular venous distention or thyromegaly.  The lungs were not clear with coarse rhonchi in the right lower lobe posteriorly. The left lung was clearTo percussion and auscultation.  The heart sounds were regular at 77 with no auscultatory evidence for murmur, rub or gallop.  The patient had no truncal obesity or palpable organomegaly.  There was no chronic venous stasis changes in the lower extremities.  Neurologically the patient was awake, aware and alert with no retrograde amnesia. He was oriented to person place and time.  The patient was steady on his feet with no right pronator drift, no deviation of the tongue to the right and no weakness of the right leg or a positive Babinski on the right side.  His skin was warm and dry.

## 2017-03-10 NOTE — DISCHARGE NOTE ADULT - CARE PLAN
Principal Discharge DX:	Syncope, unspecified syncope type  Goal:	No further episodes of syncope.  Instructions for follow-up, activity and diet:	Follow up with Dr. Ya on Tuesday 3/14. Continue taking medications as instructed. If you have another episode of loss of consciousness please return to the Emergency Department for evaluation.  Secondary Diagnosis:	Renal cell carcinoma  Goal:	Continue with chemotherapy treatment with Dr. Ya  Instructions for follow-up, activity and diet:	A chemoport was placed on 3/10 for easier administration of chemotherapy. Follow up with Dr. Ya on Tuesday 3/14 for further treatment.  Secondary Diagnosis:	Essential hypertension  Goal:	Maintain a blood pressure less than 130/90  Instructions for follow-up, activity and diet:	Continue Norvasc 5mg daily.  Continue Carvedilol 3.125mg twice daily  Secondary Diagnosis:	Seizure  Goal:	Remain seizure free.  Instructions for follow-up, activity and diet:	Continue taking Lacosamide 200mg twice daily   Continue Valproic Acid 250mg twice daily  If you experience any seizures return to the Emergency Department for evaluation  Secondary Diagnosis:	Hypothyroidism  Goal:	Maintain normal thyroid hormone levels  Instructions for follow-up, activity and diet:	Continue taking Synthroid 50mcg daily  Secondary Diagnosis:	Glaucoma  Goal:	Maintain normal intraocular pressure  Secondary Diagnosis:	Anemia Principal Discharge DX:	Syncope, unspecified syncope type  Goal:	No further episodes of syncope.  Instructions for follow-up, activity and diet:	You likely had a syncopal episode at Dr. Ya's office. You were admitted and ruled out for a neurologic and cardiac cause of this syncope. This even was likely vasovagal. Please follow up with Dr. Ya on Tuesday 3/14. Continue taking medications as instructed. If you have another episode of loss of consciousness please return to the Emergency Department for evaluation.  Secondary Diagnosis:	Renal cell carcinoma  Goal:	Continue with chemotherapy treatment with Dr. Ya  Instructions for follow-up, activity and diet:	A chemoport was placed on 3/10 for easier administration of chemotherapy. Follow up with Dr. Ya on Tuesday 3/14 for further treatment.  Secondary Diagnosis:	Essential hypertension  Goal:	Maintain a blood pressure less than 130/90  Instructions for follow-up, activity and diet:	Continue your current home regimen.  Secondary Diagnosis:	Seizure  Goal:	Remain seizure free.  Instructions for follow-up, activity and diet:	Continue taking Lacosamide 200mg twice daily   Continue Valproic Acid 250mg twice daily  If you experience any seizures return to the Emergency Department for evaluation  Secondary Diagnosis:	Hypothyroidism  Goal:	Maintain normal thyroid hormone levels  Instructions for follow-up, activity and diet:	Continue taking Synthroid 50mcg daily  Secondary Diagnosis:	Glaucoma  Goal:	Maintain normal intraocular pressure  Instructions for follow-up, activity and diet:	Continue your current home regimen  Secondary Diagnosis:	Anemia  Instructions for follow-up, activity and diet:	Your hemoglobin was at your baseline. Please follow-up with your primary care physician for a further work-up. Principal Discharge DX:	Orthostatic hypotension  Goal:	No further episodes of syncope.  Instructions for follow-up, activity and diet:	You likely had a syncopal episode at Dr. Ya's office. You were admitted and ruled out for a neurologic and cardiac cause of this syncope. This even was likely vasovagal. Please follow up with Dr. Ya on Tuesday 3/14. Continue taking medications as instructed. If you have another episode of loss of consciousness please return to the Emergency Department for evaluation.  Secondary Diagnosis:	Renal cell carcinoma  Goal:	Continue with chemotherapy treatment with Dr. Ya  Instructions for follow-up, activity and diet:	A chemoport was placed on 3/10 for easier administration of chemotherapy. Follow up with Dr. Ya on Tuesday 3/14 for further treatment.  Secondary Diagnosis:	Essential hypertension  Goal:	Maintain a blood pressure less than 130/90  Instructions for follow-up, activity and diet:	Continue your current home regimen.  Secondary Diagnosis:	Seizure  Goal:	Remain seizure free.  Instructions for follow-up, activity and diet:	Continue taking Lacosamide 200mg twice daily   Continue Valproic Acid 250mg twice daily  If you experience any seizures return to the Emergency Department for evaluation  Secondary Diagnosis:	Hypothyroidism  Goal:	Maintain normal thyroid hormone levels  Instructions for follow-up, activity and diet:	Continue taking Synthroid 50mcg daily  Secondary Diagnosis:	Glaucoma  Goal:	Maintain normal intraocular pressure  Instructions for follow-up, activity and diet:	Continue your current home regimen  Secondary Diagnosis:	JULIAN (acute kidney injury)  Instructions for follow-up, activity and diet:	You initially had an elevation in your Creatinine which resolved with fluids. This was likely due to dehydration

## 2017-03-10 NOTE — DISCHARGE NOTE ADULT - CARE PROVIDER_API CALL
Clyde Ya), Internal Medicine; Medical Oncology  [address] [place] [address]  Phone: [phone]  Fax: [phone]

## 2017-03-11 LAB
DESMETHYL LACOSAMIDE: 12.2 UG/ML — SIGNIFICANT CHANGE UP
LACOSAMIDE (VIMPAT) RESULT: 19.2 UG/ML — HIGH (ref 1–10)

## 2017-03-16 DIAGNOSIS — I12.9 HYPERTENSIVE CHRONIC KIDNEY DISEASE WITH STAGE 1 THROUGH STAGE 4 CHRONIC KIDNEY DISEASE, OR UNSPECIFIED CHRONIC KIDNEY DISEASE: ICD-10-CM

## 2017-03-16 DIAGNOSIS — C64.9 MALIGNANT NEOPLASM OF UNSPECIFIED KIDNEY, EXCEPT RENAL PELVIS: ICD-10-CM

## 2017-03-16 DIAGNOSIS — G40.89 OTHER SEIZURES: ICD-10-CM

## 2017-03-16 DIAGNOSIS — T45.1X5A ADVERSE EFFECT OF ANTINEOPLASTIC AND IMMUNOSUPPRESSIVE DRUGS, INITIAL ENCOUNTER: ICD-10-CM

## 2017-03-16 DIAGNOSIS — Z90.5 ACQUIRED ABSENCE OF KIDNEY: ICD-10-CM

## 2017-03-16 DIAGNOSIS — Z66 DO NOT RESUSCITATE: ICD-10-CM

## 2017-03-16 DIAGNOSIS — D72.829 ELEVATED WHITE BLOOD CELL COUNT, UNSPECIFIED: ICD-10-CM

## 2017-03-16 DIAGNOSIS — E87.1 HYPO-OSMOLALITY AND HYPONATREMIA: ICD-10-CM

## 2017-03-16 DIAGNOSIS — D64.9 ANEMIA, UNSPECIFIED: ICD-10-CM

## 2017-03-16 DIAGNOSIS — E87.5 HYPERKALEMIA: ICD-10-CM

## 2017-03-16 DIAGNOSIS — C79.31 SECONDARY MALIGNANT NEOPLASM OF BRAIN: ICD-10-CM

## 2017-03-16 DIAGNOSIS — R55 SYNCOPE AND COLLAPSE: ICD-10-CM

## 2017-03-16 DIAGNOSIS — E86.0 DEHYDRATION: ICD-10-CM

## 2017-03-16 DIAGNOSIS — N18.3 CHRONIC KIDNEY DISEASE, STAGE 3 (MODERATE): ICD-10-CM

## 2017-03-16 DIAGNOSIS — N17.9 ACUTE KIDNEY FAILURE, UNSPECIFIED: ICD-10-CM

## 2017-03-16 DIAGNOSIS — H40.9 UNSPECIFIED GLAUCOMA: ICD-10-CM

## 2017-03-16 DIAGNOSIS — I95.1 ORTHOSTATIC HYPOTENSION: ICD-10-CM

## 2017-03-16 DIAGNOSIS — E03.2 HYPOTHYROIDISM DUE TO MEDICAMENTS AND OTHER EXOGENOUS SUBSTANCES: ICD-10-CM

## 2017-06-05 ENCOUNTER — APPOINTMENT (OUTPATIENT)
Dept: RADIATION ONCOLOGY | Facility: CLINIC | Age: 62
End: 2017-06-05

## 2017-11-06 ENCOUNTER — INPATIENT (INPATIENT)
Facility: HOSPITAL | Age: 62
LOS: 3 days | Discharge: HOME HEALTH | DRG: 682 | End: 2017-11-10
Attending: INTERNAL MEDICINE | Admitting: INTERNAL MEDICINE
Payer: MEDICARE

## 2017-11-06 VITALS
TEMPERATURE: 98 F | SYSTOLIC BLOOD PRESSURE: 163 MMHG | RESPIRATION RATE: 18 BRPM | WEIGHT: 154.1 LBS | OXYGEN SATURATION: 97 % | DIASTOLIC BLOOD PRESSURE: 101 MMHG | HEART RATE: 75 BPM

## 2017-11-06 DIAGNOSIS — Z90.5 ACQUIRED ABSENCE OF KIDNEY: Chronic | ICD-10-CM

## 2017-11-06 DIAGNOSIS — H40.9 UNSPECIFIED GLAUCOMA: ICD-10-CM

## 2017-11-06 DIAGNOSIS — E03.2 HYPOTHYROIDISM DUE TO MEDICAMENTS AND OTHER EXOGENOUS SUBSTANCES: ICD-10-CM

## 2017-11-06 DIAGNOSIS — E87.5 HYPERKALEMIA: ICD-10-CM

## 2017-11-06 DIAGNOSIS — R11.2 NAUSEA WITH VOMITING, UNSPECIFIED: ICD-10-CM

## 2017-11-06 DIAGNOSIS — R63.8 OTHER SYMPTOMS AND SIGNS CONCERNING FOOD AND FLUID INTAKE: ICD-10-CM

## 2017-11-06 DIAGNOSIS — C64.9 MALIGNANT NEOPLASM OF UNSPECIFIED KIDNEY, EXCEPT RENAL PELVIS: ICD-10-CM

## 2017-11-06 DIAGNOSIS — I10 ESSENTIAL (PRIMARY) HYPERTENSION: ICD-10-CM

## 2017-11-06 DIAGNOSIS — G93.41 METABOLIC ENCEPHALOPATHY: ICD-10-CM

## 2017-11-06 DIAGNOSIS — R42 DIZZINESS AND GIDDINESS: ICD-10-CM

## 2017-11-06 DIAGNOSIS — N17.9 ACUTE KIDNEY FAILURE, UNSPECIFIED: ICD-10-CM

## 2017-11-06 LAB
ALBUMIN SERPL ELPH-MCNC: 3.7 G/DL — SIGNIFICANT CHANGE UP (ref 3.3–5)
ALP SERPL-CCNC: 38 U/L — LOW (ref 40–120)
ALT FLD-CCNC: 44 U/L — SIGNIFICANT CHANGE UP (ref 10–45)
ANION GAP SERPL CALC-SCNC: 13 MMOL/L — SIGNIFICANT CHANGE UP (ref 5–17)
ANION GAP SERPL CALC-SCNC: 14 MMOL/L — SIGNIFICANT CHANGE UP (ref 5–17)
ANION GAP SERPL CALC-SCNC: 16 MMOL/L — SIGNIFICANT CHANGE UP (ref 5–17)
APPEARANCE UR: CLEAR — SIGNIFICANT CHANGE UP
APTT BLD: 23 SEC — LOW (ref 27.5–37.4)
AST SERPL-CCNC: 20 U/L — SIGNIFICANT CHANGE UP (ref 10–40)
BASE EXCESS BLDV CALC-SCNC: -2.2 MMOL/L — SIGNIFICANT CHANGE UP
BASOPHILS NFR BLD AUTO: 0.1 % — SIGNIFICANT CHANGE UP (ref 0–2)
BILIRUB SERPL-MCNC: 0.4 MG/DL — SIGNIFICANT CHANGE UP (ref 0.2–1.2)
BILIRUB UR-MCNC: NEGATIVE — SIGNIFICANT CHANGE UP
BLD GP AB SCN SERPL QL: NEGATIVE — SIGNIFICANT CHANGE UP
BUN SERPL-MCNC: 50 MG/DL — HIGH (ref 7–23)
BUN SERPL-MCNC: 58 MG/DL — HIGH (ref 7–23)
BUN SERPL-MCNC: 58 MG/DL — HIGH (ref 7–23)
CA-I SERPL-SCNC: 1.11 MMOL/L — LOW (ref 1.12–1.3)
CALCIUM SERPL-MCNC: 7.8 MG/DL — LOW (ref 8.4–10.5)
CALCIUM SERPL-MCNC: 8.2 MG/DL — LOW (ref 8.4–10.5)
CALCIUM SERPL-MCNC: 8.4 MG/DL — SIGNIFICANT CHANGE UP (ref 8.4–10.5)
CHLORIDE SERPL-SCNC: 102 MMOL/L — SIGNIFICANT CHANGE UP (ref 96–108)
CHLORIDE SERPL-SCNC: 93 MMOL/L — LOW (ref 96–108)
CHLORIDE SERPL-SCNC: 98 MMOL/L — SIGNIFICANT CHANGE UP (ref 96–108)
CHLORIDE UR-SCNC: 62 MMOL/L — SIGNIFICANT CHANGE UP
CO2 SERPL-SCNC: 21 MMOL/L — LOW (ref 22–31)
CO2 SERPL-SCNC: 21 MMOL/L — LOW (ref 22–31)
CO2 SERPL-SCNC: 22 MMOL/L — SIGNIFICANT CHANGE UP (ref 22–31)
COLOR SPEC: YELLOW — SIGNIFICANT CHANGE UP
CREAT ?TM UR-MCNC: 66 MG/DL — SIGNIFICANT CHANGE UP
CREAT SERPL-MCNC: 1.57 MG/DL — HIGH (ref 0.5–1.3)
CREAT SERPL-MCNC: 1.72 MG/DL — HIGH (ref 0.5–1.3)
CREAT SERPL-MCNC: 1.8 MG/DL — HIGH (ref 0.5–1.3)
DIFF PNL FLD: (no result)
EOSINOPHIL NFR BLD AUTO: 0 % — SIGNIFICANT CHANGE UP (ref 0–6)
GAS PNL BLDV: 129 MMOL/L — LOW (ref 138–146)
GAS PNL BLDV: SIGNIFICANT CHANGE UP
GAS PNL BLDV: SIGNIFICANT CHANGE UP
GLUCOSE BLDC GLUCOMTR-MCNC: 104 MG/DL — HIGH (ref 70–99)
GLUCOSE SERPL-MCNC: 143 MG/DL — HIGH (ref 70–99)
GLUCOSE SERPL-MCNC: 158 MG/DL — HIGH (ref 70–99)
GLUCOSE SERPL-MCNC: 188 MG/DL — HIGH (ref 70–99)
GLUCOSE UR QL: NEGATIVE — SIGNIFICANT CHANGE UP
HCO3 BLDV-SCNC: 23 MMOL/L — SIGNIFICANT CHANGE UP (ref 20–27)
HCT VFR BLD CALC: 42 % — SIGNIFICANT CHANGE UP (ref 39–50)
HGB BLD-MCNC: 14.1 G/DL — SIGNIFICANT CHANGE UP (ref 13–17)
INR BLD: 1.04 — SIGNIFICANT CHANGE UP (ref 0.88–1.16)
KETONES UR-MCNC: NEGATIVE — SIGNIFICANT CHANGE UP
LEUKOCYTE ESTERASE UR-ACNC: NEGATIVE — SIGNIFICANT CHANGE UP
LYMPHOCYTES # BLD AUTO: 15.9 % — SIGNIFICANT CHANGE UP (ref 13–44)
MAGNESIUM SERPL-MCNC: 2.5 MG/DL — SIGNIFICANT CHANGE UP (ref 1.6–2.6)
MCHC RBC-ENTMCNC: 28.4 PG — SIGNIFICANT CHANGE UP (ref 27–34)
MCHC RBC-ENTMCNC: 33.6 G/DL — SIGNIFICANT CHANGE UP (ref 32–36)
MCV RBC AUTO: 84.5 FL — SIGNIFICANT CHANGE UP (ref 80–100)
MONOCYTES NFR BLD AUTO: 2.3 % — SIGNIFICANT CHANGE UP (ref 2–14)
NEUTROPHILS NFR BLD AUTO: 81.7 % — HIGH (ref 43–77)
NITRITE UR-MCNC: NEGATIVE — SIGNIFICANT CHANGE UP
OSMOLALITY UR: 594 MOSMOL/KG — SIGNIFICANT CHANGE UP (ref 100–650)
PCO2 BLDV: 39 MMHG — LOW (ref 41–51)
PH BLDV: 7.38 — SIGNIFICANT CHANGE UP (ref 7.32–7.43)
PH UR: 7 — SIGNIFICANT CHANGE UP (ref 5–8)
PHOSPHATE SERPL-MCNC: 4 MG/DL — SIGNIFICANT CHANGE UP (ref 2.5–4.5)
PLATELET # BLD AUTO: 268 K/UL — SIGNIFICANT CHANGE UP (ref 150–400)
PO2 BLDV: 53 MMHG — SIGNIFICANT CHANGE UP
POTASSIUM BLDV-SCNC: 5.7 MMOL/L — HIGH (ref 3.5–4.9)
POTASSIUM SERPL-MCNC: 5.3 MMOL/L — SIGNIFICANT CHANGE UP (ref 3.5–5.3)
POTASSIUM SERPL-MCNC: 5.7 MMOL/L — HIGH (ref 3.5–5.3)
POTASSIUM SERPL-MCNC: 6.6 MMOL/L — CRITICAL HIGH (ref 3.5–5.3)
POTASSIUM SERPL-SCNC: 5.3 MMOL/L — SIGNIFICANT CHANGE UP (ref 3.5–5.3)
POTASSIUM SERPL-SCNC: 5.7 MMOL/L — HIGH (ref 3.5–5.3)
POTASSIUM SERPL-SCNC: 6.6 MMOL/L — CRITICAL HIGH (ref 3.5–5.3)
PROT SERPL-MCNC: 6.7 G/DL — SIGNIFICANT CHANGE UP (ref 6–8.3)
PROT UR-MCNC: 100 MG/DL
PROTHROM AB SERPL-ACNC: 11.5 SEC — SIGNIFICANT CHANGE UP (ref 9.8–12.7)
RBC # BLD: 4.97 M/UL — SIGNIFICANT CHANGE UP (ref 4.2–5.8)
RBC # FLD: 15.9 % — SIGNIFICANT CHANGE UP (ref 10.3–16.9)
RH IG SCN BLD-IMP: POSITIVE — SIGNIFICANT CHANGE UP
SAO2 % BLDV: 86 % — SIGNIFICANT CHANGE UP
SODIUM SERPL-SCNC: 130 MMOL/L — LOW (ref 135–145)
SODIUM SERPL-SCNC: 133 MMOL/L — LOW (ref 135–145)
SODIUM SERPL-SCNC: 137 MMOL/L — SIGNIFICANT CHANGE UP (ref 135–145)
SODIUM UR-SCNC: 73 MMOL/L — SIGNIFICANT CHANGE UP
SP GR SPEC: 1.01 — SIGNIFICANT CHANGE UP (ref 1–1.03)
TSH SERPL-MCNC: 1.09 UIU/ML — SIGNIFICANT CHANGE UP (ref 0.35–4.94)
UROBILINOGEN FLD QL: 0.2 E.U./DL — SIGNIFICANT CHANGE UP
UUN UR-MCNC: 1011 MG/DL — SIGNIFICANT CHANGE UP
WBC # BLD: 13.8 K/UL — HIGH (ref 3.8–10.5)
WBC # FLD AUTO: 13.8 K/UL — HIGH (ref 3.8–10.5)

## 2017-11-06 PROCEDURE — 99291 CRITICAL CARE FIRST HOUR: CPT

## 2017-11-06 PROCEDURE — 70450 CT HEAD/BRAIN W/O DYE: CPT | Mod: 26

## 2017-11-06 PROCEDURE — 95951: CPT | Mod: 26

## 2017-11-06 PROCEDURE — 99222 1ST HOSP IP/OBS MODERATE 55: CPT

## 2017-11-06 PROCEDURE — 71010: CPT | Mod: 26

## 2017-11-06 RX ORDER — INSULIN HUMAN 100 [IU]/ML
10 INJECTION, SOLUTION SUBCUTANEOUS ONCE
Qty: 0 | Refills: 0 | Status: COMPLETED | OUTPATIENT
Start: 2017-11-06 | End: 2017-11-06

## 2017-11-06 RX ORDER — LACOSAMIDE 50 MG/1
200 TABLET ORAL EVERY 12 HOURS
Qty: 0 | Refills: 0 | Status: DISCONTINUED | OUTPATIENT
Start: 2017-11-06 | End: 2017-11-10

## 2017-11-06 RX ORDER — SODIUM CHLORIDE 9 MG/ML
1000 INJECTION INTRAMUSCULAR; INTRAVENOUS; SUBCUTANEOUS
Qty: 0 | Refills: 0 | Status: DISCONTINUED | OUTPATIENT
Start: 2017-11-06 | End: 2017-11-07

## 2017-11-06 RX ORDER — CALCIUM GLUCONATE 100 MG/ML
1 VIAL (ML) INTRAVENOUS ONCE
Qty: 0 | Refills: 0 | Status: COMPLETED | OUTPATIENT
Start: 2017-11-06 | End: 2017-11-06

## 2017-11-06 RX ORDER — SODIUM CHLORIDE 9 MG/ML
1000 INJECTION INTRAMUSCULAR; INTRAVENOUS; SUBCUTANEOUS ONCE
Qty: 0 | Refills: 0 | Status: COMPLETED | OUTPATIENT
Start: 2017-11-06 | End: 2017-11-06

## 2017-11-06 RX ORDER — DIVALPROEX SODIUM 500 MG/1
250 TABLET, DELAYED RELEASE ORAL EVERY 12 HOURS
Qty: 0 | Refills: 0 | Status: DISCONTINUED | OUTPATIENT
Start: 2017-11-06 | End: 2017-11-10

## 2017-11-06 RX ORDER — ENOXAPARIN SODIUM 100 MG/ML
40 INJECTION SUBCUTANEOUS EVERY 24 HOURS
Qty: 0 | Refills: 0 | Status: DISCONTINUED | OUTPATIENT
Start: 2017-11-06 | End: 2017-11-10

## 2017-11-06 RX ORDER — SODIUM POLYSTYRENE SULFONATE 4.1 MEQ/G
15 POWDER, FOR SUSPENSION ORAL ONCE
Qty: 0 | Refills: 0 | Status: COMPLETED | OUTPATIENT
Start: 2017-11-06 | End: 2017-11-06

## 2017-11-06 RX ORDER — MEGESTROL ACETATE 40 MG/ML
200 SUSPENSION ORAL DAILY
Qty: 0 | Refills: 0 | Status: DISCONTINUED | OUTPATIENT
Start: 2017-11-06 | End: 2017-11-10

## 2017-11-06 RX ORDER — DIVALPROEX SODIUM 500 MG/1
250 TABLET, DELAYED RELEASE ORAL EVERY 12 HOURS
Qty: 0 | Refills: 0 | Status: DISCONTINUED | OUTPATIENT
Start: 2017-11-06 | End: 2017-11-06

## 2017-11-06 RX ORDER — ONDANSETRON 8 MG/1
4 TABLET, FILM COATED ORAL ONCE
Qty: 0 | Refills: 0 | Status: COMPLETED | OUTPATIENT
Start: 2017-11-06 | End: 2017-11-06

## 2017-11-06 RX ORDER — AMLODIPINE BESYLATE 2.5 MG/1
1 TABLET ORAL
Qty: 0 | Refills: 0 | COMMUNITY

## 2017-11-06 RX ORDER — DEXTROSE 50 % IN WATER 50 %
50 SYRINGE (ML) INTRAVENOUS ONCE
Qty: 0 | Refills: 0 | Status: COMPLETED | OUTPATIENT
Start: 2017-11-06 | End: 2017-11-06

## 2017-11-06 RX ORDER — MEGESTROL ACETATE 40 MG/ML
5 SUSPENSION ORAL
Qty: 0 | Refills: 0 | COMMUNITY

## 2017-11-06 RX ORDER — LEVOTHYROXINE SODIUM 125 MCG
50 TABLET ORAL DAILY
Qty: 0 | Refills: 0 | Status: DISCONTINUED | OUTPATIENT
Start: 2017-11-06 | End: 2017-11-10

## 2017-11-06 RX ORDER — DEXAMETHASONE 0.5 MG/5ML
4 ELIXIR ORAL EVERY 12 HOURS
Qty: 0 | Refills: 0 | Status: DISCONTINUED | OUTPATIENT
Start: 2017-11-06 | End: 2017-11-10

## 2017-11-06 RX ORDER — VALPROIC ACID (AS SODIUM SALT) 250 MG/5ML
5 SOLUTION, ORAL ORAL
Qty: 0 | Refills: 0 | COMMUNITY

## 2017-11-06 RX ORDER — AMLODIPINE BESYLATE 2.5 MG/1
5 TABLET ORAL DAILY
Qty: 0 | Refills: 0 | Status: DISCONTINUED | OUTPATIENT
Start: 2017-11-06 | End: 2017-11-10

## 2017-11-06 RX ORDER — LATANOPROST 0.05 MG/ML
1 SOLUTION/ DROPS OPHTHALMIC; TOPICAL AT BEDTIME
Qty: 0 | Refills: 0 | Status: DISCONTINUED | OUTPATIENT
Start: 2017-11-06 | End: 2017-11-10

## 2017-11-06 RX ORDER — CARVEDILOL PHOSPHATE 80 MG/1
1 CAPSULE, EXTENDED RELEASE ORAL
Qty: 60 | Refills: 0 | COMMUNITY

## 2017-11-06 RX ORDER — SODIUM BICARBONATE 1 MEQ/ML
50 SYRINGE (ML) INTRAVENOUS ONCE
Qty: 0 | Refills: 0 | Status: COMPLETED | OUTPATIENT
Start: 2017-11-06 | End: 2017-11-06

## 2017-11-06 RX ORDER — DIVALPROEX SODIUM 500 MG/1
1 TABLET, DELAYED RELEASE ORAL
Qty: 0 | Refills: 0 | COMMUNITY

## 2017-11-06 RX ORDER — SODIUM CHLORIDE 9 MG/ML
1000 INJECTION INTRAMUSCULAR; INTRAVENOUS; SUBCUTANEOUS ONCE
Qty: 0 | Refills: 0 | Status: DISCONTINUED | OUTPATIENT
Start: 2017-11-06 | End: 2017-11-06

## 2017-11-06 RX ORDER — ASPIRIN/CALCIUM CARB/MAGNESIUM 324 MG
1 TABLET ORAL
Qty: 0 | Refills: 0 | COMMUNITY

## 2017-11-06 RX ADMIN — DIVALPROEX SODIUM 250 MILLIGRAM(S): 500 TABLET, DELAYED RELEASE ORAL at 18:51

## 2017-11-06 RX ADMIN — SODIUM POLYSTYRENE SULFONATE 15 GRAM(S): 4.1 POWDER, FOR SUSPENSION ORAL at 08:44

## 2017-11-06 RX ADMIN — INSULIN HUMAN 10 UNIT(S): 100 INJECTION, SOLUTION SUBCUTANEOUS at 05:13

## 2017-11-06 RX ADMIN — Medication 4 MILLIGRAM(S): at 09:48

## 2017-11-06 RX ADMIN — AMLODIPINE BESYLATE 5 MILLIGRAM(S): 2.5 TABLET ORAL at 10:04

## 2017-11-06 RX ADMIN — SODIUM CHLORIDE 1000 MILLILITER(S): 9 INJECTION INTRAMUSCULAR; INTRAVENOUS; SUBCUTANEOUS at 05:13

## 2017-11-06 RX ADMIN — ENOXAPARIN SODIUM 40 MILLIGRAM(S): 100 INJECTION SUBCUTANEOUS at 08:44

## 2017-11-06 RX ADMIN — DIVALPROEX SODIUM 250 MILLIGRAM(S): 500 TABLET, DELAYED RELEASE ORAL at 09:48

## 2017-11-06 RX ADMIN — MEGESTROL ACETATE 200 MILLIGRAM(S): 40 SUSPENSION ORAL at 09:51

## 2017-11-06 RX ADMIN — ONDANSETRON 4 MILLIGRAM(S): 8 TABLET, FILM COATED ORAL at 02:32

## 2017-11-06 RX ADMIN — Medication 50 MICROGRAM(S): at 09:48

## 2017-11-06 RX ADMIN — Medication 200 GRAM(S): at 05:21

## 2017-11-06 RX ADMIN — Medication 4 MILLIGRAM(S): at 18:51

## 2017-11-06 RX ADMIN — Medication 50 MILLIEQUIVALENT(S): at 05:13

## 2017-11-06 RX ADMIN — LATANOPROST 1 DROP(S): 0.05 SOLUTION/ DROPS OPHTHALMIC; TOPICAL at 22:55

## 2017-11-06 RX ADMIN — SODIUM CHLORIDE 120 MILLILITER(S): 9 INJECTION INTRAMUSCULAR; INTRAVENOUS; SUBCUTANEOUS at 09:48

## 2017-11-06 RX ADMIN — LACOSAMIDE 200 MILLIGRAM(S): 50 TABLET ORAL at 09:59

## 2017-11-06 RX ADMIN — LACOSAMIDE 200 MILLIGRAM(S): 50 TABLET ORAL at 18:52

## 2017-11-06 RX ADMIN — Medication 50 MILLILITER(S): at 05:13

## 2017-11-06 NOTE — H&P ADULT - NSHPLABSRESULTS_GEN_ALL_CORE
LABS:                         14.1   13.8  )-----------( 268      ( 06 Nov 2017 03:59 )             42.0     11-06    133<L>  |  98  |  58<H>  ----------------------------<  158<H>  5.7<H>   |  22  |  1.72<H>    Ca    8.2<L>      06 Nov 2017 06:27    TPro  6.7  /  Alb  3.7  /  TBili  0.4  /  DBili  x   /  AST  20  /  ALT  44  /  AlkPhos  38<L>  11-06    PT/INR - ( 06 Nov 2017 03:59 )   PT: 11.5 sec;   INR: 1.04          PTT - ( 06 Nov 2017 03:59 )  PTT:23.0 sec      RADIOLOGY, EKG & ADDITIONAL TESTS:     CT Head No Cont 11.06.17  FINDINGS: The CT examination demonstrates the patient to be status post   right parieto-occipital craniectomy and cranioplasty. There is metallic   mesh overlying the right parietal occipital craniectomy and cranioplasty.   Encephalomalacia changes are seen in the left cerebellar hemisphere. The   patient is also status post left frontal craniotomy. Small   encephalomalacic changes are seen in the left frontal cortex. Hyperdense   focus is once again seen in the left temporal lobe on axial image 12,   series 2. A second hyperdense focus is seen in the right parietal lobe on   image 19, series 2. Confluent hypodensities are noted in the   periventricular white matter, likely sequelae of small vessel ischemic   disease. There is enlargement of the sulci, cisterns andventricles,   consistent with mild diffuse cerebral and cerebellar volume loss. The   gray white differentiation appears within normal limits. There is no   acute transcortical infarct. The bony windows demonstrates no fractures.   The visualized paranasal sinuses are within normal limits. The mastoid   air cells are well aerated.    IMPRESSION: Vague small hyperdensities are noted in the left temporal   lobe and right parietal lobe, similar to prior study. These may represent   metastatic disease.

## 2017-11-06 NOTE — ED ADULT TRIAGE NOTE - CHIEF COMPLAINT QUOTE
pt BIBA from home complaining of dizziness N/V denies abd pain CP SOB pt with hx of metastatic CA brain blood kidney

## 2017-11-06 NOTE — ED PROVIDER NOTE - CRITICAL CARE PROVIDED
documentation/additional history taking/interpretation of diagnostic studies/direct patient care (not related to procedure)/consultation with other physicians/consult w/ pt's family directly relating to pts condition

## 2017-11-06 NOTE — H&P ADULT - PROBLEM SELECTOR PLAN 1
Patient p/w dizziness and diaphoresis with an increase in confusion in the setting of emesis and electrolyte abnormalities found on admission. Etiology of dizziness likely 2/2 dehydration, however cannot r/o contribution of brain metastasis given CT scan findings.   - neurology consulted, recommending MRI w/ contrast to r/o brain mets and vEEG to r/o Patient p/w dizziness and diaphoresis with an increase in confusion in the setting of emesis and electrolyte abnormalities found on admission. Etiology of dizziness likely 2/2 volume depletion, however cannot r/o new brain mets vs. seizures.   - neurology consulted, recommending MRI w/ contrast to r/o brain mets and vEEG   - vEEG monitoring for seizures given sz hx and confusion  - c/w home seizure medications (Divalproex delayed release 250 BID, Vimpat 200 BID)  - will attempt to obtain outpatient records/disc for recent MRI which reportedly shows new lesions. If unable to assess properly, will consider repeating MRI on this admission however will have to defer contrast until JULIAN resolving. Patient p/w dizziness and diaphoresis with an increase in confusion in the setting of emesis and electrolyte abnormalities found on admission. Etiology of dizziness likely 2/2 volume depletion, however cannot r/o new brain mets vs. seizures.   - neurology consulted, recommending MRI w/ contrast to r/o brain mets and vEEG   - vEEG monitoring for seizures given sz hx and confusion  - c/w home seizure medications (Divalproex delayed release 250 BID, Vimpat 200 BID)  - pt's wife to bring in disc from recent MRI (within last 2 weeks) for review by neurology either tonight or tomorrow AM

## 2017-11-06 NOTE — H&P ADULT - PROBLEM SELECTOR PLAN 3
Patient with hyperkalemia on admission to 6.6, EKG mild peaked T waves, s/p calcium gluconate/insulin/sodium bicarb. Most recent K 5.7.   - ordered for Kayexalate x1   - f/u repeat BMP at 12PM   - c/w maintenance fluids @ 120cc/hr  - trend BMP Patient with hyperkalemia on admission to 6.6, EKG mild peaked T waves, s/p calcium gluconate/insulin/sodium bicarb. Most recent K 5.7.   - ordered for Kayexalate x1   - f/u repeat BMP at 12PM   - c/w maintenance fluids @ 120cc/hr for JULIAN given likely pre-renal etiology and suspect that with correction of JULIAN, K will also correct  - trend BMP

## 2017-11-06 NOTE — H&P ADULT - PROBLEM SELECTOR PLAN 8
Patient with hx RCC with mets to brain, s/p R total nephrectomy, craniotomy x2 and gamma knife tx x3 now with CT findings showing possible mets and reported outpatient MRI showing new Patient with hx RCC with mets to brain, s/p R total nephrectomy, craniotomy x2 and gamma knife tx x3 now with CT findings showing possible mets and reported outpatient MRI 2 weeks ago showing new L sided lesions. Most recent Opdivo/avastin treatment 9/20/2017.  - will f/u with outpatient and get records/images from recent MRI Patient with hx RCC with mets to brain, s/p R total nephrectomy, craniotomy x2 and gamma knife tx x3 now with CT findings showing possible mets and reported outpatient MRI 2 weeks ago showing new L sided lesions. Most recent Opdivo/avastin treatment 9/20/2017.  - will f/u regarding emts when wife brings CD from recent MRI

## 2017-11-06 NOTE — H&P ADULT - PROBLEM SELECTOR PLAN 7
Patient with chemotherapy (Opdivo/avastin) induced hypothyroidism, on home Synthroid 50mcg qd.   - c/w Synthroid 50mcg qd Patient with chemotherapy (Opdivo/avastin) induced hypothyroidism, on home Synthroid 50mcg qd.   - c/w Synthroid 50mcg qd  - f/u TSH

## 2017-11-06 NOTE — H&P ADULT - FAMILY HISTORY
Family history of malignant neoplasm of gastrointestinal tract, Family history of gastric cancer     Family history of malignant neoplasm of gastrointestinal tract, Family history of pancreatic cancer     Mother  Still living? Unknown  Family history of malignant neoplasm of breast, Age at diagnosis: Age Unknown

## 2017-11-06 NOTE — H&P ADULT - PROBLEM SELECTOR PLAN 5
Patient w/ history of HTN with dx preceding cancer tx. On home triamterene/HCTA 37.5/25 qd and Coreg CR 40mg qd.   - holding diuretics and anti-hypertensives at this time in the setting of Patient w/ history of HTN with dx preceding cancer tx. On home triamterene/HCTA 37.5/25 qd and Coreg CR 40mg qd.   - holding diuretics and home BB at this time in the setting of volume depletion and JULIAN  - given hypertension to SBP 160s since admission, will start low dose CCB (amlodipine 5mg qd) for HTN Patient w/ history of HTN with dx preceding cancer tx. On home triamterene/HCTZ 37.5/25 qd and Coreg CR 40mg qd.   - holding diuretics and home BB at this time in the setting of volume depletion and JULIAN  - given hypertension to SBP 160s since admission, will start low dose CCB (amlodipine 5mg qd) for HTN

## 2017-11-06 NOTE — H&P ADULT - PROBLEM SELECTOR PLAN 9
Patient initially p/w confusion in the setting of electrolyte derangements, which has since improved with fluid resuscitation and correction of electrolyte abnormalities indicating likely metabolic encephalopathy.

## 2017-11-06 NOTE — H&P ADULT - PROBLEM SELECTOR PLAN 4
Patient with questionable hx CKD, unclear baseline Cr based on last admission values, and Cr 1.80 on admission likely indicating JULIAN. Suspect pre-renal etiology given hypovolemic clinical appearance in the setting of emesis and electrolyte abnormalities responding to fluid resuscitation.  - f/u urine lytes and calculate FeNa  - c/w maintenance fluids NS @ 120cc/hr  - avoid nephrotoxic agents including IV contrast   - trend BMP Patient with questionable hx CKD, unclear baseline Cr based on last admission values, and Cr 1.80 on admission indicating JULIAN. Suspect pre-renal etiology given hypovolemic clinical appearance in the setting of emesis and electrolyte abnormalities responding to fluid resuscitation.  - f/u urine lytes and calculate FeNa  - bladder scan post-void to r/o obstructive cause of JULIAN  - c/w maintenance fluids NS @ 120cc/hr  - avoid nephrotoxic agents including IV contrast  - trend BMP Patient with questionable hx CKD, unclear baseline Cr based on last admission values, and Cr 1.80 on admission indicating JULIAN. Suspect pre-renal etiology given hypovolemic clinical appearance in the setting of emesis and electrolyte abnormalities responding to fluid resuscitation.  - f/u urine lytes and calculate FeNa  - bladder scan post-void to r/o obstructive cause of JULIAN  - c/w maintenance fluids NS @ 120cc/hr  - avoid nephrotoxic agents including IV contrast  - strict I&O's  - trend BMP

## 2017-11-06 NOTE — H&P ADULT - PROBLEM SELECTOR PLAN 10
Fluids: NS @ 120cc/hr  Electrolytes: Monitor BMP, replete PRN   Nutrition: DASH/TLC diet w/ potassium restriction    DVT ppx- Lovenox 40 qd  GI ppx- not indicated at this time    Code Status: Full Code    Dispo: Admit to [facility]

## 2017-11-06 NOTE — CONSULT NOTE ADULT - SUBJECTIVE AND OBJECTIVE BOX
REASON FOR CONSULT:    HISTORY OF PRESENT ILLNESS:  62M PMH metastatic RCC (s/p R nephrectomy, s/p craniotomy x2, s/p gamma knife x3 most recently March 2017, currently on Opdivo and Avastin w/ Dr. 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 of nausea, vomiting, confusion, and dizziness. Patient was in usual state of health until last night around 9PM, at which time he felt weak and nauseous. He required assistance from wife and niece to get to bathroom and he was noted to be diaphoretic at the time. Patient then vomited (white-royce, NBNB emesis) and continued to feel weak. Patient was then noted to be confused, forgetting the names of one of his children and with limited understanding of what was going on. Wife noted that patient had a "distant look in his eyes" which she has noted preceding past seizures. Pt did not have any tonic/clonic movements and remained responsive throughout the entirety of this episode, though wife did note that patient had mild b/l UE tremors of the hands. Last meal was dinner 3-4 hours prior to the episode and patient had normal appetite prior. Denies any recent fever, chills, diarrhea, constipation, bloody or tarry stools, URI sx, dysuria, increased frequency, cough, SOB, chest pain, palpitations, lower extremity edema. No recent NSAID use or history of ulcers. Only recent medication change was addition of dexamethasone 4mg BID on 10/25/17.     Upon arrival to the ED, patient's vitals were as follows: Tm 98F, HR 60-75, -163/, RR 18, SpO2 97-98% on RA.  Labs notable for mild leukocytosis w/ left shift (WBC 13.8, PMH 82%), mild hyponatremia (Na 130), significant hyperkalemia (K 6.6), and elevated BUN/Cr 58/1.80.   CT head performed w/ no acute changes when compared to 3/8/17 but findings concerning for metastatic disease.    Patient received 1g calcium gluconate, 10u insulin + D50, 50mEq sodium bicarb, and NS 1L bolus x1 with improvement of K to 5.7.    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 metastases  Malignant neoplasm of kidney: Renal cell cancer  Glaucoma: Dx 2014, s/p b/l surgical repair  Essential hypertension: HTN (hypertension)  History of nephrectomy, unilateral: right nephrectomy  Other postprocedural status: S/P craniotomy  Other postprocedural status: S/P tonsillectomy      [ ] Diabetes   [ ] Hypertension  [ ] Hyperlipidemia  [ ] CAD  [ ] PCI  [ ] CABG    PREVIOUS DIAGNOSTIC TESTING:    [ ] Echocardiogram:  [ ]  Catheterization:  [ ] Stress Test:  	    MEDICATIONS:  amLODIPine   Tablet 5 milliGRAM(s) Oral daily        diVALproex  milliGRAM(s) Oral every 12 hours  lacosamide 200 milliGRAM(s) Oral every 12 hours      dexamethasone     Tablet 4 milliGRAM(s) Oral every 12 hours  levothyroxine 50 MICROGram(s) Oral daily  megestrol Suspension 200 milliGRAM(s) Oral daily    enoxaparin Injectable 40 milliGRAM(s) SubCutaneous every 24 hours  latanoprost 0.005% Ophthalmic Solution 1 Drop(s) Both EYES at bedtime  sodium chloride 0.9%. 1000 milliLiter(s) IV Continuous <Continuous>      FAMILY HISTORY:  Family history of malignant neoplasm of gastrointestinal tract: Family history of gastric cancer  Family history of malignant neoplasm of breast (Mother): Family history of breast cancer in sister  Family history of malignant neoplasm of gastrointestinal tract: Family history of pancreatic cancer      SOCIAL HISTORY:    [ ] Non-smoker  [ ] Smoker  [ ] Alcohol    Allergies    No Known Allergies    Intolerances    	    REVIEW OF SYSTEMS:    [x] as per HPI  CONSTITUTIONAL: No fever, weight loss, or fatigue  ENT:  No difficulty hearing, tinnitus, vertigo; No sinus or throat pain  RESPIRATORY: No cough, wheezing, chills or hemoptysis; No Shortness of Breath  CARDIOVASCULAR: No chest pain, palpitations, dizziness, or leg swelling  GASTROINTESTINAL: No abdominal or epigastric pain. No nausea, vomiting, or hematemesis; No diarrhea or constipation. No melena or hematochezia.  GENITOURINARY: No dysuria, frequency, hematuria, or incontinence  NEUROLOGICAL: No headaches, memory loss, loss of strength, numbness, or tremors  MUSCULOSKELETAL: No joint pain or swelling; No muscle, back, or extremity pain  [x] All others negative	  [ ] Unable to obtain    PHYSICAL EXAM:  T(C): 36.6 (11-06-17 @ 08:40), Max: 36.7 (11-06-17 @ 01:31)  HR: 75 (11-06-17 @ 08:40) (55 - 75)  BP: 163/90 (11-06-17 @ 08:40) (160/94 - 163/101)  RR: 18 (11-06-17 @ 08:40) (17 - 18)  SpO2: 98% (11-06-17 @ 08:40) (97% - 99%)  Wt(kg): --  I&O's Summary      Appearance: Normal	  HEENT:   Normal oral mucosa, PERRL, EOMI	  Lymphatic: No lymphadenopathy  Cardiovascular: Normal S1 S2, No JVD, No murmurs, No edema  Respiratory: Lungs clear to auscultation	  Psychiatry: A & O x 3, Mood & affect appropriate  Gastrointestinal:  Soft, Non-tender, + BS	  Skin: No rashes, No ecchymoses, No cyanosis	  Neurologic: Non-focal  Extremities: Normal range of motion, No clubbing, cyanosis or edema  Vascular: Peripheral pulses palpable 2+ bilaterally    TELEMETRY: 	    ECG:  < from: 12 Lead ECG (03.09.17 @ 08:08) >  Diagnosis Line Normal sinus rhythm    < end of copied text >    ECHO:< from: Echocardiogram (03.09.17 @ 08:57) >  mild   concentric left ventricular hypertrophy.The left ventricular wall motion   is   normal.The left ventricle is hyperdynamic and the overall ejection   fraction is   increased (>75%).  The left atrial size is normal.Right atrial size is   normal.The right ventricle is normal in size and function.Structurally   normal   aortic valve.There is mild aortic regurgitation.Structurally normal   mitral   valve.No mitral regurgitation noted.Structurally normal tricuspid   valve.Borderline aortic root dilatation.The aortic root measures 3.9 cm   at   sinuses(normal less than 4 cm for men, less than 3.6 cm for women).  The   aortic root measures 2.0 cm/m2 at sinuses (normal less than 2.1 cm/m2   for men,   less than 2.2 cm/m2 for women).  The pulmonary artery is normal size.    < end of copied text >    STRESS:  CATH:  	  RADIOLOGY:  CXR:  CT:  US:   	  	  LABS:	 	    CARDIAC MARKERS:                                  14.1   13.8  )-----------( 268      ( 06 Nov 2017 03:59 )             42.0     11-06    133<L>  |  98  |  58<H>  ----------------------------<  158<H>  5.7<H>   |  22  |  1.72<H>    Ca    8.2<L>      06 Nov 2017 06:27    TPro  6.7  /  Alb  3.7  /  TBili  0.4  /  DBili  x   /  AST  20  /  ALT  44  /  AlkPhos  38<L>  11-06    proBNP:   Lipid Profile:   HgA1c:   TSH:     ASSESSMENT/PLAN: 	     Problem: Near Syncope.  Plan: Patient p/w dizziness and diaphoresis with an increase in confusion in the setting of emesis and electrolyte abnormalities found on admission. Etiology of dizziness likely 2/2 volume depletion, however cannot r/o new brain mets vs. seizures.   - neurology consulted, recommending MRI w/ contrast to r/o brain mets and vEEG   - vEEG monitoring for seizures given sz hx and confusion  - c/w home seizure medications (Divalproex delayed release 250 BID, Vimpat 200 BID)  - pt's wife to bring in disc from recent MRI (within last 2 weeks) for review by neurology either tonight or tomorrow AM. Patient p/w dizziness and diaphoresis with an increase in confusion in the setting of emesis and electrolyte abnormalities found on admission. Etiology of dizziness likely 2/2 volume depletion, however cannot r/o new brain mets vs. seizures.   - neurology consulted, recommending MRI w/ contrast to r/o brain mets and vEEG   - vEEG monitoring for seizures given sz hx and confusion  - c/w home seizure medications (Divalproex delayed release 250 BID, Vimpat 200 BID)  - will attempt to obtain outpatient records/disc for recent MRI which reportedly shows new lesions. If unable to assess properly, will consider repeating MRI on this admission however will have to defer contrast until JULIAN resolving.     Problem: Essential hypertension.  Plan: Patient w/ history of HTN with dx preceding cancer tx. On home triamterene/HCTZ 37.5/25 qd and Coreg CR 40mg qd.   - holding diuretics and home BB at this time in the setting of volume depletion and JULIAN  - given hypertension to SBP 160s since admission, will start low dose CCB (amlodipine 5mg qd) for HTN. Patient w/ history of HTN with dx preceding cancer tx. On home triamterene/HCTA 37.5/25 qd and Coreg CR 40mg qd.   - holding diuretics and home BB at this time in the setting of volume depletion and JULIAN  - given hypertension to SBP 160s since admission, will start low dose CCB (amlodipine 5mg qd) for HTN   #CV Prevention  q3mo Fasting Lipid Profile, Goal LDL<100, statin as tolerated  q6week TSH  q3mo 25-OH Vitamin D Level, Goal 50, supplement as tolerated

## 2017-11-06 NOTE — CONSULT NOTE ADULT - ATTENDING COMMENTS
Place recent MRI of brain into the medical records for radiology (pacs)  Rehabilitation consult Dr. Dawson Mckeon.  Neurology consultation Dr. Nain Reynoso.  Medical consultation Dr. Michael Rdz.

## 2017-11-06 NOTE — CONSULT NOTE ADULT - CONSULT REASON
Patient with decreasing performance while being treated with Avastin and opdivo for metastatic renal cell cancer to the lung and brain.

## 2017-11-06 NOTE — ED ADULT NURSE NOTE - PSH
History of nephrectomy, unilateral  right nephrectomy  Other postprocedural status  S/P craniotomy  Other postprocedural status  S/P tonsillectomy

## 2017-11-06 NOTE — H&P ADULT - PROBLEM SELECTOR PLAN 2
Patient p/w nausea and vomiting in the setting of mild leukocytosis however w/o sick contacts or other infections signs/sx. Unclear etiology of nausea and vomiting at this time, though differential would include viral gastroenteritis (given leukocytosis) vs. increased intracranial pressure in the setting of brain mets vs. medication side effect. Nausea/vomiting has now resolved and patient hungry, tolerating PO diet.  - c/w fluid resuscitation Patient p/w nausea and vomiting in the setting of mild leukocytosis however w/o sick contacts or other infections signs/sx. Unclear etiology of nausea and vomiting at this time, though differential would include viral gastroenteritis (given leukocytosis) vs. increased intracranial pressure in the setting of brain mets vs. medication side effect. Nausea/vomiting has now resolved and patient hungry, tolerating PO diet.  - c/w fluid resuscitation    #Leukocytosis  Leukocytosis on admission to 13.8 w/ mild left shift. Etiology may be 2/2 recent steroid tx inititation vs. reactive vs. viral gastroenteritis in the setting of emesis.  - will trend WBC count  - No other signs/sx of infection at this time, though if any signs/sx of infection (SIRS criteria, fever) will do complete fever w/u and consider starting abx

## 2017-11-06 NOTE — ED PROVIDER NOTE - MEDICAL DECISION MAKING DETAILS
nausea/vomiting tonight with headache yesterday.  concern for mets to brain/ edema/ bleed.  went to ct immediately which showed possible mets to brain stable from prior.  given zofran and fluid with improvement in symptoms.  labs significant for hyperkalemia, crystal.  treated with insulin/glucose/bicarb/fluids. repeat downtrending.  slight peak t waves on ecg, otherwise normal.  admit for further treatment.  discussed with dr. cat

## 2017-11-06 NOTE — H&P ADULT - PROBLEM SELECTOR PLAN 6
Pt w/ hx of glaucoma, on home latanoprost drops at bedtime.   - c/w latanoprost drops 1 drop each eye b/l qHS

## 2017-11-06 NOTE — ED PROVIDER NOTE - CARE PLAN
Principal Discharge DX:	Nausea and vomiting  Secondary Diagnosis:	Hyperkalemia  Secondary Diagnosis:	JULIAN (acute kidney injury)

## 2017-11-06 NOTE — H&P ADULT - ASSESSMENT
62M PMH metastatic RCC (s/p R nephrectomy, s/p craniotomy x2, s/p gamma knife x3 most recently March 2017, currently on Opdivo and Avastin w/ Dr. 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 of nausea, vomiting, confusion, and dizziness, found to have electrolyte derangements and admitted for correction of lab abnormalities as well as work up of dizziness.

## 2017-11-06 NOTE — CONSULT NOTE ADULT - SUBJECTIVE AND OBJECTIVE BOX
The patient comes in today for recommendations on the treatment of metastatic renal cell cancer. The patient comes to the emergency room with diminishing performance.    History of Present Illness: The patient states that he is somewhat better this week after the treatment with dexamethasone.  The patient's age says that the patient is quite shaky when he attempts to walk although he his performance is somewhat better.    Review of Systems: the patient denies headache, visual changes, hearing loss or tinnitus, nosebleeds or nasal discharge, oral sores, oral lesions or poor dental health.  the patient denies shortness of breath, cardiac palpitations or chest pain on exertion, cough or orthostasis.  the patient denies difficulty swallowing, heartburn, nausea, indigestion, constipation, diarrhea, rectal bleeding or change in bowel habits.  the patient denies dysuria, hematuria, urinary frequency, scrotal or penile rashes.  the patient denies skin rashes, lesions, pruritus or alteration in nail bed shaped or integrity.    Past Medical History: Patient is no pastmedical history other than the present illness.    Family History: The patient's father  of pancreatic cancer at age 71. The patient's mother  of senile dementia at age 79. The patient's a sister  of pancreatic cancer at age 65. Another sister has breast cancer, endometriosis and gastric cancer. The patient has an older brother who has Alzheimer's disease. The patient has another brother who  of unknown causes.    Social History: [Tobacco: Never smoker]      Allergies: No Known Allergies    Medications: 1) Aloxi 0.25 mg/5 mL intravenous solution, INFUSE 0,25 MG IN PROVIDERS OFFICE EVERY 2 KWS  2) AVASTIN 400 MG, INFUSE 700 MG EVERY 2 WKS IN PROVIDERS OFFICE  3) carvedilol 10 mg oral capsule, extended release, Take 1 pill by mouth BID X 1 Month (30d)  4) Depakote 250 mg oral delayed release tablet, Take 1 pill by mouth BID  5) dexamethasone 4 mg oral tablet, 1 tablet twice a day  6) Dyazide 37.5 mg-25 mg oral capsule, Take 1 pill by mouth QD as needed for edema  7) Emla 2.5%-2.5% topical cream, apply to the skin over the port one hour prior to access  8) lacosamide 200 mg oral tablet, one tablet twice daily  9) latanoprost 0.005% ophthalmic solution, one drop in each eye at bedtime  10) Megace 40 mg/mL oral suspension, 10 ML every morning  11) Opdivo 10 mg/mL intravenous solution, 220 mg IV piggyback every 2 weeks  12) OPDIVO 100 MG, INFUSE 210 MG EVERY 2 WKS IN PROVIDERS OFFICE  13) OPDIVO 40 MG, INFUSE 210 MG EVERY 2 WKS IN PROVIDERS OFFICE  14) OPDIVO 40MG, INFUSE 210 MG EVERY 2 WKS  15) Oxandrin 10 mg oral tablet, one tablet b.i.d.  16) Synthroid 50 mcg (0.05 mg) oral tablet, Take 1 pill by mouth QD (Daily) X 1 Month (30d)      Physical Examination: Wt: 147.8 lb  Ht/Ln: 72 in  BMI: 20.0  BP: 143/87  Pulse: 88  RR: 18  Temp: 98.2F  Sat: 99  Examination of the head ears eyes nose and throat demonstrates no abnormality.  Examination of the neck reveals no palpable lymphadenopathy, jugular venous distention or thyroidomegaly.  The lungs are clear to percussion and auscultation.  The heart sounds are regular with no auscultatory evidence for murmur, rub or gallop.  The patient has no truncal obesity and there is no palpable or percussible hepatomegaly or splenomegaly.  The patient has no chronic venous stasis changes in the lower extremities.  Neurologically the patient however has proximal muscle weakness especially in the pelvic girdle muscles.  The patient has a right pronator drift and extension of his tongue deviates to the right.  The patient has left hemispherical signs.    Assessment & Plan:   The patient has left hemispherical signs which correspond to the MRI which demonstrates relapse in the brain and multiple small sites.  The patient may not continue to receive stereotactic radiosurgery to as many sites as he is already received.  The patient may benefit from a tyrosine kinase inhibitor although it may be doubtful that there would be decent penetrants of the medications into the brain.

## 2017-11-06 NOTE — H&P ADULT - NSHPPHYSICALEXAM_GEN_ALL_CORE
VITAL SIGNS:  T(C): 36.6 (11-06-17 @ 08:40), Max: 36.7 (11-06-17 @ 01:31)  T(F): 97.8 (11-06-17 @ 08:40), Max: 98 (11-06-17 @ 01:31)  HR: 75 (11-06-17 @ 08:40) (55 - 75)  BP: 163/90 (11-06-17 @ 08:40) (160/94 - 163/101)  RR: 18 (11-06-17 @ 08:40) (17 - 18)  SpO2: 98% (11-06-17 @ 08:40) (97% - 99%)    PHYSICAL EXAM:    Constitutional: WDWN resting comfortably in bed; NAD  Head: NC/AT  Eyes: PERRL, EOMI, anicteric sclera  ENT: no nasal discharge; uvula midline, no oropharyngeal erythema or exudates; MMM  Neck: supple; no JVD or thyromegaly  Respiratory: CTA B/L; no W/R/R, no retractions  Cardiac: +S1/S2; RRR; no M/R/G; PMI non-displaced  Gastrointestinal: soft, NT/ND; no rebound or guarding; +BSx4  Genitourinary: normal external genitalia  Back: spine midline, no bony tenderness or step-offs; no CVAT B/L  Extremities: WWP, no clubbing or cyanosis; no peripheral edema  Musculoskeletal: NROM x4; no joint swelling, tenderness or erythema  Vascular: 2+ radial, femoral, DP/PT pulses B/L  Dermatologic: skin warm, dry and intact; no rashes, wounds, or scars  Lymphatic: no submandibular or cervical LAD  Neurologic: AAOx3; CNII-XII grossly intact; no focal deficits  Psychiatric: appropriate mood and affect VITAL SIGNS:  T(C): 36.6 (11-06-17 @ 08:40), Max: 36.7 (11-06-17 @ 01:31)  T(F): 97.8 (11-06-17 @ 08:40), Max: 98 (11-06-17 @ 01:31)  HR: 75 (11-06-17 @ 08:40) (55 - 75)  BP: 163/90 (11-06-17 @ 08:40) (160/94 - 163/101)  RR: 18 (11-06-17 @ 08:40) (17 - 18)  SpO2: 98% (11-06-17 @ 08:40) (97% - 99%)    PHYSICAL EXAM:    Constitutional: thin,  male, resting in bed, does not appear to be in acute distress, slow to respond but appropriate responses and level of consciousness  Head: posterior occipital region soft at site of prior craniotomy w/o tenderness and incision healed  Eyes: PERRL, EOMI, anicteric sclera  ENT: no nasal discharge; uvula midline, no oropharyngeal erythema or exudates; dry MM  Neck: no JVD  Respiratory: CTA B/L; no W/R/R, no retractions  Cardiac: +S1/S2; RRR; no M/R/G  Gastrointestinal: soft, NT/ND; no rebound or guarding; +BSx4  Back: spine midline, no bony tenderness or step-offs; no CVAT B/L  Extremities: WWP, no clubbing or cyanosis; no peripheral edema  Musculoskeletal: NROM x4; no joint swelling, tenderness or erythema  Vascular: 2+ radial, DP/PT pulses B/L  Dermatologic: skin warm, dry and intact; no rashes, wounds, or scars  Lymphatic: no submandibular or cervical LAD  Neurologic: AAOx3; patient's responses are slow however appropriate; recent memory appears intact though there is some deficit in the events leading up to hospitalization; CNII-XII grossly intact; strength 5/5 in all muscle groups of b/l UE and LE, however some subjective weakness in abduction of the LUE when compared to the right. Cerebellar function intact based on finger-to-nose, heel-to-shin, and rapid alternating movements.  Psychiatric: appropriate mood and affect

## 2017-11-06 NOTE — H&P ADULT - NSHPSOCIALHISTORY_GEN_ALL_CORE
Patient lives at home with wife and has HHA when wife is at work each day.   Denies smoking, EtOH, recreational drug use including IVDU.   Patient is ambulatory with walker at home.

## 2017-11-06 NOTE — CONSULT NOTE ADULT - SUBJECTIVE AND OBJECTIVE BOX
Patient is a 62y old  Male who presents with a chief complaint of dizziness (06 Nov 2017 08:15)        HPI: episode of confusion no clinical seizures- no headaches or dizziness      Allergies  No Known Allergies      Health Issues  NAUSEA AND VOMITINGHYPERKALEMIA; JULIAN  Family history of malignant neoplasm of gastrointestinal tract  Family history of malignant neoplasm of breast (Mother)  Family history of malignant neoplasm of gastrointestinal tract  Handoff  MEWS Score  Secondary malignant neoplasm of brain and spinal cord  Malignant neoplasm of kidney  Glaucoma  Essential hypertension  Nausea and vomiting  History of nephrectomy, unilateral  Other postprocedural status  Other postprocedural status  DIZZINESS  90+  JULIAN (acute kidney injury)  Hyperkalemia        FAMILY HISTORY:  Family history of malignant neoplasm of gastrointestinal tract: Family history of gastric cancer  Family history of malignant neoplasm of breast (Mother): Family history of breast cancer in sister  Family history of malignant neoplasm of gastrointestinal tract: Family history of pancreatic cancer      MEDICATIONS  (STANDING):  enoxaparin Injectable 40 milliGRAM(s) SubCutaneous every 24 hours  sodium chloride 0.9%. 1000 milliLiter(s) (120 mL/Hr) IV Continuous <Continuous>  sodium polystyrene sulfonate Suspension 15 Gram(s) Oral once    MEDICATIONS  (PRN):      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 metastases  Malignant neoplasm of kidney: Renal cell cancer  Glaucoma: Dx 2014, s/p b/l surgical repair  Essential hypertension: HTN (hypertension)  History of nephrectomy, unilateral: right nephrectomy  Other postprocedural status: S/P craniotomy  Other postprocedural status: S/P tonsillectomy      Labs                          14.1   13.8  )-----------( 268      ( 06 Nov 2017 03:59 )             42.0     11-06    133<L>  |  98  |  58<H>  ----------------------------<  158<H>  5.7<H>   |  22  |  1.72<H>    Ca    8.2<L>      06 Nov 2017 06:27    TPro  6.7  /  Alb  3.7  /  TBili  0.4  /  DBili  x   /  AST  20  /  ALT  44  /  AlkPhos  38<L>  11-06      Radiology:    Physical Exam    MENTAL STATUS  -Level of Consciousness- awake    Orientation- person, place time  Language- aphasia/ dysarthria  Memory- recent and remote- poor      Cranial Nerve 1- 12  Pupils- equal and reactive  Eye movements-full  Facial - no asymmetry   Lower CN-nl    Gait and Station-n/a    MOTOR  Upper- no drift  Lower- no foot drop    Reflexes- decreased    Sensation- no sensory level    Cerebellar- no tremors    vascular -    Assessment- Brain mets    Plan MRI head with contrast   EEG- continue with present epilepsy drugs

## 2017-11-06 NOTE — ED PROVIDER NOTE - NEUROLOGICAL, MLM
Alert and oriented, no focal deficits, no motor or sensory deficits.  cn 2-12 intact, speech normal, finger to nose with dysmetria, knows name, date, location, president

## 2017-11-06 NOTE — ED PROVIDER NOTE - OBJECTIVE STATEMENT
here with nausea/vomiting, dizziness, confusion.  Per wife, said he needed to go the bathroom tonight.  Normally able to get up with assistance and walk but tonight seemed more weak than normal.  Eventually able to get to bathroom where he had bm then vomited.  Seemed a little confused to her and she asked him about their children.  He knew they had two but could only remember the name of one of them.  Has history of metastatic renal cancer to brain/ lung treated with immunotherapy.  Last treatment in September.  Had mild headache yesterday, denies at present.  No fever, trauma.  Reports an outpatient MRI 2 weeks ago that showed new lesions in left side of brain.

## 2017-11-06 NOTE — ED ADULT NURSE NOTE - OBJECTIVE STATEMENT
Pt c/o dizziness followed by an episode of nausea and vomiting. Per the pt significant other pt also had a moment of confusion where he was rambling random not appropriate to the situation things. Pt also with the episode of dizziness had difficulty ambulating without assistance. Pt states he is not nauseous at this moment. Pt A/O x3 . Per significant other pt has had occasional episodes of confusion yet none where he required assistance ambulating.

## 2017-11-07 LAB
ANION GAP SERPL CALC-SCNC: 10 MMOL/L — SIGNIFICANT CHANGE UP (ref 5–17)
ANION GAP SERPL CALC-SCNC: 17 MMOL/L — SIGNIFICANT CHANGE UP (ref 5–17)
BUN SERPL-MCNC: 38 MG/DL — HIGH (ref 7–23)
BUN SERPL-MCNC: 43 MG/DL — HIGH (ref 7–23)
CALCIUM SERPL-MCNC: 7.6 MG/DL — LOW (ref 8.4–10.5)
CALCIUM SERPL-MCNC: 7.9 MG/DL — LOW (ref 8.4–10.5)
CHLORIDE SERPL-SCNC: 103 MMOL/L — SIGNIFICANT CHANGE UP (ref 96–108)
CHLORIDE SERPL-SCNC: 99 MMOL/L — SIGNIFICANT CHANGE UP (ref 96–108)
CO2 SERPL-SCNC: 20 MMOL/L — LOW (ref 22–31)
CO2 SERPL-SCNC: 25 MMOL/L — SIGNIFICANT CHANGE UP (ref 22–31)
CREAT SERPL-MCNC: 1.36 MG/DL — HIGH (ref 0.5–1.3)
CREAT SERPL-MCNC: 1.65 MG/DL — HIGH (ref 0.5–1.3)
GLUCOSE SERPL-MCNC: 144 MG/DL — HIGH (ref 70–99)
GLUCOSE SERPL-MCNC: 205 MG/DL — HIGH (ref 70–99)
HCT VFR BLD CALC: 38.7 % — LOW (ref 39–50)
HGB BLD-MCNC: 12.6 G/DL — LOW (ref 13–17)
MAGNESIUM SERPL-MCNC: 2.6 MG/DL — SIGNIFICANT CHANGE UP (ref 1.6–2.6)
MCHC RBC-ENTMCNC: 28 PG — SIGNIFICANT CHANGE UP (ref 27–34)
MCHC RBC-ENTMCNC: 32.6 G/DL — SIGNIFICANT CHANGE UP (ref 32–36)
MCV RBC AUTO: 86 FL — SIGNIFICANT CHANGE UP (ref 80–100)
PHOSPHATE SERPL-MCNC: 3.5 MG/DL — SIGNIFICANT CHANGE UP (ref 2.5–4.5)
PLATELET # BLD AUTO: 223 K/UL — SIGNIFICANT CHANGE UP (ref 150–400)
POTASSIUM SERPL-MCNC: 5.1 MMOL/L — SIGNIFICANT CHANGE UP (ref 3.5–5.3)
POTASSIUM SERPL-MCNC: 5.5 MMOL/L — HIGH (ref 3.5–5.3)
POTASSIUM SERPL-SCNC: 5.1 MMOL/L — SIGNIFICANT CHANGE UP (ref 3.5–5.3)
POTASSIUM SERPL-SCNC: 5.5 MMOL/L — HIGH (ref 3.5–5.3)
RBC # BLD: 4.5 M/UL — SIGNIFICANT CHANGE UP (ref 4.2–5.8)
RBC # FLD: 16.1 % — SIGNIFICANT CHANGE UP (ref 10.3–16.9)
SODIUM SERPL-SCNC: 136 MMOL/L — SIGNIFICANT CHANGE UP (ref 135–145)
SODIUM SERPL-SCNC: 138 MMOL/L — SIGNIFICANT CHANGE UP (ref 135–145)
WBC # BLD: 16.6 K/UL — HIGH (ref 3.8–10.5)
WBC # FLD AUTO: 16.6 K/UL — HIGH (ref 3.8–10.5)

## 2017-11-07 PROCEDURE — 99232 SBSQ HOSP IP/OBS MODERATE 35: CPT

## 2017-11-07 PROCEDURE — 93010 ELECTROCARDIOGRAM REPORT: CPT

## 2017-11-07 PROCEDURE — 95951: CPT | Mod: 26

## 2017-11-07 RX ORDER — SODIUM POLYSTYRENE SULFONATE 4.1 MEQ/G
15 POWDER, FOR SUSPENSION ORAL ONCE
Qty: 0 | Refills: 0 | Status: DISCONTINUED | OUTPATIENT
Start: 2017-11-07 | End: 2017-11-07

## 2017-11-07 RX ORDER — SODIUM POLYSTYRENE SULFONATE 4.1 MEQ/G
15 POWDER, FOR SUSPENSION ORAL ONCE
Qty: 0 | Refills: 0 | Status: COMPLETED | OUTPATIENT
Start: 2017-11-07 | End: 2017-11-07

## 2017-11-07 RX ORDER — SODIUM CHLORIDE 9 MG/ML
1000 INJECTION INTRAMUSCULAR; INTRAVENOUS; SUBCUTANEOUS
Qty: 0 | Refills: 0 | Status: DISCONTINUED | OUTPATIENT
Start: 2017-11-07 | End: 2017-11-09

## 2017-11-07 RX ADMIN — SODIUM POLYSTYRENE SULFONATE 15 GRAM(S): 4.1 POWDER, FOR SUSPENSION ORAL at 11:50

## 2017-11-07 RX ADMIN — SODIUM CHLORIDE 120 MILLILITER(S): 9 INJECTION INTRAMUSCULAR; INTRAVENOUS; SUBCUTANEOUS at 19:52

## 2017-11-07 RX ADMIN — MEGESTROL ACETATE 200 MILLIGRAM(S): 40 SUSPENSION ORAL at 17:47

## 2017-11-07 RX ADMIN — ENOXAPARIN SODIUM 40 MILLIGRAM(S): 100 INJECTION SUBCUTANEOUS at 09:30

## 2017-11-07 RX ADMIN — DIVALPROEX SODIUM 250 MILLIGRAM(S): 500 TABLET, DELAYED RELEASE ORAL at 06:35

## 2017-11-07 RX ADMIN — LACOSAMIDE 200 MILLIGRAM(S): 50 TABLET ORAL at 17:49

## 2017-11-07 RX ADMIN — AMLODIPINE BESYLATE 5 MILLIGRAM(S): 2.5 TABLET ORAL at 06:35

## 2017-11-07 RX ADMIN — LATANOPROST 1 DROP(S): 0.05 SOLUTION/ DROPS OPHTHALMIC; TOPICAL at 21:33

## 2017-11-07 RX ADMIN — Medication 4 MILLIGRAM(S): at 06:35

## 2017-11-07 RX ADMIN — Medication 4 MILLIGRAM(S): at 17:47

## 2017-11-07 RX ADMIN — SODIUM CHLORIDE 120 MILLILITER(S): 9 INJECTION INTRAMUSCULAR; INTRAVENOUS; SUBCUTANEOUS at 09:32

## 2017-11-07 RX ADMIN — Medication 50 MICROGRAM(S): at 06:35

## 2017-11-07 RX ADMIN — LACOSAMIDE 200 MILLIGRAM(S): 50 TABLET ORAL at 06:35

## 2017-11-07 RX ADMIN — DIVALPROEX SODIUM 250 MILLIGRAM(S): 500 TABLET, DELAYED RELEASE ORAL at 17:47

## 2017-11-07 NOTE — DIETITIAN INITIAL EVALUATION ADULT. - PROBLEM SELECTOR PLAN 10
Fluids: NS @ 120cc/hr  Electrolytes: Monitor BMP, replete PRN   Nutrition: DASH/TLC diet w/ potassium restriction    DVT ppx- Lovenox 40 qd  GI ppx- not indicated at this time    Code Status: Full Code    Dispo: Admit to [facility]

## 2017-11-07 NOTE — PROGRESS NOTE ADULT - SUBJECTIVE AND OBJECTIVE BOX
62M PMH metastatic RCC (s/p R nephrectomy, s/p craniotomy x2, s/p gamma knife x3 most recently 2017, currently on Opdivo and Avastin w/ Dr. 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 of nausea, vomiting, confusion, and dizziness. Patient was in usual state of health until last night around 9PM, at which time he felt weak and nauseous. He required assistance from wife and niece to get to bathroom and he was noted to be diaphoretic at the time. Patient then vomited (white-royce, NBNB emesis) and continued to feel weak. Patient was then noted to be confused, forgetting the names of one of his children and with limited understanding of what was going on. Wife noted that patient had a "distant look in his eyes" which she has noted preceding past seizures. Pt did not have any tonic/clonic movements and remained responsive throughout the entirety of this episode, though wife did note that patient had mild b/l UE tremors of the hands. Last meal was dinner 3-4 hours prior to the episode and patient had normal appetite prior. Denies any recent fever, chills, diarrhea, constipation, bloody or tarry stools, URI sx, dysuria, increased frequency, cough, SOB, chest pain, palpitations, lower extremity edema. No recent NSAID use or history of ulcers. Only recent medication change was addition of dexamethasone 4mg BID on 10/25/17.     Upon arrival to the ED, patient's vitals were as follows: Tm 98F, HR 60-75, -163/, RR 18, SpO2 97-98% on RA.  Labs notable for mild leukocytosis w/ left shift (WBC 13.8, PMH 82%), mild hyponatremia (Na 130), significant hyperkalemia (K 6.6), and elevated BUN/Cr 58/1.80.   CT head performed w/ no acute changes when compared to 3/8/17 but findings concerning for metastatic disease.    Patient received 1g calcium gluconate, 10u insulin + D50, 50mEq sodium bicarb, and NS 1L bolus x1 with improvement of K to 5.7.     Patient seen and examined at bedside.  ICU Vital Signs Last 24 Hrs  T(C): 36.7 (2017 17:40), Max: 36.7 (2017 17:40)  T(F): 98 (2017 17:40), Max: 98 (2017 17:40)  HR: 94 (2017 17:40) (68 - 94)  BP: 146/87 (2017 17:40) (144/88 - 154/94)  BP(mean): --  ABP: --  ABP(mean): --  RR: 16 (2017 17:40) (16 - 16)  SpO2: 98% (2017 17:40) (98% - 98%)    PHYSICAL EXAM:    General: WDWN  HEENT: NC/AT; PERRL, anicteric sclera; MMM  Neck: supple  Cardiovascular: +S1/S2; RRR  Respiratory: CTA B/L; no W/R/R  Gastrointestinal: soft, NT/ND; +BSx4  Extremities: WWP; no edema, clubbing or cyanosis  Vascular: 2+ radial, DP/PT pulses B/L  Neurological:  no focal deficits    MEDICATIONS:  MEDICATIONS  (STANDING):  amLODIPine   Tablet 5 milliGRAM(s) Oral daily  dexamethasone     Tablet 4 milliGRAM(s) Oral every 12 hours  diVALproex  milliGRAM(s) Oral every 12 hours  enoxaparin Injectable 40 milliGRAM(s) SubCutaneous every 24 hours  lacosamide 200 milliGRAM(s) Oral every 12 hours  latanoprost 0.005% Ophthalmic Solution 1 Drop(s) Both EYES at bedtime  levothyroxine 50 MICROGram(s) Oral daily  megestrol Suspension 200 milliGRAM(s) Oral daily  sodium chloride 0.9%. 1000 milliLiter(s) (120 mL/Hr) IV Continuous <Continuous>    MEDICATIONS  (PRN):      ALLERGIES:  Allergies    No Known Allergies    Intolerances        LABS:                        12.6   16.6  )-----------( 223      ( 2017 05:38 )             38.7     11-07    138  |  103  |  43<H>  ----------------------------<  144<H>  5.5<H>   |  25  |  1.65<H>    Ca    7.9<L>      2017 05:39  Phos  3.5       Mg     2.6         TPro  6.7  /  Alb  3.7  /  TBili  0.4  /  DBili  x   /  AST  20  /  ALT  44  /  AlkPhos  38<L>  11    PT/INR - ( 2017 03:59 )   PT: 11.5 sec;   INR: 1.04          PTT - ( 2017 03:59 )  PTT:23.0 sec  Urinalysis Basic - ( 2017 13:27 )    Color: Yellow / Appearance: Clear / S.015 / pH: x  Gluc: x / Ketone: NEGATIVE  / Bili: Negative / Urobili: 0.2 E.U./dL   Blood: x / Protein: 100 mg/dL / Nitrite: NEGATIVE   Leuk Esterase: NEGATIVE / RBC: < 5 /HPF / WBC < 5 /HPF   Sq Epi: x / Non Sq Epi: 0-5 /HPF / Bacteria: Present /HPF      CAPILLARY BLOOD GLUCOSE      POCT Blood Glucose.: 104 mg/dL (2017 06:48)      RADIOLOGY & ADDITIONAL TESTS: Reviewed.    ASSESSMENT:    PLAN: Consult w/ Dr VANDANA Simon

## 2017-11-07 NOTE — DIETITIAN INITIAL EVALUATION ADULT. - PERTINENT LABORATORY DATA
WBC 16.6, H/H 12.6/38.7, K 5.5, Na 133, BUN 43, Creat 1.65, GFR 50%, POCT 104/119, Alkaline Phosphatase 38

## 2017-11-07 NOTE — DIETITIAN INITIAL EVALUATION ADULT. - ENERGY NEEDS
Ht. 72". BW 71kg (bed scale 11/6). BMI 21.25. IBW 80.9kg. 87.7%IBW.  Used current weight to calculate needs per pt's BW between 80%-120%IBW  Increased nutrient needs 2/2 prolonged catabolic illness and chemotherapy  Protein: 1.2-1.5g/kg BW = 85g-107g protein/d

## 2017-11-07 NOTE — DIETITIAN INITIAL EVALUATION ADULT. - PROBLEM SELECTOR PLAN 8
Patient with hx RCC with mets to brain, s/p R total nephrectomy, craniotomy x2 and gamma knife tx x3 now with CT findings showing possible mets and reported outpatient MRI 2 weeks ago showing new L sided lesions. Most recent Opdivo/avastin treatment 9/20/2017.  - will f/u regarding emts when wife brings CD from recent MRI

## 2017-11-07 NOTE — CONSULT NOTE ADULT - SUBJECTIVE AND OBJECTIVE BOX
CORIN PERDOMO    [id number]    Patient is a 62y old  Male who presents with a chief complaint of dizziness (06 Nov 2017 08:15)      HPI:  62M PMH metastatic RCC (s/p R nephrectomy, s/p craniotomy x2, s/p gamma knife x3 most recently March 2017, currently on Opdivo and Avastin w/ Dr. 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 of nausea, vomiting, confusion, and dizziness. Patient was in usual state of health until last night around 9PM, at which time he felt weak and nauseous. He required assistance from wife and niece to get to bathroom and he was noted to be diaphoretic at the time. Patient then vomited (white-royce, NBNB emesis) and continued to feel weak. Patient was then noted to be confused, forgetting the names of one of his children and with limited understanding of what was going on. Wife noted that patient had a "distant look in his eyes" which she has noted preceding past seizures. Pt did not have any tonic/clonic movements and remained responsive throughout the entirety of this episode, though wife did note that patient had mild b/l UE tremors of the hands. Last meal was dinner 3-4 hours prior to the episode and patient had normal appetite prior. Denies any recent fever, chills, diarrhea, constipation, bloody or tarry stools, URI sx, dysuria, increased frequency, cough, SOB, chest pain, palpitations, lower extremity edema. No recent NSAID use or history of ulcers. Only recent medication change was addition of dexamethasone 4mg BID on 10/25/17.     Upon arrival to the ED, patient's vitals were as follows: Tm 98F, HR 60-75, -163/, RR 18, SpO2 97-98% on RA.  Labs notable for mild leukocytosis w/ left shift (WBC 13.8, PMH 82%), mild hyponatremia (Na 130), significant hyperkalemia (K 6.6), and elevated BUN/Cr 58/1.80.   CT head performed w/ no acute changes when compared to 3/8/17 but findings concerning for metastatic disease.    Patient received 1g calcium gluconate, 10u insulin + D50, 50mEq sodium bicarb, and NS 1L bolus x1 with improvement of K to 5.7. (06 Nov 2017 08:15)      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 metastases  Malignant neoplasm of kidney: Renal cell cancer  Glaucoma: Dx 2014, s/p b/l surgical repair  Essential hypertension: HTN (hypertension)  History of nephrectomy, unilateral: right nephrectomy  Other postprocedural status: S/P craniotomy  Other postprocedural status: S/P tonsillectomy        Social History:    FAMILY HISTORY:  Family history of malignant neoplasm of gastrointestinal tract: Family history of gastric cancer  Family history of malignant neoplasm of breast (Mother): Family history of breast cancer in sister  Family history of malignant neoplasm of gastrointestinal tract: Family history of pancreatic cancer      Functional Level Prior to Admission:                          12.6   16.6  )-----------( 223      ( 07 Nov 2017 05:38 )             38.7       11-07    138  |  103  |  43<H>  ----------------------------<  144<H>  5.5<H>   |  25  |  1.65<H>    Ca    7.9<L>      07 Nov 2017 05:39  Phos  3.5     11-07  Mg     2.6     11-07    TPro  6.7  /  Alb  3.7  /  TBili  0.4  /  DBili  x   /  AST  20  /  ALT  44  /  AlkPhos  38<L>  11-06      Vital Signs Last 24 Hrs  T(C): 36.3 (07 Nov 2017 06:07), Max: 36.8 (06 Nov 2017 20:30)  T(F): 97.4 (07 Nov 2017 06:07), Max: 98.2 (06 Nov 2017 20:30)  HR: 68 (07 Nov 2017 06:07) (68 - 94)  BP: 154/94 (07 Nov 2017 06:07) (145/97 - 165/83)  BP(mean): --  RR: 16 (07 Nov 2017 06:07) (14 - 18)  SpO2: 98% (07 Nov 2017 06:07) (97% - 98%)      PHYSICAL EXAM:  This 62 y-o male was admitted on 11/06/17 with dizziness, confusion and weakness.  Episode of N/V . h/o metastatixc RCC  (s/p Rt. nephrectomy, brain mets, s/p craniotomy. s/p Kamma knife x3, recent March, 2017  CT head 11/06/17 metastatic disease, similar to prior test.maggy with wife. HHA 8 hrs / day, 5x / week. Ambulatory with assistance.      Constitutional: lying in bed. Alert, oriented. Stable. In fairly good spirits. On ECG monitoring.    Eyes:  h/o glaucoma    ENMT: neg    Neck: supple, no neck pain    Breasts:    Back:  no back pain    Respiratory: no sob    Cardiovascular: no chest pain and no chest pain. no more dizziness.    Gastrointestinal:    Genitourinary: no abd. pain. no N/V    Rectal:    Extremities: full rom, 4 ext., no joint pain, no edema.    Vascular:    Neurological:  alert, oriented and cooperative. Speech normal.  Deconditioned, 3+-4/5, no focal deficits    Skin:    Lymph Nodes:    Musculoskeletal:    Psychiatric:            Impression:  1. Deconditioned.  2.  Dizziness, cleared.  3.  RCC, mets to brain, s/p Rt.nephrectomy, craniotomy    Recommendations: 1. PT for therapeutic ex., bed mobility and gait traiining   2. HHA / ? Homem PT

## 2017-11-07 NOTE — PROGRESS NOTE ADULT - SUBJECTIVE AND OBJECTIVE BOX
OVERNIGHT EVENTS: RUBY    SUBJECTIVE / INTERVAL HPI: Patient seen and examined at bedside. Patient states he feels well however is tired. Oriented to person and place. ROS negative.     VITAL SIGNS:  Vital Signs Last 24 Hrs  T(C): 36.6 (2017 11:08), Max: 36.8 (2017 20:30)  T(F): 97.9 (2017 11:08), Max: 98.2 (2017 20:30)  HR: 82 (2017 11:08) (68 - 82)  BP: 144/88 (2017 11:08) (144/88 - 156/93)  BP(mean): --  RR: 16 (2017 11:08) (14 - 16)  SpO2: 98% (2017 11:08) (98% - 98%)    PHYSICAL EXAM:    General: WDWN  HEENT: NC/AT; PERRL, anicteric sclera; MMM  Neck: supple  Cardiovascular: +S1/S2; RRR  Respiratory: CTA B/L; no W/R/R  Gastrointestinal: soft, NT/ND; +BSx4  Extremities: WWP; no edema, clubbing or cyanosis  Vascular: 2+ radial, DP/PT pulses B/L  Neurological: AAOx3; no focal deficits    MEDICATIONS:  MEDICATIONS  (STANDING):  amLODIPine   Tablet 5 milliGRAM(s) Oral daily  dexamethasone     Tablet 4 milliGRAM(s) Oral every 12 hours  diVALproex  milliGRAM(s) Oral every 12 hours  enoxaparin Injectable 40 milliGRAM(s) SubCutaneous every 24 hours  lacosamide 200 milliGRAM(s) Oral every 12 hours  latanoprost 0.005% Ophthalmic Solution 1 Drop(s) Both EYES at bedtime  levothyroxine 50 MICROGram(s) Oral daily  megestrol Suspension 200 milliGRAM(s) Oral daily  sodium chloride 0.9%. 1000 milliLiter(s) (120 mL/Hr) IV Continuous <Continuous>    MEDICATIONS  (PRN):      ALLERGIES:  Allergies    No Known Allergies    Intolerances        LABS:                        12.6   16.6  )-----------( 223      ( 2017 05:38 )             38.7     11-07    138  |  103  |  43<H>  ----------------------------<  144<H>  5.5<H>   |  25  |  1.65<H>    Ca    7.9<L>      2017 05:39  Phos  3.5       Mg     2.6         TPro  6.7  /  Alb  3.7  /  TBili  0.4  /  DBili  x   /  AST  20  /  ALT  44  /  AlkPhos  38<L>  1106    PT/INR - ( 2017 03:59 )   PT: 11.5 sec;   INR: 1.04          PTT - ( 2017 03:59 )  PTT:23.0 sec  Urinalysis Basic - ( 2017 13:27 )    Color: Yellow / Appearance: Clear / S.015 / pH: x  Gluc: x / Ketone: NEGATIVE  / Bili: Negative / Urobili: 0.2 E.U./dL   Blood: x / Protein: 100 mg/dL / Nitrite: NEGATIVE   Leuk Esterase: NEGATIVE / RBC: < 5 /HPF / WBC < 5 /HPF   Sq Epi: x / Non Sq Epi: 0-5 /HPF / Bacteria: Present /HPF      CAPILLARY BLOOD GLUCOSE      POCT Blood Glucose.: 104 mg/dL (2017 06:48)      RADIOLOGY & ADDITIONAL TESTS: Reviewed.    ASSESSMENT:    PLAN:

## 2017-11-07 NOTE — PROGRESS NOTE ADULT - SUBJECTIVE AND OBJECTIVE BOX
The patient feels well but is confined to bed with seizure helmet and leads to the monitor.  He has minimal constitutional complaints other than weakness on the right side.    Examination of the head ears eyes nose and throat demonstrates no abnormality other than the "seizure helmet" with leads to the monitor..  Examination of the neck reveals no palpable lymphadenopathy, jugular venous distention or thyroidomegaly.  The lungs are clear to percussion and auscultation.  The heart sounds are regular with no auscultatory evidence for murmur, rub or gallop.  The patient has no truncal obesity and there is no palpable or percussible hepatomegaly or splenomegaly.  The patient has no chronic venous stasis changes in the lower extremities.  Neurologically the patient however has proximal muscle weakness especially in the pelvic girdle muscles.  The patient has a right pronator drift and extension of his tongue deviates to the right.  The patient has left hemispherical signs which are somewhat better today on current therapy..

## 2017-11-07 NOTE — DIETITIAN INITIAL EVALUATION ADULT. - SIGNS/SYMPTOMS
AEB unintentional weight loss 9# (5.45% weight change) in 2yrs AEB metastatic RCC undergoing treatment; unintentional weight loss 9# (5.45% weight change) in 2yrs

## 2017-11-07 NOTE — PROGRESS NOTE ADULT - PROBLEM SELECTOR PLAN 3
Patient with hyperkalemia on admission to 6.6, EKG mild peaked T waves, s/p calcium gluconate/insulin/sodium bicarb. Most recent K 5.5 this AM  - Received Kayexalate today with bowel movements   - f/u repeat BMP at 6PM   - c/w maintenance fluids @ 120cc/hr for JULIAN given likely pre-renal etiology and suspect that with correction of JULIAN, K will also correct  - trend BMP

## 2017-11-07 NOTE — DIETITIAN INITIAL EVALUATION ADULT. - PROBLEM SELECTOR PLAN 3
Patient with hyperkalemia on admission to 6.6, EKG mild peaked T waves, s/p calcium gluconate/insulin/sodium bicarb. Most recent K 5.7.   - ordered for Kayexalate x1   - f/u repeat BMP at 12PM   - c/w maintenance fluids @ 120cc/hr for JULIAN given likely pre-renal etiology and suspect that with correction of JULIAN, K will also correct  - trend BMP

## 2017-11-07 NOTE — PROGRESS NOTE ADULT - SUBJECTIVE AND OBJECTIVE BOX
Neurology Follow up note    Name  CORIN PERDOMO    HPI:  62M PMH metastatic RCC (s/p R nephrectomy, s/p craniotomy x2, s/p gamma knife x3 most recently March 2017, currently on Opdivo and Avastin w/ Dr. 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 of nausea, vomiting, confusion, and dizziness. Patient was in usual state of health until last night around 9PM, at which time he felt weak and nauseous. He required assistance from wife and niece to get to bathroom and he was noted to be diaphoretic at the time. Patient then vomited (white-royce, NBNB emesis) and continued to feel weak. Patient was then noted to be confused, forgetting the names of one of his children and with limited understanding of what was going on. Wife noted that patient had a "distant look in his eyes" which she has noted preceding past seizures. Pt did not have any tonic/clonic movements and remained responsive throughout the entirety of this episode, though wife did note that patient had mild b/l UE tremors of the hands. Last meal was dinner 3-4 hours prior to the episode and patient had normal appetite prior. Denies any recent fever, chills, diarrhea, constipation, bloody or tarry stools, URI sx, dysuria, increased frequency, cough, SOB, chest pain, palpitations, lower extremity edema. No recent NSAID use or history of ulcers. Only recent medication change was addition of dexamethasone 4mg BID on 10/25/17.     Upon arrival to the ED, patient's vitals were as follows: Tm 98F, HR 60-75, -163/, RR 18, SpO2 97-98% on RA.  Labs notable for mild leukocytosis w/ left shift (WBC 13.8, PMH 82%), mild hyponatremia (Na 130), significant hyperkalemia (K 6.6), and elevated BUN/Cr 58/1.80.   CT head performed w/ no acute changes when compared to 3/8/17 but findings concerning for metastatic disease.    Patient received 1g calcium gluconate, 10u insulin + D50, 50mEq sodium bicarb, and NS 1L bolus x1 with improvement of K to 5.7. (06 Nov 2017 08:15)      Interval History -no clinical seizures - intermittent confusion        REVIEW OF SYSTEMS    Vital Signs Last 24 Hrs  T(C): 36.3 (07 Nov 2017 06:07), Max: 36.8 (06 Nov 2017 20:30)  T(F): 97.4 (07 Nov 2017 06:07), Max: 98.2 (06 Nov 2017 20:30)  HR: 68 (07 Nov 2017 06:07) (68 - 94)  BP: 154/94 (07 Nov 2017 06:07) (145/97 - 165/83)  BP(mean): --  RR: 16 (07 Nov 2017 06:07) (14 - 18)  SpO2: 98% (07 Nov 2017 06:07) (97% - 98%)    Physical Exam-     Mental Status-lethargic    Cranial Nerves-full EOM    Gait and station-n/a    Motor-moves all 4 extremities    Reflexes- decreased    Sensation-no sensory level    Coordination-no tremors    Vascular -    Medications  amLODIPine   Tablet 5 milliGRAM(s) Oral daily  dexamethasone     Tablet 4 milliGRAM(s) Oral every 12 hours  diVALproex  milliGRAM(s) Oral every 12 hours  enoxaparin Injectable 40 milliGRAM(s) SubCutaneous every 24 hours  lacosamide 200 milliGRAM(s) Oral every 12 hours  latanoprost 0.005% Ophthalmic Solution 1 Drop(s) Both EYES at bedtime  levothyroxine 50 MICROGram(s) Oral daily  megestrol Suspension 200 milliGRAM(s) Oral daily  sodium chloride 0.9%. 1000 milliLiter(s) IV Continuous <Continuous>      Lab      Radiology    Assessment- Brain mets- no clinical seizures    Plan as per Dr Ya

## 2017-11-07 NOTE — DIETITIAN INITIAL EVALUATION ADULT. - PROBLEM SELECTOR PLAN 4
Patient with questionable hx CKD, unclear baseline Cr based on last admission values, and Cr 1.80 on admission indicating JULIAN. Suspect pre-renal etiology given hypovolemic clinical appearance in the setting of emesis and electrolyte abnormalities responding to fluid resuscitation.  - f/u urine lytes and calculate FeNa  - bladder scan post-void to r/o obstructive cause of JULIAN  - c/w maintenance fluids NS @ 120cc/hr  - avoid nephrotoxic agents including IV contrast  - strict I&O's  - trend BMP

## 2017-11-07 NOTE — DIETITIAN INITIAL EVALUATION ADULT. - PROBLEM SELECTOR PLAN 5
Patient w/ history of HTN with dx preceding cancer tx. On home triamterene/HCTZ 37.5/25 qd and Coreg CR 40mg qd.   - holding diuretics and home BB at this time in the setting of volume depletion and JULIAN  - given hypertension to SBP 160s since admission, will start low dose CCB (amlodipine 5mg qd) for HTN

## 2017-11-07 NOTE — DIETITIAN INITIAL EVALUATION ADULT. - ETIOLOGY
RT hypermetabolic state associated with prolonged catabolic illness 2/2 RCC metastatic cancer RT increased demand secondary to hypermetabolic state

## 2017-11-07 NOTE — PROGRESS NOTE ADULT - PROBLEM SELECTOR PLAN 2
Patient p/w nausea and vomiting in the setting of mild leukocytosis however w/o sick contacts or other infections signs/sx. Now resolved. Unclear etiology of nausea and vomiting at this time, though differential would include viral gastroenteritis (given leukocytosis) vs. increased intracranial pressure in the setting of brain mets vs. medication side effect. Nausea/vomiting has now resolved and patient hungry, tolerating PO diet.  - c/w fluid resuscitation    #Leukocytosis  Leukocytosis on admission to 13.8 w/ mild left shift. Etiology may be 2/2 recent steroid tx inititation vs. reactive vs. viral gastroenteritis in the setting of emesis.  - will trend WBC count  - No other signs/sx of infection at this time, though if any signs/sx of infection (SIRS criteria, fever) will do complete fever w/u and consider starting abx

## 2017-11-07 NOTE — PROGRESS NOTE ADULT - SUBJECTIVE AND OBJECTIVE BOX
Chief Complaint/Reason for Consult: near syncope  INTERVAL HPI: overnight events noted, feeling better less dizzy  	  MEDICATIONS:  amLODIPine   Tablet 5 milliGRAM(s) Oral daily        diVALproex  milliGRAM(s) Oral every 12 hours  lacosamide 200 milliGRAM(s) Oral every 12 hours      dexamethasone     Tablet 4 milliGRAM(s) Oral every 12 hours  levothyroxine 50 MICROGram(s) Oral daily  megestrol Suspension 200 milliGRAM(s) Oral daily    enoxaparin Injectable 40 milliGRAM(s) SubCutaneous every 24 hours  latanoprost 0.005% Ophthalmic Solution 1 Drop(s) Both EYES at bedtime  sodium chloride 0.9%. 1000 milliLiter(s) IV Continuous <Continuous>      REVIEW OF SYSTEMS:  [x] As per HPI  CONSTITUTIONAL: No fever, weight loss, or fatigue  RESPIRATORY: No cough, wheezing, chills or hemoptysis; No Shortness of Breath  CARDIOVASCULAR: No chest pain, palpitations, dizziness, or leg swelling  GASTROINTESTINAL: No abdominal or epigastric pain. No nausea, vomiting, or hematemesis; No diarrhea or constipation. No melena or hematochezia.  MUSCULOSKELETAL: No joint pain or swelling; No muscle, back, or extremity pain  [x] All others negative	  [ ] Unable to obtain    PHYSICAL EXAM:  T(C): 36.6 (11-07-17 @ 11:08), Max: 36.8 (11-06-17 @ 20:30)  HR: 82 (11-07-17 @ 11:08) (68 - 87)  BP: 144/88 (11-07-17 @ 11:08) (144/88 - 165/83)  RR: 16 (11-07-17 @ 11:08) (14 - 18)  SpO2: 98% (11-07-17 @ 11:08) (98% - 98%)  Wt(kg): --  I&O's Summary        Appearance: Normal	  HEENT:   Normal oral mucosa  Cardiovascular: Normal S1 S2, No JVD, No murmurs, No edema  Respiratory: Lungs clear to auscultation	  Gastrointestinal:  Soft, Non-tender, + BS	  Extremities: Normal range of motion, No clubbing, cyanosis or edema  Vascular: Peripheral pulses palpable 2+ bilaterally    TELEMETRY: 	    ECG:   	  RADIOLOGY:   CXR:  CT:  US:    CARDIAC TESTING:  Echocardiogram:  Catheterization:  Stress Test:      LABS:	 	    CARDIAC MARKERS:                                  12.6   16.6  )-----------( 223      ( 07 Nov 2017 05:38 )             38.7     11-07    138  |  103  |  43<H>  ----------------------------<  144<H>  5.5<H>   |  25  |  1.65<H>    Ca    7.9<L>      07 Nov 2017 05:39  Phos  3.5     11-07  Mg     2.6     11-07    TPro  6.7  /  Alb  3.7  /  TBili  0.4  /  DBili  x   /  AST  20  /  ALT  44  /  AlkPhos  38<L>  11-06    proBNP:   Lipid Profile:   HgA1c:   TSH:     ASSESSMENT/PLAN: 	    Problem: Near Syncope.  Plan: Patient p/w dizziness and diaphoresis with an increase in confusion in the setting of emesis and electrolyte abnormalities found on admission. Etiology of dizziness likely 2/2 volume depletion, however cannot r/o new brain mets vs. seizures.   - neurology consulted, recommending MRI w/ contrast to r/o brain mets and vEEG   - vEEG monitoring for seizures given sz hx and confusion  - c/w home seizure medications (Divalproex delayed release 250 BID, Vimpat 200 BID)  - pt's wife to bring in disc from recent MRI (within last 2 weeks) for review by neurology either tonight or tomorrow AM. Patient p/w dizziness and diaphoresis with an increase in confusion in the setting of emesis and electrolyte abnormalities found on admission. Etiology of dizziness likely 2/2 volume depletion, however cannot r/o new brain mets vs. seizures.   - neurology consulted, recommending MRI w/ contrast to r/o brain mets and vEEG   - vEEG monitoring for seizures given sz hx and confusion  - c/w home seizure medications (Divalproex delayed release 250 BID, Vimpat 200 BID)  - will attempt to obtain outpatient records/disc for recent MRI which reportedly shows new lesions. If unable to assess properly, will consider repeating MRI on this admission however will have to defer contrast until JULIAN resolving.     Problem: Essential hypertension.  Plan: Patient w/ history of HTN with dx preceding cancer tx. On home triamterene/HCTZ 37.5/25 qd and Coreg CR 40mg qd.   - holding diuretics and home BB at this time in the setting of volume depletion and JULIAN  - given hypertension to SBP 160s since admission, will start low dose CCB (amlodipine 5mg qd) for HTN. Patient w/ history of HTN with dx preceding cancer tx. On home triamterene/HCTA 37.5/25 qd and Coreg CR 40mg qd.   - holding diuretics and home BB at this time in the setting of volume depletion and JULIAN  - given hypertension to SBP 160s since admission, will start low dose CCB (amlodipine 5mg qd) for HTN   #CV Prevention  q3mo Fasting Lipid Profile, Goal LDL<100, statin as tolerated

## 2017-11-07 NOTE — DIETITIAN INITIAL EVALUATION ADULT. - PROBLEM SELECTOR PLAN 1
Patient p/w dizziness and diaphoresis with an increase in confusion in the setting of emesis and electrolyte abnormalities found on admission. Etiology of dizziness likely 2/2 volume depletion, however cannot r/o new brain mets vs. seizures.   - neurology consulted, recommending MRI w/ contrast to r/o brain mets and vEEG   - vEEG monitoring for seizures given sz hx and confusion  - c/w home seizure medications (Divalproex delayed release 250 BID, Vimpat 200 BID)  - pt's wife to bring in disc from recent MRI (within last 2 weeks) for review by neurology either tonight or tomorrow AM

## 2017-11-07 NOTE — DIETITIAN INITIAL EVALUATION ADULT. - PROBLEM SELECTOR PLAN 7
Patient with chemotherapy (Opdivo/avastin) induced hypothyroidism, on home Synthroid 50mcg qd.   - c/w Synthroid 50mcg qd  - f/u TSH

## 2017-11-07 NOTE — DIETITIAN INITIAL EVALUATION ADULT. - OTHER INFO
63yo male admitted with dizziness, confusion, emesis and lyte abnormalities (11/6). PMHx HTN, malignant neoplasm kidney RCC and secondary neoplasm of brain and spinal cord currently undergoing chemotherapy (last treatment 9/20/2017). RN at beside per pt waxing and waning in mental status; RD questions accuracy of information provided per pt. Pt repots good appetite; consistent PO intake >75% of meals; RD observed PO intake ~95% breakfast this AM (11/7). Denies mechanical chewing/swallowing issues, N/V/C/D yet unable to recall last BM. Pain controlled and medically managed per pt's RN. NKFA or dietary intolerances. PTA pt reports # and stable over the past few weeks; per EMR at previous admission pt # (10/21/15); pt endorses 9# unintentional weight loss (5.45% change) to initiation of chemotherapy. Recalls wife assisting at home to prepare pt meals. RD encouraged continued consistent intake of >75% of meals and offered ONS Nepro to support increased energy/nutrient needs. Pt expressed positive verbal understanding. Pt indicated interest in ONS Nepro and compliance is expected. 61yo male admitted with dizziness, confusion, emesis and lyte abnormalities (11/6). PMHx HTN, malignant neoplasm kidney RCC and secondary neoplasm of brain and spinal cord currently undergoing chemotherapy (last treatment 9/20/2017). RN at beside.  Pt noted with waxing and waning in mental status; RD questions accuracy of some information provided by pt. Pt repots good appetite; consistent PO intake >75% of meals; RD observed PO intake ~95% breakfast this AM (11/7). Denies mechanical chewing/swallowing issues, N/V/C/D yet unable to recall last BM. Pain controlled and medically managed per pt's RN. NKFA or dietary intolerances. PTA pt reports # and stable over the past few weeks; per EMR at previous admission pt # (10/21/15); pt attributes 9# unintentional weight loss (5.45% change) to initiation of chemotherapy. Recalls wife assisting at home to prepare pt meals. RD encouraged continued consistent intake of >75% of meals and offered ONS Nepro to support increased energy/nutrient needs while keeping K intake low. Pt expressed positive verbal understanding. Pt indicated interest in ONS Nepro and compliance is expected.

## 2017-11-07 NOTE — DIETITIAN INITIAL EVALUATION ADULT. - PROBLEM SELECTOR PLAN 2
Patient p/w nausea and vomiting in the setting of mild leukocytosis however w/o sick contacts or other infections signs/sx. Unclear etiology of nausea and vomiting at this time, though differential would include viral gastroenteritis (given leukocytosis) vs. increased intracranial pressure in the setting of brain mets vs. medication side effect. Nausea/vomiting has now resolved and patient hungry, tolerating PO diet.  - c/w fluid resuscitation    #Leukocytosis  Leukocytosis on admission to 13.8 w/ mild left shift. Etiology may be 2/2 recent steroid tx inititation vs. reactive vs. viral gastroenteritis in the setting of emesis.  - will trend WBC count  - No other signs/sx of infection at this time, though if any signs/sx of infection (SIRS criteria, fever) will do complete fever w/u and consider starting abx

## 2017-11-07 NOTE — PROGRESS NOTE ADULT - PROBLEM SELECTOR PLAN 4
Patient with questionable hx CKD, unclear baseline Cr based on last admission values, and Cr 1.80 on admission indicating JULIAN.   Suspect pre-renal etiology given hypovolemic clinical appearance in the setting of emesis and electrolyte abnormalities responding to fluid resuscitation.  - c/w maintenance fluids NS @ 120cc/hr  - avoid nephrotoxic agents including IV contrast  - strict I&O's  - trend BMP

## 2017-11-07 NOTE — PROGRESS NOTE ADULT - ATTENDING COMMENTS
The patient's spouse was instructed to bring the MRI to the hospital and place it in the PACS.  If that is not done today then an MRI should be ordered with gadolinium.  Dr. Macias should be consulted from radiation therapy.

## 2017-11-07 NOTE — DIETITIAN INITIAL EVALUATION ADULT. - NS AS NUTRI INTERV MEDICAL AND FOOD SUPPLEMENTS
Rec Nepro 1x/d (425kcal/d, 19.1g protein) to support consistent PO intake and defer further weight loss per MD/[employer]

## 2017-11-08 LAB
ALBUMIN SERPL ELPH-MCNC: 3.1 G/DL — LOW (ref 3.3–5)
ANION GAP SERPL CALC-SCNC: 12 MMOL/L — SIGNIFICANT CHANGE UP (ref 5–17)
BUN SERPL-MCNC: 37 MG/DL — HIGH (ref 7–23)
CALCIUM SERPL-MCNC: 7.7 MG/DL — LOW (ref 8.4–10.5)
CHLORIDE SERPL-SCNC: 102 MMOL/L — SIGNIFICANT CHANGE UP (ref 96–108)
CO2 SERPL-SCNC: 23 MMOL/L — SIGNIFICANT CHANGE UP (ref 22–31)
CREAT SERPL-MCNC: 1.58 MG/DL — HIGH (ref 0.5–1.3)
GLUCOSE SERPL-MCNC: 153 MG/DL — HIGH (ref 70–99)
HCT VFR BLD CALC: 38.8 % — LOW (ref 39–50)
HGB BLD-MCNC: 12.3 G/DL — LOW (ref 13–17)
MCHC RBC-ENTMCNC: 27.4 PG — SIGNIFICANT CHANGE UP (ref 27–34)
MCHC RBC-ENTMCNC: 31.7 G/DL — LOW (ref 32–36)
MCV RBC AUTO: 86.4 FL — SIGNIFICANT CHANGE UP (ref 80–100)
PLATELET # BLD AUTO: 240 K/UL — SIGNIFICANT CHANGE UP (ref 150–400)
POTASSIUM SERPL-MCNC: 5 MMOL/L — SIGNIFICANT CHANGE UP (ref 3.5–5.3)
POTASSIUM SERPL-SCNC: 5 MMOL/L — SIGNIFICANT CHANGE UP (ref 3.5–5.3)
RBC # BLD: 4.49 M/UL — SIGNIFICANT CHANGE UP (ref 4.2–5.8)
RBC # FLD: 16.2 % — SIGNIFICANT CHANGE UP (ref 10.3–16.9)
SODIUM SERPL-SCNC: 137 MMOL/L — SIGNIFICANT CHANGE UP (ref 135–145)
WBC # BLD: 17.6 K/UL — HIGH (ref 3.8–10.5)
WBC # FLD AUTO: 17.6 K/UL — HIGH (ref 3.8–10.5)

## 2017-11-08 PROCEDURE — 99232 SBSQ HOSP IP/OBS MODERATE 35: CPT

## 2017-11-08 PROCEDURE — 99222 1ST HOSP IP/OBS MODERATE 55: CPT

## 2017-11-08 PROCEDURE — 95951: CPT | Mod: 26

## 2017-11-08 RX ADMIN — Medication 4 MILLIGRAM(S): at 06:38

## 2017-11-08 RX ADMIN — LACOSAMIDE 200 MILLIGRAM(S): 50 TABLET ORAL at 17:59

## 2017-11-08 RX ADMIN — LACOSAMIDE 200 MILLIGRAM(S): 50 TABLET ORAL at 06:38

## 2017-11-08 RX ADMIN — DIVALPROEX SODIUM 250 MILLIGRAM(S): 500 TABLET, DELAYED RELEASE ORAL at 06:38

## 2017-11-08 RX ADMIN — MEGESTROL ACETATE 200 MILLIGRAM(S): 40 SUSPENSION ORAL at 13:08

## 2017-11-08 RX ADMIN — AMLODIPINE BESYLATE 5 MILLIGRAM(S): 2.5 TABLET ORAL at 06:38

## 2017-11-08 RX ADMIN — Medication 4 MILLIGRAM(S): at 17:59

## 2017-11-08 RX ADMIN — LATANOPROST 1 DROP(S): 0.05 SOLUTION/ DROPS OPHTHALMIC; TOPICAL at 21:15

## 2017-11-08 RX ADMIN — Medication 50 MICROGRAM(S): at 06:38

## 2017-11-08 RX ADMIN — DIVALPROEX SODIUM 250 MILLIGRAM(S): 500 TABLET, DELAYED RELEASE ORAL at 17:59

## 2017-11-08 RX ADMIN — ENOXAPARIN SODIUM 40 MILLIGRAM(S): 100 INJECTION SUBCUTANEOUS at 06:40

## 2017-11-08 NOTE — PHYSICAL THERAPY INITIAL EVALUATION ADULT - GAIT DEVIATIONS NOTED, PT EVAL
decreased blanka/decreased step length/shuffling, decreased foot clearance, decreased hip and knee flexion

## 2017-11-08 NOTE — PROGRESS NOTE ADULT - SUBJECTIVE AND OBJECTIVE BOX
The patient feels better today and is more cognizant and has greater performance.    Examination of the head ears eyes nose and throat demonstrates no abnormality other than the "seizure helmet" with leads to the monitor..  Examination of the neck reveals no palpable lymphadenopathy, jugular venous distention or thyroidomegaly.  The lungs are clear to percussion and auscultation.  The heart sounds are regular with no auscultatory evidence for murmur, rub or gallop.  The patient has no truncal obesity and there is no palpable or percussible hepatomegaly or splenomegaly.  The patient has no chronic venous stasis changes in the lower extremities.  Neurologically the patient however has proximal muscle weakness especially in the pelvic girdle muscles.  The patient has a right pronator drift and extension of his tongue deviates to the right.  The patient has left hemispherical signs which are even better today on current therapy.

## 2017-11-08 NOTE — CONSULT NOTE ADULT - SUBJECTIVE AND OBJECTIVE BOX
Patient is a  62 year old  male with metastatic RCC ( S/P Right nephrectomy, S/P Craniotomy x2, S/P gamma knife x3, most recently in March 2017 who is currently taking Opdivo and Avastin with Dr. Ya and had his last treatment on on 9/20/17), seizures secondary to Brain metastases, Hypothyroidism, HTN and Glaucoma who presented with 1 day of nausea, vomiting, confusion, and dizziness. Patient was found to have electrolyte abnormalities for which he was treated. Patient has been noted to be depressed and difficult to engage as he is stabilized. Recent MRI as an outpatient had showed new left side lesions.        Mental Status:  Patient is a slender 62 year old male who is awake and sitting up in bed. He appears depressed and is difficult to engage. He is oriented to person, place and time. Speech is clear but there is a paucity of speech. Affect is constricted Mood is depressed Thought processes there is a pauciity of thought. There are no auditory or visual hallucinations. There is no suicidal or homicidal ideation.        Impression: Toxic-Metabolic Encephalapathy                        Adjustment reaction with depression        Recommendation: Supportive therapy. Will discuss.

## 2017-11-08 NOTE — PROGRESS NOTE ADULT - SUBJECTIVE AND OBJECTIVE BOX
Chief Complaint/Reason for Consult: near syncope  INTERVAL HPI: overnight events noted  	  MEDICATIONS:  amLODIPine   Tablet 5 milliGRAM(s) Oral daily        diVALproex  milliGRAM(s) Oral every 12 hours  lacosamide 200 milliGRAM(s) Oral every 12 hours      dexamethasone     Tablet 4 milliGRAM(s) Oral every 12 hours  levothyroxine 50 MICROGram(s) Oral daily  megestrol Suspension 200 milliGRAM(s) Oral daily    enoxaparin Injectable 40 milliGRAM(s) SubCutaneous every 24 hours  latanoprost 0.005% Ophthalmic Solution 1 Drop(s) Both EYES at bedtime  sodium chloride 0.9%. 1000 milliLiter(s) IV Continuous <Continuous>      REVIEW OF SYSTEMS:  [x] As per HPI  CONSTITUTIONAL: No fever, weight loss, or fatigue  RESPIRATORY: No cough, wheezing, chills or hemoptysis; No Shortness of Breath  CARDIOVASCULAR: No chest pain, palpitations, dizziness, or leg swelling  GASTROINTESTINAL: No abdominal or epigastric pain. No nausea, vomiting, or hematemesis; No diarrhea or constipation. No melena or hematochezia.  MUSCULOSKELETAL: No joint pain or swelling; No muscle, back, or extremity pain  [x] All others negative	  [ ] Unable to obtain    PHYSICAL EXAM:  T(C): 36.7 (11-08-17 @ 05:46), Max: 36.9 (11-07-17 @ 22:00)  HR: 97 (11-08-17 @ 05:46) (82 - 97)  BP: 156/96 (11-08-17 @ 05:46) (140/89 - 156/96)  RR: 16 (11-08-17 @ 05:46) (16 - 16)  SpO2: 97% (11-08-17 @ 05:46) (97% - 98%)  Wt(kg): --  I&O's Summary    07 Nov 2017 07:01  -  08 Nov 2017 07:00  --------------------------------------------------------  IN: 1320 mL / OUT: 0 mL / NET: 1320 mL          Appearance: Normal	  HEENT:   Normal oral mucosa  Cardiovascular: Normal S1 S2, No JVD, No murmurs, No edema  Respiratory: Lungs clear to auscultation	  Gastrointestinal:  Soft, Non-tender, + BS	  Extremities: Normal range of motion, No clubbing, cyanosis or edema  Vascular: Peripheral pulses palpable 2+ bilaterally    TELEMETRY: 	    ECG:   	  RADIOLOGY:   CXR:  CT:  US:    CARDIAC TESTING:  Echocardiogram:  Catheterization:  Stress Test:      LABS:	 	    CARDIAC MARKERS:                                  12.3   17.6  )-----------( 240      ( 08 Nov 2017 06:17 )             38.8     11-08    137  |  102  |  37<H>  ----------------------------<  153<H>  5.0   |  23  |  1.58<H>    Ca    7.7<L>      08 Nov 2017 06:15  Phos  3.5     11-07  Mg     2.6     11-07      proBNP:   Lipid Profile:   HgA1c:   TSH:     ASSESSMENT/PLAN: 	      Problem: Near Syncope.  Plan: Patient p/w dizziness and diaphoresis with an increase in confusion in the setting of emesis and electrolyte abnormalities found on admission. Etiology of dizziness likely 2/2 volume depletion, however cannot r/o new brain mets vs. seizures.   - neurology consulted, recommending MRI w/ contrast to r/o brain mets and vEEG   - vEEG monitoring for seizures given sz hx and confusion  - c/w home seizure medications (Divalproex delayed release 250 BID, Vimpat 200 BID)  - pt's wife to bring in disc from recent MRI (within last 2 weeks) for review by neurology either tonight or tomorrow AM. Patient p/w dizziness and diaphoresis with an increase in confusion in the setting of emesis and electrolyte abnormalities found on admission. Etiology of dizziness likely 2/2 volume depletion, however cannot r/o new brain mets vs. seizures.   - neurology consulted, recommending MRI w/ contrast to r/o brain mets and vEEG   - vEEG monitoring for seizures given sz hx and confusion  - c/w home seizure medications (Divalproex delayed release 250 BID, Vimpat 200 BID)  - will attempt to obtain outpatient records/disc for recent MRI which reportedly shows new lesions. If unable to assess properly, will consider repeating MRI on this admission however will have to defer contrast until JULIAN resolving.     Problem: Essential hypertension.  Plan: Patient w/ history of HTN with dx preceding cancer tx. On home triamterene/HCTZ 37.5/25 qd and Coreg CR 40mg qd.   - holding diuretics and home BB at this time in the setting of volume depletion and JULIAN  - given hypertension to SBP 160s since admission, will start low dose CCB (amlodipine 5mg qd) for HTN. Patient w/ history of HTN with dx preceding cancer tx. On home triamterene/HCTA 37.5/25 qd and Coreg CR 40mg qd.   - holding diuretics and home BB at this time in the setting of volume depletion and JULIAN  - given hypertension to SBP 160s since admission, will start low dose CCB (amlodipine 5mg qd) for HTN   #CV Prevention  q3mo Fasting Lipid Profile, Goal LDL<100, statin as tolerated

## 2017-11-08 NOTE — PHYSICAL THERAPY INITIAL EVALUATION ADULT - MODALITIES TREATMENT COMMENTS
Neuro exam performed: Sensation; intact, Diadodyschokinesia; +, finger to nose; undershooting and decreased speed, Vision; unable to fully assess 2/2 cognition/delayed processing, Cranial nerves; intact Neuro exam performed: Sensation; intact, Diadodyschokinesia; + for delayed response and incoordination, finger to nose; undershooting and decreased speed, Vision; unable to fully assess 2/2 cognition/delayed processing, Cranial nerves; intact

## 2017-11-08 NOTE — CONSULT NOTE ADULT - CONSULT REQUESTED BY NAME
Dr. Michael Rdz and Dr. Nain Holland
Dr. PONCE Ya
Dr. Tarango
Dr. Tarango
Emelina
dorothy
Dr. Palacios

## 2017-11-08 NOTE — PROGRESS NOTE ADULT - SUBJECTIVE AND OBJECTIVE BOX
62M PMH metastatic RCC (s/p R nephrectomy, s/p craniotomy x2, s/p gamma knife x3 most recently 2017, currently on Opdivo and Avastin w/ Dr. 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 of nausea, vomiting, confusion, and dizziness. Patient was in usual state of health until last night around 9PM, at which time he felt weak and nauseous. He required assistance from wife and niece to get to bathroom and he was noted to be diaphoretic at the time. Patient then vomited (white-royce, NBNB emesis) and continued to feel weak. Patient was then noted to be confused, forgetting the names of one of his children and with limited understanding of what was going on. Wife noted that patient had a "distant look in his eyes" which she has noted preceding past seizures. Pt did not have any tonic/clonic movements and remained responsive throughout the entirety of this episode, though wife did note that patient had mild b/l UE tremors of the hands. Last meal was dinner 3-4 hours prior to the episode and patient had normal appetite prior. Denies any recent fever, chills, diarrhea, constipation, bloody or tarry stools, URI sx, dysuria, increased frequency, cough, SOB, chest pain, palpitations, lower extremity edema. No recent NSAID use or history of ulcers. Only recent medication change was addition of dexamethasone 4mg BID on 10/25/17.     Upon arrival to the ED, patient's vitals were as follows: Tm 98F, HR 60-75, -163/, RR 18, SpO2 97-98% on RA.  Labs notable for mild leukocytosis w/ left shift (WBC 13.8, PMH 82%), mild hyponatremia (Na 130), significant hyperkalemia (K 6.6), and elevated BUN/Cr 58/1.80.   CT head performed w/ no acute changes when compared to 3/8/17 but findings concerning for metastatic disease.    Patient received 1g calcium gluconate, 10u insulin + D50, 50mEq sodium bicarb, and NS 1L bolus x1 with improvement of K to 5.7.       Patient seen and examined at bedside. Patient states that he is sleepy.  ICU Vital Signs Last 24 Hrs  T(C): 36.6 (2017 11:57), Max: 36.9 (2017 22:00)  T(F): 97.9 (2017 11:57), Max: 98.5 (2017 22:00)  HR: 90 (2017 11:57) (90 - 97)  BP: 147/91 (2017 11:57) (140/89 - 156/96)  BP(mean): --  ABP: --  ABP(mean): --  RR: 16 (2017 11:57) (16 - 16)  SpO2: 97% (2017 11:57) (97% - 98%)    PHYSICAL EXAM:    General: WDWN male resting comfortably in bed  HEENT: NC/AT; PERRL, anicteric sclera; MMM  Neck: supple  Cardiovascular: +S1/S2; RRR  Respiratory: CTA B/L; no W/R/R  Gastrointestinal: soft, NT/ND; +BSx4  Extremities: WWP; no edema, clubbing or cyanosis  Vascular: 2+ radial, DP/PT pulses B/L      MEDICATIONS:  MEDICATIONS  (STANDING):  amLODIPine   Tablet 5 milliGRAM(s) Oral daily  dexamethasone     Tablet 4 milliGRAM(s) Oral every 12 hours  diVALproex  milliGRAM(s) Oral every 12 hours  enoxaparin Injectable 40 milliGRAM(s) SubCutaneous every 24 hours  lacosamide 200 milliGRAM(s) Oral every 12 hours  latanoprost 0.005% Ophthalmic Solution 1 Drop(s) Both EYES at bedtime  levothyroxine 50 MICROGram(s) Oral daily  megestrol Suspension 200 milliGRAM(s) Oral daily  sodium chloride 0.9%. 1000 milliLiter(s) (120 mL/Hr) IV Continuous <Continuous>    MEDICATIONS  (PRN):      ALLERGIES:  Allergies    No Known Allergies    Intolerances        LABS:                        12.3   17.6  )-----------( 240      ( 2017 06:17 )             38.8     11-08    137  |  102  |  37<H>  ----------------------------<  153<H>  5.0   |  23  |  1.58<H>    Ca    7.7<L>      2017 06:15  Phos  3.5     -  Mg     2.6     -        Urinalysis Basic - ( 2017 13:27 )    Color: Yellow / Appearance: Clear / S.015 / pH: x  Gluc: x / Ketone: NEGATIVE  / Bili: Negative / Urobili: 0.2 E.U./dL   Blood: x / Protein: 100 mg/dL / Nitrite: NEGATIVE   Leuk Esterase: NEGATIVE / RBC: < 5 /HPF / WBC < 5 /HPF   Sq Epi: x / Non Sq Epi: 0-5 /HPF / Bacteria: Present /HPF      CAPILLARY BLOOD GLUCOSE          RADIOLOGY & ADDITIONAL TESTS: Reviewed.    ASSESSMENT:    PLAN:

## 2017-11-08 NOTE — CONSULT NOTE ADULT - CONSULT REQUESTED DATE/TIME
06-Nov-2017 08:00
06-Nov-2017 08:43
06-Nov-2017 10:24
07-Nov-2017
07-Nov-2017 10:44
08-Nov-2017
08-Nov-2017

## 2017-11-08 NOTE — PHYSICAL THERAPY INITIAL EVALUATION ADULT - PHYSICAL ASSIST/NONPHYSICAL ASSIST: GAIT, REHAB EVAL
verbal cues/patient with freezing every couple of steps despite VC's to continue walking. Patient noted he was not stopping because he was tired or weak, but did not attribute the freezing to anything/1 person assist

## 2017-11-08 NOTE — PHYSICAL THERAPY INITIAL EVALUATION ADULT - IMPAIRMENTS FOUND, PT EVAL
gait, locomotion, and balance/arousal, attention, and cognition/aerobic capacity/endurance/muscle strength

## 2017-11-08 NOTE — PROGRESS NOTE ADULT - ATTENDING COMMENTS
The patient will be started on Votrient (Pazopanib) 200 mg daily which will be escalated every 2 weeks going to 400 mg, 600 mg and finally to a maximum of 800 mg providing there is no cardiac or central nervous system toxicities.

## 2017-11-08 NOTE — PHYSICAL THERAPY INITIAL EVALUATION ADULT - SHORT TERM MEMORY, REHAB EVAL
intact/3/3 object recall after 1 min. 2/3 object recall after ~7min, rembered 3rd item when given options

## 2017-11-08 NOTE — PHYSICAL THERAPY INITIAL EVALUATION ADULT - PERTINENT HX OF CURRENT PROBLEM, REHAB EVAL
Patient is a 62 year old female 1d. of nausea, vomiting, confusion, and dizziness, found to have electrolyte derangements and admitted for correction of lab abnormalities as well as work up of dizziness. Patient is a 62 year old male 1d. of nausea, vomiting, confusion, and dizziness, found to have electrolyte derangements and admitted for correction of lab abnormalities as well as work up of dizziness.

## 2017-11-08 NOTE — PROGRESS NOTE ADULT - SUBJECTIVE AND OBJECTIVE BOX
Neurology Follow up note    Name  CORIN PERDOMO    HPI:  62M PMH metastatic RCC (s/p R nephrectomy, s/p craniotomy x2, s/p gamma knife x3 most recently March 2017, currently on Opdivo and Avastin w/ Dr. 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 of nausea, vomiting, confusion, and dizziness. Patient was in usual state of health until last night around 9PM, at which time he felt weak and nauseous. He required assistance from wife and niece to get to bathroom and he was noted to be diaphoretic at the time. Patient then vomited (white-royce, NBNB emesis) and continued to feel weak. Patient was then noted to be confused, forgetting the names of one of his children and with limited understanding of what was going on. Wife noted that patient had a "distant look in his eyes" which she has noted preceding past seizures. Pt did not have any tonic/clonic movements and remained responsive throughout the entirety of this episode, though wife did note that patient had mild b/l UE tremors of the hands. Last meal was dinner 3-4 hours prior to the episode and patient had normal appetite prior. Denies any recent fever, chills, diarrhea, constipation, bloody or tarry stools, URI sx, dysuria, increased frequency, cough, SOB, chest pain, palpitations, lower extremity edema. No recent NSAID use or history of ulcers. Only recent medication change was addition of dexamethasone 4mg BID on 10/25/17.     Upon arrival to the ED, patient's vitals were as follows: Tm 98F, HR 60-75, -163/, RR 18, SpO2 97-98% on RA.  Labs notable for mild leukocytosis w/ left shift (WBC 13.8, PMH 82%), mild hyponatremia (Na 130), significant hyperkalemia (K 6.6), and elevated BUN/Cr 58/1.80.   CT head performed w/ no acute changes when compared to 3/8/17 but findings concerning for metastatic disease.    Patient received 1g calcium gluconate, 10u insulin + D50, 50mEq sodium bicarb, and NS 1L bolus x1 with improvement of K to 5.7. (06 Nov 2017 08:15)     lethargic and generalized weakness      Interval History -        REVIEW OF SYSTEMS    Vital Signs Last 24 Hrs  T(C): 36.7 (08 Nov 2017 05:46), Max: 36.9 (07 Nov 2017 22:00)  T(F): 98.1 (08 Nov 2017 05:46), Max: 98.5 (07 Nov 2017 22:00)  HR: 97 (08 Nov 2017 05:46) (82 - 97)  BP: 156/96 (08 Nov 2017 05:46) (140/89 - 156/96)  BP(mean): --  RR: 16 (08 Nov 2017 05:46) (16 - 16)  SpO2: 97% (08 Nov 2017 05:46) (97% - 98%)    Physical Exam-     Mental Status- oriented to allison and place    Cranial Nerves- full EOM    Gait and station-n/a    Motor- generalized weakness    Reflexes- decreased    Sensation- no sensory level    Coordination- no tremors    Vascular -    Medications  amLODIPine   Tablet 5 milliGRAM(s) Oral daily  dexamethasone     Tablet 4 milliGRAM(s) Oral every 12 hours  diVALproex  milliGRAM(s) Oral every 12 hours  enoxaparin Injectable 40 milliGRAM(s) SubCutaneous every 24 hours  lacosamide 200 milliGRAM(s) Oral every 12 hours  latanoprost 0.005% Ophthalmic Solution 1 Drop(s) Both EYES at bedtime  levothyroxine 50 MICROGram(s) Oral daily  megestrol Suspension 200 milliGRAM(s) Oral daily  sodium chloride 0.9%. 1000 milliLiter(s) IV Continuous <Continuous>      Lab      Radiology    Assessment- Brain mets    Plan Continue Vimpat and depakote

## 2017-11-08 NOTE — PROGRESS NOTE ADULT - PROBLEM SELECTOR PLAN 2
Patient p/w dizziness and diaphoresis with an increase in confusion in the setting of emesis and electrolyte abnormalities found on admission. Etiology of dizziness likely 2/2 volume depletion, however cannot r/o new brain mets vs. seizures.   - neurology consulted, recommending MRI w/ contrast to r/o brain mets and vEEG   - vEEG monitoring for seizures given sz hx and confusion  - c/w home seizure medications (Divalproex delayed release 250 BID, Vimpat 200 BID)  - MRI from outpatient setting uploaded into PACS today.

## 2017-11-08 NOTE — CONSULT NOTE ADULT - SUBJECTIVE AND OBJECTIVE BOX
Hematology/Oncology Consult Note     Patient is a 62y male with PMH metastatic RCC (s/p R nephrectomy, s/p craniotomy x2, s/p gamma knife x3 most recently March 2017, currently on Opdivo and Avastin w/ Dr. 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 Labs were  notable for mild leukocytosis w/ left shift (WBC 13.8, PMH 82%), mild hyponatremia (Na 130), significant hyperkalemia (K 6.6), and elevated BUN/Cr 58/1.80.   CT head performed showed vague small hyperdensities in the left temporal lobe and right parietal lobe, similar to prior study which may represent metastatic disease. Pt. does not recall events leading up to his hospitalization.  Pt. had a recent MRI and CT abd/pelvis 10/25/17-no report available  We are consulted today for consideration of radiation therapy.    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 metastases  Malignant neoplasm of kidney: Renal cell cancer  Glaucoma: Dx 2014, s/p b/l surgical repair  Essential hypertension: HTN (hypertension)  History of nephrectomy, unilateral: right nephrectomy  Other postprocedural status: S/P craniotomy  Other postprocedural status: S/P tonsillectomy      Allergies: NKA      Social History:    FAMILY HISTORY:  Family history of malignant neoplasm of gastrointestinal tract: Family history of gastric cancer  Family history of malignant neoplasm of breast (Mother): Family history of breast cancer in sister  Family history of malignant neoplasm of gastrointestinal tract: Family history of pancreatic cancer      MEDICATIONS  (STANDING):  amLODIPine   Tablet 5 milliGRAM(s) Oral daily  dexamethasone     Tablet 4 milliGRAM(s) Oral every 12 hours  diVALproex  milliGRAM(s) Oral every 12 hours  enoxaparin Injectable 40 milliGRAM(s) SubCutaneous every 24 hours  lacosamide 200 milliGRAM(s) Oral every 12 hours  latanoprost 0.005% Ophthalmic Solution 1 Drop(s) Both EYES at bedtime  levothyroxine 50 MICROGram(s) Oral daily  megestrol Suspension 200 milliGRAM(s) Oral daily  sodium chloride 0.9%. 1000 milliLiter(s) (120 mL/Hr) IV Continuous <Continuous>    MEDICATIONS  (PRN):      PHYSICAL EXAM:    T(F): 97.9 (11-08-17 @ 11:57), Max: 98.5 (11-07-17 @ 22:00)  HR: 90 (11-08-17 @ 11:57) (90 - 97)  BP: 147/91 (11-08-17 @ 11:57) (140/89 - 156/96)  RR: 16 (11-08-17 @ 11:57) (16 - 16)  SpO2: 97% (11-08-17 @ 11:57) (97% - 98%)               Gen: well developed, well nourished, comfortable. Alert and oriented x 3  HEENT: normocephalic/atraumatic, Neck: supple, no masses, no JVD  Cardiovascular: RR, nl S1S2, no murmurs/rubs/gallops  Respiratory: clear air entry b/l  Gastrointestinal: BS+, soft, NT/ND, no masses, no splenomegaly, no hepatomegaly, no evidence for ascites  Extremities: no clubbing/cyanosis, no edema, no calf tenderness  Musculoskeletal: Right upper extremity strength greater than left  Psychiatric:  mood stable    Pupils- equal and reactive  Eye movements-full  Facial - no asymmetry   Cerebellar- no tremors        CBC Full  -  ( 08 Nov 2017 06:17 )  WBC Count : 17.6 K/uL  Hemoglobin : 12.3 g/dL  Hematocrit : 38.8 %  Platelet Count - Automated : 240 K/uL  Mean Cell Volume : 86.4 fL  Mean Cell Hemoglobin : 27.4 pg  Mean Cell Hemoglobin Concentration : 31.7 g/dL  Auto Neutrophil # : x  Auto Lymphocyte # : x  Auto Monocyte # : x  Auto Eosinophil # : x  Auto Basophil # : x  Auto Neutrophil % : x  Auto Lymphocyte % : x  Auto Monocyte % : x  Auto Eosinophil % : x  Auto Basophil % : x      11-08    137  |  102  |  37<H>  ----------------------------<  153<H>  5.0   |  23  |  1.58<H>    Ca    7.7<L>      08 Nov 2017 06:15  Phos  3.5     11-07  Mg     2.6     11-07    TPro  x   /  Alb  3.1<L>  /  TBili  x   /  DBili  x   /  AST  x   /  ALT  x   /  AlkPhos  x   11-08 Radiation Oncology Consult Note     Patient is a 62y male with PMH metastatic RCC (s/p R nephrectomy, s/p craniotomy x2, s/p gamma knife x3 most recently March 2017, currently on Opdivo and Avastin w/ Dr. 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 It appears these symptoms were in context of a mechanical fall.  Labs were  notable for mild leukocytosis w/ left shift (WBC 13.8, PMH 82%), mild hyponatremia (Na 130), significant hyperkalemia (K 6.6), and elevated BUN/Cr 58/1.80.     CT head performed showed vague small hyperdensities in the left temporal lobe and right parietal lobe, similar to prior study which may represent metastatic disease. Pt. does not recall events leading up to his hospitalization. Pt. had a recent MRI and CT abd/pelvis 10/25/17-no report available    At time of current visit, patient feels close to baseline.  He denies headache, nausea, vomiting, fevers, chills or night sweats.  There is an outside MRI from [facility] that we have reviewed.  This study was done without contrast and did not compare it to previous MRI's.  He denies focal neurological weakness at this time.  We are consulted today for consideration of radiation therapy.    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 metastases  Malignant neoplasm of kidney: Renal cell cancer  Glaucoma: Dx 2014, s/p b/l surgical repair  Essential hypertension: HTN (hypertension)  History of nephrectomy, unilateral: right nephrectomy  Other postprocedural status: S/P craniotomy  Other postprocedural status: S/P tonsillectomy      Allergies: NKA      FAMILY HISTORY:  Family history of malignant neoplasm of gastrointestinal tract: Family history of gastric cancer  Family history of malignant neoplasm of breast (Mother): Family history of breast cancer in sister  Family history of malignant neoplasm of gastrointestinal tract: Family history of pancreatic cancer      MEDICATIONS  (STANDING):  amLODIPine   Tablet 5 milliGRAM(s) Oral daily  dexamethasone     Tablet 4 milliGRAM(s) Oral every 12 hours  diVALproex  milliGRAM(s) Oral every 12 hours  enoxaparin Injectable 40 milliGRAM(s) SubCutaneous every 24 hours  lacosamide 200 milliGRAM(s) Oral every 12 hours  latanoprost 0.005% Ophthalmic Solution 1 Drop(s) Both EYES at bedtime  levothyroxine 50 MICROGram(s) Oral daily  megestrol Suspension 200 milliGRAM(s) Oral daily  sodium chloride 0.9%. 1000 milliLiter(s) (120 mL/Hr) IV Continuous <Continuous>      PHYSICAL EXAM:    T(F): 97.9 (11-08-17 @ 11:57), Max: 98.5 (11-07-17 @ 22:00)  HR: 90 (11-08-17 @ 11:57) (90 - 97)  BP: 147/91 (11-08-17 @ 11:57) (140/89 - 156/96)  RR: 16 (11-08-17 @ 11:57) (16 - 16)  SpO2: 97% (11-08-17 @ 11:57) (97% - 98%    Gen: well developed, well nourished, comfortable. Alert and oriented x 3  HEENT: normocephalic/atraumatic, Neck: supple, no masses, no JVD  Cardiovascular: RR, nl S1S2, no murmurs/rubs/gallops  Respiratory: clear air entry b/l  Gastrointestinal: BS+, soft, NT/ND, no masses, no splenomegaly, no hepatomegaly, no evidence for ascites  Extremities: no clubbing/cyanosis, no edema, no calf tenderness  Musculoskeletal: Right upper extremity strength greater than left  Psychiatric:  mood stable    Pupils- equal and reactive  Eye movements-full  Facial - no asymmetry   Cerebellar- no tremors        CBC Full  -  ( 08 Nov 2017 06:17 )  WBC Count : 17.6 K/uL  Hemoglobin : 12.3 g/dL  Hematocrit : 38.8 %  Platelet Count - Automated : 240 K/uL  Mean Cell Volume : 86.4 fL  Mean Cell Hemoglobin : 27.4 pg

## 2017-11-08 NOTE — PHYSICAL THERAPY INITIAL EVALUATION ADULT - ORIENTATION, REHAB EVAL
oriented to person, place, time and situation/patient initiallly stated it is 2007, quickly noted it is actually 2017

## 2017-11-08 NOTE — PROGRESS NOTE ADULT - SUBJECTIVE AND OBJECTIVE BOX
OVERNIGHT EVENTS:    SUBJECTIVE / INTERVAL HPI: Patient seen and examined at bedside.     VITAL SIGNS:  Vital Signs Last 24 Hrs  T(C): 36.7 (2017 05:46), Max: 36.9 (2017 22:00)  T(F): 98.1 (2017 05:46), Max: 98.5 (2017 22:00)  HR: 97 (2017 05:46) (82 - 97)  BP: 156/96 (2017 05:46) (140/89 - 156/96)  BP(mean): --  RR: 16 (2017 05:46) (16 - 16)  SpO2: 97% (2017 05:46) (97% - 98%)    PHYSICAL EXAM:    General: WDWN  HEENT: NC/AT; PERRL, anicteric sclera; MMM  Neck: supple  Cardiovascular: +S1/S2; RRR  Respiratory: CTA B/L; no W/R/R  Gastrointestinal: soft, NT/ND; +BSx4  Extremities: WWP; no edema, clubbing or cyanosis  Vascular: 2+ radial, DP/PT pulses B/L  Neurological: AAOx3; no focal deficits    MEDICATIONS:  MEDICATIONS  (STANDING):  amLODIPine   Tablet 5 milliGRAM(s) Oral daily  dexamethasone     Tablet 4 milliGRAM(s) Oral every 12 hours  diVALproex  milliGRAM(s) Oral every 12 hours  enoxaparin Injectable 40 milliGRAM(s) SubCutaneous every 24 hours  lacosamide 200 milliGRAM(s) Oral every 12 hours  latanoprost 0.005% Ophthalmic Solution 1 Drop(s) Both EYES at bedtime  levothyroxine 50 MICROGram(s) Oral daily  megestrol Suspension 200 milliGRAM(s) Oral daily  sodium chloride 0.9%. 1000 milliLiter(s) (120 mL/Hr) IV Continuous <Continuous>    MEDICATIONS  (PRN):      ALLERGIES:  Allergies    No Known Allergies    Intolerances        LABS:                        12.3   17.6  )-----------( 240      ( 2017 06:17 )             38.8     11-    137  |  102  |  37<H>  ----------------------------<  153<H>  5.0   |  23  |  1.58<H>    Ca    7.7<L>      2017 06:15  Phos  3.5     11-  Mg     2.6     11-        Urinalysis Basic - ( 2017 13:27 )    Color: Yellow / Appearance: Clear / S.015 / pH: x  Gluc: x / Ketone: NEGATIVE  / Bili: Negative / Urobili: 0.2 E.U./dL   Blood: x / Protein: 100 mg/dL / Nitrite: NEGATIVE   Leuk Esterase: NEGATIVE / RBC: < 5 /HPF / WBC < 5 /HPF   Sq Epi: x / Non Sq Epi: 0-5 /HPF / Bacteria: Present /HPF      CAPILLARY BLOOD GLUCOSE          RADIOLOGY & ADDITIONAL TESTS: Reviewed.    ASSESSMENT:    PLAN: OVERNIGHT EVENTS: RUBY    SUBJECTIVE / INTERVAL HPI: Patient seen and examined at bedside. Patient states that he is sleepy however he is A&Ox3 at bedside. ROS negative.     VITAL SIGNS:  Vital Signs Last 24 Hrs  T(C): 36.7 (2017 05:46), Max: 36.9 (2017 22:00)  T(F): 98.1 (2017 05:46), Max: 98.5 (2017 22:00)  HR: 97 (2017 05:46) (82 - 97)  BP: 156/96 (2017 05:46) (140/89 - 156/96)  BP(mean): --  RR: 16 (2017 05:46) (16 - 16)  SpO2: 97% (2017 05:46) (97% - 98%)    PHYSICAL EXAM:    General: WDWN male resting comfortably in bed  HEENT: NC/AT; PERRL, anicteric sclera; MMM  Neck: supple  Cardiovascular: +S1/S2; RRR  Respiratory: CTA B/L; no W/R/R  Gastrointestinal: soft, NT/ND; +BSx4  Extremities: WWP; no edema, clubbing or cyanosis  Vascular: 2+ radial, DP/PT pulses B/L  Neurological: AAOx3 - waxes and wanes; 4/5 weakness in LUE compared tot he right. Cerebellar function intact, finger to nose, MAYNOR    MEDICATIONS:  MEDICATIONS  (STANDING):  amLODIPine   Tablet 5 milliGRAM(s) Oral daily  dexamethasone     Tablet 4 milliGRAM(s) Oral every 12 hours  diVALproex  milliGRAM(s) Oral every 12 hours  enoxaparin Injectable 40 milliGRAM(s) SubCutaneous every 24 hours  lacosamide 200 milliGRAM(s) Oral every 12 hours  latanoprost 0.005% Ophthalmic Solution 1 Drop(s) Both EYES at bedtime  levothyroxine 50 MICROGram(s) Oral daily  megestrol Suspension 200 milliGRAM(s) Oral daily  sodium chloride 0.9%. 1000 milliLiter(s) (120 mL/Hr) IV Continuous <Continuous>    MEDICATIONS  (PRN):      ALLERGIES:  Allergies    No Known Allergies    Intolerances        LABS:                        12.3   17.6  )-----------( 240      ( 2017 06:17 )             38.8     11-08    137  |  102  |  37<H>  ----------------------------<  153<H>  5.0   |  23  |  1.58<H>    Ca    7.7<L>      2017 06:15  Phos  3.5       Mg     2.6             Urinalysis Basic - ( 2017 13:27 )    Color: Yellow / Appearance: Clear / S.015 / pH: x  Gluc: x / Ketone: NEGATIVE  / Bili: Negative / Urobili: 0.2 E.U./dL   Blood: x / Protein: 100 mg/dL / Nitrite: NEGATIVE   Leuk Esterase: NEGATIVE / RBC: < 5 /HPF / WBC < 5 /HPF   Sq Epi: x / Non Sq Epi: 0-5 /HPF / Bacteria: Present /HPF      CAPILLARY BLOOD GLUCOSE          RADIOLOGY & ADDITIONAL TESTS: Reviewed.    ASSESSMENT:    PLAN:

## 2017-11-08 NOTE — PHYSICAL THERAPY INITIAL EVALUATION ADULT - MANUAL MUSCLE TESTING RESULTS, REHAB EVAL
shoulder flexion 2+/5 bilaterally, all other Upper Extremity strength >3/5 bilaterally. Hip flexion 2/5 bilaterally, all other Lower Extremity motion at least 3/5 bilaterally.

## 2017-11-08 NOTE — PHYSICAL THERAPY INITIAL EVALUATION ADULT - ACTIVE RANGE OF MOTION EXAMINATION, REHAB EVAL
bilateral shoulder flexion and abduction 90 degrees, all other Upper Extremity AROM WFL bilaterally. bilateral shoulder flexion and abduction 90 degrees, all other Upper Extremity AROM WFL bilaterally. bilateral hip flexion ~25% of available range against gravity, all other Lower Extremity AROM WFL

## 2017-11-09 LAB
ANION GAP SERPL CALC-SCNC: 11 MMOL/L — SIGNIFICANT CHANGE UP (ref 5–17)
ANION GAP SERPL CALC-SCNC: 16 MMOL/L — SIGNIFICANT CHANGE UP (ref 5–17)
BUN SERPL-MCNC: 39 MG/DL — HIGH (ref 7–23)
BUN SERPL-MCNC: 41 MG/DL — HIGH (ref 7–23)
CALCIUM SERPL-MCNC: 7.9 MG/DL — LOW (ref 8.4–10.5)
CALCIUM SERPL-MCNC: 8 MG/DL — LOW (ref 8.4–10.5)
CHLORIDE SERPL-SCNC: 100 MMOL/L — SIGNIFICANT CHANGE UP (ref 96–108)
CHLORIDE SERPL-SCNC: 101 MMOL/L — SIGNIFICANT CHANGE UP (ref 96–108)
CO2 SERPL-SCNC: 22 MMOL/L — SIGNIFICANT CHANGE UP (ref 22–31)
CO2 SERPL-SCNC: 25 MMOL/L — SIGNIFICANT CHANGE UP (ref 22–31)
CREAT SERPL-MCNC: 1.49 MG/DL — HIGH (ref 0.5–1.3)
CREAT SERPL-MCNC: 1.53 MG/DL — HIGH (ref 0.5–1.3)
GLUCOSE SERPL-MCNC: 163 MG/DL — HIGH (ref 70–99)
GLUCOSE SERPL-MCNC: 218 MG/DL — HIGH (ref 70–99)
HCT VFR BLD CALC: 40.2 % — SIGNIFICANT CHANGE UP (ref 39–50)
HGB BLD-MCNC: 12.7 G/DL — LOW (ref 13–17)
MAGNESIUM SERPL-MCNC: 2.5 MG/DL — SIGNIFICANT CHANGE UP (ref 1.6–2.6)
MCHC RBC-ENTMCNC: 27.5 PG — SIGNIFICANT CHANGE UP (ref 27–34)
MCHC RBC-ENTMCNC: 31.6 G/DL — LOW (ref 32–36)
MCV RBC AUTO: 87 FL — SIGNIFICANT CHANGE UP (ref 80–100)
PHOSPHATE SERPL-MCNC: 2.9 MG/DL — SIGNIFICANT CHANGE UP (ref 2.5–4.5)
PLATELET # BLD AUTO: 219 K/UL — SIGNIFICANT CHANGE UP (ref 150–400)
POTASSIUM SERPL-MCNC: 4.7 MMOL/L — SIGNIFICANT CHANGE UP (ref 3.5–5.3)
POTASSIUM SERPL-MCNC: 5.6 MMOL/L — HIGH (ref 3.5–5.3)
POTASSIUM SERPL-SCNC: 4.7 MMOL/L — SIGNIFICANT CHANGE UP (ref 3.5–5.3)
POTASSIUM SERPL-SCNC: 5.6 MMOL/L — HIGH (ref 3.5–5.3)
RBC # BLD: 4.62 M/UL — SIGNIFICANT CHANGE UP (ref 4.2–5.8)
RBC # FLD: 16.5 % — SIGNIFICANT CHANGE UP (ref 10.3–16.9)
SODIUM SERPL-SCNC: 136 MMOL/L — SIGNIFICANT CHANGE UP (ref 135–145)
SODIUM SERPL-SCNC: 139 MMOL/L — SIGNIFICANT CHANGE UP (ref 135–145)
WBC # BLD: 18.8 K/UL — HIGH (ref 3.8–10.5)
WBC # FLD AUTO: 18.8 K/UL — HIGH (ref 3.8–10.5)

## 2017-11-09 PROCEDURE — 99232 SBSQ HOSP IP/OBS MODERATE 35: CPT

## 2017-11-09 PROCEDURE — 70552 MRI BRAIN STEM W/DYE: CPT | Mod: 26

## 2017-11-09 PROCEDURE — 95951: CPT | Mod: 26

## 2017-11-09 RX ORDER — SODIUM POLYSTYRENE SULFONATE 4.1 MEQ/G
30 POWDER, FOR SUSPENSION ORAL ONCE
Qty: 0 | Refills: 0 | Status: COMPLETED | OUTPATIENT
Start: 2017-11-09 | End: 2017-11-09

## 2017-11-09 RX ORDER — SODIUM CHLORIDE 9 MG/ML
1000 INJECTION INTRAMUSCULAR; INTRAVENOUS; SUBCUTANEOUS
Qty: 0 | Refills: 0 | Status: DISCONTINUED | OUTPATIENT
Start: 2017-11-09 | End: 2017-11-10

## 2017-11-09 RX ADMIN — AMLODIPINE BESYLATE 5 MILLIGRAM(S): 2.5 TABLET ORAL at 06:52

## 2017-11-09 RX ADMIN — Medication 4 MILLIGRAM(S): at 17:15

## 2017-11-09 RX ADMIN — MEGESTROL ACETATE 200 MILLIGRAM(S): 40 SUSPENSION ORAL at 15:32

## 2017-11-09 RX ADMIN — LACOSAMIDE 200 MILLIGRAM(S): 50 TABLET ORAL at 17:14

## 2017-11-09 RX ADMIN — LACOSAMIDE 200 MILLIGRAM(S): 50 TABLET ORAL at 06:52

## 2017-11-09 RX ADMIN — SODIUM CHLORIDE 120 MILLILITER(S): 9 INJECTION INTRAMUSCULAR; INTRAVENOUS; SUBCUTANEOUS at 17:32

## 2017-11-09 RX ADMIN — DIVALPROEX SODIUM 250 MILLIGRAM(S): 500 TABLET, DELAYED RELEASE ORAL at 06:52

## 2017-11-09 RX ADMIN — Medication 4 MILLIGRAM(S): at 06:52

## 2017-11-09 RX ADMIN — SODIUM POLYSTYRENE SULFONATE 30 GRAM(S): 4.1 POWDER, FOR SUSPENSION ORAL at 09:37

## 2017-11-09 RX ADMIN — ENOXAPARIN SODIUM 40 MILLIGRAM(S): 100 INJECTION SUBCUTANEOUS at 06:53

## 2017-11-09 RX ADMIN — DIVALPROEX SODIUM 250 MILLIGRAM(S): 500 TABLET, DELAYED RELEASE ORAL at 17:14

## 2017-11-09 RX ADMIN — Medication 50 MICROGRAM(S): at 06:52

## 2017-11-09 RX ADMIN — LATANOPROST 1 DROP(S): 0.05 SOLUTION/ DROPS OPHTHALMIC; TOPICAL at 21:20

## 2017-11-09 NOTE — PROGRESS NOTE ADULT - PROBLEM SELECTOR PLAN 8
Patient with chemotherapy (Opdivo/avastin) induced hypothyroidism, on home Synthroid 50mcg qd.   - c/w Synthroid 50mcg qd  - f/u TSH
Patient with chemotherapy (Opdivo/avastin) induced hypothyroidism, on home Synthroid 50mcg qd.   - c/w Synthroid 50mcg qd  - f/u TSH
Patient with hx RCC with mets to brain, s/p R total nephrectomy, craniotomy x2 and gamma knife tx x3 now with CT findings showing possible mets and reported outpatient MRI 2 weeks ago showing new L sided lesions. Most recent Opdivo/avastin treatment 9/20/2017.
Patient with hx RCC with mets to brain, s/p R total nephrectomy, craniotomy x2 and gamma knife tx x3 now with CT findings showing possible mets and reported outpatient MRI 2 weeks ago showing new L sided lesions. Most recent Opdivo/avastin treatment 9/20/2017.
Patient with chemotherapy (Opdivo/avastin) induced hypothyroidism, on home Synthroid 50mcg qd.   - c/w Synthroid 50mcg qd  - f/u TSH
Patient with chemotherapy (Opdivo/avastin) induced hypothyroidism, on home Synthroid 50mcg qd.   - c/w Synthroid 50mcg qd  - f/u TSH

## 2017-11-09 NOTE — PROGRESS NOTE ADULT - SUBJECTIVE AND OBJECTIVE BOX
Chief Complaint/Reason for Consult: near syncope  INTERVAL HPI: overnight events noted  	  MEDICATIONS:  amLODIPine   Tablet 5 milliGRAM(s) Oral daily        diVALproex  milliGRAM(s) Oral every 12 hours  lacosamide 200 milliGRAM(s) Oral every 12 hours      dexamethasone     Tablet 4 milliGRAM(s) Oral every 12 hours  levothyroxine 50 MICROGram(s) Oral daily  megestrol Suspension 200 milliGRAM(s) Oral daily    enoxaparin Injectable 40 milliGRAM(s) SubCutaneous every 24 hours  latanoprost 0.005% Ophthalmic Solution 1 Drop(s) Both EYES at bedtime  sodium chloride 0.9%. 1000 milliLiter(s) IV Continuous <Continuous>      REVIEW OF SYSTEMS:  [x] As per HPI  CONSTITUTIONAL: No fever, weight loss, or fatigue  RESPIRATORY: No cough, wheezing, chills or hemoptysis; No Shortness of Breath  CARDIOVASCULAR: No chest pain, palpitations, dizziness, or leg swelling  GASTROINTESTINAL: No abdominal or epigastric pain. No nausea, vomiting, or hematemesis; No diarrhea or constipation. No melena or hematochezia.  MUSCULOSKELETAL: No joint pain or swelling; No muscle, back, or extremity pain  [x] All others negative	  [ ] Unable to obtain    PHYSICAL EXAM:  T(C): 36.7 (11-09-17 @ 11:27), Max: 36.9 (11-08-17 @ 17:54)  HR: 97 (11-09-17 @ 11:27) (93 - 98)  BP: 146/91 (11-09-17 @ 11:27) (137/86 - 150/96)  RR: 15 (11-09-17 @ 11:27) (15 - 16)  SpO2: 98% (11-09-17 @ 11:27) (96% - 98%)  Wt(kg): --  I&O's Summary        Appearance: Normal	  HEENT:   Normal oral mucosa  Cardiovascular: Normal S1 S2, No JVD, No murmurs, No edema  Respiratory: Lungs clear to auscultation	  Gastrointestinal:  Soft, Non-tender, + BS	  Extremities: Normal range of motion, No clubbing, cyanosis or edema  Vascular: Peripheral pulses palpable 2+ bilaterally    TELEMETRY: 	    ECG:   	  RADIOLOGY:   CXR:  CT:  US:    CARDIAC TESTING:  Echocardiogram:  Catheterization:  Stress Test:      LABS:	 	    CARDIAC MARKERS:                                  12.7   18.8  )-----------( 219      ( 09 Nov 2017 06:08 )             40.2     11-09    136  |  100  |  39<H>  ----------------------------<  163<H>  5.6<H>   |  25  |  1.53<H>    Ca    8.0<L>      09 Nov 2017 06:08  Phos  2.9     11-09  Mg     2.5     11-09    TPro  x   /  Alb  3.1<L>  /  TBili  x   /  DBili  x   /  AST  x   /  ALT  x   /  AlkPhos  x   11-08    proBNP:   Lipid Profile:   HgA1c:   TSH:     ASSESSMENT/PLAN: 	    Problem: Near Syncope.  Plan: Patient p/w dizziness and diaphoresis with an increase in confusion in the setting of emesis and electrolyte abnormalities found on admission. Etiology of dizziness likely 2/2 volume depletion, however cannot r/o new brain mets vs. seizures.   - neurology consulted, recommending MRI w/ contrast to r/o brain mets and vEEG   - vEEG monitoring for seizures given sz hx and confusion  - c/w home seizure medications (Divalproex delayed release 250 BID, Vimpat 200 BID)  - pt's wife to bring in disc from recent MRI (within last 2 weeks) for review by neurology either tonight or tomorrow AM. Patient p/w dizziness and diaphoresis with an increase in confusion in the setting of emesis and electrolyte abnormalities found on admission. Etiology of dizziness likely 2/2 volume depletion, however cannot r/o new brain mets vs. seizures.   - neurology consulted, recommending MRI w/ contrast to r/o brain mets and vEEG   - vEEG monitoring for seizures given sz hx and confusion  - c/w home seizure medications (Divalproex delayed release 250 BID, Vimpat 200 BID)  - will attempt to obtain outpatient records/disc for recent MRI which reportedly shows new lesions. If unable to assess properly, will consider repeating MRI on this admission however will have to defer contrast until JULIAN resolving.     Problem: Essential hypertension.  Plan: Patient w/ history of HTN with dx preceding cancer tx. On home triamterene/HCTZ 37.5/25 qd and Coreg CR 40mg qd.   - holding diuretics and home BB at this time in the setting of volume depletion and JULIAN  - given hypertension to SBP 160s since admission, will start low dose CCB (amlodipine 5mg qd) for HTN. Patient w/ history of HTN with dx preceding cancer tx. On home triamterene/HCTA 37.5/25 qd and Coreg CR 40mg qd.   - holding diuretics and home BB at this time in the setting of volume depletion and JULIAN  - given hypertension to SBP 160s since admission, will start low dose CCB (amlodipine 5mg qd) for HTN   #CV Prevention  q3mo Fasting Lipid Profile, Goal LDL<100, statin as tolerated

## 2017-11-09 NOTE — PROGRESS NOTE ADULT - PROBLEM SELECTOR PLAN 2
Patient p/w dizziness and diaphoresis with an increase in confusion in the setting of emesis and electrolyte abnormalities found on admission. Etiology of dizziness likely 2/2 volume depletion, however cannot r/o new brain mets vs. seizures.   - neurology consulted, recommending MRI w/ contrast to r/o brain mets and vEEG   - vEEG monitoring for seizures given sz hx and confusion  - c/w home seizure medications (Divalproex delayed release 250 BID, Vimpat 200 BID)  - MRI from outpatient setting uploaded into PACS today. Patient p/w dizziness and diaphoresis with an increase in confusion in the setting of emesis and electrolyte abnormalities found on admission. Now resolved.   - Etiology of dizziness likely 2/2 volume depletion, however cannot r/o new brain mets vs. seizures.   - vEEG and MRI w/ contrast  - No seizures on vEEG  - c/w home seizure medications (Divalproex delayed release 250 BID, Vimpat 200 BID)  - MRI from outpatient setting uploaded into PACS today.

## 2017-11-09 NOTE — PROGRESS NOTE ADULT - PROBLEM SELECTOR PROBLEM 5
Essential hypertension
JULIAN (acute kidney injury)
Essential hypertension
JULIAN (acute kidney injury)

## 2017-11-09 NOTE — PROGRESS NOTE ADULT - PROBLEM SELECTOR PROBLEM 1
Malignant neoplasm of kidney
Dizziness
Malignant neoplasm of kidney
Dizziness

## 2017-11-09 NOTE — PROGRESS NOTE ADULT - PROBLEM SELECTOR PROBLEM 4
JULIAN (acute kidney injury)
Hyperkalemia
Hyperkalemia
JULIAN (acute kidney injury)
Hyperkalemia
Hyperkalemia

## 2017-11-09 NOTE — PROGRESS NOTE ADULT - SUBJECTIVE AND OBJECTIVE BOX
Physical Medicine and Rehabilitation Progress Note    CORIN PERDOMO    MRN-[id number]    Patient is a 62y old  Male who presents with a chief complaint of dizziness (06 Nov 2017 08:15)      Vital Signs Last 24 Hrs  T(C): 36.7 (09 Nov 2017 11:27), Max: 36.9 (08 Nov 2017 17:54)  T(F): 98.1 (09 Nov 2017 11:27), Max: 98.5 (08 Nov 2017 17:54)  HR: 97 (09 Nov 2017 11:27) (93 - 98)  BP: 146/91 (09 Nov 2017 11:27) (137/86 - 150/96)  BP(mean): --  RR: 15 (09 Nov 2017 11:27) (15 - 16)  SpO2: 98% (09 Nov 2017 11:27) (96% - 98%)    Current Functional Status in Physical Therapy:  lying in bed. Comfortable. Stable. Deconditioned. PT started.    Bed Mobility: needs minimal to moderate assistance    Transfers: minimal assistance    Ambulation: using rolling  walker with minimal assistance, pr balance and unsteady  gait.      Impression:  1. Deconditioned.  2. Episode of dizziness.  3. Metastatic RCC, s/p Rt. nephrectomy, brain  mets, s/p craniotomy.      Recommendations:  1. Continue PT  2. Home PT / HHA

## 2017-11-09 NOTE — PROGRESS NOTE ADULT - PROBLEM SELECTOR PLAN 6
Patient w/ history of HTN with dx preceding cancer tx. On home triamterene/HCTZ 37.5/25 qd and Coreg CR 40mg qd.   - holding diuretics and home BB at this time in the setting of volume depletion and JULIAN  - C/w amlodipine started on this admission

## 2017-11-09 NOTE — PROGRESS NOTE ADULT - PROBLEM SELECTOR PLAN 10
Fluids: NS @ 120cc/hr  Electrolytes: Monitor BMP, replete PRN   Nutrition: DASH/TLC diet w/ potassium restriction    DVT ppx- Lovenox 40 qd  GI ppx- not indicated at this time    Code Status: Full Code    Dispo: Admit to [facility]
Fluids: NS @ 120cc/hr  Electrolytes: Monitor BMP, replete PRN   Nutrition: DASH/TLC diet w/ potassium restriction    DVT ppx- Lovenox 40 qd  GI ppx- not indicated at this time    Code Status: Full Code    Dispo: Admit to [facility]
Fluids: NS @ 120cc/hr  Electrolytes: Monitor BMP, replete PRN   Nutrition: DASH/TLC diet w/ potassium restriction    DVT ppx- Lovenox 40 qd  GI ppx- not indicated at this time    Code Status: Full Code    Dispo: Admit to [facility]
Fluids: NS @ 120cc/hr  Electrolytes: Monitor BMP, replete PRN   Nutrition: DASH/TLC diet w/ potassium restriction    DVT ppx- Lovenox 40 qd  GI ppx- not indicated at this time    Code Status: Full Code    Dispo: Admit to [facility]
Fluids: NS @ 120cc/hr  Electrolytes: Monitor BMP, replete PRN   Nutrition: DASH/TLC diet w/ potassium restriction    DVT ppx- Lovenox 40 qd  GI ppx- not indicated at this time    Code Status: Full Code    Dispo: Admit to [facility]
Fluids: NS @ 120cc/hr  Electrolytes: Monitor BMP, replete PRN   Nutrition: DASH/TLC diet w/ potassium restriction    DVT ppx- Lovenox 40 qd  GI ppx- not indicated at this time    Code Status: Full Code    Dispo: Admit to [facility]

## 2017-11-09 NOTE — PROGRESS NOTE ADULT - PROBLEM SELECTOR PROBLEM 8
Hypothyroidism, iatrogenic
Hypothyroidism, iatrogenic
Malignant neoplasm of kidney
Malignant neoplasm of kidney
Hypothyroidism, iatrogenic
Hypothyroidism, iatrogenic

## 2017-11-09 NOTE — PROGRESS NOTE ADULT - PROBLEM SELECTOR PROBLEM 3
Hyperkalemia
Nausea and vomiting
Hyperkalemia
Nausea and vomiting

## 2017-11-09 NOTE — PROGRESS NOTE ADULT - SUBJECTIVE AND OBJECTIVE BOX
OVERNIGHT EVENTS:    SUBJECTIVE / INTERVAL HPI: Patient seen and examined at bedside.     VITAL SIGNS:  Vital Signs Last 24 Hrs  T(C): 36.6 (09 Nov 2017 06:07), Max: 36.9 (08 Nov 2017 17:54)  T(F): 97.9 (09 Nov 2017 06:07), Max: 98.5 (08 Nov 2017 17:54)  HR: 93 (09 Nov 2017 06:07) (90 - 98)  BP: 150/91 (09 Nov 2017 06:07) (137/86 - 150/96)  BP(mean): --  RR: 16 (09 Nov 2017 06:07) (16 - 16)  SpO2: 97% (09 Nov 2017 06:07) (96% - 97%)    PHYSICAL EXAM:    General: WDWN  HEENT: NC/AT; PERRL, anicteric sclera; MMM  Neck: supple  Cardiovascular: +S1/S2; RRR  Respiratory: CTA B/L; no W/R/R  Gastrointestinal: soft, NT/ND; +BSx4  Extremities: WWP; no edema, clubbing or cyanosis  Vascular: 2+ radial, DP/PT pulses B/L  Neurological: AAOx3; no focal deficits    MEDICATIONS:  MEDICATIONS  (STANDING):  amLODIPine   Tablet 5 milliGRAM(s) Oral daily  dexamethasone     Tablet 4 milliGRAM(s) Oral every 12 hours  diVALproex  milliGRAM(s) Oral every 12 hours  enoxaparin Injectable 40 milliGRAM(s) SubCutaneous every 24 hours  lacosamide 200 milliGRAM(s) Oral every 12 hours  latanoprost 0.005% Ophthalmic Solution 1 Drop(s) Both EYES at bedtime  levothyroxine 50 MICROGram(s) Oral daily  megestrol Suspension 200 milliGRAM(s) Oral daily  sodium chloride 0.9%. 1000 milliLiter(s) (120 mL/Hr) IV Continuous <Continuous>    MEDICATIONS  (PRN):      ALLERGIES:  Allergies    No Known Allergies    Intolerances        LABS:                        12.7   18.8  )-----------( 219      ( 09 Nov 2017 06:08 )             40.2     11-09    136  |  100  |  39<H>  ----------------------------<  163<H>  5.6<H>   |  25  |  1.53<H>    Ca    8.0<L>      09 Nov 2017 06:08  Phos  2.9     11-09  Mg     2.5     11-09    TPro  x   /  Alb  3.1<L>  /  TBili  x   /  DBili  x   /  AST  x   /  ALT  x   /  AlkPhos  x   11-08        CAPILLARY BLOOD GLUCOSE          RADIOLOGY & ADDITIONAL TESTS: Reviewed.    ASSESSMENT:    PLAN: OVERNIGHT EVENTS: RUBY    SUBJECTIVE / INTERVAL HPI: Patient seen and examined at bedside. Patient A&Ox3. ROS negative. Endorsing he was vEEG taken off.     VITAL SIGNS:  Vital Signs Last 24 Hrs  T(C): 36.6 (09 Nov 2017 06:07), Max: 36.9 (08 Nov 2017 17:54)  T(F): 97.9 (09 Nov 2017 06:07), Max: 98.5 (08 Nov 2017 17:54)  HR: 93 (09 Nov 2017 06:07) (90 - 98)  BP: 150/91 (09 Nov 2017 06:07) (137/86 - 150/96)  BP(mean): --  RR: 16 (09 Nov 2017 06:07) (16 - 16)  SpO2: 97% (09 Nov 2017 06:07) (96% - 97%)    PHYSICAL EXAM:    General: WDWN male resting comfortably in bed  HEENT: NC/AT; PERRL, anicteric sclera; MMM  Neck: supple  Cardiovascular: +S1/S2; RRR  Respiratory: CTA B/L; no W/R/R  Gastrointestinal: soft, NT/ND; +BSx4  Extremities: WWP; no edema, clubbing or cyanosis  Vascular: 2+ radial, DP/PT pulses B/L  Neurological: AAOx3; no focal deficits    MEDICATIONS:  MEDICATIONS  (STANDING):  amLODIPine   Tablet 5 milliGRAM(s) Oral daily  dexamethasone     Tablet 4 milliGRAM(s) Oral every 12 hours  diVALproex  milliGRAM(s) Oral every 12 hours  enoxaparin Injectable 40 milliGRAM(s) SubCutaneous every 24 hours  lacosamide 200 milliGRAM(s) Oral every 12 hours  latanoprost 0.005% Ophthalmic Solution 1 Drop(s) Both EYES at bedtime  levothyroxine 50 MICROGram(s) Oral daily  megestrol Suspension 200 milliGRAM(s) Oral daily  sodium chloride 0.9%. 1000 milliLiter(s) (120 mL/Hr) IV Continuous <Continuous>    MEDICATIONS  (PRN):      ALLERGIES:  Allergies    No Known Allergies    Intolerances        LABS:                        12.7   18.8  )-----------( 219      ( 09 Nov 2017 06:08 )             40.2     11-09    136  |  100  |  39<H>  ----------------------------<  163<H>  5.6<H>   |  25  |  1.53<H>    Ca    8.0<L>      09 Nov 2017 06:08  Phos  2.9     11-09  Mg     2.5     11-09    TPro  x   /  Alb  3.1<L>  /  TBili  x   /  DBili  x   /  AST  x   /  ALT  x   /  AlkPhos  x   11-08        CAPILLARY BLOOD GLUCOSE          RADIOLOGY & ADDITIONAL TESTS: Reviewed.    ASSESSMENT:    PLAN:

## 2017-11-09 NOTE — PROGRESS NOTE ADULT - SUBJECTIVE AND OBJECTIVE BOX
Notes reviewed; discussed with staff and patient seen. Patient is alert and responsive and sitting up in bed. EEG has been completed. Patient feels stronger. Is eating more and sleeping better. Mood is improved and sensorium is clear. Patient is looking forward to discharge home tomorrow. Continuing Supportive therapy.

## 2017-11-09 NOTE — PROGRESS NOTE ADULT - SUBJECTIVE AND OBJECTIVE BOX
62M PMH metastatic RCC (s/p R nephrectomy, s/p craniotomy x2, s/p gamma knife x3 most recently March 2017, currently on Opdivo and Avastin w/ Dr. 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 of nausea, vomiting, confusion, and dizziness. Patient was in usual state of health until last night around 9PM, at which time he felt weak and nauseous. He required assistance from wife and niece to get to bathroom and he was noted to be diaphoretic at the time. Patient then vomited (white-royce, NBNB emesis) and continued to feel weak. Patient was then noted to be confused, forgetting the names of one of his children and with limited understanding of what was going on. Wife noted that patient had a "distant look in his eyes" which she has noted preceding past seizures. Pt did not have any tonic/clonic movements and remained responsive throughout the entirety of this episode, though wife did note that patient had mild b/l UE tremors of the hands. Last meal was dinner 3-4 hours prior to the episode and patient had normal appetite prior. Denies any recent fever, chills, diarrhea, constipation, bloody or tarry stools, URI sx, dysuria, increased frequency, cough, SOB, chest pain, palpitations, lower extremity edema. No recent NSAID use or history of ulcers. Only recent medication change was addition of dexamethasone 4mg BID on 10/25/17.     Upon arrival to the ED, patient's vitals were as follows: Tm 98F, HR 60-75, -163/, RR 18, SpO2 97-98% on RA.  Labs notable for mild leukocytosis w/ left shift (WBC 13.8, PMH 82%), mild hyponatremia (Na 130), significant hyperkalemia (K 6.6), and elevated BUN/Cr 58/1.80.   CT head performed w/ no acute changes when compared to 3/8/17 but findings concerning for metastatic disease.    Patient received 1g calcium gluconate, 10u insulin + D50, 50mEq sodium bicarb, and NS 1L bolus x1 with improvement of K to 5.7.       Patient seen and examined at bedside. Patient A&Ox3. ROS negative. Endorsing he was vEEG taken off.   ICU Vital Signs Last 24 Hrs  T(C): 36.7 (09 Nov 2017 11:27), Max: 36.9 (08 Nov 2017 17:54)  T(F): 98.1 (09 Nov 2017 11:27), Max: 98.5 (08 Nov 2017 17:54)  HR: 97 (09 Nov 2017 11:27) (93 - 98)  BP: 146/91 (09 Nov 2017 11:27) (137/86 - 150/96)  BP(mean): --  ABP: --  ABP(mean): --  RR: 15 (09 Nov 2017 11:27) (15 - 16)  SpO2: 98% (09 Nov 2017 11:27) (96% - 98%)    PHYSICAL EXAM:    General: WDWN male resting comfortably in bed  HEENT: NC/AT; PERRL, anicteric sclera; MMM  Neck: supple  Cardiovascular: +S1/S2; RRR  Respiratory: CTA B/L; no W/R/R  Gastrointestinal: soft, NT/ND; +BSx4  Extremities: WWP; no edema, clubbing or cyanosis  Vascular: 2+ radial, DP/PT pulses B/L  Neurological:  oriented to person and time    MEDICATIONS:  MEDICATIONS  (STANDING):  amLODIPine   Tablet 5 milliGRAM(s) Oral daily  dexamethasone     Tablet 4 milliGRAM(s) Oral every 12 hours  diVALproex  milliGRAM(s) Oral every 12 hours  enoxaparin Injectable 40 milliGRAM(s) SubCutaneous every 24 hours  lacosamide 200 milliGRAM(s) Oral every 12 hours  latanoprost 0.005% Ophthalmic Solution 1 Drop(s) Both EYES at bedtime  levothyroxine 50 MICROGram(s) Oral daily  megestrol Suspension 200 milliGRAM(s) Oral daily  sodium chloride 0.9%. 1000 milliLiter(s) (120 mL/Hr) IV Continuous <Continuous>    MEDICATIONS  (PRN):      ALLERGIES:  Allergies    No Known Allergies    Intolerances        LABS:                        12.7   18.8  )-----------( 219      ( 09 Nov 2017 06:08 )             40.2     11-09    136  |  100  |  39<H>  ----------------------------<  163<H>  5.6<H>   |  25  |  1.53<H>    Ca    8.0<L>      09 Nov 2017 06:08  Phos  2.9     11-09  Mg     2.5     11-09    TPro  x   /  Alb  3.1<L>  /  TBili  x   /  DBili  x   /  AST  x   /  ALT  x   /  AlkPhos  x   11-08        CAPILLARY BLOOD GLUCOSE          RADIOLOGY & ADDITIONAL TESTS: Reviewed.    ASSESSMENT:    PLAN:

## 2017-11-09 NOTE — PROGRESS NOTE ADULT - PROBLEM SELECTOR PLAN 2
Patient p/w dizziness and diaphoresis with an increase in confusion in the setting of emesis and electrolyte abnormalities found on admission. Now resolved.   - Etiology of dizziness likely 2/2 volume depletion, however cannot r/o new brain mets vs. seizures.   - vEEG and MRI w/ contrast  - No seizures on vEEG  - c/w home seizure medications (Divalproex delayed release 250 BID, Vimpat 200 BID)  - MRI from outpatient setting uploaded into PACS today.

## 2017-11-09 NOTE — PROGRESS NOTE ADULT - PROBLEM SELECTOR PLAN 3
Patient p/w nausea and vomiting in the setting of mild leukocytosis however w/o sick contacts or other infections signs/sx. Now resolved.   - Unclear etiology of nausea and vomiting at this time, though differential would include viral gastroenteritis (given leukocytosis) vs. increased intracranial pressure in the setting of brain mets vs. medication side effect. Nausea/vomiting has now resolved and patient hungry, tolerating PO diet.  - c/w fluid resuscitation    #Leukocytosis  Etiology may be 2/2 recent steroid tx inititation vs. reactive vs. viral gastroenteritis in the setting of emesis.  - will trend WBC count  - No other signs/sx of infection at this time, though if any signs/sx of infection (SIRS criteria, fever) will do complete fever w/u and consider starting abx

## 2017-11-09 NOTE — PROGRESS NOTE ADULT - PROBLEM SELECTOR PLAN 3
Patient p/w nausea and vomiting in the setting of mild leukocytosis however w/o sick contacts or other infections signs/sx. Now resolved. Unclear etiology of nausea and vomiting at this time, though differential would include viral gastroenteritis (given leukocytosis) vs. increased intracranial pressure in the setting of brain mets vs. medication side effect. Nausea/vomiting has now resolved and patient hungry, tolerating PO diet.  - c/w fluid resuscitation    #Leukocytosis  Leukocytosis on admission to 13.8 w/ mild left shift. Etiology may be 2/2 recent steroid tx inititation vs. reactive vs. viral gastroenteritis in the setting of emesis.  - will trend WBC count  - No other signs/sx of infection at this time, though if any signs/sx of infection (SIRS criteria, fever) will do complete fever w/u and consider starting abx Patient p/w nausea and vomiting in the setting of mild leukocytosis however w/o sick contacts or other infections signs/sx. Now resolved.   - Unclear etiology of nausea and vomiting at this time, though differential would include viral gastroenteritis (given leukocytosis) vs. increased intracranial pressure in the setting of brain mets vs. medication side effect. Nausea/vomiting has now resolved and patient hungry, tolerating PO diet.  - c/w fluid resuscitation    #Leukocytosis  Etiology may be 2/2 recent steroid tx inititation vs. reactive vs. viral gastroenteritis in the setting of emesis.  - will trend WBC count  - No other signs/sx of infection at this time, though if any signs/sx of infection (SIRS criteria, fever) will do complete fever w/u and consider starting abx

## 2017-11-09 NOTE — PROGRESS NOTE ADULT - PROBLEM SELECTOR PROBLEM 6
Glaucoma
Essential hypertension
Essential hypertension
Glaucoma
Essential hypertension
Essential hypertension

## 2017-11-09 NOTE — PROGRESS NOTE ADULT - PROBLEM SELECTOR PLAN 5
Patient with questionable hx CKD, unclear baseline Cr based on last admission values, and Cr 1.80 on admission indicating JULIAN.   Suspect pre-renal etiology given hypovolemic clinical appearance in the setting of emesis and electrolyte abnormalities responding to fluid resuscitation.  - c/w maintenance fluids NS @ 120cc/hr  - avoid nephrotoxic agents including IV contrast  - strict I&O's  - trend BMP Patient with questionable hx CKD, unclear baseline Cr based on last admission values, and Cr 1.80 on admission indicating JULIAN.   Suspect pre-renal etiology given hypovolemic clinical appearance in the setting of emesis and electrolyte abnormalities responding to fluid resuscitation.  - avoid nephrotoxic agents including IV contrast  - strict I&O's  - trend BMP

## 2017-11-09 NOTE — CHART NOTE - NSCHARTNOTEFT_GEN_A_CORE
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 of nausea, vomiting, confusion, and dizziness, found to have electrolyte derangements and admitted for correction of lab abnormalities as well as work up of dizziness.         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 metastases  Malignant neoplasm of kidney: Renal cell cancer  Glaucoma: Dx 2014, s/p b/l surgical repair  Essential hypertension: HTN (hypertension)  History of nephrectomy, unilateral: right nephrectomy  Other postprocedural status: S/P craniotomy  Other postprocedural status: S/P tonsillectomy      Current Nutrition Order: DASH/TLC, No concentrate potassium     PO Intake: Good (%) [ X ]  Fair (50-75%) [   ] Poor (<25%) [   ]    GI Issues: Denies any n/v/d/c at present time, ate % of breakfast     Pain: Pain controlled     Skin Integrity: Intact     Labs:   11-09    136  |  100  |  39<H>  ----------------------------<  163<H>  5.6<H>   |  25  |  1.53<H>    Ca    8.0<L>      09 Nov 2017 06:08  Phos  2.9     11-09  Mg     2.5     11-09    TPro  x   /  Alb  3.1<L>  /  TBili  x   /  DBili  x   /  AST  x   /  ALT  x   /  AlkPhos  x   11-08    CAPILLARY BLOOD GLUCOSE          Medications:  MEDICATIONS  (STANDING):  amLODIPine   Tablet 5 milliGRAM(s) Oral daily  dexamethasone     Tablet 4 milliGRAM(s) Oral every 12 hours  diVALproex  milliGRAM(s) Oral every 12 hours  enoxaparin Injectable 40 milliGRAM(s) SubCutaneous every 24 hours  lacosamide 200 milliGRAM(s) Oral every 12 hours  latanoprost 0.005% Ophthalmic Solution 1 Drop(s) Both EYES at bedtime  levothyroxine 50 MICROGram(s) Oral daily  megestrol Suspension 200 milliGRAM(s) Oral daily  sodium chloride 0.9%. 1000 milliLiter(s) (120 mL/Hr) IV Continuous <Continuous>    MEDICATIONS  (PRN):      Weight: 71 kg     Weight Change: No new wt to assess, yet pt with 9 lb wt loss PTA     Estimated energy needs: Used current weight to calculate needs per pt's BW between 80%-120%IBW  Increased nutrient needs 2/2 prolonged catabolic illness and chemotherapy   Estimated Energy Needs (30-35 calories/kg):  · Weight  (lbs)	156.5 lb  · Weight (kg)	71 kg  · From (30 tomer/kg)	2130  · To (35 calories/kg)	2485      Protein: 1.2-1.5g/kg BW = 85g-107g protein/d     Estimated Fluid Needs (30-35 ml/kg):  · Weight  (lbs)	156.5  · Weight (kg)	71  · From (30 ml/kg)	2130  · To (35 ml/kg)	2485      Subjective: Pt tolerating diet with good PO intake. Pt with slightly elevated potassium, on potassium restriction. No nutrition related concerns or complaints.     Previous Nutrition Diagnosis: Increased nutrient needs (specify); kcal/pro RT increased demand secondary to hypermetabolic state AEB metastatic RCC undergoing treatment; unintentional weight loss 9# (5.45% weight change) in 2yrs      Active [ X  ]  Resolved [   ]    If resolved, new PES:     Goal: 1.) Pt will continue to meet 75% of needs PO     Recommendations: Continue current diet. Monitor potassium for possible liberalization of diet.     Education: Educated on current diet, potassium restriction.     Risk Level: High [   ] Moderate [ X  ] Low [   ]

## 2017-11-09 NOTE — PROGRESS NOTE ADULT - SUBJECTIVE AND OBJECTIVE BOX
Neurology Follow up note    Name  CORIN PERDOMO    HPI:  62M PMH metastatic RCC (s/p R nephrectomy, s/p craniotomy x2, s/p gamma knife x3 most recently March 2017, currently on Opdivo and Avastin w/ Dr. 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 of nausea, vomiting, confusion, and dizziness. Patient was in usual state of health until last night around 9PM, at which time he felt weak and nauseous. He required assistance from wife and niece to get to bathroom and he was noted to be diaphoretic at the time. Patient then vomited (white-royce, NBNB emesis) and continued to feel weak. Patient was then noted to be confused, forgetting the names of one of his children and with limited understanding of what was going on. Wife noted that patient had a "distant look in his eyes" which she has noted preceding past seizures. Pt did not have any tonic/clonic movements and remained responsive throughout the entirety of this episode, though wife did note that patient had mild b/l UE tremors of the hands. Last meal was dinner 3-4 hours prior to the episode and patient had normal appetite prior. Denies any recent fever, chills, diarrhea, constipation, bloody or tarry stools, URI sx, dysuria, increased frequency, cough, SOB, chest pain, palpitations, lower extremity edema. No recent NSAID use or history of ulcers. Only recent medication change was addition of dexamethasone 4mg BID on 10/25/17.     Upon arrival to the ED, patient's vitals were as follows: Tm 98F, HR 60-75, -163/, RR 18, SpO2 97-98% on RA.  Labs notable for mild leukocytosis w/ left shift (WBC 13.8, PMH 82%), mild hyponatremia (Na 130), significant hyperkalemia (K 6.6), and elevated BUN/Cr 58/1.80.   CT head performed w/ no acute changes when compared to 3/8/17 but findings concerning for metastatic disease.    Patient received 1g calcium gluconate, 10u insulin + D50, 50mEq sodium bicarb, and NS 1L bolus x1 with improvement of K to 5.7. (06 Nov 2017 08:15)      Interval History -altered mental satus        REVIEW OF SYSTEMS    Vital Signs Last 24 Hrs  T(C): 36.6 (09 Nov 2017 06:07), Max: 36.9 (08 Nov 2017 17:54)  T(F): 97.9 (09 Nov 2017 06:07), Max: 98.5 (08 Nov 2017 17:54)  HR: 93 (09 Nov 2017 06:07) (90 - 98)  BP: 150/91 (09 Nov 2017 06:07) (137/86 - 150/96)  BP(mean): --  RR: 16 (09 Nov 2017 06:07) (16 - 16)  SpO2: 97% (09 Nov 2017 06:07) (96% - 97%)    Physical Exam-     Mental Status- oriented to person and time    Cranial Nerves-no diplopia    Gait and station-n/a    Motor- moves all 4 extremities    Reflexes- decreased    Sensation- no sensory level    Coordination-no new tremors    Vascular -    Medications  amLODIPine   Tablet 5 milliGRAM(s) Oral daily  dexamethasone     Tablet 4 milliGRAM(s) Oral every 12 hours  diVALproex  milliGRAM(s) Oral every 12 hours  enoxaparin Injectable 40 milliGRAM(s) SubCutaneous every 24 hours  lacosamide 200 milliGRAM(s) Oral every 12 hours  latanoprost 0.005% Ophthalmic Solution 1 Drop(s) Both EYES at bedtime  levothyroxine 50 MICROGram(s) Oral daily  megestrol Suspension 200 milliGRAM(s) Oral daily  sodium chloride 0.9%. 1000 milliLiter(s) IV Continuous <Continuous>  sodium polystyrene sulfonate Suspension 30 Gram(s) Oral once      Lab      Radiology    Assessment- Brain mets  No seizures   FU as per Dr Bhakti Borges

## 2017-11-09 NOTE — PROGRESS NOTE ADULT - PROBLEM SELECTOR PLAN 1
Patient p/w dizziness and diaphoresis with an increase in confusion in the setting of emesis and electrolyte abnormalities found on admission. Etiology of dizziness likely 2/2 volume depletion, however cannot r/o new brain mets vs. seizures.   - neurology consulted, recommending MRI w/ contrast to r/o brain mets and vEEG   - vEEG monitoring for seizures given sz hx and confusion  - c/w home seizure medications (Divalproex delayed release 250 BID, Vimpat 200 BID)  - MRI from outpatient setting uploaded into PACS today.
Patient with hx RCC with mets to brain, s/p R total nephrectomy, craniotomy x2 and gamma knife tx x3 now with CT findings showing possible mets and reported outpatient MRI 2 weeks ago showing new L sided lesions. Most recent Opdivo/avastin treatment 9/20/2017.  - Per Dr. Ya will start on chemotherapy: Votrient (Pazopanib) 200 mg daily which will be escalated every 2 weeks going to 400 mg, 600 mg and finally to a maximum of 800 mg providing there is no cardiac or central nervous system toxicities.  - Consult PT   - Nutrition consult, patient malnourished as compared to baseline.
Patient p/w dizziness and diaphoresis with an increase in confusion in the setting of emesis and electrolyte abnormalities found on admission. Etiology of dizziness likely 2/2 volume depletion, however cannot r/o new brain mets vs. seizures.   - neurology consulted, recommending MRI w/ contrast to r/o brain mets and vEEG   - vEEG monitoring for seizures given sz hx and confusion  - c/w home seizure medications (Divalproex delayed release 250 BID, Vimpat 200 BID)  - MRI from outpatient setting uploaded into PACS today.
Patient with hx RCC with mets to brain, s/p R total nephrectomy, craniotomy x2 and gamma knife tx x3 now with CT findings showing possible mets and reported outpatient MRI 2 weeks ago showing new L sided lesions. Most recent Opdivo/avastin treatment 9/20/2017.  - Per Dr. Ya will start on chemotherapy: Votrient (Pazopanib) 200 mg daily which will be escalated every 2 weeks going to 400 mg, 600 mg and finally to a maximum of 800 mg providing there is no cardiac or central nervous system toxicities.  - Per rad onc, MRI brain w/ IV contrast
Patient with hx RCC with mets to brain, s/p R total nephrectomy, craniotomy x2 and gamma knife tx x3 now with CT findings showing possible mets and reported outpatient MRI 2 weeks ago showing new L sided lesions. Most recent Opdivo/avastin treatment 9/20/2017.  - Per Dr. Ya will start on chemotherapy: Votrient (Pazopanib) 200 mg daily which will be escalated every 2 weeks going to 400 mg, 600 mg and finally to a maximum of 800 mg providing there is no cardiac or central nervous system toxicities.  - Consult PT   - Nutrition consult, patient malnourished as compared to baseline.
Patient with hx RCC with mets to brain, s/p R total nephrectomy, craniotomy x2 and gamma knife tx x3 now with CT findings showing possible mets and reported outpatient MRI 2 weeks ago showing new L sided lesions. Most recent Opdivo/avastin treatment 9/20/2017.  - Per Dr. Ya will start on chemotherapy: Votrient (Pazopanib) 200 mg daily which will be escalated every 2 weeks going to 400 mg, 600 mg and finally to a maximum of 800 mg providing there is no cardiac or central nervous system toxicities.  - Per rad onc, MRI brain w/ IV contrast

## 2017-11-09 NOTE — PROGRESS NOTE ADULT - PROBLEM SELECTOR PLAN 7
Pt w/ hx of glaucoma, on home latanoprost drops at bedtime.   - c/w latanoprost drops 1 drop each eye b/l qHS
Patient with chemotherapy (Opdivo/avastin) induced hypothyroidism, on home Synthroid 50mcg qd.   - c/w Synthroid 50mcg qd  - f/u TSH
Patient with chemotherapy (Opdivo/avastin) induced hypothyroidism, on home Synthroid 50mcg qd.   - c/w Synthroid 50mcg qd  - f/u TSH
Pt w/ hx of glaucoma, on home latanoprost drops at bedtime.   - c/w latanoprost drops 1 drop each eye b/l qHS

## 2017-11-09 NOTE — PROGRESS NOTE ADULT - PROBLEM SELECTOR PLAN 4
Patient with hyperkalemia on admission to 6.6, EKG mild peaked T waves, s/p calcium gluconate/insulin/sodium bicarb. Most recent K 5.5 this AM  - Received Kayexalate today with bowel movements   - f/u repeat BMP at 6PM   - c/w maintenance fluids @ 120cc/hr for JULIAN given likely pre-renal etiology and suspect that with correction of JULIAN, K will also correct  - trend BMP Hyperkalemia on admission, resolved however 5.6 this AM  - Received Kayexalate tod  - f/u repeat BMP  - Maintenance fluids were stopped, however restarted this afternoon IVF NS 120cc/hr  - trend BMP

## 2017-11-09 NOTE — PROGRESS NOTE ADULT - PROBLEM SELECTOR PLAN 4
Hyperkalemia on admission, resolved however 5.6 this AM  - Received Kayexalate tod  - f/u repeat BMP  - Maintenance fluids were stopped, however restarted this afternoon IVF NS 120cc/hr  - trend BMP

## 2017-11-09 NOTE — PROGRESS NOTE ADULT - PROBLEM SELECTOR PLAN 6
Patient w/ history of HTN with dx preceding cancer tx. On home triamterene/HCTZ 37.5/25 qd and Coreg CR 40mg qd.   - holding diuretics and home BB at this time in the setting of volume depletion and JULIAN  - given hypertension to SBP 160s since admission, will start low dose CCB (amlodipine 5mg qd) for HTN Patient w/ history of HTN with dx preceding cancer tx. On home triamterene/HCTZ 37.5/25 qd and Coreg CR 40mg qd.   - holding diuretics and home BB at this time in the setting of volume depletion and JULIAN  - C/w amlodipine started on this admission

## 2017-11-09 NOTE — PROGRESS NOTE ADULT - PROBLEM SELECTOR PLAN 5
Patient with questionable hx CKD, unclear baseline Cr based on last admission values, and Cr 1.80 on admission indicating JULIAN.   Suspect pre-renal etiology given hypovolemic clinical appearance in the setting of emesis and electrolyte abnormalities responding to fluid resuscitation.  - avoid nephrotoxic agents including IV contrast  - strict I&O's  - trend BMP

## 2017-11-10 ENCOUNTER — TRANSCRIPTION ENCOUNTER (OUTPATIENT)
Age: 62
End: 2017-11-10

## 2017-11-10 VITALS
TEMPERATURE: 98 F | HEART RATE: 86 BPM | OXYGEN SATURATION: 99 % | DIASTOLIC BLOOD PRESSURE: 91 MMHG | RESPIRATION RATE: 16 BRPM | SYSTOLIC BLOOD PRESSURE: 145 MMHG

## 2017-11-10 LAB
ANION GAP SERPL CALC-SCNC: 11 MMOL/L — SIGNIFICANT CHANGE UP (ref 5–17)
BUN SERPL-MCNC: 43 MG/DL — HIGH (ref 7–23)
CALCIUM SERPL-MCNC: 7.4 MG/DL — LOW (ref 8.4–10.5)
CHLORIDE SERPL-SCNC: 101 MMOL/L — SIGNIFICANT CHANGE UP (ref 96–108)
CO2 SERPL-SCNC: 25 MMOL/L — SIGNIFICANT CHANGE UP (ref 22–31)
CREAT SERPL-MCNC: 1.57 MG/DL — HIGH (ref 0.5–1.3)
GLUCOSE SERPL-MCNC: 142 MG/DL — HIGH (ref 70–99)
HCT VFR BLD CALC: 37.7 % — LOW (ref 39–50)
HGB BLD-MCNC: 12 G/DL — LOW (ref 13–17)
MAGNESIUM SERPL-MCNC: 2.2 MG/DL — SIGNIFICANT CHANGE UP (ref 1.6–2.6)
MCHC RBC-ENTMCNC: 27.6 PG — SIGNIFICANT CHANGE UP (ref 27–34)
MCHC RBC-ENTMCNC: 31.8 G/DL — LOW (ref 32–36)
MCV RBC AUTO: 86.7 FL — SIGNIFICANT CHANGE UP (ref 80–100)
PHOSPHATE SERPL-MCNC: 3.6 MG/DL — SIGNIFICANT CHANGE UP (ref 2.5–4.5)
PLATELET # BLD AUTO: 211 K/UL — SIGNIFICANT CHANGE UP (ref 150–400)
POTASSIUM SERPL-MCNC: 4.6 MMOL/L — SIGNIFICANT CHANGE UP (ref 3.5–5.3)
POTASSIUM SERPL-SCNC: 4.6 MMOL/L — SIGNIFICANT CHANGE UP (ref 3.5–5.3)
RBC # BLD: 4.35 M/UL — SIGNIFICANT CHANGE UP (ref 4.2–5.8)
RBC # FLD: 16.2 % — SIGNIFICANT CHANGE UP (ref 10.3–16.9)
SODIUM SERPL-SCNC: 137 MMOL/L — SIGNIFICANT CHANGE UP (ref 135–145)
WBC # BLD: 17.1 K/UL — HIGH (ref 3.8–10.5)
WBC # FLD AUTO: 17.1 K/UL — HIGH (ref 3.8–10.5)

## 2017-11-10 RX ORDER — CARVEDILOL PHOSPHATE 80 MG/1
1 CAPSULE, EXTENDED RELEASE ORAL
Qty: 0 | Refills: 0 | COMMUNITY

## 2017-11-10 RX ORDER — AMLODIPINE BESYLATE 2.5 MG/1
1 TABLET ORAL
Qty: 0 | Refills: 0 | COMMUNITY
Start: 2017-11-10

## 2017-11-10 RX ORDER — AMLODIPINE BESYLATE 2.5 MG/1
1 TABLET ORAL
Qty: 30 | Refills: 0 | OUTPATIENT
Start: 2017-11-10 | End: 2017-12-10

## 2017-11-10 RX ADMIN — ENOXAPARIN SODIUM 40 MILLIGRAM(S): 100 INJECTION SUBCUTANEOUS at 06:18

## 2017-11-10 RX ADMIN — LACOSAMIDE 200 MILLIGRAM(S): 50 TABLET ORAL at 06:18

## 2017-11-10 RX ADMIN — AMLODIPINE BESYLATE 5 MILLIGRAM(S): 2.5 TABLET ORAL at 06:18

## 2017-11-10 RX ADMIN — Medication 50 MICROGRAM(S): at 06:18

## 2017-11-10 RX ADMIN — DIVALPROEX SODIUM 250 MILLIGRAM(S): 500 TABLET, DELAYED RELEASE ORAL at 06:18

## 2017-11-10 RX ADMIN — MEGESTROL ACETATE 200 MILLIGRAM(S): 40 SUSPENSION ORAL at 12:55

## 2017-11-10 RX ADMIN — Medication 4 MILLIGRAM(S): at 06:22

## 2017-11-10 RX ADMIN — SODIUM CHLORIDE 120 MILLILITER(S): 9 INJECTION INTRAMUSCULAR; INTRAVENOUS; SUBCUTANEOUS at 06:26

## 2017-11-10 NOTE — PROGRESS NOTE ADULT - PROVIDER SPECIALTY LIST ADULT
Cardiology
Heme/Onc
Internal Medicine
Neurology
Psychiatry
Rehab Medicine
Cardiology
Cardiology
Internal Medicine
Internal Medicine

## 2017-11-10 NOTE — DISCHARGE NOTE ADULT - HOSPITAL COURSE
61 y/o male with past history of metastatic RCC (s/p nephrectomy, s/p craniotomy x2, s/p gamma knife x3 most recently march 2017, currently on Opdivo and Avastin with Dr. 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 Upon arrival to the ED, patient's vitals were as follows: Tm 98F, HR 60-75, -163/, RR 18, SpO2 97-98% on RA. Labs notable for mild leukocytosis w/ left shift (WBC 13.8, PMH 82%), mild hyponatremia (Na 130), significant hyperkalemia (K 6.6), and elevated BUN/Cr 58/1.80. CT head performed w/ no acute changes when compared to 3/8/17 but findings concerning for metastatic disease.Patient received 1g calcium gluconate, 10u insulin + D50, 50mEq sodium bicarb, and NS 1L bolus x1 with improvement of K to 5.7. Hyperkalemia resolved on day 2 of hospital course after 2 additional doses of Kayexalate were given. Rad/onc was consulted for possible radiation therapy in the setting of possible new brain mets. MRI w/ contrast was performed. vEEG with no new seizure activity. Patient was clinically stable and deemed ready for discharge home with PT. 63 y/o male with past history of metastatic RCC (s/p nephrectomy, s/p craniotomy x2, s/p gamma knife x3 most recently march 2017, currently on Opdivo and Avastin with Dr. 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 Upon arrival to the ED, patient's vitals were as follows: Tm 98F, HR 60-75, -163/, RR 18, SpO2 97-98% on RA. Labs notable for mild leukocytosis w/ left shift (WBC 13.8, PMH 82%), mild hyponatremia (Na 130), significant hyperkalemia (K 6.6), and elevated BUN/Cr 58/1.80. CT head performed w/ no acute changes when compared to 3/8/17 but findings concerning for metastatic disease.Patient received 1g calcium gluconate, 10u insulin + D50, 50mEq sodium bicarb, and NS 1L bolus x1 with improvement of K to 5.7. Hyperkalemia resolved on day 2 of hospital course after 2 additional doses of Kayexalate were given. Rad/onc was consulted for possible radiation therapy in the setting of possible new brain mets. MRI w/ contrast was performed. vEEG with no new seizure activity.  Dr. Ya started the patient on chemotherapy: Votrient (Pazopanib) 200 mg daily which will be escalated every 2 weeks going to 400 mg, 600 mg and finally to a maximum of 800 mg providing there is no cardiac or central nervous system toxicities. Patient was clinically stable and deemed ready for discharge home with PT. 61 y/o male with past history of metastatic RCC (s/p nephrectomy, s/p craniotomy x2, s/p gamma knife x3 most recently march 2017, currently on Opdivo and Avastin with Dr. 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 Upon arrival to the ED, patient's vitals were as follows: Tm 98F, HR 60-75, -163/, RR 18, SpO2 97-98% on RA. Labs notable for mild leukocytosis w/ left shift (WBC 13.8, PMH 82%), mild hyponatremia (Na 130), significant hyperkalemia (K 6.6), and elevated BUN/Cr 58/1.80. CT head performed w/ no acute changes when compared to 3/8/17 but findings concerning for metastatic disease.Patient received 1g calcium gluconate, 10u insulin + D50, 50mEq sodium bicarb, and NS 1L bolus x1 with improvement of K to 5.7. Hyperkalemia resolved on day 2 of hospital course after 2 additional doses of Kayexalate were given. Rad/onc was consulted for possible radiation therapy in the setting of possible new brain mets. MRI w/ contrast was performed. vEEG with no new seizure activity.  Dr. Ya started the patient on chemotherapy: Votrient (Pazopanib) 200 mg daily which will be escalated every 2 weeks going to 400 mg, 600 mg and finally to a maximum of 800 mg providing there is no cardiac or central nervous system toxicities. Patient was clinically stable and deemed ready for discharge home with PT.

## 2017-11-10 NOTE — PROGRESS NOTE ADULT - NSHPATTENDINGPLANDISCUSS_GEN_ALL_CORE
Edgardo English.HS
patient
The patient and the house staff in person, Dr. Michael Rdz by telephone
The patient, his spouse, Dr. Michael Rdz and the house staff and person
The patient, his spouse, Dr. Nain Reynoso and the house staff and person.
Edgardo English Perry.HS
Edgardo English.HS
Edgardo English.HS

## 2017-11-10 NOTE — DISCHARGE NOTE ADULT - PATIENT PORTAL LINK FT
“You can access the Follow[facility] Patient Portal, offered by [facility], by registering with the following website: [email or web address]”

## 2017-11-10 NOTE — DISCHARGE NOTE ADULT - MEDICATION SUMMARY - MEDICATIONS TO TAKE
I will START or STAY ON the medications listed below when I get home from the hospital:    dexamethasone 4 mg oral tablet  -- 1 tab(s) by mouth 2 times a day  -- Indication: For Malignant neoplasm of kidney    Opdivo 10 mg/mL intravenous solution  --  intravenous   -- Indication: For Malignant neoplasm of kidney    divalproex sodium 250 mg oral delayed release tablet  -- 250 milligram(s) by mouth 2 times a day  -- Indication: For Seizure    Vimpat 200 mg oral tablet  -- 1 tab(s) by mouth 2 times a day  -- Indication: For Seizure    megestrol 40 mg/mL oral suspension  -- 5 milliliter(s) by mouth once a day  -- Indication: For Malignant neoplasm of kidney    amLODIPine 5 mg oral tablet  -- 1 tab(s) by mouth once a day  -- Indication: For Essential hypertension    latanoprost 0.005% ophthalmic solution  -- 1 drop(s) to each affected eye once a day (at bedtime)  -- Indication: For Eye discomfort    levothyroxine 50 mcg (0.05 mg) oral tablet  -- 1 tab(s) by mouth once a day  -- Indication: For Hypothyroidism, iatrogenic    Avastin 25 mg/mL intravenous solution  --  intravenous   -- Indication: For Malignant neoplasm of kidney

## 2017-11-10 NOTE — PROGRESS NOTE ADULT - SUBJECTIVE AND OBJECTIVE BOX
62M PMH metastatic RCC (s/p R nephrectomy, s/p craniotomy x2, s/p gamma knife x3 most recently March 2017, currently on Opdivo and Avastin w/ Dr. 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 of nausea, vomiting, confusion, and dizziness. Patient was in usual state of health until last night around 9PM, at which time he felt weak and nauseous. He required assistance from wife and niece to get to bathroom and he was noted to be diaphoretic at the time. Patient then vomited (white-royce, NBNB emesis) and continued to feel weak. Patient was then noted to be confused, forgetting the names of one of his children and with limited understanding of what was going on. Wife noted that patient had a "distant look in his eyes" which she has noted preceding past seizures. Pt did not have any tonic/clonic movements and remained responsive throughout the entirety of this episode, though wife did note that patient had mild b/l UE tremors of the hands. Last meal was dinner 3-4 hours prior to the episode and patient had normal appetite prior. Denies any recent fever, chills, diarrhea, constipation, bloody or tarry stools, URI sx, dysuria, increased frequency, cough, SOB, chest pain, palpitations, lower extremity edema. No recent NSAID use or history of ulcers. Only recent medication change was addition of dexamethasone 4mg BID on 10/25/17.     Upon arrival to the ED, patient's vitals were as follows: Tm 98F, HR 60-75, -163/, RR 18, SpO2 97-98% on RA.  Labs notable for mild leukocytosis w/ left shift (WBC 13.8, PMH 82%), mild hyponatremia (Na 130), significant hyperkalemia (K 6.6), and elevated BUN/Cr 58/1.80.   CT head performed w/ no acute changes when compared to 3/8/17 but findings concerning for metastatic disease.    Patient received 1g calcium gluconate, 10u insulin + D50, 50mEq sodium bicarb, and NS 1L bolus x1 with improvement of K to 5.7.       Patient seen and examined at bedside in AM        	  MEDICATIONS:  amLODIPine   Tablet 5 milliGRAM(s) Oral daily        diVALproex  milliGRAM(s) Oral every 12 hours  lacosamide 200 milliGRAM(s) Oral every 12 hours      dexamethasone     Tablet 4 milliGRAM(s) Oral every 12 hours  levothyroxine 50 MICROGram(s) Oral daily  megestrol Suspension 200 milliGRAM(s) Oral daily    enoxaparin Injectable 40 milliGRAM(s) SubCutaneous every 24 hours  latanoprost 0.005% Ophthalmic Solution 1 Drop(s) Both EYES at bedtime  sodium chloride 0.9%. 1000 milliLiter(s) IV Continuous <Continuous>      Complaint:     Otherwise 12 point review of systems is normal.    PHYSICAL EXAM:    Constitutional: NAD  Eyes: PERRL, EOMI, sclera non-icteric  Neck: supple, no masses, no JVD  Respiratory: CTA b/l, good air entry b/l, no wheezing, rhonchi, rales, with normal respiratory effort and no intercostal retractions  Cardiovascular: RRR, normal S1S2, no M/R/G  Gastrointestinal: soft, NTND, no masses palpable, BS normal in all four quadrants,   Extremities:  no c/c/e  Neurological: AAOx2    ICU Vital Signs Last 24 Hrs  T(C): 36.8 (10 Nov 2017 11:23), Max: 36.8 (10 Nov 2017 11:23)  T(F): 98.3 (10 Nov 2017 11:23), Max: 98.3 (10 Nov 2017 11:23)  HR: 86 (10 Nov 2017 11:23) (81 - 100)  BP: 145/91 (10 Nov 2017 11:23) (134/87 - 145/91)  BP(mean): --  ABP: --  ABP(mean): --  RR: 16 (10 Nov 2017 11:23) (14 - 16)  SpO2: 99% (10 Nov 2017 11:23) (96% - 99%)      ECG:      CHEST X RAY    CT    MRI    MRA    CT ANGIO    CAROTID DUPLEX    DUPLEX     Echocardiogram    Catheterization:    Stress Test:     LABS:	 	  CARDIAC MARKERS:                              12.0   17.1  )-----------( 211      ( 10 Nov 2017 06:03 )             37.7     11-10    137  |  101  |  43<H>  ----------------------------<  142<H>  4.6   |  25  |  1.57<H>    Ca    7.4<L>      10 Nov 2017 06:03  Phos  3.6     11-10  Mg     2.2     11-10        ASSESSMENT/PLAN: 	  61 y/o male with past history of metastatic RCC (s/p nephrectomy, s/p craniotomy x2, s/p gamma knife x3 most recently march 2017, currently on Opdivo and Avastin with Dr. 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 Upon arrival to the ED, patient's vitals were as follows: Tm 98F, HR 60-75, -163/, RR 18, SpO2 97-98% on RA. Labs notable for mild leukocytosis w/ left shift (WBC 13.8, PMH 82%), mild hyponatremia (Na 130), significant hyperkalemia (K 6.6), and elevated BUN/Cr 58/1.80. CT head performed w/ no acute changes when compared to 3/8/17 but findings concerning for metastatic disease.Patient received 1g calcium gluconate, 10u insulin + D50, 50mEq sodium bicarb, and NS 1L bolus x1 with improvement of K to 5.7. Hyperkalemia resolved on day 2 of hospital course after 2 additional doses of Kayexalate were given. Rad/onc was consulted for possible radiation therapy in the setting of possible new brain mets. MRI w/ contrast was performed. vEEG with no new seizure activity.  Dr. Ya started the patient on chemotherapy: Votrient (Pazopanib) 200 mg daily which will be escalated every 2 weeks going to 400 mg, 600 mg and finally to a maximum of 800 mg providing there is no cardiac or central nervous system toxicities. Patient was clinically stable and deemed ready for discharge home with PT.

## 2017-11-10 NOTE — DISCHARGE NOTE ADULT - PLAN OF CARE
To prevent confusion On this admission, you came into the hospital confused likely due to electrolyte abnormalities. We corrected the abnormalities and you became less confused. If you become confused again please contact your primary care provider. On this admission, you were found to have acute kidney injury which occurred likely due to dehydration. We gave you fluids and your kidney function improved. Please follow up with your primary care providers for further management. You came into the hospital for nausea and vomiting. You likely had a viral illness that caused you to fall ill. Please call your primary care provider should your symptoms re-occur. You have a history of malignant renal cell carcinoma for which you follow up with Dr. Ya. On this admission, you had an MRI to determine if further treatment for cancer is necessary. Please continue to follow with Dr. Ya for further management. On this admission, you were found to have elevated potassium which resolved with medications. Please continue to eat healthy and take all of your medications. You have a history of high blood pressure. Some of your medications you were taking at home could have caused kidney injury we found on admission. We switched your blood pressure medications to one that is better for the kidneys. Please take Norvasc 5mg once a day and follow up with your primary care provider for further management.

## 2017-11-10 NOTE — DISCHARGE NOTE ADULT - MEDICATION SUMMARY - MEDICATIONS TO STOP TAKING
I will STOP taking the medications listed below when I get home from the hospital:    triamterene-hydroCHLOROthiazide 37.5mg-25mg oral capsule  -- 1 cap(s) by mouth once a day    Coreg CR 40 mg oral capsule, extended release  -- 1 cap(s) by mouth once a day (in the morning)

## 2017-11-10 NOTE — PROGRESS NOTE ADULT - SUBJECTIVE AND OBJECTIVE BOX
Neurology Follow up note    Name  CORIN PERDOMO    HPI:  62M PMH metastatic RCC (s/p R nephrectomy, s/p craniotomy x2, s/p gamma knife x3 most recently March 2017, currently on Opdivo and Avastin w/ Dr. 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 of nausea, vomiting, confusion, and dizziness. Patient was in usual state of health until last night around 9PM, at which time he felt weak and nauseous. He required assistance from wife and niece to get to bathroom and he was noted to be diaphoretic at the time. Patient then vomited (white-royce, NBNB emesis) and continued to feel weak. Patient was then noted to be confused, forgetting the names of one of his children and with limited understanding of what was going on. Wife noted that patient had a "distant look in his eyes" which she has noted preceding past seizures. Pt did not have any tonic/clonic movements and remained responsive throughout the entirety of this episode, though wife did note that patient had mild b/l UE tremors of the hands. Last meal was dinner 3-4 hours prior to the episode and patient had normal appetite prior. Denies any recent fever, chills, diarrhea, constipation, bloody or tarry stools, URI sx, dysuria, increased frequency, cough, SOB, chest pain, palpitations, lower extremity edema. No recent NSAID use or history of ulcers. Only recent medication change was addition of dexamethasone 4mg BID on 10/25/17.     Upon arrival to the ED, patient's vitals were as follows: Tm 98F, HR 60-75, -163/, RR 18, SpO2 97-98% on RA.  Labs notable for mild leukocytosis w/ left shift (WBC 13.8, PMH 82%), mild hyponatremia (Na 130), significant hyperkalemia (K 6.6), and elevated BUN/Cr 58/1.80.   CT head performed w/ no acute changes when compared to 3/8/17 but findings concerning for metastatic disease.    Patient received 1g calcium gluconate, 10u insulin + D50, 50mEq sodium bicarb, and NS 1L bolus x1 with improvement of K to 5.7. (06 Nov 2017 08:15)      Interval History - mental status improved- reports no pain        REVIEW OF SYSTEMS    Vital Signs Last 24 Hrs  T(C): 36.5 (10 Nov 2017 05:47), Max: 36.7 (09 Nov 2017 11:27)  T(F): 97.7 (10 Nov 2017 05:47), Max: 98.1 (09 Nov 2017 11:27)  HR: 81 (10 Nov 2017 05:47) (81 - 100)  BP: 138/84 (10 Nov 2017 05:47) (134/87 - 146/91)  BP(mean): --  RR: 14 (10 Nov 2017 05:47) (14 - 16)  SpO2: 96% (10 Nov 2017 05:47) (96% - 98%)    Physical Exam-     Mental Status- awake - poor memory    Cranial Nerves- full EOM    Gait and station- weakness LE    Motor- LE weakness    Reflexes- decreased    Sensation- no sensory level    Coordination- no tremors    Vascular -    Medications  amLODIPine   Tablet 5 milliGRAM(s) Oral daily  dexamethasone     Tablet 4 milliGRAM(s) Oral every 12 hours  diVALproex  milliGRAM(s) Oral every 12 hours  enoxaparin Injectable 40 milliGRAM(s) SubCutaneous every 24 hours  lacosamide 200 milliGRAM(s) Oral every 12 hours  latanoprost 0.005% Ophthalmic Solution 1 Drop(s) Both EYES at bedtime  levothyroxine 50 MICROGram(s) Oral daily  megestrol Suspension 200 milliGRAM(s) Oral daily  sodium chloride 0.9%. 1000 milliLiter(s) IV Continuous <Continuous>      Lab      Radiology    Assessment- Brain mets    Plan- no changes in seizure drugs

## 2017-11-10 NOTE — DISCHARGE NOTE ADULT - CARE PROVIDER_API CALL
Clyde Ya), Internal Medicine; Medical Oncology  [address] [place] [address]  Phone: [phone]  Fax: [phone]

## 2017-11-10 NOTE — PROGRESS NOTE ADULT - ATTENDING COMMENTS
Discharge the patient to home today.  Prescription for Pazopanib it has been sent to the specialty pharmacy.  The patient will see me next week.  The patient will have his immunotherapy increased by the addition of Yervoy to opdivo and Pazopanib.

## 2017-11-10 NOTE — PROGRESS NOTE ADULT - SUBJECTIVE AND OBJECTIVE BOX
The patient was seen on the ninth and 10th of November.  The patient was feeling better daily.  He was able to walk with a walker and assistance.  He is anxious to go home with home rehabilitation.    Examination of the head ears eyes nose and throat demonstrates no abnormality.  Examination of the neck reveals no palpable lymphadenopathy, jugular venous distention or thyroidomegaly.  The lungs are clear to percussion and auscultation.  The heart sounds are regular with no auscultatory evidence for murmur, rub or gallop.  The patient has no truncal obesity and there is no palpable or percussible hepatomegaly or splenomegaly.  The patient has no chronic venous stasis changes in the lower extremities.  Neurologically the patient however has proximal muscle weakness especially in the pelvic girdle muscles.  The patient has a right pronator drift and extension of his tongue deviates to the right.  The patient has left hemispherical signs which are even better today on current therapy.

## 2017-11-10 NOTE — DISCHARGE NOTE ADULT - CARE PLAN
Principal Discharge DX:	Metabolic encephalopathy  Goal:	To prevent confusion  Instructions for follow-up, activity and diet:	On this admission, you came into the hospital confused likely due to electrolyte abnormalities. We corrected the abnormalities and you became less confused. If you become confused again please contact your primary care provider.  Secondary Diagnosis:	JULIAN (acute kidney injury)  Instructions for follow-up, activity and diet:	On this admission, you were found to have acute kidney injury which occurred likely due to dehydration. We gave you fluids and your kidney function improved. Please follow up with your primary care providers for further management.  Secondary Diagnosis:	Nausea and vomiting  Instructions for follow-up, activity and diet:	You came into the hospital for nausea and vomiting. You likely had a viral illness that caused you to fall ill. Please call your primary care provider should your symptoms re-occur.  Secondary Diagnosis:	Malignant neoplasm of kidney  Instructions for follow-up, activity and diet:	You have a history of malignant renal cell carcinoma for which you follow up with Dr. Ya. On this admission, you had an MRI to determine if further treatment for cancer is necessary. Please continue to follow with Dr. Ya for further management.  Secondary Diagnosis:	Hyperkalemia  Instructions for follow-up, activity and diet:	On this admission, you were found to have elevated potassium which resolved with medications.  Secondary Diagnosis:	Nutrition, metabolism, and development symptoms  Instructions for follow-up, activity and diet:	Please continue to eat healthy and take all of your medications. Principal Discharge DX:	Metabolic encephalopathy  Goal:	To prevent confusion  Instructions for follow-up, activity and diet:	On this admission, you came into the hospital confused likely due to electrolyte abnormalities. We corrected the abnormalities and you became less confused. If you become confused again please contact your primary care provider.  Secondary Diagnosis:	JULIAN (acute kidney injury)  Instructions for follow-up, activity and diet:	On this admission, you were found to have acute kidney injury which occurred likely due to dehydration. We gave you fluids and your kidney function improved. Please follow up with your primary care providers for further management.  Secondary Diagnosis:	Nausea and vomiting  Instructions for follow-up, activity and diet:	You came into the hospital for nausea and vomiting. You likely had a viral illness that caused you to fall ill. Please call your primary care provider should your symptoms re-occur.  Secondary Diagnosis:	Malignant neoplasm of kidney  Instructions for follow-up, activity and diet:	You have a history of malignant renal cell carcinoma for which you follow up with Dr. Ya. On this admission, you had an MRI to determine if further treatment for cancer is necessary. Please continue to follow with Dr. Ya for further management.  Secondary Diagnosis:	Hyperkalemia  Instructions for follow-up, activity and diet:	On this admission, you were found to have elevated potassium which resolved with medications.  Secondary Diagnosis:	Nutrition, metabolism, and development symptoms  Instructions for follow-up, activity and diet:	Please continue to eat healthy and take all of your medications.  Secondary Diagnosis:	Essential hypertension  Instructions for follow-up, activity and diet:	You have a history of high blood pressure. Some of your medications you were taking at home could have caused kidney injury we found on admission. We switched your blood pressure medications to one that is better for the kidneys. Please take Norvasc 5mg once a day and follow up with your primary care provider for further management.

## 2017-11-10 NOTE — DISCHARGE NOTE ADULT - SECONDARY DIAGNOSIS.
JULIAN (acute kidney injury) Nausea and vomiting Malignant neoplasm of kidney Hyperkalemia Nutrition, metabolism, and development symptoms Essential hypertension

## 2017-11-13 DIAGNOSIS — N17.9 ACUTE KIDNEY FAILURE, UNSPECIFIED: ICD-10-CM

## 2017-11-13 DIAGNOSIS — Z90.5 ACQUIRED ABSENCE OF KIDNEY: ICD-10-CM

## 2017-11-13 DIAGNOSIS — N18.9 CHRONIC KIDNEY DISEASE, UNSPECIFIED: ICD-10-CM

## 2017-11-13 DIAGNOSIS — Z80.0 FAMILY HISTORY OF MALIGNANT NEOPLASM OF DIGESTIVE ORGANS: ICD-10-CM

## 2017-11-13 DIAGNOSIS — H40.9 UNSPECIFIED GLAUCOMA: ICD-10-CM

## 2017-11-13 DIAGNOSIS — Z85.528 PERSONAL HISTORY OF OTHER MALIGNANT NEOPLASM OF KIDNEY: ICD-10-CM

## 2017-11-13 DIAGNOSIS — T45.1X5A ADVERSE EFFECT OF ANTINEOPLASTIC AND IMMUNOSUPPRESSIVE DRUGS, INITIAL ENCOUNTER: ICD-10-CM

## 2017-11-13 DIAGNOSIS — G92 TOXIC ENCEPHALOPATHY: ICD-10-CM

## 2017-11-13 DIAGNOSIS — E87.1 HYPO-OSMOLALITY AND HYPONATREMIA: ICD-10-CM

## 2017-11-13 DIAGNOSIS — E03.2 HYPOTHYROIDISM DUE TO MEDICAMENTS AND OTHER EXOGENOUS SUBSTANCES: ICD-10-CM

## 2017-11-13 DIAGNOSIS — E87.5 HYPERKALEMIA: ICD-10-CM

## 2017-11-13 DIAGNOSIS — R42 DIZZINESS AND GIDDINESS: ICD-10-CM

## 2017-11-13 DIAGNOSIS — I12.9 HYPERTENSIVE CHRONIC KIDNEY DISEASE WITH STAGE 1 THROUGH STAGE 4 CHRONIC KIDNEY DISEASE, OR UNSPECIFIED CHRONIC KIDNEY DISEASE: ICD-10-CM

## 2017-11-13 DIAGNOSIS — F43.21 ADJUSTMENT DISORDER WITH DEPRESSED MOOD: ICD-10-CM

## 2017-11-13 DIAGNOSIS — D72.829 ELEVATED WHITE BLOOD CELL COUNT, UNSPECIFIED: ICD-10-CM

## 2017-11-13 DIAGNOSIS — C79.31 SECONDARY MALIGNANT NEOPLASM OF BRAIN: ICD-10-CM

## 2017-11-13 DIAGNOSIS — Z92.3 PERSONAL HISTORY OF IRRADIATION: ICD-10-CM

## 2017-11-13 DIAGNOSIS — E86.0 DEHYDRATION: ICD-10-CM

## 2017-12-04 ENCOUNTER — INPATIENT (INPATIENT)
Facility: HOSPITAL | Age: 62
LOS: 17 days | Discharge: NURSING, REHAB OR LONG-TERM CARE | DRG: 923 | End: 2017-12-22
Attending: INTERNAL MEDICINE | Admitting: INTERNAL MEDICINE
Payer: MEDICARE

## 2017-12-04 VITALS
TEMPERATURE: 98 F | SYSTOLIC BLOOD PRESSURE: 132 MMHG | RESPIRATION RATE: 18 BRPM | OXYGEN SATURATION: 97 % | DIASTOLIC BLOOD PRESSURE: 95 MMHG | HEART RATE: 107 BPM

## 2017-12-04 DIAGNOSIS — R63.8 OTHER SYMPTOMS AND SIGNS CONCERNING FOOD AND FLUID INTAKE: ICD-10-CM

## 2017-12-04 DIAGNOSIS — R41.82 ALTERED MENTAL STATUS, UNSPECIFIED: ICD-10-CM

## 2017-12-04 DIAGNOSIS — R53.1 WEAKNESS: ICD-10-CM

## 2017-12-04 DIAGNOSIS — C64.9 MALIGNANT NEOPLASM OF UNSPECIFIED KIDNEY, EXCEPT RENAL PELVIS: ICD-10-CM

## 2017-12-04 DIAGNOSIS — I10 ESSENTIAL (PRIMARY) HYPERTENSION: ICD-10-CM

## 2017-12-04 DIAGNOSIS — Z90.5 ACQUIRED ABSENCE OF KIDNEY: Chronic | ICD-10-CM

## 2017-12-04 LAB
ANION GAP SERPL CALC-SCNC: 13 MMOL/L — SIGNIFICANT CHANGE UP (ref 5–17)
APPEARANCE UR: CLEAR — SIGNIFICANT CHANGE UP
BACTERIA # UR AUTO: PRESENT /HPF
BASOPHILS NFR BLD AUTO: 0.1 % — SIGNIFICANT CHANGE UP (ref 0–2)
BILIRUB UR-MCNC: NEGATIVE — SIGNIFICANT CHANGE UP
BUN SERPL-MCNC: 44 MG/DL — HIGH (ref 7–23)
CALCIUM SERPL-MCNC: 7.8 MG/DL — LOW (ref 8.4–10.5)
CHLORIDE SERPL-SCNC: 102 MMOL/L — SIGNIFICANT CHANGE UP (ref 96–108)
CO2 SERPL-SCNC: 20 MMOL/L — LOW (ref 22–31)
COLOR SPEC: YELLOW — SIGNIFICANT CHANGE UP
CREAT SERPL-MCNC: 1.12 MG/DL — SIGNIFICANT CHANGE UP (ref 0.5–1.3)
DIFF PNL FLD: (no result)
EOSINOPHIL NFR BLD AUTO: 0.2 % — SIGNIFICANT CHANGE UP (ref 0–6)
EPI CELLS # UR: SIGNIFICANT CHANGE UP /HPF (ref 0–5)
GLUCOSE SERPL-MCNC: 163 MG/DL — HIGH (ref 70–99)
GLUCOSE UR QL: NEGATIVE — SIGNIFICANT CHANGE UP
HCT VFR BLD CALC: 42.4 % — SIGNIFICANT CHANGE UP (ref 39–50)
HGB BLD-MCNC: 14.4 G/DL — SIGNIFICANT CHANGE UP (ref 13–17)
KETONES UR-MCNC: NEGATIVE — SIGNIFICANT CHANGE UP
LEUKOCYTE ESTERASE UR-ACNC: NEGATIVE — SIGNIFICANT CHANGE UP
LYMPHOCYTES # BLD AUTO: 13.5 % — SIGNIFICANT CHANGE UP (ref 13–44)
MCHC RBC-ENTMCNC: 28.4 PG — SIGNIFICANT CHANGE UP (ref 27–34)
MCHC RBC-ENTMCNC: 34 G/DL — SIGNIFICANT CHANGE UP (ref 32–36)
MCV RBC AUTO: 83.6 FL — SIGNIFICANT CHANGE UP (ref 80–100)
MONOCYTES NFR BLD AUTO: 3.5 % — SIGNIFICANT CHANGE UP (ref 2–14)
NEUTROPHILS NFR BLD AUTO: 82.7 % — HIGH (ref 43–77)
NITRITE UR-MCNC: NEGATIVE — SIGNIFICANT CHANGE UP
PH UR: 6 — SIGNIFICANT CHANGE UP (ref 5–8)
PLATELET # BLD AUTO: 123 K/UL — LOW (ref 150–400)
POTASSIUM SERPL-MCNC: 6.2 MMOL/L — CRITICAL HIGH (ref 3.5–5.3)
POTASSIUM SERPL-SCNC: 6.2 MMOL/L — CRITICAL HIGH (ref 3.5–5.3)
PROT UR-MCNC: 100 MG/DL
RBC # BLD: 5.07 M/UL — SIGNIFICANT CHANGE UP (ref 4.2–5.8)
RBC # FLD: 16.6 % — SIGNIFICANT CHANGE UP (ref 10.3–16.9)
RBC CASTS # UR COMP ASSIST: < 5 /HPF — SIGNIFICANT CHANGE UP
SODIUM SERPL-SCNC: 135 MMOL/L — SIGNIFICANT CHANGE UP (ref 135–145)
SP GR SPEC: 1.01 — SIGNIFICANT CHANGE UP (ref 1–1.03)
UROBILINOGEN FLD QL: 0.2 E.U./DL — SIGNIFICANT CHANGE UP
WBC # BLD: 16 K/UL — HIGH (ref 3.8–10.5)
WBC # FLD AUTO: 16 K/UL — HIGH (ref 3.8–10.5)
WBC UR QL: < 5 /HPF — SIGNIFICANT CHANGE UP

## 2017-12-04 PROCEDURE — 71010: CPT | Mod: 26

## 2017-12-04 PROCEDURE — 99285 EMERGENCY DEPT VISIT HI MDM: CPT | Mod: 25

## 2017-12-04 PROCEDURE — 93010 ELECTROCARDIOGRAM REPORT: CPT

## 2017-12-04 RX ORDER — DEXAMETHASONE 0.5 MG/5ML
4 ELIXIR ORAL
Qty: 0 | Refills: 0 | Status: DISCONTINUED | OUTPATIENT
Start: 2017-12-04 | End: 2017-12-13

## 2017-12-04 RX ORDER — DIVALPROEX SODIUM 500 MG/1
250 TABLET, DELAYED RELEASE ORAL
Qty: 0 | Refills: 0 | Status: DISCONTINUED | OUTPATIENT
Start: 2017-12-04 | End: 2017-12-11

## 2017-12-04 RX ORDER — HEPARIN SODIUM 5000 [USP'U]/ML
5000 INJECTION INTRAVENOUS; SUBCUTANEOUS EVERY 8 HOURS
Qty: 0 | Refills: 0 | Status: DISCONTINUED | OUTPATIENT
Start: 2017-12-04 | End: 2017-12-05

## 2017-12-04 RX ORDER — MEGESTROL ACETATE 40 MG/ML
400 SUSPENSION ORAL DAILY
Qty: 0 | Refills: 0 | Status: DISCONTINUED | OUTPATIENT
Start: 2017-12-04 | End: 2017-12-15

## 2017-12-04 RX ORDER — LATANOPROST 0.05 MG/ML
1 SOLUTION/ DROPS OPHTHALMIC; TOPICAL AT BEDTIME
Qty: 0 | Refills: 0 | Status: DISCONTINUED | OUTPATIENT
Start: 2017-12-04 | End: 2017-12-22

## 2017-12-04 RX ORDER — LACOSAMIDE 50 MG/1
200 TABLET ORAL
Qty: 0 | Refills: 0 | Status: DISCONTINUED | OUTPATIENT
Start: 2017-12-04 | End: 2017-12-11

## 2017-12-04 RX ORDER — LEVOTHYROXINE SODIUM 125 MCG
50 TABLET ORAL DAILY
Qty: 0 | Refills: 0 | Status: DISCONTINUED | OUTPATIENT
Start: 2017-12-05 | End: 2017-12-13

## 2017-12-04 RX ORDER — AMLODIPINE BESYLATE 2.5 MG/1
5 TABLET ORAL DAILY
Qty: 0 | Refills: 0 | Status: DISCONTINUED | OUTPATIENT
Start: 2017-12-05 | End: 2017-12-12

## 2017-12-04 RX ADMIN — LATANOPROST 1 DROP(S): 0.05 SOLUTION/ DROPS OPHTHALMIC; TOPICAL at 22:42

## 2017-12-04 RX ADMIN — HEPARIN SODIUM 5000 UNIT(S): 5000 INJECTION INTRAVENOUS; SUBCUTANEOUS at 23:51

## 2017-12-04 RX ADMIN — LACOSAMIDE 200 MILLIGRAM(S): 50 TABLET ORAL at 21:53

## 2017-12-04 RX ADMIN — DIVALPROEX SODIUM 250 MILLIGRAM(S): 500 TABLET, DELAYED RELEASE ORAL at 21:52

## 2017-12-04 NOTE — H&P ADULT - ASSESSMENT
62M with Renal Cell Carcinoma with metastases to brain and possible bone s/p craniotomy. Presenting to ED with AMS

## 2017-12-04 NOTE — ED PROVIDER NOTE - ENMT, MLM
Airway patent, Nasal mucosa clear. Mouth with normal mucosa. Throat has no vesicles, no oropharyngeal exudates and uvula is midline. None

## 2017-12-04 NOTE — ED ADULT NURSE REASSESSMENT NOTE - NS ED NURSE REASSESS COMMENT FT1
Unable to flush IV line. IV has been removed. Med port has been accessed by BRAULIO Winter supervised by BRAULIO Joaquin. Pt tolerated procedure well.

## 2017-12-04 NOTE — H&P ADULT - PROBLEM SELECTOR PLAN 1
Patient presenting to the ED with 1 day AMS in association with 1 week urine/bowel incontinence.   - Possibly a component of brain mets, however received an MRI as outpatient with no new findings  -

## 2017-12-04 NOTE — ED PROVIDER NOTE - OBJECTIVE STATEMENT
63 y/o male with past history of metastatic RCC (s/p nephrectomy, s/p craniotomy x2, s/p gamma knife x3 most recently march 2017, currently on Opdivo and Avastin with Dr. 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

## 2017-12-04 NOTE — H&P ADULT - HISTORY OF PRESENT ILLNESS
62M with metastatic RCC (s/p nephrectomy, s/p craniotomy x2, s/p gamma knife x3 most recently march 2017, currently on Opdivo and Avastin with Dr. 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 Presenting to the ED brought in by wife for AMS. Patient is at bedside however unable to give history. Patient wife states that he was in his normal state of health until today when she was called by the home health aid stating that he had episodes of staring into space and not     Wife states that s 62M with metastatic RCC (s/p nephrectomy, s/p craniotomy x2, s/p gamma knife x3 most recently march 2017, currently on Opdivo and Avastin with Dr. 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 Presenting to the ED brought in by wife for AMS. Patient is at bedside however unable to give history. Patient wife states that he was in his normal state of health until today when she was called by the home health aid stating that he had episodes of staring into space and not as responsive as he is on a regular basis. Patient denies HA, change in vision, cough, CP, abdominal pain, back pain, dysuria, hematuria, constipation, diarrhea.     In ED T 97.8 P100 /89 R18 O2Sat 97% RA, patient was seen and evaluated by Neuro

## 2017-12-04 NOTE — H&P ADULT - NSHPPHYSICALEXAM_GEN_ALL_CORE
.  VITAL SIGNS:  T(C): 36.6 (12-04-17 @ 20:45), Max: 36.9 (12-04-17 @ 14:33)  T(F): 97.8 (12-04-17 @ 20:45), Max: 98.4 (12-04-17 @ 14:33)  HR: 102 (12-04-17 @ 20:45) (97 - 107)  BP: 136/90 (12-04-17 @ 20:45) (132/95 - 143/93)  RR: 18 (12-04-17 @ 20:45) (18 - 18)  SpO2: 97% (12-04-17 @ 20:45) (97% - 97%)      PHYSICAL EXAM:  Constitutional: WDWN resting comfortably in bed; NAD  Head: NC/AT  Eyes: PERRLA, EOMI, clear conjunctiva  ENT: no nasal discharge; no oropharyngeal erythema or exudates; dry oral mucosa  Neck: supple; no JVD or thyromegaly  Respiratory: CTA B/L; no W/R/R, no retractions  Cardiac: +S1/S2; RRR; no M/R/G; PMI non-displaced  Gastrointestinal: soft, NT/ND; no rebound or guarding; +BS, no hepatosplenomegaly  Extremities: WWP, no clubbing or cyanosis; no peripheral edema, 4/5 strength in all extremities  Vascular: 2+ radial, DP/PT pulses B/L  Lymphatic: no submandibular or cervical LAD  Neurologic: AAOx3; CNII-XII grossly intact; no focal deficits, motor 4/5 in UE and LE

## 2017-12-04 NOTE — ED ADULT NURSE NOTE - OBJECTIVE STATEMENT
As per pt's spouse, pt has been confused for the past 2weeks and not able to ambulate for a week, and symptoms have been progressively worsened.  Pt is awake and oriented to person and place, not to time, and pt's spouse states that A&O x2 is his baseline. However, pt has not been able communicate well with his aid for the past week. Pt denies any pain, dizziness, SOB, nor any other physical distress.

## 2017-12-04 NOTE — H&P ADULT - NSHPLABSRESULTS_GEN_ALL_CORE
.  LABS:                         14.4   16.0  )-----------( 123      ( 04 Dec 2017 16:25 )             42.4     12-04    135  |  102  |  44<H>  ----------------------------<  163<H>  6.2<HH>   |  20<L>  |  1.12    Ca    7.8<L>      04 Dec 2017 17:52        Urinalysis Basic - ( 04 Dec 2017 16:44 )    Color: Yellow / Appearance: Clear / S.015 / pH: x  Gluc: x / Ketone: NEGATIVE  / Bili: Negative / Urobili: 0.2 E.U./dL   Blood: x / Protein: 100 mg/dL / Nitrite: NEGATIVE   Leuk Esterase: NEGATIVE / RBC: < 5 /HPF / WBC < 5 /HPF   Sq Epi: x / Non Sq Epi: 0-5 /HPF / Bacteria: Present /HPF                RADIOLOGY, EKG & ADDITIONAL TESTS: Reviewed.

## 2017-12-04 NOTE — ED ADULT NURSE NOTE - CHIEF COMPLAINT QUOTE
pt presents with HHA with episode of ? unresponsiveness.  HHA unable to provide details of what happened but reports she tried to get him up and he would not get up and his jaw was clenched. She states he was awake and conscious.  Pt has hx of seizures, kidney CA with mets to brain and lungs, baseline mental status A and O x 1, can follow commands.  Upon arrival pt is able to answer questions with one word, A and O x 1 to self, as per aide this is his baseline. Pt denies pain.

## 2017-12-04 NOTE — ED ADULT TRIAGE NOTE - CHIEF COMPLAINT QUOTE
pt presents with HHA with episode of ? unresponsiveness.  HHA unable to provide details of what happened but reports she tried to get him up and he would not get up and his jaw was clenched. She states he was awake and conscious.  Pt has hx of seizures, kidney CA with mets to brain and lungs, baseline mental status A and O x 1, can follow commands.  Upon arrival pt is able to answer questions with one word, A and O x 1 to self, as per aide this is his baseline. Pt denies pain. pt presents with HHA with episode of ? unresponsiveness.  HHA unable to provide details of what happened but reports she tried to get him up and he would not get up and his jaw was clenched. She states he was awake and conscious.  Pt has hx of seizures, kidney CA with mets to brain and lungs, baseline mental status A and O x 1, can follow commands.  Upon arrival pt is able to answer questions with one word, A and O x 1 to self, as per aide this is his baseline. Pt denies pain.

## 2017-12-05 DIAGNOSIS — D69.6 THROMBOCYTOPENIA, UNSPECIFIED: ICD-10-CM

## 2017-12-05 LAB
ANION GAP SERPL CALC-SCNC: 13 MMOL/L — SIGNIFICANT CHANGE UP (ref 5–17)
BUN SERPL-MCNC: 46 MG/DL — HIGH (ref 7–23)
CALCIUM SERPL-MCNC: 8 MG/DL — LOW (ref 8.4–10.5)
CHLORIDE SERPL-SCNC: 102 MMOL/L — SIGNIFICANT CHANGE UP (ref 96–108)
CO2 SERPL-SCNC: 20 MMOL/L — LOW (ref 22–31)
CREAT SERPL-MCNC: 1.26 MG/DL — SIGNIFICANT CHANGE UP (ref 0.5–1.3)
CULTURE RESULTS: NO GROWTH — SIGNIFICANT CHANGE UP
GLUCOSE SERPL-MCNC: 146 MG/DL — HIGH (ref 70–99)
HCT VFR BLD CALC: 40.8 % — SIGNIFICANT CHANGE UP (ref 39–50)
HGB BLD-MCNC: 13.4 G/DL — SIGNIFICANT CHANGE UP (ref 13–17)
MCHC RBC-ENTMCNC: 27.9 PG — SIGNIFICANT CHANGE UP (ref 27–34)
MCHC RBC-ENTMCNC: 32.8 G/DL — SIGNIFICANT CHANGE UP (ref 32–36)
MCV RBC AUTO: 84.8 FL — SIGNIFICANT CHANGE UP (ref 80–100)
PLATELET # BLD AUTO: 92 K/UL — LOW (ref 150–400)
POTASSIUM SERPL-MCNC: 5.5 MMOL/L — HIGH (ref 3.5–5.3)
POTASSIUM SERPL-SCNC: 5.5 MMOL/L — HIGH (ref 3.5–5.3)
RBC # BLD: 4.81 M/UL — SIGNIFICANT CHANGE UP (ref 4.2–5.8)
RBC # FLD: 16.4 % — SIGNIFICANT CHANGE UP (ref 10.3–16.9)
SODIUM SERPL-SCNC: 135 MMOL/L — SIGNIFICANT CHANGE UP (ref 135–145)
SPECIMEN SOURCE: SIGNIFICANT CHANGE UP
WBC # BLD: 16.2 K/UL — HIGH (ref 3.8–10.5)
WBC # FLD AUTO: 16.2 K/UL — HIGH (ref 3.8–10.5)

## 2017-12-05 PROCEDURE — 70552 MRI BRAIN STEM W/DYE: CPT | Mod: 26

## 2017-12-05 PROCEDURE — 70450 CT HEAD/BRAIN W/O DYE: CPT | Mod: 26

## 2017-12-05 PROCEDURE — 72147 MRI CHEST SPINE W/DYE: CPT | Mod: 26

## 2017-12-05 PROCEDURE — 72142 MRI NECK SPINE W/DYE: CPT | Mod: 26

## 2017-12-05 PROCEDURE — 93010 ELECTROCARDIOGRAM REPORT: CPT

## 2017-12-05 RX ORDER — SODIUM POLYSTYRENE SULFONATE 4.1 MEQ/G
15 POWDER, FOR SUSPENSION ORAL ONCE
Qty: 0 | Refills: 0 | Status: COMPLETED | OUTPATIENT
Start: 2017-12-05 | End: 2017-12-05

## 2017-12-05 RX ADMIN — SODIUM POLYSTYRENE SULFONATE 15 GRAM(S): 4.1 POWDER, FOR SUSPENSION ORAL at 13:43

## 2017-12-05 RX ADMIN — Medication 50 MICROGRAM(S): at 06:12

## 2017-12-05 RX ADMIN — Medication 4 MILLIGRAM(S): at 06:12

## 2017-12-05 RX ADMIN — DIVALPROEX SODIUM 250 MILLIGRAM(S): 500 TABLET, DELAYED RELEASE ORAL at 18:06

## 2017-12-05 RX ADMIN — LACOSAMIDE 200 MILLIGRAM(S): 50 TABLET ORAL at 18:06

## 2017-12-05 RX ADMIN — Medication 4 MILLIGRAM(S): at 18:06

## 2017-12-05 RX ADMIN — AMLODIPINE BESYLATE 5 MILLIGRAM(S): 2.5 TABLET ORAL at 06:12

## 2017-12-05 RX ADMIN — LACOSAMIDE 200 MILLIGRAM(S): 50 TABLET ORAL at 06:13

## 2017-12-05 RX ADMIN — HEPARIN SODIUM 5000 UNIT(S): 5000 INJECTION INTRAVENOUS; SUBCUTANEOUS at 06:12

## 2017-12-05 RX ADMIN — MEGESTROL ACETATE 200 MILLIGRAM(S): 40 SUSPENSION ORAL at 13:43

## 2017-12-05 RX ADMIN — DIVALPROEX SODIUM 250 MILLIGRAM(S): 500 TABLET, DELAYED RELEASE ORAL at 06:13

## 2017-12-05 NOTE — PROGRESS NOTE ADULT - SUBJECTIVE AND OBJECTIVE BOX
62M with metastatic RCC (s/p nephrectomy, s/p craniotomy x2, s/p gamma knife x3 most recently 2017, currently on Opdivo and Avastin with Dr. 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 Presenting to the ED brought in by wife for AMS. Patient is at bedside however unable to give history. Patient wife states that he was in his normal state of health until today when she was called by the home health aid stating that he had episodes of staring into space and not as responsive as he is on a regular basis. Patient denies HA, change in vision, cough, CP, abdominal pain, back pain, dysuria, hematuria, constipation, diarrhea.     In ED T 97.8 P100 /89 R18 O2Sat 97% RA, patient was seen and evaluated by Neuro       Patient seen and examined at bedside. Pt in no acute distress. Pt difficult to arouse but awakens with sternal rub. Pt is able to minimally follow commands but not able to speak.     ICU Vital Signs Last 24 Hrs  T(C): 37.2 (05 Dec 2017 09:08), Max: 37.2 (05 Dec 2017 09:08)  T(F): 98.9 (05 Dec 2017 09:08), Max: 98.9 (05 Dec 2017 09:08)  HR: 111 (05 Dec 2017 09:08) (102 - 111)  BP: 128/89 (05 Dec 2017 09:08) (128/89 - 147/96)  BP(mean): --  ABP: --  ABP(mean): --  RR: 18 (05 Dec 2017 09:08) (18 - 19)  SpO2: 97% (05 Dec 2017 09:08) (96% - 97%)      PHYSICAL EXAM:  General: NAD  HEENT: Normocephalic, Atraumtic. PEERL. MMM  Respiratory: Normal breath sounds b/l. No wheezes, crackles, rhonchi.  Cardiovascular: Normal S1 and S2. RRR. No rubs, murmurs.   Gastrointestinal: Soft, non-distended, non-tender. Positive bowel sounds.   Extremities: B/l upper extremity contractures and rigidity. No LE Edema.   Skin: No rashes or ulcers. No clubbing/cyanosis.   Vascular: 2+ dorsalis pedis pulses b/l  Neurological: Pt opens eyes to sternal rub. Pt able to grasp and squeeze right hand. Left hand w/ resting tremor.       MEDICATIONS  (STANDING):  amLODIPine   Tablet 5 milliGRAM(s) Oral daily  dexamethasone     Tablet 4 milliGRAM(s) Oral two times a day  diVALproex  milliGRAM(s) Oral two times a day  heparin  Injectable 5000 Unit(s) SubCutaneous every 8 hours  lacosamide 200 milliGRAM(s) Oral two times a day  latanoprost 0.005% Ophthalmic Solution 1 Drop(s) Both EYES at bedtime  levothyroxine 50 MICROGram(s) Oral daily  megestrol Suspension 200 milliGRAM(s) Oral daily    MEDICATIONS  (PRN):      Allergies    No Known Allergies    Intolerances        LABS:                        13.4   16.2  )-----------( 92       ( 05 Dec 2017 10:58 )             40.8     12-05    135  |  102  |  46<H>  ----------------------------<  146<H>  5.5<H>   |  20<L>  |  1.26    Ca    8.0<L>      05 Dec 2017 10:58        Urinalysis Basic - ( 04 Dec 2017 16:44 )    Color: Yellow / Appearance: Clear / S.015 / pH: x  Gluc: x / Ketone: NEGATIVE  / Bili: Negative / Urobili: 0.2 E.U./dL   Blood: x / Protein: 100 mg/dL / Nitrite: NEGATIVE   Leuk Esterase: NEGATIVE / RBC: < 5 /HPF / WBC < 5 /HPF   Sq Epi: x / Non Sq Epi: 0-5 /HPF / Bacteria: Present /HPF      62M with Renal Cell Carcinoma with metastases to brain and possible bone s/p craniotomy. Presenting to ED with AMS    Problem/Plan - 1:  ·  Problem: Altered mental status.  Plan: Patient presenting to the ED with 1 day AMS in association with 1 week urine/bowel incontinence.   - Possibly a component of brain mets, however received an MRI as outpatient with no new findings  - Repeat MRI brain, cervical and thoracic spine w/ gadolinium.     Problem/Plan - 2:  ·  Problem: Thrombocytopenia. Malignant neoplasm of kidney  Plan: Pt w/ platelets of 123 on admission. Trended down to 92 today. On previous admissions platelets noted to ~200s. Pt received only 1 dose of SQH last night.   -will hold off on giving patient any further SQH  -SCDs  -f/u repeat platelet count   -f/u serotonin release assay   -f/u heparin induced platelet antibody test. Patient last chemotherapy on 2017. Patients wife reports plans for chemo in 2018  - On Opdivo and Avastin during last regimen  - Hold off on chemo at this time  - Dr Ya to follow     Problem/Plan - 3:  ·  Problem: Malignant neoplasm of kidney. Weakness  Plan: Patient last chemotherapy on 2017. Patients wife reports plans for chemo in 2018  - On Opdivo and Avastin during last regimen  - Hold off on chemo at this time  - Dr Ya to follow. Worsening since last admission  - Physical Therapy Consult     Problem/Plan - 4:  ·  Problem: Weakness. Essential hypertension  Plan: Worsening since last admission  - Physical Therapy Consult. Patient on Norvasc 5mg at home, BP in acceptable range at this time  - Continue Norvasc 5mg daily     Problem/Plan - 5:  ·  Problem: Essential hypertension. Nutrition, metabolism, and development symptoms  Plan: Patient on Norvasc 5mg at home, BP in acceptable range at this time  - Continue Norvasc 5mg daily. Regular Diet  FULL CODE  PPx: HSQ  [facility]     Problem/Plan - 6:  Problem: Nutrition, metabolism, and development symptoms. Plan: Regular Diet  FULL CODE

## 2017-12-05 NOTE — PROGRESS NOTE ADULT - PROBLEM SELECTOR PLAN 5
Regular Diet  FULL CODE  PPx: HSQ  Admit to [facility] Patient on Norvasc 5mg at home, BP in acceptable range at this time  - Continue Norvasc 5mg daily

## 2017-12-05 NOTE — PROGRESS NOTE ADULT - PROBLEM SELECTOR PLAN 3
Worsening since last admission  - Physical Therapy Consult Patient last chemotherapy on Sept 2017. Patients wife reports plans for chemo in Jan 2018  - On Opdivo and Avastin during last regimen  - Hold off on chemo at this time  - Dr Ya to follow

## 2017-12-05 NOTE — PROGRESS NOTE ADULT - PROBLEM SELECTOR PLAN 1
Patient presenting to the ED with 1 day AMS in association with 1 week urine/bowel incontinence.   - Possibly a component of brain mets, however received an MRI as outpatient with no new findings  - Repeat MRI brain, cervical and thoracic spine w/ gadolinium

## 2017-12-05 NOTE — PROGRESS NOTE ADULT - SUBJECTIVE AND OBJECTIVE BOX
INTERVAL HPI/OVERNIGHT EVENTS: No acute events overnight.     Subjective: Patient seen and examined at bedside. Pt in no acute distress. Pt difficult to arouse but awakens with sternal rub. Pt is able to minimally follow commands but not able to speak.     ROS: Unable to assess.     VITAL SIGNS:  T(F): 98.9 (17 @ 09:08)  HR: 111 (17 @ 09:08)  BP: 128/89 (17 @ 09:08)  RR: 18 (17 @ 09:08)  SpO2: 97% (17 @ 09:08)  Wt(kg): --    PHYSICAL EXAM:  General: Elderly male, frail, laying in bed comfortably. Mask like facies.   HEENT: Normocephalic, Atraumtic. PEERL. MMM  Respiratory: Normal breath sounds b/l. No wheezes, crackles, rhonchi.  Cardiovascular: Normal S1 and S2. RRR. No rubs, murmurs.   Gastrointestinal: Soft, non-distended, non-tender. Positive bowel sounds.   Extremities: B/l upper extremity contractures and rigidity. No LE Edema.   Skin: No rashes or ulcers. No clubbing/cyanosis.   Vascular: 2+ dorsalis pedis pulses b/l  Neurological: Pt opens eyes to sternal rub. Pt able to grasp and squeeze right hand. Left hand w/ resting tremor. Pupils sluggish but reactive to light. Pt not following exam for extraocular muscles. Pt able to open mouth and close eyes shut. Glabellar reflex intact.     MEDICATIONS  (STANDING):  amLODIPine   Tablet 5 milliGRAM(s) Oral daily  dexamethasone     Tablet 4 milliGRAM(s) Oral two times a day  diVALproex  milliGRAM(s) Oral two times a day  heparin  Injectable 5000 Unit(s) SubCutaneous every 8 hours  lacosamide 200 milliGRAM(s) Oral two times a day  latanoprost 0.005% Ophthalmic Solution 1 Drop(s) Both EYES at bedtime  levothyroxine 50 MICROGram(s) Oral daily  megestrol Suspension 200 milliGRAM(s) Oral daily    MEDICATIONS  (PRN):      Allergies    No Known Allergies    Intolerances        LABS:                        13.4   16.2  )-----------( 92       ( 05 Dec 2017 10:58 )             40.8     12-    135  |  102  |  46<H>  ----------------------------<  146<H>  5.5<H>   |  20<L>  |  1.26    Ca    8.0<L>      05 Dec 2017 10:58        Urinalysis Basic - ( 04 Dec 2017 16:44 )    Color: Yellow / Appearance: Clear / S.015 / pH: x  Gluc: x / Ketone: NEGATIVE  / Bili: Negative / Urobili: 0.2 E.U./dL   Blood: x / Protein: 100 mg/dL / Nitrite: NEGATIVE   Leuk Esterase: NEGATIVE / RBC: < 5 /HPF / WBC < 5 /HPF   Sq Epi: x / Non Sq Epi: 0-5 /HPF / Bacteria: Present /HPF        RADIOLOGY & ADDITIONAL TESTS:

## 2017-12-06 DIAGNOSIS — C79.31 SECONDARY MALIGNANT NEOPLASM OF BRAIN: ICD-10-CM

## 2017-12-06 DIAGNOSIS — R41.82 ALTERED MENTAL STATUS, UNSPECIFIED: ICD-10-CM

## 2017-12-06 DIAGNOSIS — Z51.5 ENCOUNTER FOR PALLIATIVE CARE: ICD-10-CM

## 2017-12-06 LAB
ANION GAP SERPL CALC-SCNC: 13 MMOL/L — SIGNIFICANT CHANGE UP (ref 5–17)
BUN SERPL-MCNC: 44 MG/DL — HIGH (ref 7–23)
CALCIUM SERPL-MCNC: 7.8 MG/DL — LOW (ref 8.4–10.5)
CHLORIDE SERPL-SCNC: 102 MMOL/L — SIGNIFICANT CHANGE UP (ref 96–108)
CLOSURE TME COLL+EPINEP BLD: 89 K/UL — LOW (ref 150–400)
CO2 SERPL-SCNC: 21 MMOL/L — LOW (ref 22–31)
CREAT SERPL-MCNC: 1.3 MG/DL — SIGNIFICANT CHANGE UP (ref 0.5–1.3)
GLUCOSE SERPL-MCNC: 111 MG/DL — HIGH (ref 70–99)
HCT VFR BLD CALC: 39.5 % — SIGNIFICANT CHANGE UP (ref 39–50)
HGB BLD-MCNC: 13 G/DL — SIGNIFICANT CHANGE UP (ref 13–17)
MAGNESIUM SERPL-MCNC: 2.1 MG/DL — SIGNIFICANT CHANGE UP (ref 1.6–2.6)
MCHC RBC-ENTMCNC: 28 PG — SIGNIFICANT CHANGE UP (ref 27–34)
MCHC RBC-ENTMCNC: 32.9 G/DL — SIGNIFICANT CHANGE UP (ref 32–36)
MCV RBC AUTO: 84.9 FL — SIGNIFICANT CHANGE UP (ref 80–100)
PF4 HEPARIN CMPLX AB SER-ACNC: SIGNIFICANT CHANGE UP
PF4 HEPARIN CMPLX AB SERPL QL IA: NEGATIVE — SIGNIFICANT CHANGE UP
PLATELET # BLD AUTO: 92 K/UL — LOW (ref 150–400)
POTASSIUM SERPL-MCNC: 5 MMOL/L — SIGNIFICANT CHANGE UP (ref 3.5–5.3)
POTASSIUM SERPL-SCNC: 5 MMOL/L — SIGNIFICANT CHANGE UP (ref 3.5–5.3)
RBC # BLD: 4.65 M/UL — SIGNIFICANT CHANGE UP (ref 4.2–5.8)
RBC # FLD: 16.2 % — SIGNIFICANT CHANGE UP (ref 10.3–16.9)
SODIUM SERPL-SCNC: 136 MMOL/L — SIGNIFICANT CHANGE UP (ref 135–145)
VALPROATE SERPL-MCNC: 57 UG/ML — SIGNIFICANT CHANGE UP (ref 50–100)
WBC # BLD: 12.7 K/UL — HIGH (ref 3.8–10.5)
WBC # FLD AUTO: 12.7 K/UL — HIGH (ref 3.8–10.5)

## 2017-12-06 PROCEDURE — 99223 1ST HOSP IP/OBS HIGH 75: CPT

## 2017-12-06 PROCEDURE — 95816 EEG AWAKE AND DROWSY: CPT | Mod: 26

## 2017-12-06 RX ORDER — SODIUM CHLORIDE 9 MG/ML
1000 INJECTION INTRAMUSCULAR; INTRAVENOUS; SUBCUTANEOUS
Qty: 0 | Refills: 0 | Status: DISCONTINUED | OUTPATIENT
Start: 2017-12-06 | End: 2017-12-07

## 2017-12-06 RX ADMIN — LATANOPROST 1 DROP(S): 0.05 SOLUTION/ DROPS OPHTHALMIC; TOPICAL at 00:53

## 2017-12-06 RX ADMIN — LACOSAMIDE 200 MILLIGRAM(S): 50 TABLET ORAL at 06:36

## 2017-12-06 RX ADMIN — Medication 4 MILLIGRAM(S): at 17:04

## 2017-12-06 RX ADMIN — SODIUM CHLORIDE 70 MILLILITER(S): 9 INJECTION INTRAMUSCULAR; INTRAVENOUS; SUBCUTANEOUS at 16:39

## 2017-12-06 RX ADMIN — MEGESTROL ACETATE 400 MILLIGRAM(S): 40 SUSPENSION ORAL at 16:13

## 2017-12-06 RX ADMIN — DIVALPROEX SODIUM 250 MILLIGRAM(S): 500 TABLET, DELAYED RELEASE ORAL at 06:37

## 2017-12-06 RX ADMIN — AMLODIPINE BESYLATE 5 MILLIGRAM(S): 2.5 TABLET ORAL at 06:37

## 2017-12-06 RX ADMIN — Medication 50 MICROGRAM(S): at 06:37

## 2017-12-06 RX ADMIN — Medication 4 MILLIGRAM(S): at 06:36

## 2017-12-06 RX ADMIN — LATANOPROST 1 DROP(S): 0.05 SOLUTION/ DROPS OPHTHALMIC; TOPICAL at 23:24

## 2017-12-06 RX ADMIN — LACOSAMIDE 200 MILLIGRAM(S): 50 TABLET ORAL at 17:04

## 2017-12-06 RX ADMIN — DIVALPROEX SODIUM 250 MILLIGRAM(S): 500 TABLET, DELAYED RELEASE ORAL at 17:04

## 2017-12-06 NOTE — PHYSICAL THERAPY INITIAL EVALUATION ADULT - PERTINENT HX OF CURRENT PROBLEM, REHAB EVAL
62M with metastatic RCC (s/p nephrectomy, s/p craniotomy x2, s/p gamma knife x3 most recently. 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 Presenting to the ED brought in by wife for AMS.

## 2017-12-06 NOTE — CONSULT NOTE ADULT - PROBLEM SELECTOR RECOMMENDATION 3
support provided to pvt marymaribel Paris who has cared for pt the last 2 yrs. Support provided to pt and family. Patient to have access to supportive services during rest of hospital stay as the pt/family deems necessary i.e. Chaplaincy, Massage Therapy, Music Therapy, Pt/family supportive services, Palliative SW, etc.

## 2017-12-06 NOTE — PROGRESS NOTE ADULT - SUBJECTIVE AND OBJECTIVE BOX
Neurology Follow up note    Name  CORIN PERDOMO    HPI:  62M with metastatic RCC (s/p nephrectomy, s/p craniotomy x2, s/p gamma knife x3 most recently march 2017, currently on Opdivo and Avastin with Dr. 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 Presenting to the ED brought in by wife for AMS. Patient is at bedside however unable to give history. Patient wife states that he was in his normal state of health until today when she was called by the home health aid stating that he had episodes of staring into space and not as responsive as he is on a regular basis. Patient denies HA, change in vision, cough, CP, abdominal pain, back pain, dysuria, hematuria, constipation, diarrhea.     In ED T 97.8 P100 /89 R18 O2Sat 97% RA, patient was seen and evaluated by Neuro (04 Dec 2017 19:08)      Interval History -more alert- continues to be more responsive- continued aphasia        REVIEW OF SYSTEMS    Vital Signs Last 24 Hrs  T(C): 36.7 (06 Dec 2017 06:02), Max: 37.2 (05 Dec 2017 09:08)  T(F): 98 (06 Dec 2017 06:02), Max: 98.9 (05 Dec 2017 09:08)  HR: 84 (06 Dec 2017 06:02) (84 - 111)  BP: 148/94 (06 Dec 2017 06:02) (128/89 - 148/94)  BP(mean): --  RR: 17 (06 Dec 2017 06:02) (17 - 18)  SpO2: 96% (06 Dec 2017 06:02) (96% - 99%)    Physical Exam-     Mental Status-aphasia    Cranial Nerves-full EOM    Gait and station-n/a    Motor poor movement LE    Reflexes-decreased    Sensation-no sensory level    Coordination-no tremors    Vascular -    Medications  amLODIPine   Tablet 5 milliGRAM(s) Oral daily  dexamethasone     Tablet 4 milliGRAM(s) Oral two times a day  diVALproex  milliGRAM(s) Oral two times a day  lacosamide 200 milliGRAM(s) Oral two times a day  latanoprost 0.005% Ophthalmic Solution 1 Drop(s) Both EYES at bedtime  levothyroxine 50 MICROGram(s) Oral daily  megestrol Suspension 200 milliGRAM(s) Oral daily      Lab      Radiology    Assessment- No evidence of new brain mets    Plan- MRI total spine, EEG

## 2017-12-06 NOTE — PROGRESS NOTE ADULT - PROBLEM SELECTOR PLAN 3
Patient last chemotherapy on Sept 2017. Patients wife reports plans for chemo in Jan 2018  - On Opdivo and Avastin during last regimen  - Hold off on chemo at this time  - Dr Ya to follow

## 2017-12-06 NOTE — PHYSICAL THERAPY INITIAL EVALUATION ADULT - ADDITIONAL COMMENTS
patient lives in elevator operated apartment building with 3 steps to enter. Home health aide states that she is there from 9am-5pm until the patients wife comes home and takes over care. Aide states that in the past 2 weeks the patient has declined rapidly and is now bed bound and needs assist with everything. Aide states that prior to 2 weeks ago patient ambulated with walker and assist. patient lives in elevator operated apartment building with 3 steps to enter. Home health aide states that she is there from 9am-5pm until the patients wife comes home and takes over care. Aide states that in the past 2 weeks the patient has declined rapidly and is now bed bound and needs assist with everything. Aide states that prior to 2 weeks ago patient ambulated with walker and assist.  Face symmetric, tongue midline, able to track with smooth pursuit.

## 2017-12-06 NOTE — PROGRESS NOTE ADULT - PROBLEM SELECTOR PLAN 2
Pt w/ platelets of 123 on admission. Trended down to 92 today. On previous admissions platelets noted to ~200s. Pt received only 1 dose of SQH last night.   -will hold off on giving patient any further SQH  -SCDs  -f/u repeat platelet count   -f/u serotonin release assay   -f/u heparin induced platelet antibody test Pt w/ platelets of 123 on admission. Trended down to 92 today. On previous admissions platelets noted to ~200s. Pt received only 1 dose of SQH last night.   -will hold off on giving patient any further SQH  -SCDs  -f/u serotonin release assay   -f/u heparin induced platelet antibody test

## 2017-12-06 NOTE — PROGRESS NOTE ADULT - ASSESSMENT
62M with Renal Cell Carcinoma with metastases to brain and possible bone s/p craniotomy. Presenting to ED with AMS.

## 2017-12-06 NOTE — PHYSICAL THERAPY INITIAL EVALUATION ADULT - RANGE OF MOTION EXAMINATION, REHAB EVAL
deficits as listed below/BUE & BLE showed ROM deficits throughout for both AROM and PROM. Tone in all 4 extremities

## 2017-12-06 NOTE — PROGRESS NOTE ADULT - PROBLEM SELECTOR PLAN 1
Improving. Patient presenting to the ED with 1 day AMS in association with 1 week urine/bowel incontinence.  - CT head showing no interval change from prior study.   - Repeat MRI brain, cervical and thoracic spine w/ gadolinium Improving. Patient presenting to the ED with 1 day AMS in association with 1 week urine/bowel incontinence.  - CT head showing no interval change from prior study.   - MRI head showing no new brain lesions, but new midline suboccipital subcutaneous collection. Progressive interval increase in ventricular size. Progressive interval increase in the periventricular FLAIR/T2 signal and left frontal lobe signal.

## 2017-12-06 NOTE — PROGRESS NOTE ADULT - SUBJECTIVE AND OBJECTIVE BOX
62M with metastatic RCC (s/p nephrectomy, s/p craniotomy x2, s/p gamma knife x3 most recently 2017, currently on Opdivo and Avastin with Dr. 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 Presenting to the ED brought in by wife for AMS. Patient is at bedside however unable to give history. Patient wife states that he was in his normal state of health until today when she was called by the home health aid stating that he had episodes of staring into space and not as responsive as he is on a regular basis. Patient denies HA, change in vision, cough, CP, abdominal pain, back pain, dysuria, hematuria, constipation, diarrhea.     In ED T 97.8 P100 /89 R18 O2Sat 97% RA, patient was seen and evaluated by Neuro       Pt in no acute distress. Pt difficult to arouse but awakens with sternal rub. Pt is able to minimally follow commands but not able to speak.        Patient seen and examined at bedside this morning.     ICU Vital Signs Last 24 Hrs  T(C): 36.9 (06 Dec 2017 15:37), Max: 36.9 (06 Dec 2017 15:37)  T(F): 98.4 (06 Dec 2017 15:37), Max: 98.4 (06 Dec 2017 15:37)  HR: 120 (06 Dec 2017 16:26) (84 - 133)  BP: 142/98 (06 Dec 2017 16:26) (142/98 - 156/98)  BP(mean): --  ABP: --  ABP(mean): --  RR: 19 (06 Dec 2017 15:37) (17 - 19)  SpO2: 97% (06 Dec 2017 15:37) (96% - 99%)        PHYSICAL EXAM:  General:NAD  HEENT: Normocephalic, Atraumtic. PEERL. MMM, minimally following commands to EOM exam.   Respiratory: Normal breath sounds b/l. No wheezes, crackles, rhonchi.  Cardiovascular: Normal S1 and S2. RRR. No rubs, murmurs.   Gastrointestinal: Soft, non-distended, non-tender. Positive bowel sounds.   : Taxus catheter  Extremities: No LE edema.   Skin: No rashes or ulcers. No clubbing/cyanosis.   Vascular: 2+ dorsalis pedis pulses b/l  Neurological: Pt able to grasp right hand and squeeze, left arm w/ noticable weaker strength and tremor. Left arm flexed and contracted. pt able to close eyes shut and open mouth and stick out tongue.       MEDICATIONS  (STANDING):  amLODIPine   Tablet 5 milliGRAM(s) Oral daily  dexamethasone     Tablet 4 milliGRAM(s) Oral two times a day  diVALproex  milliGRAM(s) Oral two times a day  lacosamide 200 milliGRAM(s) Oral two times a day  latanoprost 0.005% Ophthalmic Solution 1 Drop(s) Both EYES at bedtime  levothyroxine 50 MICROGram(s) Oral daily  megestrol Suspension 200 milliGRAM(s) Oral daily    MEDICATIONS  (PRN):      Allergies    No Known Allergies    Intolerances        LABS:                        13.0   12.7  )-----------( 92       ( 06 Dec 2017 06:43 )             39.5     12    136  |  102  |  44<H>  ----------------------------<  111<H>  5.0   |  21<L>  |  1.30    Ca    7.8<L>      06 Dec 2017 06:43  Mg     2.1             Urinalysis Basic - ( 04 Dec 2017 16:44 )    Color: Yellow / Appearance: Clear / S.015 / pH: x  Gluc: x / Ketone: NEGATIVE  / Bili: Negative / Urobili: 0.2 E.U./dL   Blood: x / Protein: 100 mg/dL / Nitrite: NEGATIVE   Leuk Esterase: NEGATIVE / RBC: < 5 /HPF / WBC < 5 /HPF   Sq Epi: x / Non Sq Epi: 0-5 /HPF / Bacteria: Present /HPF  · Assessment		  62M with Renal Cell Carcinoma with metastases to brain and possible bone s/p craniotomy. Presenting to ED with AMS.     Problem/Plan - 1:  ·  Problem: Altered mental status.  Plan: Improving. Patient presenting to the ED with 1 day AMS in association with 1 week urine/bowel incontinence.  - CT head showing no interval change from prior study.   - MRI head showing no new brain lesions, but new midline suboccipital subcutaneous collection. Progressive interval increase in ventricular size. Progressive interval increase in the periventricular FLAIR/T2 signal and left frontal lobe signal. Improving. Patient presenting to the ED with 1 day AMS in association with 1 week urine/bowel incontinence.  - CT head showing no interval change from prior study.   - Repeat MRI brain, cervical and thoracic spine w/ gadolinium     Problem/Plan - 2:  ·  Problem: Thrombocytopenia.  Plan: Pt w/ platelets of 123 on admission. Trended down to 92 today. On previous admissions platelets noted to ~200s. Pt received only 1 dose of SQH last night.   -will hold off on giving patient any further SQH  -SCDs  -f/u serotonin release assay   -f/u heparin induced platelet antibody test. Pt w/ platelets of 123 on admission. Trended down to 92 today. On previous admissions platelets noted to ~200s. Pt received only 1 dose of SQH last night.   -will hold off on giving patient any further SQH  -SCDs  -f/u repeat platelet count   -f/u serotonin release assay   -f/u heparin induced platelet antibody test     Problem/Plan - 3:  ·  Problem: Malignant neoplasm of kidney.  Plan: Patient last chemotherapy on 2017. Patients wife reports plans for chemo in 2018  - On Opdivo and Avastin during last regimen  - Hold off on chemo at this time  - Dr Ya to follow.     Problem/Plan - 4:  ·  Problem: Weakness.  Plan: Worsening since last admission  - Physical Therapy Consult.     Problem/Plan - 5:  ·  Problem: Essential hypertension.  Plan: Patient on Norvasc 5mg at home, BP in acceptable range at this time  - Continue Norvasc 5mg daily.     Problem/Plan - 6:  Problem: Nutrition, metabolism, and development symptoms. Plan: Regular Diet  FULL CODE  PPx: HSQ    Last Updated: 06-Dec-2017 12:45 by Enio  < from: MR Thoracic Spine w/ IV Cont (17 @ 18:29) >      IMPRESSION: Interval increase in the lesion involving the T9 vertebral   body with avid enhancement which may represent bony metastases. No  epidural compressive soft tissue.    < from: MR Cervical Spine w/ IV Cont (17 @ 18:29) >  IMPRESSION: No bony metastases. Stable cervical spondylosis.    < from: MR Head w/ IV Cont (17 @ 18:29) >    IMPRESSION: New midline suboccipital subcutaneous collection. Progressive   interval increase in ventricular size. Progressive interval increase in   the periventricular FLAIR/T2 signal and left frontal lobe signal   abnormality which may represent treatment related effects. No new   enhancing lesions.      < from: CT Head No Cont (12.05.17 @ 19:16) >  IMPRESSION:-    1.  No significant interval change.     2.  Status post left fronto-parietal craniotomy, midline occipital   craniectomy with cranioplasty and right parieto-occipital craniectomy   with cranioplasty. There are postsurgical hypodense areas in the left   posterior frontal lobe,  left cerebellar hemisphere and right occipital   lobe.    3.  Several tiny hyperdense spots are seen in left posterior temporal   lobe and right parietal lobe, probably representing hemorrhagic   metastatic nodules.

## 2017-12-06 NOTE — PHYSICAL THERAPY INITIAL EVALUATION ADULT - CRITERIA FOR SKILLED THERAPEUTIC INTERVENTIONS
functional limitations in following categories/impairments found/therapy frequency/anticipated discharge recommendation/risk reduction/prevention/rehab potential

## 2017-12-06 NOTE — PROGRESS NOTE ADULT - SUBJECTIVE AND OBJECTIVE BOX
INTERVAL HPI/OVERNIGHT EVENTS: Overnight no acute events.     Subjective: Patient seen and examined at bedside this morning. Patient in no acute distress. Appears more awake today. Pt not verbalizing, unable to perform ROS.     VITAL SIGNS:  T(F): 97.8 (17 @ 09:00)  HR: 102 (17 @ 09:00)  BP: 152/77 (17 @ 09:00)  RR: 18 (17 @ 09:00)  SpO2: 96% (17 @ 09:00)  Wt(kg): --    PHYSICAL EXAM:  General: Elderly male w/ mask like facies laying in bed comfortably  HEENT: Normocephalic, Atraumtic. PEERL. MMM, minimally following commands to EOM exam.   Respiratory: Normal breath sounds b/l. No wheezes, crackles, rhonchi.  Cardiovascular: Normal S1 and S2. RRR. No rubs, murmurs.   Gastrointestinal: Soft, non-distended, non-tender. Positive bowel sounds.   : Taxus catheter  Extremities: No LE edema.   Skin: No rashes or ulcers. No clubbing/cyanosis.   Vascular: 2+ dorsalis pedis pulses b/l  Neurological: Pt able to grasp right hand and squeeze, left arm w/ noticable weaker strength and tremor. Left arm flexed and contracted. pt able to close eyes shut and open mouth and stick out tongue.       MEDICATIONS  (STANDING):  amLODIPine   Tablet 5 milliGRAM(s) Oral daily  dexamethasone     Tablet 4 milliGRAM(s) Oral two times a day  diVALproex  milliGRAM(s) Oral two times a day  lacosamide 200 milliGRAM(s) Oral two times a day  latanoprost 0.005% Ophthalmic Solution 1 Drop(s) Both EYES at bedtime  levothyroxine 50 MICROGram(s) Oral daily  megestrol Suspension 200 milliGRAM(s) Oral daily    MEDICATIONS  (PRN):      Allergies    No Known Allergies    Intolerances        LABS:                        13.0   12.7  )-----------( 92       ( 06 Dec 2017 06:43 )             39.5     12    136  |  102  |  44<H>  ----------------------------<  111<H>  5.0   |  21<L>  |  1.30    Ca    7.8<L>      06 Dec 2017 06:43  Mg     2.1     12-        Urinalysis Basic - ( 04 Dec 2017 16:44 )    Color: Yellow / Appearance: Clear / S.015 / pH: x  Gluc: x / Ketone: NEGATIVE  / Bili: Negative / Urobili: 0.2 E.U./dL   Blood: x / Protein: 100 mg/dL / Nitrite: NEGATIVE   Leuk Esterase: NEGATIVE / RBC: < 5 /HPF / WBC < 5 /HPF   Sq Epi: x / Non Sq Epi: 0-5 /HPF / Bacteria: Present /HPF        RADIOLOGY & ADDITIONAL TESTS:

## 2017-12-06 NOTE — PHYSICAL THERAPY INITIAL EVALUATION ADULT - SITTING BALANCE: STATIC
fair balance/lateropulsion to the right side right lateral trunk lean during static sitting/fair balance poor balance/right lateral trunk lean during static sitting

## 2017-12-07 LAB — SRA INTERP SER-IMP: SIGNIFICANT CHANGE UP

## 2017-12-07 PROCEDURE — 99232 SBSQ HOSP IP/OBS MODERATE 35: CPT

## 2017-12-07 RX ADMIN — MEGESTROL ACETATE 400 MILLIGRAM(S): 40 SUSPENSION ORAL at 14:10

## 2017-12-07 RX ADMIN — DIVALPROEX SODIUM 250 MILLIGRAM(S): 500 TABLET, DELAYED RELEASE ORAL at 19:50

## 2017-12-07 RX ADMIN — Medication 50 MICROGRAM(S): at 06:43

## 2017-12-07 RX ADMIN — DIVALPROEX SODIUM 250 MILLIGRAM(S): 500 TABLET, DELAYED RELEASE ORAL at 06:43

## 2017-12-07 RX ADMIN — Medication 4 MILLIGRAM(S): at 19:50

## 2017-12-07 RX ADMIN — AMLODIPINE BESYLATE 5 MILLIGRAM(S): 2.5 TABLET ORAL at 06:43

## 2017-12-07 RX ADMIN — LACOSAMIDE 200 MILLIGRAM(S): 50 TABLET ORAL at 19:49

## 2017-12-07 RX ADMIN — Medication 4 MILLIGRAM(S): at 06:43

## 2017-12-07 RX ADMIN — LACOSAMIDE 200 MILLIGRAM(S): 50 TABLET ORAL at 06:43

## 2017-12-07 RX ADMIN — LATANOPROST 1 DROP(S): 0.05 SOLUTION/ DROPS OPHTHALMIC; TOPICAL at 23:25

## 2017-12-07 NOTE — PROGRESS NOTE ADULT - ASSESSMENT
61 y/o gentleman with h/o metastatic RCC to brain and lung s/p crani x2, gamma knife (most recently 3/2017), and Avastin/opdivo, right nephrectomy, tonsillectomy, glaucoma, and HTN, presenting from home with AMS and progressive functional/mental decline over ~2wks, seen for support

## 2017-12-07 NOTE — CONSULT NOTE ADULT - PROBLEM SELECTOR RECOMMENDATION 9
-no neurosurgical intervention at this time, per Dr. Ibarra  -CTH performed 12/5 shows no interval change from prior CTH performed 11/6  -continue plan per primary medicine team  -re-consult neurosurgery as needed  -d/w Dr. Ibarra

## 2017-12-07 NOTE — PROGRESS NOTE ADULT - SUBJECTIVE AND OBJECTIVE BOX
INTERVAL HPI/OVERNIGHT EVENTS: No acute events overnight.     Subjective: Patient seen and examined at bedside this morning. No acute distress. Pt more alert today but has moments where he dazes off into the distance. ROS unable to assess.     VITAL SIGNS:  T(F): 97.8 (12-07-17 @ 06:20)  HR: 105 (12-07-17 @ 06:20)  BP: 153/102 (12-07-17 @ 06:20)  RR: 19 (12-07-17 @ 06:20)  SpO2: 98% (12-07-17 @ 06:20)  Wt(kg): --    PHYSICAL EXAM:  General: No acute distress.   HEENT: Normocephalic, Atraumtic. PEERL. MMM  Respiratory: Normal breath sounds b/l. No wheezes, crackles, rhonchi.  Cardiovascular: Normal S1 and S2. RRR. No rubs, murmurs.   Gastrointestinal: Soft, non-distended, non-tender. Positive bowel sounds.   Extremities: No LE edema.   Skin: No rashes or ulcers. No clubbing/cyanosis.   Vascular: 2+ dorsalis pedis pulses b/l  Neurological: No focal deficits. AAOx3.       MEDICATIONS  (STANDING):  amLODIPine   Tablet 5 milliGRAM(s) Oral daily  dexamethasone     Tablet 4 milliGRAM(s) Oral two times a day  diVALproex  milliGRAM(s) Oral two times a day  lacosamide 200 milliGRAM(s) Oral two times a day  latanoprost 0.005% Ophthalmic Solution 1 Drop(s) Both EYES at bedtime  levothyroxine 50 MICROGram(s) Oral daily  megestrol Suspension 400 milliGRAM(s) Oral daily  sodium chloride 0.9%. 1000 milliLiter(s) (70 mL/Hr) IV Continuous <Continuous>    MEDICATIONS  (PRN):      Allergies    No Known Allergies    Intolerances        LABS:                        13.0   12.7  )-----------( 92       ( 06 Dec 2017 06:43 )             39.5     12-06    136  |  102  |  44<H>  ----------------------------<  111<H>  5.0   |  21<L>  |  1.30    Ca    7.8<L>      06 Dec 2017 06:43  Mg     2.1     12-06            RADIOLOGY & ADDITIONAL TESTS:

## 2017-12-07 NOTE — DIETITIAN INITIAL EVALUATION ADULT. - ENERGY NEEDS
BMI:21.4,IBW:178lbs,89% of IBW.Increased kcal and nutrient needs due to metastatic CA.Moderate protein needs due to CKD.

## 2017-12-07 NOTE — PROGRESS NOTE ADULT - SUBJECTIVE AND OBJECTIVE BOX
Neurology Follow up note    Name  CORIN PERDOMO    HPI:  62M with metastatic RCC (s/p nephrectomy, s/p craniotomy x2, s/p gamma knife x3 most recently march 2017, currently on Opdivo and Avastin with Dr. 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 Presenting to the ED brought in by wife for AMS. Patient is at bedside however unable to give history. Patient wife states that he was in his normal state of health until today when she was called by the home health aid stating that he had episodes of staring into space and not as responsive as he is on a regular basis. Patient denies HA, change in vision, cough, CP, abdominal pain, back pain, dysuria, hematuria, constipation, diarrhea.     In ED T 97.8 P100 /89 R18 O2Sat 97% RA, patient was seen and evaluated by Neuro (04 Dec 2017 19:08)      Interval History - more awake- no pain- expressive aphasia        REVIEW OF SYSTEMS    Vital Signs Last 24 Hrs  T(C): 36.7 (07 Dec 2017 09:15), Max: 36.9 (06 Dec 2017 15:37)  T(F): 98 (07 Dec 2017 09:15), Max: 98.4 (06 Dec 2017 15:37)  HR: 108 (07 Dec 2017 09:15) (105 - 133)  BP: 133/102 (07 Dec 2017 09:15) (133/102 - 156/98)  BP(mean): --  RR: 18 (07 Dec 2017 09:15) (18 - 19)  SpO2: 96% (07 Dec 2017 09:15) (96% - 98%)    Physical Exam-     Mental Status- aphasic     Cranial Nerves-no field cut    Gait and station-n/a    Motor- LE strength 2/5    Reflexes- decreased    Sensation- no sensory level    Coordination- no tremors    Vascular -    Medications  amLODIPine   Tablet 5 milliGRAM(s) Oral daily  dexamethasone     Tablet 4 milliGRAM(s) Oral two times a day  diVALproex  milliGRAM(s) Oral two times a day  lacosamide 200 milliGRAM(s) Oral two times a day  latanoprost 0.005% Ophthalmic Solution 1 Drop(s) Both EYES at bedtime  levothyroxine 50 MICROGram(s) Oral daily  megestrol Suspension 400 milliGRAM(s) Oral daily  sodium chloride 0.9%. 1000 milliLiter(s) IV Continuous <Continuous>      Lab      Radiology    Assessment- Brain mets- radiation necrosis, T8 bony lesion    Plan as per Dr Ya

## 2017-12-07 NOTE — PROGRESS NOTE ADULT - SUBJECTIVE AND OBJECTIVE BOX
62M with metastatic RCC (s/p nephrectomy, s/p craniotomy x2, s/p gamma knife x3 most recently march 2017, currently on Opdivo and Avastin with Dr. 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 Presenting to the ED brought in by wife for AMS. Patient is at bedside however unable to give history. Patient wife states that he was in his normal state of health until today when she was called by the home health aid stating that he had episodes of staring into space and not as responsive as he is on a regular basis. Patient denies HA, change in vision, cough, CP, abdominal pain, back pain, dysuria, hematuria, constipation, diarrhea.    Patient seen and examined at bedside this morning,w/     ICU Vital Signs Last 24 Hrs  T(C): 36.9 (07 Dec 2017 16:16), Max: 36.9 (07 Dec 2017 16:16)  T(F): 98.4 (07 Dec 2017 16:16), Max: 98.4 (07 Dec 2017 16:16)  HR: 117 (07 Dec 2017 16:16) (105 - 117)  BP: 135/86 (07 Dec 2017 16:16) (133/102 - 153/102)  BP(mean): 102 (07 Dec 2017 16:16) (102 - 102)  ABP: --  ABP(mean): --  RR: 17 (07 Dec 2017 16:16) (17 - 19)  SpO2: 96% (07 Dec 2017 16:16) (96% - 98%)        PHYSICAL EXAM:  General: No acute distress.   HEENT: Normocephalic, Atraumtic. PEERL. MMM  Respiratory: Normal breath sounds b/l. No wheezes, crackles, rhonchi.  Cardiovascular: Normal S1 and S2. RRR. No rubs, murmurs.   Gastrointestinal: Soft, non-distended, non-tender. Positive bowel sounds.   Extremities: No LE edema.   Skin: No rashes or ulcers. No clubbing/cyanosis.   Vascular: 2+ dorsalis pedis pulses b/l  Neurological: No focal deficits. AAOx3.       MEDICATIONS  (STANDING):  amLODIPine   Tablet 5 milliGRAM(s) Oral daily  dexamethasone     Tablet 4 milliGRAM(s) Oral two times a day  diVALproex  milliGRAM(s) Oral two times a day  lacosamide 200 milliGRAM(s) Oral two times a day  latanoprost 0.005% Ophthalmic Solution 1 Drop(s) Both EYES at bedtime  levothyroxine 50 MICROGram(s) Oral daily  megestrol Suspension 400 milliGRAM(s) Oral daily  sodium chloride 0.9%. 1000 milliLiter(s) (70 mL/Hr) IV Continuous <Continuous>    MEDICATIONS  (PRN):      Allergies    No Known Allergies    Intolerances        LABS:                        13.0   12.7  )-----------( 92       ( 06 Dec 2017 06:43 )             39.5     12-06    136  |  102  |  44<H>  ----------------------------<  111<H>  5.0   |  21<L>  |  1.30    Ca    7.8<L>      06 Dec 2017 06:43  Mg     2.1     12-06      · Assessment		  62M with Renal Cell Carcinoma with metastases to brain and possible bone s/p craniotomy. Presenting to ED with AMS.     Problem/Plan - 1:  ·  Problem: Altered mental status.  Plan: Improving. Patient presenting to the ED with 1 day AMS in association with 1 week urine/bowel incontinence.  - CT head showing no interval change from prior study.   - MRI head showing no new brain lesions, but new midline suboccipital subcutaneous collection. Progressive interval increase in ventricular size. Progressive interval increase in the periventricular FLAIR/T2 signal and left frontal lobe signal.     Problem/Plan - 2:  ·  Problem: Thrombocytopenia.  Plan: Pt w/ platelets of 123 on admission. Trended down to 92 today. On previous admissions platelets noted to ~200s. Pt received only 1 dose of SQH last night.   -will hold off on giving patient any further SQH  -SCDs  -f/u serotonin release assay   -f/u heparin induced platelet antibody test.     Problem/Plan - 3:  ·  Problem: Malignant neoplasm of kidney.  Plan: Patient last chemotherapy on Sept 2017. Patients wife reports plans for chemo in Jan 2018  - On Opdivo and Avastin during last regimen  - Hold off on chemo at this time  - Dr Ya to follow.     Problem/Plan - 4:  ·  Problem: Weakness.  Plan: Worsening since last admission  - Physical Therapy Consult.     Problem/Plan - 5:  ·  Problem: Essential hypertension.  Plan: Patient on Norvasc 5mg at home, BP in acceptable range at this time  - Continue Norvasc 5mg daily.     Problem/Plan - 6:  Problem: Nutrition, metabolism, and development symptoms. Plan: Regular Diet  FULL CODE  PPx: HSQ

## 2017-12-07 NOTE — PROGRESS NOTE ADULT - PROBLEM SELECTOR PLAN 2
Pt w/ platelets of 123 on admission. Trended down to 92 today. On previous admissions platelets noted to ~200s. Pt received only 1 dose of SQH last night.   -will hold off on giving patient any further SQH  -SCDs  -f/u serotonin release assay   -f/u heparin induced platelet antibody test

## 2017-12-07 NOTE — PROGRESS NOTE ADULT - PROBLEM SELECTOR PLAN 3
spoke via phone with wife while she was at work briefly to introduce service for support.  Will cont. to follow with IDT

## 2017-12-07 NOTE — PROGRESS NOTE ADULT - SUBJECTIVE AND OBJECTIVE BOX
CORIN PERDOMO   MRN-[id number]         CC: Patient is a 62y old  Male who presents with a chief complaint of AMS (04 Dec 2017 19:08) No significant events or neurological changes overnight.  Wife stayed overnight, eft before start of nsg day shift, and ate fair bkfst with assist,  per nsg    HPI:  62M with metastatic RCC (s/p nephrectomy, s/p craniotomy x2, s/p gamma knife x3 most recently march 2017, currently on Opdivo and Avastin with Dr. 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 Presenting to the ED brought in by wife for AMS. Patient is at bedside however unable to give history. Patient wife states that he was in his normal state of health until today when she was called by the home health aid stating that he had episodes of staring into space and not as responsive as he is on a regular basis. Patient denies HA, change in vision, cough, CP, abdominal pain, back pain, dysuria, hematuria, constipation, diarrhea.     In ED T 97.8 P100 /89 R18 O2Sat 97% RA, patient was seen and evaluated by Neuro (04 Dec 2017 19:08)    ROS:  UNABLE TO OBTAIN  due to: AMS    DYSPNEA (Y/N):	  N/V (Y/N):	  SECRETIONS (Y/N):	  AGITATION (Y/N):  PAIN(Y/N):        -Provocation/Palliation:     -Quality/Quantity:     -Radiating:     -Severity:     -Timing/Frequency:     -Impact on ADLs:     OTHER REVIEW OF SYSTEMS:  ALLERGIES:  No Known Allergies    OPIATE NAÏVE (Y/N): y  -iStop reviewed (Y/N): Y    MEDICATIONS: reviewed  MEDICATIONS  (STANDING):  amLODIPine   Tablet 5 milliGRAM(s) Oral daily  dexamethasone     Tablet 4 milliGRAM(s) Oral two times a day  diVALproex  milliGRAM(s) Oral two times a day  lacosamide 200 milliGRAM(s) Oral two times a day  latanoprost 0.005% Ophthalmic Solution 1 Drop(s) Both EYES at bedtime  levothyroxine 50 MICROGram(s) Oral daily  megestrol Suspension 400 milliGRAM(s) Oral daily  sodium chloride 0.9%. 1000 milliLiter(s) (70 mL/Hr) IV Continuous <Continuous>    MEDICATIONS  (PRN):      PHYSICAL EXAM:  T(C): 36.7 (12-07-17 @ 09:15), Max: 36.9 (12-06-17 @ 15:37)  T(F): 98 (12-07-17 @ 09:15), Max: 98.4 (12-06-17 @ 15:37)  HR: 108 (12-07-17 @ 09:15) (105 - 133)  BP: 133/102 (12-07-17 @ 09:15) (133/102 - 156/98)  RR: 18 (12-07-17 @ 09:15) (18 - 19)  SpO2: 96% (12-07-17 @ 09:15) (96% - 98%)    GENERAL:  Awake upon entering room  HEENT:  tracking at times, no nasal discharge, moist mucous membranes    	  NECK: supple          CVS: nl S1S2         	  RESP: RA, resp even and not labored, CTA bilaterally       	  GI: +BS, soft, NT, ND            	  : [facility] [facility]          	  MUSC: left sided weakness, same as yesterday      	  NEURO: minimally verbal, stares mostly, impaired coordination, follows simple one step commands   	  PSYCH: calm         SKIN: no rash        	   LYMPH:  no supraclavicular LAD       LABS: reviewed                        13.0   12.7  )-----------( 92       ( 06 Dec 2017 06:43 )             39.5     12-06    136  |  102  |  44<H>  ----------------------------<  111<H>  5.0   |  21<L>  |  1.30    Ca    7.8<L>      06 Dec 2017 06:43  Mg     2.1     12-06    IMAGING: reviewed  < from: MR Thoracic Spine w/ IV Cont (12.05.17 @ 18:29) >  EXAM:  MR SPINE THORACIC IC                          PROCEDURE DATE:  12/05/2017    < from: MR Thoracic Spine w/ IV Cont (12.05.17 @ 18:29) >  IMPRESSION: Interval increase in the lesion involving the T9 vertebral   body with avid enhancement which may represent bony metastases. No  epidural compressive soft tissue.    < end of copied text >    ADVANCE DIRECTIVES: Full Code      Decision maker: Leia Lucille  Legal surrogate:    PSYCHOSOCIAL-SPIRITUAL ASSESSMENT:  Reviewed    GOALS OF CARE DISCUSSION:  Palliative care info/counseling provided	      Documentation of GOC: to be determined    AGENCY CHOICE DISCUSSED:   none          REFERRALS:	   Palliative Med   Unit SW/Case Mgmt    Massage Therapy  Music Therapy  PT/OT    [ ]CRITICAL CARE TIME PROVIDED TO UNSTABLE PT W/ ORGAN FAILURE            [x]> 50% OF THE TIME SPENT IN COUNSELING AND COORDINATING CARE     [ ]PROLONGED SERVICE       FACE TO FACE: [x]PT     [ ]PT & FAMILY    Start:               End:  	       Minutes:

## 2017-12-07 NOTE — PROGRESS NOTE ADULT - PROBLEM SELECTOR PLAN 1
Improving. Patient presenting to the ED with 1 day AMS in association with 1 week urine/bowel incontinence.  - CT head showing no interval change from prior study.   - MRI head showing no new brain lesions, but new midline suboccipital subcutaneous collection. Progressive interval increase in ventricular size. Progressive interval increase in the periventricular FLAIR/T2 signal and left frontal lobe signal.

## 2017-12-07 NOTE — DIETITIAN INITIAL EVALUATION ADULT. - OTHER INFO
61 y/o male with noted admission for change in mental status. Significant Pmhx. Palliative following.As per aide at bedside, patient must be fed and is eating only 50%..No N/V/D or reported pain.Skin intact.Suspect weight loss(RD will quantify with wife when visiting).

## 2017-12-08 DIAGNOSIS — R00.0 TACHYCARDIA, UNSPECIFIED: ICD-10-CM

## 2017-12-08 DIAGNOSIS — E87.1 HYPO-OSMOLALITY AND HYPONATREMIA: ICD-10-CM

## 2017-12-08 LAB
ALBUMIN SERPL ELPH-MCNC: 2.4 G/DL — LOW (ref 3.3–5)
ALBUMIN SERPL ELPH-MCNC: 2.4 G/DL — LOW (ref 3.3–5)
ALP SERPL-CCNC: 115 U/L — SIGNIFICANT CHANGE UP (ref 40–120)
ALP SERPL-CCNC: 127 U/L — HIGH (ref 40–120)
ALT FLD-CCNC: 115 U/L — HIGH (ref 10–45)
ALT FLD-CCNC: 123 U/L — HIGH (ref 10–45)
ANION GAP SERPL CALC-SCNC: 12 MMOL/L — SIGNIFICANT CHANGE UP (ref 5–17)
ANION GAP SERPL CALC-SCNC: 14 MMOL/L — SIGNIFICANT CHANGE UP (ref 5–17)
ANION GAP SERPL CALC-SCNC: 15 MMOL/L — SIGNIFICANT CHANGE UP (ref 5–17)
ANION GAP SERPL CALC-SCNC: 16 MMOL/L — SIGNIFICANT CHANGE UP (ref 5–17)
ANISOCYTOSIS BLD QL: SLIGHT — SIGNIFICANT CHANGE UP
APPEARANCE UR: (no result)
AST SERPL-CCNC: 43 U/L — HIGH (ref 10–40)
AST SERPL-CCNC: 49 U/L — HIGH (ref 10–40)
B-OH-BUTYR SERPL-SCNC: 0.1 MMOL/L — SIGNIFICANT CHANGE UP
BILIRUB DIRECT SERPL-MCNC: <0.2 MG/DL — SIGNIFICANT CHANGE UP (ref 0–0.2)
BILIRUB INDIRECT FLD-MCNC: >0.2 MG/DL — SIGNIFICANT CHANGE UP (ref 0.2–1)
BILIRUB SERPL-MCNC: 0.4 MG/DL — SIGNIFICANT CHANGE UP (ref 0.2–1.2)
BILIRUB SERPL-MCNC: 0.4 MG/DL — SIGNIFICANT CHANGE UP (ref 0.2–1.2)
BILIRUB UR-MCNC: NEGATIVE — SIGNIFICANT CHANGE UP
BUN SERPL-MCNC: 32 MG/DL — HIGH (ref 7–23)
BUN SERPL-MCNC: 35 MG/DL — HIGH (ref 7–23)
BUN SERPL-MCNC: 35 MG/DL — HIGH (ref 7–23)
BUN SERPL-MCNC: 36 MG/DL — HIGH (ref 7–23)
CALCIUM SERPL-MCNC: 7 MG/DL — LOW (ref 8.4–10.5)
CALCIUM SERPL-MCNC: 7.1 MG/DL — LOW (ref 8.4–10.5)
CALCIUM SERPL-MCNC: 7.4 MG/DL — LOW (ref 8.4–10.5)
CALCIUM SERPL-MCNC: 7.4 MG/DL — LOW (ref 8.4–10.5)
CHLORIDE SERPL-SCNC: 102 MMOL/L — SIGNIFICANT CHANGE UP (ref 96–108)
CHLORIDE SERPL-SCNC: 98 MMOL/L — SIGNIFICANT CHANGE UP (ref 96–108)
CHLORIDE SERPL-SCNC: 99 MMOL/L — SIGNIFICANT CHANGE UP (ref 96–108)
CHLORIDE SERPL-SCNC: 99 MMOL/L — SIGNIFICANT CHANGE UP (ref 96–108)
CO2 SERPL-SCNC: 18 MMOL/L — LOW (ref 22–31)
CO2 SERPL-SCNC: 18 MMOL/L — LOW (ref 22–31)
CO2 SERPL-SCNC: 19 MMOL/L — LOW (ref 22–31)
CO2 SERPL-SCNC: 20 MMOL/L — LOW (ref 22–31)
COLOR SPEC: YELLOW — SIGNIFICANT CHANGE UP
CREAT ?TM UR-MCNC: 83 MG/DL — SIGNIFICANT CHANGE UP
CREAT SERPL-MCNC: 1.03 MG/DL — SIGNIFICANT CHANGE UP (ref 0.5–1.3)
CREAT SERPL-MCNC: 1.16 MG/DL — SIGNIFICANT CHANGE UP (ref 0.5–1.3)
CREAT SERPL-MCNC: 1.18 MG/DL — SIGNIFICANT CHANGE UP (ref 0.5–1.3)
CREAT SERPL-MCNC: 1.2 MG/DL — SIGNIFICANT CHANGE UP (ref 0.5–1.3)
DIFF PNL FLD: (no result)
GLUCOSE BLDC GLUCOMTR-MCNC: 183 MG/DL — HIGH (ref 70–99)
GLUCOSE SERPL-MCNC: 130 MG/DL — HIGH (ref 70–99)
GLUCOSE SERPL-MCNC: 214 MG/DL — HIGH (ref 70–99)
GLUCOSE SERPL-MCNC: 230 MG/DL — HIGH (ref 70–99)
GLUCOSE SERPL-MCNC: 240 MG/DL — HIGH (ref 70–99)
GLUCOSE UR QL: NEGATIVE — SIGNIFICANT CHANGE UP
HCT VFR BLD CALC: 38.8 % — LOW (ref 39–50)
HGB BLD-MCNC: 12.8 G/DL — LOW (ref 13–17)
KETONES UR-MCNC: NEGATIVE — SIGNIFICANT CHANGE UP
LACTATE SERPL-SCNC: 2.8 MMOL/L — HIGH (ref 0.5–2)
LACTATE SERPL-SCNC: 3.2 MMOL/L — HIGH (ref 0.5–2)
LACTATE SERPL-SCNC: 3.5 MMOL/L — HIGH (ref 0.5–2)
LACTATE SERPL-SCNC: 3.8 MMOL/L — HIGH (ref 0.5–2)
LEUKOCYTE ESTERASE UR-ACNC: NEGATIVE — SIGNIFICANT CHANGE UP
LG PLATELETS BLD QL AUTO: PRESENT — SIGNIFICANT CHANGE UP
LYMPHOCYTES # BLD AUTO: 16 % — SIGNIFICANT CHANGE UP (ref 13–44)
MAGNESIUM SERPL-MCNC: 1.8 MG/DL — SIGNIFICANT CHANGE UP (ref 1.6–2.6)
MAGNESIUM SERPL-MCNC: 1.9 MG/DL — SIGNIFICANT CHANGE UP (ref 1.6–2.6)
MAGNESIUM SERPL-MCNC: 1.9 MG/DL — SIGNIFICANT CHANGE UP (ref 1.6–2.6)
MANUAL SMEAR VERIFICATION: SIGNIFICANT CHANGE UP
MCHC RBC-ENTMCNC: 27.7 PG — SIGNIFICANT CHANGE UP (ref 27–34)
MCHC RBC-ENTMCNC: 33 G/DL — SIGNIFICANT CHANGE UP (ref 32–36)
MCV RBC AUTO: 84 FL — SIGNIFICANT CHANGE UP (ref 80–100)
MICROCYTES BLD QL: SLIGHT — SIGNIFICANT CHANGE UP
MONOCYTES NFR BLD AUTO: 5 % — SIGNIFICANT CHANGE UP (ref 2–14)
NEUTROPHILS NFR BLD AUTO: 71 % — SIGNIFICANT CHANGE UP (ref 43–77)
NEUTS BAND # BLD: 2 % — SIGNIFICANT CHANGE UP
NITRITE UR-MCNC: NEGATIVE — SIGNIFICANT CHANGE UP
NT-PROBNP SERPL-SCNC: 1306 PG/ML — HIGH (ref 0–300)
OSMOLALITY SERPL: 294 MOSM/KG — SIGNIFICANT CHANGE UP (ref 280–301)
OSMOLALITY UR: 561 MOSMOL/KG — SIGNIFICANT CHANGE UP (ref 100–650)
OVALOCYTES BLD QL SMEAR: SLIGHT — SIGNIFICANT CHANGE UP
PH UR: 6 — SIGNIFICANT CHANGE UP (ref 5–8)
PLAT MORPH BLD: (no result)
PLATELET # BLD AUTO: 95 K/UL — LOW (ref 150–400)
POIKILOCYTOSIS BLD QL AUTO: SLIGHT — SIGNIFICANT CHANGE UP
POTASSIUM SERPL-MCNC: 4.8 MMOL/L — SIGNIFICANT CHANGE UP (ref 3.5–5.3)
POTASSIUM SERPL-MCNC: 5 MMOL/L — SIGNIFICANT CHANGE UP (ref 3.5–5.3)
POTASSIUM SERPL-SCNC: 4.8 MMOL/L — SIGNIFICANT CHANGE UP (ref 3.5–5.3)
POTASSIUM SERPL-SCNC: 5 MMOL/L — SIGNIFICANT CHANGE UP (ref 3.5–5.3)
PROT SERPL-MCNC: 4.1 G/DL — LOW (ref 6–8.3)
PROT SERPL-MCNC: 4.2 G/DL — LOW (ref 6–8.3)
PROT UR-MCNC: 100 MG/DL
RBC # BLD: 4.62 M/UL — SIGNIFICANT CHANGE UP (ref 4.2–5.8)
RBC # FLD: 15.7 % — SIGNIFICANT CHANGE UP (ref 10.3–16.9)
RBC BLD AUTO: (no result)
SMUDGE CELLS # BLD: SIGNIFICANT CHANGE UP
SODIUM SERPL-SCNC: 131 MMOL/L — LOW (ref 135–145)
SODIUM SERPL-SCNC: 132 MMOL/L — LOW (ref 135–145)
SODIUM SERPL-SCNC: 133 MMOL/L — LOW (ref 135–145)
SODIUM SERPL-SCNC: 134 MMOL/L — LOW (ref 135–145)
SODIUM UR-SCNC: 29 MMOL/L — SIGNIFICANT CHANGE UP
SP GR SPEC: 1.01 — SIGNIFICANT CHANGE UP (ref 1–1.03)
TSH SERPL-MCNC: 0.66 UIU/ML — SIGNIFICANT CHANGE UP (ref 0.35–4.94)
UROBILINOGEN FLD QL: 0.2 E.U./DL — SIGNIFICANT CHANGE UP
VARIANT LYMPHS # BLD: 6 % — HIGH
WBC # BLD: 13.8 K/UL — HIGH (ref 3.8–10.5)
WBC # FLD AUTO: 13.8 K/UL — HIGH (ref 3.8–10.5)

## 2017-12-08 PROCEDURE — 71010: CPT | Mod: 26

## 2017-12-08 PROCEDURE — 93010 ELECTROCARDIOGRAM REPORT: CPT

## 2017-12-08 PROCEDURE — 93306 TTE W/DOPPLER COMPLETE: CPT | Mod: 26

## 2017-12-08 RX ORDER — SODIUM CHLORIDE 9 MG/ML
500 INJECTION INTRAMUSCULAR; INTRAVENOUS; SUBCUTANEOUS ONCE
Qty: 0 | Refills: 0 | Status: COMPLETED | OUTPATIENT
Start: 2017-12-08 | End: 2017-12-08

## 2017-12-08 RX ORDER — ENOXAPARIN SODIUM 100 MG/ML
40 INJECTION SUBCUTANEOUS EVERY 24 HOURS
Qty: 0 | Refills: 0 | Status: DISCONTINUED | OUTPATIENT
Start: 2017-12-08 | End: 2017-12-11

## 2017-12-08 RX ORDER — DEXTROSE 50 % IN WATER 50 %
1 SYRINGE (ML) INTRAVENOUS ONCE
Qty: 0 | Refills: 0 | Status: DISCONTINUED | OUTPATIENT
Start: 2017-12-08 | End: 2017-12-15

## 2017-12-08 RX ORDER — DEXTROSE 50 % IN WATER 50 %
25 SYRINGE (ML) INTRAVENOUS ONCE
Qty: 0 | Refills: 0 | Status: DISCONTINUED | OUTPATIENT
Start: 2017-12-08 | End: 2017-12-15

## 2017-12-08 RX ORDER — HEPARIN SODIUM 5000 [USP'U]/ML
5000 INJECTION INTRAVENOUS; SUBCUTANEOUS EVERY 8 HOURS
Qty: 0 | Refills: 0 | Status: DISCONTINUED | OUTPATIENT
Start: 2017-12-08 | End: 2017-12-08

## 2017-12-08 RX ORDER — SODIUM CHLORIDE 9 MG/ML
1000 INJECTION, SOLUTION INTRAVENOUS
Qty: 0 | Refills: 0 | Status: DISCONTINUED | OUTPATIENT
Start: 2017-12-08 | End: 2017-12-15

## 2017-12-08 RX ORDER — GLUCAGON INJECTION, SOLUTION 0.5 MG/.1ML
1 INJECTION, SOLUTION SUBCUTANEOUS ONCE
Qty: 0 | Refills: 0 | Status: DISCONTINUED | OUTPATIENT
Start: 2017-12-08 | End: 2017-12-15

## 2017-12-08 RX ORDER — ALBUMIN HUMAN 25 %
250 VIAL (ML) INTRAVENOUS ONCE
Qty: 0 | Refills: 0 | Status: COMPLETED | OUTPATIENT
Start: 2017-12-08 | End: 2017-12-08

## 2017-12-08 RX ORDER — DEXTROSE 50 % IN WATER 50 %
12.5 SYRINGE (ML) INTRAVENOUS ONCE
Qty: 0 | Refills: 0 | Status: DISCONTINUED | OUTPATIENT
Start: 2017-12-08 | End: 2017-12-15

## 2017-12-08 RX ORDER — ONDANSETRON 8 MG/1
4 TABLET, FILM COATED ORAL ONCE
Qty: 0 | Refills: 0 | Status: COMPLETED | OUTPATIENT
Start: 2017-12-08 | End: 2017-12-08

## 2017-12-08 RX ORDER — SODIUM CHLORIDE 9 MG/ML
1000 INJECTION INTRAMUSCULAR; INTRAVENOUS; SUBCUTANEOUS
Qty: 0 | Refills: 0 | Status: DISCONTINUED | OUTPATIENT
Start: 2017-12-08 | End: 2017-12-08

## 2017-12-08 RX ORDER — INSULIN LISPRO 100/ML
VIAL (ML) SUBCUTANEOUS
Qty: 0 | Refills: 0 | Status: DISCONTINUED | OUTPATIENT
Start: 2017-12-08 | End: 2017-12-15

## 2017-12-08 RX ORDER — BACITRACIN ZINC 500 UNIT/G
1 OINTMENT IN PACKET (EA) TOPICAL DAILY
Qty: 0 | Refills: 0 | Status: DISCONTINUED | OUTPATIENT
Start: 2017-12-08 | End: 2017-12-22

## 2017-12-08 RX ADMIN — Medication 4 MILLIGRAM(S): at 19:06

## 2017-12-08 RX ADMIN — LACOSAMIDE 200 MILLIGRAM(S): 50 TABLET ORAL at 07:17

## 2017-12-08 RX ADMIN — DIVALPROEX SODIUM 250 MILLIGRAM(S): 500 TABLET, DELAYED RELEASE ORAL at 07:17

## 2017-12-08 RX ADMIN — Medication 50 MICROGRAM(S): at 06:13

## 2017-12-08 RX ADMIN — MEGESTROL ACETATE 400 MILLIGRAM(S): 40 SUSPENSION ORAL at 13:05

## 2017-12-08 RX ADMIN — Medication 4 MILLIGRAM(S): at 07:17

## 2017-12-08 RX ADMIN — DIVALPROEX SODIUM 250 MILLIGRAM(S): 500 TABLET, DELAYED RELEASE ORAL at 19:05

## 2017-12-08 RX ADMIN — LACOSAMIDE 200 MILLIGRAM(S): 50 TABLET ORAL at 19:05

## 2017-12-08 RX ADMIN — SODIUM CHLORIDE 100 MILLILITER(S): 9 INJECTION INTRAMUSCULAR; INTRAVENOUS; SUBCUTANEOUS at 19:01

## 2017-12-08 RX ADMIN — SODIUM CHLORIDE 250 MILLILITER(S): 9 INJECTION INTRAMUSCULAR; INTRAVENOUS; SUBCUTANEOUS at 14:32

## 2017-12-08 RX ADMIN — ENOXAPARIN SODIUM 40 MILLIGRAM(S): 100 INJECTION SUBCUTANEOUS at 13:02

## 2017-12-08 RX ADMIN — Medication 1: at 23:09

## 2017-12-08 RX ADMIN — ONDANSETRON 4 MILLIGRAM(S): 8 TABLET, FILM COATED ORAL at 23:09

## 2017-12-08 RX ADMIN — AMLODIPINE BESYLATE 5 MILLIGRAM(S): 2.5 TABLET ORAL at 07:17

## 2017-12-08 RX ADMIN — Medication 125 MILLILITER(S): at 15:38

## 2017-12-08 RX ADMIN — SODIUM CHLORIDE 1000 MILLILITER(S): 9 INJECTION INTRAMUSCULAR; INTRAVENOUS; SUBCUTANEOUS at 17:47

## 2017-12-08 RX ADMIN — LATANOPROST 1 DROP(S): 0.05 SOLUTION/ DROPS OPHTHALMIC; TOPICAL at 21:37

## 2017-12-08 NOTE — PROGRESS NOTE ADULT - PROBLEM SELECTOR PLAN 4
-Thrombocytopenia on admission, however platelets downtrending. With concern for HIT, heparin and lovenox was dc/d. HIT workup negative. Heparin SQ and Lovenox restarted today.   -Continue to trend CBC.

## 2017-12-08 NOTE — SWALLOW BEDSIDE ASSESSMENT ADULT - SPECIFY REASON(S)
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 Presenting to the ED brought in by wife for AMS.

## 2017-12-08 NOTE — PROGRESS NOTE ADULT - PROBLEM SELECTOR PLAN 1
Improving. Patient presenting to the ED with 1 day AMS in association with 1 week urine/bowel incontinence.  -CT head showing no interval change from prior study.   -MRI head showing no new brain lesions, but new midline suboccipital subcutaneous collection. Progressive interval increase in ventricular size. Progressive interval increase in the periventricular FLAIR/T2 signal and left frontal lobe signal.  -Neuro following, will present the case to Tumor Board and continue to follow recs. Improving. Patient presenting to the ED with 1 day AMS in association with 1 week urine/bowel incontinence.  -CT head showing no interval change from prior study.   -MRI head showing no new brain lesions, but new midline suboccipital subcutaneous collection. Progressive interval increase in ventricular size. Progressive interval increase in the periventricular FLAIR/T2 signal and left frontal lobe signal.  -Will follow up encephalopathy work up in the AM with RPR, Vitamin B12, folate.   -Neuro following, will present the case to Tumor Board and continue to follow recs.

## 2017-12-08 NOTE — CHART NOTE - NSCHARTNOTEFT_GEN_A_CORE
PGY-3 Chart Note    Patient with Persistent Tachycardia during Admission, Etiology Unclear.     Examined Patient in AM, Patient with One Word Answers, "Yes" and "No". Patient denies Fever, Chills, Nausea, Vomiting, Headache, Vision Changes, Hearing Changes, Sinus Congestion, Rhinorrhea, Sore Throat, Cough, Chest Pain, SOB, Palpitations, Dizziness/Lightheadedness, Abdominal Pain. Patient Incontinent, and Denies Diarrhea or Constipation. Confirmed with Wife and Health Aide at Bedside. No Melena, Hematochezia. Patient denies Dysuria, Hematuria, Pain or Swelling of Lower Extremities.     Vitals in AM T 98.7 (Oral), , /87, RR 16, SpO2 97% on RA.   Repeat AM Vitals T 99.4 (Rectal), , /95, RR, 16, SpO2 96% on RA    Examination:  General: Resting Comfortably in Bed, NAD, Following Commands, and Answering in 1 Word Answers  HEENT: NCAT, PERRLA B/L, EOMI B/L, Dry MM, No Oropharyngeal Erythema or Exudates  Neck: Stiff, Non-Tender, No Cervical or Supraclavicular Lymphadenopathy  Heart: Normal S1+S2, Regular, Tachycardic, No M/R/G Appreciated  Lungs: Diminished Breath Sounds Lung Bases Bilaterally, However, No Rales, No Rhonchi, No Wheezing Appreciated  Abdomen: Soft, Mildly Distended, Non-Tender, Normoactive Bowel Sounds  Back: No Skin Breakdown  : Small Cut Near Urethral Meatus, Otherwise Normal Male Genitalia  Extremities: Warm, Well Perfused, 2+ Radial and DP Pulses Bilaterally, 1+ Pitting Edema Ankles Bilaterally, 3cm Cut on Lateral Aspect of Left Lower Leg, No Surrounding Erythema or Purulence, Non-Tender  Neurological: Patient with 1 Word Answer, Difficult to Assess, However, Follows Commands  Skin: Warm, Dry    Labs/Imagin.8   13.8  )-----------( 95       ( 08 Dec 2017 08:00 )             38.8    12-    133<L>  |  99  |  35<H>  ----------------------------<  214<H>  5.0   |  19<L>  |  1.16                                          Ca    7.1<L>      08 Dec 2017 16:51  Mg     1.9         TPro  4.1<L>  /  Alb  2.4<L>  /  TBili  0.4  /  DBili  x   /  AST  43<H>  /  ALT  115<H>  /  AlkPhos  115      Lactic Acid 3.8-->3.2  Serum Osmolality 292  BHB <0.1  TSH 0.662  BNP 1306    Urinalysis Basic - ( 08 Dec 2017 12:59 )    Color: Yellow / Appearance: SL Cloudy / S.015 / pH: x  Gluc: x / Ketone: NEGATIVE  / Bili: Negative / Urobili: 0.2 E.U./dL   Blood: x / Protein: 100 mg/dL / Nitrite: NEGATIVE   Leuk Esterase: NEGATIVE / RBC: < 5 /HPF / WBC < 5 /HPF   Sq Epi: x / Non Sq Epi: 0-5 /HPF / Bacteria: None /HPF    Urine Na 29  Urine Cr 83  Urine Osmolality 561    < from: Xray Chest 1 View AP- PORTABLE-Urgent (17 @ 12:43) >      EXAM:  XR CHEST 1 VIEW PORT URGENT                          PROCEDURE DATE:  2017          INTERPRETATION:  XR CHEST 1 VIEW PORT URGENT dated 2017 12:43 PM    CLINICAL INFORMATION: Tachycardia, cough.    COMPARISON: 2017    FINDINGS: Right chest port catheter is again seen, unchanged. Heart size   is stable. There is no pleural effusion. There is a patchy infiltrate at   the right base. There is no pneumothorax.    IMPRESSION: Patchy infiltrate at the right base, which may represent   developing infection. Continued radiographic follow-up is recommended.      "Thank you for the opportunity to participate in the care of this   patient."      ROSITA GUPTA M.D., RADIOLOGY ATTENDING  This document has been electronically signed. Dec  8 2017 12:45PM          EXAM:  ECHOCARDIOGRAM (CARDIOL)                          PROCEDURE DATE:  2017            INTERPRETATION:  Patient Height: 183.0 cm  Patient Weight: 72.0 kg  BSA: 1.9 m^2  Interpretation Summary  There is severe concentric left ventricular hypertrophy.The left   ventricular   wall motion is normal.The left ventricle is hyperdynamic and the overall   ejection fraction is increased (>75%).  The right ventricle is normal in   size   and function.The left atrial size is normal.Right atrial size is   normal.No   evidence for any hemodynamically significant valvular disease.There was   insufficient TR detected from which to calculate pulmonary artery   systolic   pressure.  Borderline aortic root dilatation.The aortic root measures 4   cm at   sinuses (normal less than 4 cm for men, less than 3.6 cm for women).    There is   no pericardial effusion.Compared to the echo performed on 3/9/2017,   there has   been no significant interval changes.  Procedure Details  A complete two-dimensional transthoracic echocardiogram was performed (2D,  M-mode, spectral and color flow doppler).  Study Quality: Good.  Left Ventricle  There is severe concentric left ventricular hypertrophy.  The left ventricular wall motion is normal.  Theleft ventricle is hyperdynamic and the overall ejection fraction is  increased (>75%).  Left Atrium  The left atrial size is normal.  Right Atrium  Right atrial size is normal.  Right Ventricle  The right ventricle is normal in size and function.  Aortic Valve  Structurally normal aortic valve.  There is trace aortic regurgitation.  Mitral Valve  Structurally normal mitral valve.  No mitral regurgitation noted.  Tricuspid Valve  Structurally normal tricuspid valve.  No tricuspid regurgitation noted.  There was insufficient TR detected from which to calculate pulmonary   artery  systolic pressure.  Pulmonic Valve  The pulmonic valve is not well visualized.  No pulmonic regurgitation noted.  Arteries and Venous System  The aortic root measures 4 cm at sinuses (normal less than 4 cm for men,   less  than 3.6 cm for women).  Borderline aortic root dilatation.  The inferior vena cava is normal in size (<2.1 cm) with normal inspiratory  collapse (>50%) consistent with normal right atrial pressure.  Pericardium / Pleura  There is no pericardial effusion.  Additional Comments  No evidence for any hemodynamically significant valvular disease.  Doppler Measurements & Calculations  MV E point: 52.2 cm/sec  MV A point: 74.4 cm/sec  MV E/A: 0.7  Ao V2 max: 119 cm/sec  Ao max P.7 mmHg  Ao max PG (full): 1.7 mmHg  Ao V2 mean: 95.0 cm/sec  Ao mean PG: 3.8 mmHg  Ao mean PG (full): 1.2 mmHg  Ao V2 VTI: 17.5 cm  CAMERON(I,A): 5.2 cm^2  CAMERON(I,D): 5.2 cm^2  CAMERON(V,A): 3.9 cm^2  CAMERON(V,D): 3.9 cm^2  LV max P mmHg  LV mean P.6 mmHg  LV V1 max: 100 cm/sec  LV V1 mean: 75.8 cm/sec  LV V1 VTI: 19.8 cm  SV(Ao): 215.6 ml  SI(Ao): 111.6 ml/m^2  SV(LVOT): 91.6 ml  SI(LVOT): 47.4 ml/m^2  MMode 2D Measurements & Calculations  IVSd: 1.7 cm  LVIDd: 3.1 cm  LVIDs: 1.9 cm  LVPWd: 1.2 cm  IVS/LVPW: 1.4  FS: 37.0 %  EDV(Teich): 37.0 ml  ESV(Teich): 11.7 ml  LV mass(C)d: 152.9 grams  LV mass(C)dI: 79.1 grams/m^2  SI(cubed): 11.2 ml/m^2  Ao root diam: 4.0 cm  Ao root area: 12.3 cm^2  LA dimension: 2.1 cm  LA/Ao: 0.5  LVOT diam: 2.4 cm  LVOT area: 4.6 cm^2  LVOT area (M): 4.6 cm^2  Interpreting Physician:Azam Quinones MD electronically signed on 2017   17:23:46    "Thank you for the opportunity to participate in the care of this   patient."        AZAM QUINONES M.D., ATTENDING CARDIOLOGIST  This document has been electronically signed. Dec  8 2017  5:23PM         EKG-Sinus Tachycardia      Assessment/Plan: Patient is a 62 year old male with past medical history of Metastatic Renal Cell Carcinoma (S/P PGY-3 Chart Note    Patient with Persistent Tachycardia during Admission, Etiology Unclear.     Examined Patient in AM, Patient with One Word Answers, "Yes" and "No". Patient denies Fever, Chills, Nausea, Vomiting, Headache, Vision Changes, Hearing Changes, Sinus Congestion, Rhinorrhea, Sore Throat, Cough, Chest Pain, SOB, Palpitations, Dizziness/Lightheadedness, Abdominal Pain. Patient Incontinent, and Denies Diarrhea or Constipation. Confirmed with Wife and Health Aide at Bedside. No Melena, Hematochezia. Patient denies Dysuria, Hematuria, Pain or Swelling of Lower Extremities.     Vitals in AM T 98.7 (Oral), , /87, RR 16, SpO2 97% on RA.   Repeat AM Vitals T 99.4 (Rectal), , /95, RR, 16, SpO2 96% on RA    Examination:  General: Resting Comfortably in Bed, NAD, Following Commands, and Answering in 1 Word Answers  HEENT: NCAT, PERRLA B/L, EOMI B/L, Dry MM, No Oropharyngeal Erythema or Exudates  Neck: Stiff, Non-Tender, No Cervical or Supraclavicular Lymphadenopathy  Heart: Normal S1+S2, Regular, Tachycardic, No M/R/G Appreciated  Lungs: Diminished Breath Sounds Lung Bases Bilaterally, However, No Rales, No Rhonchi, No Wheezing Appreciated  Abdomen: Soft, Mildly Distended, Non-Tender, Normoactive Bowel Sounds  Back: No Skin Breakdown  : Small Cut Near Urethral Meatus, Otherwise Normal Male Genitalia  Extremities: Warm, Well Perfused, 2+ Radial and DP Pulses Bilaterally, 1+ Pitting Edema Ankles Bilaterally, 3cm Cut on Lateral Aspect of Left Lower Leg, No Surrounding Erythema or Purulence, Non-Tender  Neurological: Patient with 1 Word Answer, Difficult to Assess, However, Follows Commands  Skin: Warm, Dry    Labs/Imagin.8   13.8  )-----------( 95       ( 08 Dec 2017 08:00 )             38.8    12-    133<L>  |  99  |  35<H>  ----------------------------<  214<H>  5.0   |  19<L>  |  1.16                                          Ca    7.1<L>      08 Dec 2017 16:51  Mg     1.9         TPro  4.1<L>  /  Alb  2.4<L>  /  TBili  0.4  /  DBili  x   /  AST  43<H>  /  ALT  115<H>  /  AlkPhos  115      Lactic Acid 3.8-->3.2  Serum Osmolality 292  BHB <0.1  TSH 0.662  BNP 1306    Urinalysis Basic - ( 08 Dec 2017 12:59 )    Color: Yellow / Appearance: SL Cloudy / S.015 / pH: x  Gluc: x / Ketone: NEGATIVE  / Bili: Negative / Urobili: 0.2 E.U./dL   Blood: x / Protein: 100 mg/dL / Nitrite: NEGATIVE   Leuk Esterase: NEGATIVE / RBC: < 5 /HPF / WBC < 5 /HPF   Sq Epi: x / Non Sq Epi: 0-5 /HPF / Bacteria: None /HPF    Urine Na 29  Urine Cr 83  Urine Osmolality 561    < from: Xray Chest 1 View AP- PORTABLE-Urgent (17 @ 12:43) >      EXAM:  XR CHEST 1 VIEW PORT URGENT                          PROCEDURE DATE:  2017          INTERPRETATION:  XR CHEST 1 VIEW PORT URGENT dated 2017 12:43 PM    CLINICAL INFORMATION: Tachycardia, cough.    COMPARISON: 2017    FINDINGS: Right chest port catheter is again seen, unchanged. Heart size   is stable. There is no pleural effusion. There is a patchy infiltrate at   the right base. There is no pneumothorax.    IMPRESSION: Patchy infiltrate at the right base, which may represent   developing infection. Continued radiographic follow-up is recommended.      "Thank you for the opportunity to participate in the care of this   patient."      ROSITA GUPTA M.D., RADIOLOGY ATTENDING  This document has been electronically signed. Dec  8 2017 12:45PM          EXAM:  ECHOCARDIOGRAM (CARDIOL)                          PROCEDURE DATE:  2017            INTERPRETATION:  Patient Height: 183.0 cm  Patient Weight: 72.0 kg  BSA: 1.9 m^2  Interpretation Summary  There is severe concentric left ventricular hypertrophy.The left   ventricular   wall motion is normal.The left ventricle is hyperdynamic and the overall   ejection fraction is increased (>75%).  The right ventricle is normal in   size   and function.The left atrial size is normal.Right atrial size is   normal.No   evidence for any hemodynamically significant valvular disease.There was   insufficient TR detected from which to calculate pulmonary artery   systolic   pressure.  Borderline aortic root dilatation.The aortic root measures 4   cm at   sinuses (normal less than 4 cm for men, less than 3.6 cm for women).    There is   no pericardial effusion.Compared to the echo performed on 3/9/2017,   there has   been no significant interval changes.  Procedure Details  A complete two-dimensional transthoracic echocardiogram was performed (2D,  M-mode, spectral and color flow doppler).  Study Quality: Good.  Left Ventricle  There is severe concentric left ventricular hypertrophy.  The left ventricular wall motion is normal.  Theleft ventricle is hyperdynamic and the overall ejection fraction is  increased (>75%).  Left Atrium  The left atrial size is normal.  Right Atrium  Right atrial size is normal.  Right Ventricle  The right ventricle is normal in size and function.  Aortic Valve  Structurally normal aortic valve.  There is trace aortic regurgitation.  Mitral Valve  Structurally normal mitral valve.  No mitral regurgitation noted.  Tricuspid Valve  Structurally normal tricuspid valve.  No tricuspid regurgitation noted.  There was insufficient TR detected from which to calculate pulmonary   artery  systolic pressure.  Pulmonic Valve  The pulmonic valve is not well visualized.  No pulmonic regurgitation noted.  Arteries and Venous System  The aortic root measures 4 cm at sinuses (normal less than 4 cm for men,   less  than 3.6 cm for women).  Borderline aortic root dilatation.  The inferior vena cava is normal in size (<2.1 cm) with normal inspiratory  collapse (>50%) consistent with normal right atrial pressure.  Pericardium / Pleura  There is no pericardial effusion.  Additional Comments  No evidence for any hemodynamically significant valvular disease.  Doppler Measurements & Calculations  MV E point: 52.2 cm/sec  MV A point: 74.4 cm/sec  MV E/A: 0.7  Ao V2 max: 119 cm/sec  Ao max P.7 mmHg  Ao max PG (full): 1.7 mmHg  Ao V2 mean: 95.0 cm/sec  Ao mean PG: 3.8 mmHg  Ao mean PG (full): 1.2 mmHg  Ao V2 VTI: 17.5 cm  CAMEORN(I,A): 5.2 cm^2  CAMERON(I,D): 5.2 cm^2  CAMERON(V,A): 3.9 cm^2  CAMERON(V,D): 3.9 cm^2  LV max P mmHg  LV mean P.6 mmHg  LV V1 max: 100 cm/sec  LV V1 mean: 75.8 cm/sec  LV V1 VTI: 19.8 cm  SV(Ao): 215.6 ml  SI(Ao): 111.6 ml/m^2  SV(LVOT): 91.6 ml  SI(LVOT): 47.4 ml/m^2  MMode 2D Measurements & Calculations  IVSd: 1.7 cm  LVIDd: 3.1 cm  LVIDs: 1.9 cm  LVPWd: 1.2 cm  IVS/LVPW: 1.4  FS: 37.0 %  EDV(Teich): 37.0 ml  ESV(Teich): 11.7 ml  LV mass(C)d: 152.9 grams  LV mass(C)dI: 79.1 grams/m^2  SI(cubed): 11.2 ml/m^2  Ao root diam: 4.0 cm  Ao root area: 12.3 cm^2  LA dimension: 2.1 cm  LA/Ao: 0.5  LVOT diam: 2.4 cm  LVOT area: 4.6 cm^2  LVOT area (M): 4.6 cm^2  Interpreting Physician:Azam Quinones MD electronically signed on 2017   17:23:46    "Thank you for the opportunity to participate in the care of this   patient."        AZAM QUINONES M.D., ATTENDING CARDIOLOGIST  This document has been electronically signed. Dec  8 2017  5:23PM         EKG-Sinus Tachycardia      Assessment/Plan: Patient is a 62 year old male with past medical history of Metastatic Renal Cell Carcinoma (S/P Craniotomies, Radiation, and Chemotherapy, Complicated by Seizures), Hypertension, Hypothyroidism, Chronic Kidney Disease, Glaucoma, who presented with Altered Mental Status, with Work-Up Thus Far (CT Head, MRI Head, MRI C-Spine, and MRI T-Spine) without Etiology, and Current Suspicion for Radiation Necrosis. Mental Status has Improved Slightly, Now Answering Questions with Yes and No, More Awake and Alert. However, Persistently Tachycardic, and in AM Becoming Hyponatremia, and with Downtrending Bicarbonate.     1. Tachycardia: Patient persistently Tachycardic throughout Admission, with Multiple Possible Etiologies. Patient with Decreased PO Intake due to Altered Mental Status, likely Element of Dehydration based on History and Examination. Patient with Leukocytosis, however, Afebrile, Rectal Temperature Normal, and No Clear Source of Infection. DVT Prophylaxis Held in Setting of Thrombocytopenia for Concern for HIT, Possible PE, however, Denies SOB, and Oxygen Saturation Normal on RA, so Less Likely. Cardiac Etiology Possible, but Unlikely, EKG Sinus Tachycardia, Echocardiogram with Signs of Diastolic Dysfunction, Unchanged from Prior.   -Patient Bolused Total of 1L Normal Saline Today, and Started on Normal Saline at 100mL/hour, with Improvement in HR, Now <110.   -CXR Performed, Official Read for Possible Infiltrate, However, Does Not Clinically Correlate. Urinalysis Not Consistent with UTI. No Focal Complaints. Patient with Chemotherapy Port, however, No Pain at Site. Blood Cultures, Fungal Culture Obtained.   -Will Continue to Monitor.     2. Lactic Acidosis: Patient with Downtrending Bicarbonate, Etiology was Unclear. Ordered Lactic Acid, 3.8. Ordered 500mL Normal Saline Bolus. Repeat 3.2. Ordered Additional 500mL Normal Saline Bolus, and Started on Normal Saline at 100mL/Hour. Suspect Secondary to Dehydration, as Improving with IV Fluids. Unlikely Cardiac in Nature, Echocardiogram Normal. No Focal Symptoms, and On Examination, BP Stable and Making Urine. Not Recorded in [place], but RN and Health Aide reporting Incontinence. Between 3-6PM was Collected and Put Out 400mL in 3 Hours. Patient is Warm, and with Strong Pulses in Extremities Low Suspicion for Localized Lactic Acidosis. No Signs or Symptoms of Active Infection.   -Continue Normal Saline as Above.   -Repeat Lactic Acid at 8PM.     3. Hyponatremia: Patient with Downtrending Sodium in AM. 134-->132. Ordered Serum Osmolality 292 (Normal), Urine Osmolality 561, Urine Na 29. Based on Labs and Examination, Suspect Related to Decreased PO Intake. Na Improved Slightly to 133 with IV Fluids. Now S/P 1L Normal Saline.   -Continue Normal Saline at 100mL/Hour.  -Repeat BMP at 8PM.     Called and Discussed Work-Up and Above Plan with Attending Dr. Tarango PGY-3 Chart Note    Patient with Persistent Tachycardia during Admission, Etiology Unclear.     Examined Patient in AM, Patient with One Word Answers, "Yes" and "No". Patient denies Fever, Chills, Nausea, Vomiting, Headache, Vision Changes, Hearing Changes, Sinus Congestion, Rhinorrhea, Sore Throat, Cough, Chest Pain, SOB, Palpitations, Dizziness/Lightheadedness, Abdominal Pain. Patient Incontinent, and Denies Diarrhea or Constipation. Confirmed with Wife and Health Aide at Bedside. No Melena, Hematochezia. Patient denies Dysuria, Hematuria, Pain or Swelling of Lower Extremities.     Vitals in AM T 98.7 (Oral), , /87, RR 16, SpO2 97% on RA.   Repeat AM Vitals T 99.4 (Rectal), , /95, RR, 16, SpO2 96% on RA    Examination:  General: Resting Comfortably in Bed, NAD, Following Commands, and Answering in 1 Word Answers  HEENT: NCAT, PERRLA B/L, EOMI B/L, Dry MM, No Oropharyngeal Erythema or Exudates  Neck: Stiff, Non-Tender, No Cervical or Supraclavicular Lymphadenopathy  Heart: Normal S1+S2, Regular, Tachycardic, No M/R/G Appreciated  Lungs: Diminished Breath Sounds Lung Bases Bilaterally, However, No Rales, No Rhonchi, No Wheezing Appreciated  Abdomen: Soft, Mildly Distended, Non-Tender, Normoactive Bowel Sounds  Back: No Skin Breakdown  : Small Cut Near Urethral Meatus, Otherwise Normal Male Genitalia  Extremities: Warm, Well Perfused, 2+ Radial and DP Pulses Bilaterally, 1+ Pitting Edema Ankles Bilaterally, 3cm Cut on Lateral Aspect of Left Lower Leg, No Surrounding Erythema or Purulence, Non-Tender  Neurological: Patient with 1 Word Answer, Difficult to Assess, However, Follows Commands  Skin: Warm, Dry    Labs/Imagin.8   13.8  )-----------( 95       ( 08 Dec 2017 08:00 )             38.8    12-    133<L>  |  99  |  35<H>  ----------------------------<  214<H>  5.0   |  19<L>  |  1.16                                          Ca    7.1<L>      08 Dec 2017 16:51  Mg     1.9         TPro  4.1<L>  /  Alb  2.4<L>  /  TBili  0.4  /  DBili  x   /  AST  43<H>  /  ALT  115<H>  /  AlkPhos  115      Lactic Acid 3.8-->3.2  Serum Osmolality 292  BHB <0.1  TSH 0.662  BNP 1306    Urinalysis Basic - ( 08 Dec 2017 12:59 )    Color: Yellow / Appearance: SL Cloudy / S.015 / pH: x  Gluc: x / Ketone: NEGATIVE  / Bili: Negative / Urobili: 0.2 E.U./dL   Blood: x / Protein: 100 mg/dL / Nitrite: NEGATIVE   Leuk Esterase: NEGATIVE / RBC: < 5 /HPF / WBC < 5 /HPF   Sq Epi: x / Non Sq Epi: 0-5 /HPF / Bacteria: None /HPF    Urine Na 29  Urine Cr 83  Urine Osmolality 561    < from: Xray Chest 1 View AP- PORTABLE-Urgent (17 @ 12:43) >      EXAM:  XR CHEST 1 VIEW PORT URGENT                          PROCEDURE DATE:  2017          INTERPRETATION:  XR CHEST 1 VIEW PORT URGENT dated 2017 12:43 PM    CLINICAL INFORMATION: Tachycardia, cough.    COMPARISON: 2017    FINDINGS: Right chest port catheter is again seen, unchanged. Heart size   is stable. There is no pleural effusion. There is a patchy infiltrate at   the right base. There is no pneumothorax.    IMPRESSION: Patchy infiltrate at the right base, which may represent   developing infection. Continued radiographic follow-up is recommended.      "Thank you for the opportunity to participate in the care of this   patient."      ROSITA GUPTA M.D., RADIOLOGY ATTENDING  This document has been electronically signed. Dec  8 2017 12:45PM          EXAM:  ECHOCARDIOGRAM (CARDIOL)                          PROCEDURE DATE:  2017            INTERPRETATION:  Patient Height: 183.0 cm  Patient Weight: 72.0 kg  BSA: 1.9 m^2  Interpretation Summary  There is severe concentric left ventricular hypertrophy.The left   ventricular   wall motion is normal.The left ventricle is hyperdynamic and the overall   ejection fraction is increased (>75%).  The right ventricle is normal in   size   and function.The left atrial size is normal.Right atrial size is   normal.No   evidence for any hemodynamically significant valvular disease.There was   insufficient TR detected from which to calculate pulmonary artery   systolic   pressure.  Borderline aortic root dilatation.The aortic root measures 4   cm at   sinuses (normal less than 4 cm for men, less than 3.6 cm for women).    There is   no pericardial effusion.Compared to the echo performed on 3/9/2017,   there has   been no significant interval changes.  Procedure Details  A complete two-dimensional transthoracic echocardiogram was performed (2D,  M-mode, spectral and color flow doppler).  Study Quality: Good.  Left Ventricle  There is severe concentric left ventricular hypertrophy.  The left ventricular wall motion is normal.  Theleft ventricle is hyperdynamic and the overall ejection fraction is  increased (>75%).  Left Atrium  The left atrial size is normal.  Right Atrium  Right atrial size is normal.  Right Ventricle  The right ventricle is normal in size and function.  Aortic Valve  Structurally normal aortic valve.  There is trace aortic regurgitation.  Mitral Valve  Structurally normal mitral valve.  No mitral regurgitation noted.  Tricuspid Valve  Structurally normal tricuspid valve.  No tricuspid regurgitation noted.  There was insufficient TR detected from which to calculate pulmonary   artery  systolic pressure.  Pulmonic Valve  The pulmonic valve is not well visualized.  No pulmonic regurgitation noted.  Arteries and Venous System  The aortic root measures 4 cm at sinuses (normal less than 4 cm for men,   less  than 3.6 cm for women).  Borderline aortic root dilatation.  The inferior vena cava is normal in size (<2.1 cm) with normal inspiratory  collapse (>50%) consistent with normal right atrial pressure.  Pericardium / Pleura  There is no pericardial effusion.  Additional Comments  No evidence for any hemodynamically significant valvular disease.  Doppler Measurements & Calculations  MV E point: 52.2 cm/sec  MV A point: 74.4 cm/sec  MV E/A: 0.7  Ao V2 max: 119 cm/sec  Ao max P.7 mmHg  Ao max PG (full): 1.7 mmHg  Ao V2 mean: 95.0 cm/sec  Ao mean PG: 3.8 mmHg  Ao mean PG (full): 1.2 mmHg  Ao V2 VTI: 17.5 cm  CAMERON(I,A): 5.2 cm^2  CAMERON(I,D): 5.2 cm^2  CAMERON(V,A): 3.9 cm^2  CAMERON(V,D): 3.9 cm^2  LV max P mmHg  LV mean P.6 mmHg  LV V1 max: 100 cm/sec  LV V1 mean: 75.8 cm/sec  LV V1 VTI: 19.8 cm  SV(Ao): 215.6 ml  SI(Ao): 111.6 ml/m^2  SV(LVOT): 91.6 ml  SI(LVOT): 47.4 ml/m^2  MMode 2D Measurements & Calculations  IVSd: 1.7 cm  LVIDd: 3.1 cm  LVIDs: 1.9 cm  LVPWd: 1.2 cm  IVS/LVPW: 1.4  FS: 37.0 %  EDV(Teich): 37.0 ml  ESV(Teich): 11.7 ml  LV mass(C)d: 152.9 grams  LV mass(C)dI: 79.1 grams/m^2  SI(cubed): 11.2 ml/m^2  Ao root diam: 4.0 cm  Ao root area: 12.3 cm^2  LA dimension: 2.1 cm  LA/Ao: 0.5  LVOT diam: 2.4 cm  LVOT area: 4.6 cm^2  LVOT area (M): 4.6 cm^2  Interpreting Physician:Azam Quinones MD electronically signed on 2017   17:23:46    "Thank you for the opportunity to participate in the care of this   patient."        AZAM QUINONES M.D., ATTENDING CARDIOLOGIST  This document has been electronically signed. Dec  8 2017  5:23PM         EKG-Sinus Tachycardia      Assessment/Plan: Patient is a 62 year old male with past medical history of Metastatic Renal Cell Carcinoma (S/P Craniotomies, Radiation, and Chemotherapy, Complicated by Seizures), Hypertension, Hypothyroidism, Chronic Kidney Disease, Glaucoma, who presented with Altered Mental Status, with Work-Up Thus Far (CT Head, MRI Head, MRI C-Spine, and MRI T-Spine) without Etiology, and Current Suspicion for Radiation Necrosis. Mental Status has Improved Slightly, Now Answering Questions with Yes and No, More Awake and Alert. However, Persistently Tachycardic, and in AM Becoming Hyponatremia, and with Downtrending Bicarbonate.     1. Tachycardia: Patient persistently Tachycardic throughout Admission, with Multiple Possible Etiologies. Patient with Decreased PO Intake due to Altered Mental Status, likely Element of Dehydration based on History and Examination. Patient with Leukocytosis, however, Afebrile, Rectal Temperature Normal, and No Clear Source of Infection. DVT Prophylaxis Held in Setting of Thrombocytopenia for Concern for HIT, Possible PE, however, Denies SOB, and Oxygen Saturation Normal on RA, so Less Likely. Cardiac Etiology Possible, but Unlikely, EKG Sinus Tachycardia, Echocardiogram with Signs of Diastolic Dysfunction, Unchanged from Prior.   -Patient Bolused Total of 1L Normal Saline Today, and Started on Normal Saline at 100mL/hour, with Improvement in HR, Now <110.   -Also, patient with Low Albumin, Edema on Examination likely related to Low Protein State and Malnutrition. Given Albumin 5% 250mL IV x 1 As Well.   -CXR Performed, Official Read for Possible Infiltrate, However, Does Not Clinically Correlate. Urinalysis Not Consistent with UTI. No Focal Complaints. Patient with Chemotherapy Port, however, No Pain at Site. Blood Cultures, Fungal Culture Obtained.   -Will Continue to Monitor.     2. Lactic Acidosis: Patient with Downtrending Bicarbonate, Etiology was Unclear. Ordered Lactic Acid, 3.8. Ordered 500mL Normal Saline Bolus. Repeat 3.2. Ordered Additional 500mL Normal Saline Bolus, and Started on Normal Saline at 100mL/Hour. Suspect Secondary to Dehydration, as Improving with IV Fluids. Unlikely Cardiac in Nature, Echocardiogram Normal. No Focal Symptoms, and On Examination, BP Stable and Making Urine. Not Recorded in [facility], but RN and Health Aide reporting Incontinence. Between 3-6PM was Collected and Put Out 400mL in 3 Hours. Patient is Warm, and with Strong Pulses in Extremities Low Suspicion for Localized Lactic Acidosis. No Signs or Symptoms of Active Infection.   -Continue Normal Saline as Above.   -Repeat Lactic Acid at 8PM.     3. Hyponatremia: Patient with Downtrending Sodium in AM. 134-->132. Ordered Serum Osmolality 292 (Normal), Urine Osmolality 561, Urine Na 29. Based on Labs and Examination, Suspect Related to Decreased PO Intake. Na Improved Slightly to 133 with IV Fluids. Now S/P 1L Normal Saline.   -Continue Normal Saline at 100mL/Hour.  -Repeat BMP at 8PM.     Called and Discussed Work-Up and Above Plan with Attending Dr. Tarango

## 2017-12-08 NOTE — PROGRESS NOTE ADULT - PROBLEM SELECTOR PLAN 2
-Patient has been persistently tachycardic. Unknown etiology. Differentials could include PE (however no hypoxia, no tachypnea) vs infection (however no fever and WBC elevated likely 2/2 steroids) vs dehydration (patient hyponatremia, hypochloremic and elevated lactate) vs uncontrolled hyperthyroidism.   -Will given patient a 500 cc NS bolus and IV albumin.   -F/u TSH  -F/u Echo  -F/u LE Dopplers   -F/u -Patient has been persistently tachycardic. Unknown etiology. Differentials could include PE (however no hypoxia, no tachypnea) vs infection (however no fever and WBC elevated likely 2/2 steroids) vs dehydration vs uncontrolled hyperthyroidism.   -Will given patient a 500 cc NS bolus and IV albumin.   -F/u TSH  -F/u Echo  -F/u LE Dopplers   -F/u repeat BMP post fluid challenge

## 2017-12-08 NOTE — SWALLOW BEDSIDE ASSESSMENT ADULT - SWALLOW EVAL: DIAGNOSIS
Patient presents with primarily oral phase dysphagia with impaired and slowed oral bolus processing for puree > thin liquids.  Pharyngeal phase was WFL with no overt signs & symptoms of airway protection deficits across consistencies tested.  A full liquid diet is recommended at this time to facilitate po efficiency.

## 2017-12-08 NOTE — SWALLOW BEDSIDE ASSESSMENT ADULT - ORAL PHASE
Prolonged bolus processing/transport for puree? thin liquid.  Oral phase of swallowing was > 20 sec at times with puree bolus even with verbal and tactile cues to swallow.  Oral phase of swallow with thin liquid was more timely.

## 2017-12-08 NOTE — PROVIDER CONTACT NOTE (OTHER) - ASSESSMENT
FU=905, BP= 148/95, RR= 20, O2 sat= 97% on room air, T= 98.5. Patient denies feeling any palpitations.

## 2017-12-08 NOTE — PROGRESS NOTE ADULT - SUBJECTIVE AND OBJECTIVE BOX
OVERNIGHT EVENTS: No acute events overnight.     SUBJECTIVE / INTERVAL HPI: Patient seen and examined at bedside.     VITAL SIGNS:  Vital Signs Last 24 Hrs  T(C): 37.1 (08 Dec 2017 06:02), Max: 37.1 (08 Dec 2017 06:02)  T(F): 98.7 (08 Dec 2017 06:02), Max: 98.7 (08 Dec 2017 06:02)  HR: 110 (08 Dec 2017 09:13) (110 - 119)  BP: 166/95 (08 Dec 2017 09:13) (135/86 - 166/95)  BP(mean): 102 (07 Dec 2017 16:16) (102 - 102)  RR: 16 (08 Dec 2017 09:13) (16 - 20)  SpO2: 96% (08 Dec 2017 09:13) (95% - 97%)    PHYSICAL EXAM:    General: No acute distress.   HEENT: NC/AT, MMM.  Respiratory: Normal breath sounds b/l. No wheezes, crackles, rhonchi.  Cardiovascular: Normal S1 and S2. RRR. No rubs, murmurs.   Gastrointestinal: Soft, non-distended, non-tender. Positive bowel sounds.   Extremities: No LE edema.   Skin: No rashes or ulcers. No clubbing/cyanosis.   Vascular: 2+ dorsalis pedis pulses b/l  Neurological: No focal deficits. Unable to assess mental status as patient not responding to questions.      MEDICATIONS:  MEDICATIONS  (STANDING):  albumin human  5% IVPB 250 milliLiter(s) IV Intermittent once  amLODIPine   Tablet 5 milliGRAM(s) Oral daily  dexamethasone     Tablet 4 milliGRAM(s) Oral two times a day  diVALproex  milliGRAM(s) Oral two times a day  enoxaparin Injectable 40 milliGRAM(s) SubCutaneous every 24 hours  lacosamide 200 milliGRAM(s) Oral two times a day  latanoprost 0.005% Ophthalmic Solution 1 Drop(s) Both EYES at bedtime  levothyroxine 50 MICROGram(s) Oral daily  megestrol Suspension 400 milliGRAM(s) Oral daily    MEDICATIONS  (PRN):      ALLERGIES:  Allergies  No Known Allergies  Intolerances    LABS:                        12.8   13.8  )-----------( 95       ( 08 Dec 2017 08:00 )             38.8     12-08    132<L>  |  98  |  35<H>  ----------------------------<  240<H>  5.0   |  18<L>  |  1.20    Ca    7.4<L>      08 Dec 2017 11:48  Mg     1.8         TPro  4.2<L>  /  Alb  2.4<L>  /  TBili  0.4  /  DBili  <0.2  /  AST  49<H>  /  ALT  123<H>  /  AlkPhos  127<H>        Urinalysis Basic - ( 08 Dec 2017 12:59 )    Color: Yellow / Appearance: SL Cloudy / S.015 / pH: x  Gluc: x / Ketone: NEGATIVE  / Bili: Negative / Urobili: 0.2 E.U./dL   Blood: x / Protein: 100 mg/dL / Nitrite: NEGATIVE   Leuk Esterase: NEGATIVE / RBC: < 5 /HPF / WBC < 5 /HPF   Sq Epi: x / Non Sq Epi: 0-5 /HPF / Bacteria: None /HPF      CAPILLARY BLOOD GLUCOSE    RADIOLOGY & ADDITIONAL TESTS: Reviewed.

## 2017-12-08 NOTE — PROGRESS NOTE ADULT - SUBJECTIVE AND OBJECTIVE BOX
Neurology Follow up note    Name  CORIN PERDOMO    HPI:  62M with metastatic RCC (s/p nephrectomy, s/p craniotomy x2, s/p gamma knife x3 most recently march 2017, currently on Opdivo and Avastin with Dr. 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 Presenting to the ED brought in by wife for AMS. Patient is at bedside however unable to give history. Patient wife states that he was in his normal state of health until today when she was called by the home health aid stating that he had episodes of staring into space and not as responsive as he is on a regular basis. Patient denies HA, change in vision, cough, CP, abdominal pain, back pain, dysuria, hematuria, constipation, diarrhea.     In ED T 97.8 P100 /89 R18 O2Sat 97% RA, patient was seen and evaluated by Neuro (04 Dec 2017 19:08)      Interval History - continuing to have poor speech output and weakness in the LE        REVIEW OF SYSTEMS    Vital Signs Last 24 Hrs  T(C): 37.1 (08 Dec 2017 06:02), Max: 37.1 (08 Dec 2017 06:02)  T(F): 98.7 (08 Dec 2017 06:02), Max: 98.7 (08 Dec 2017 06:02)  HR: 119 (08 Dec 2017 06:02) (108 - 119)  BP: 139/87 (08 Dec 2017 06:02) (133/102 - 155/107)  BP(mean): 102 (07 Dec 2017 16:16) (102 - 102)  RR: 16 (08 Dec 2017 06:02) (16 - 20)  SpO2: 97% (08 Dec 2017 06:02) (95% - 97%)    Physical Exam-     Mental Status- expressive aphasia    Cranial Nerves- full EOM    Gait and station-n/a    Motor- decrease movement in the LE    Reflexes- decreased    Sensation-no sensory level    Coordination-no tremors    Vascular -    Medications  amLODIPine   Tablet 5 milliGRAM(s) Oral daily  dexamethasone     Tablet 4 milliGRAM(s) Oral two times a day  diVALproex  milliGRAM(s) Oral two times a day  lacosamide 200 milliGRAM(s) Oral two times a day  latanoprost 0.005% Ophthalmic Solution 1 Drop(s) Both EYES at bedtime  levothyroxine 50 MICROGram(s) Oral daily  megestrol Suspension 400 milliGRAM(s) Oral daily      Lab      Radiology    Assessment- T8 bony lesion   radiation necrosis    Plan- Tumor board to discuss further treatment

## 2017-12-08 NOTE — PROGRESS NOTE ADULT - PROBLEM SELECTOR PLAN 3
-Unknown etiology, could be 2/2 dehydration as patient has had poor PO intake. Will give 500 cc NS bolus and recheck BMP.

## 2017-12-08 NOTE — SWALLOW BEDSIDE ASSESSMENT ADULT - SLP GENERAL OBSERVATIONS
Pt received awake and alert, pleasant, followed simple commands inconsistently, (+) evidence of oral apraxia, Verbal output limited

## 2017-12-08 NOTE — PROGRESS NOTE ADULT - ASSESSMENT
61 yo M with PMHx of Renal Cell Carcinoma with metastases to the brain presenting to the ED with altered mental status, admitted to [facility] for further management. 61 yo M with PMHx of Renal Cell Carcinoma with mets presenting to the ED with altered mental status, admitted to [facility] for further management.

## 2017-12-08 NOTE — PROGRESS NOTE ADULT - PROBLEM SELECTOR PLAN 5
-Patient last chemotherapy on Sept 2017. Patients wife reports plans for chemo in Jan 2018  -On Opdivo and Avastin during last regimen  -Hold off on chemo at this time  -Dr Ya to follow

## 2017-12-08 NOTE — SWALLOW BEDSIDE ASSESSMENT ADULT - COMMENTS
Swallow consult requested as pt noted to be holding bolus in the mouth without swallowing x 1 week.  Pt's HHA reported that it took her more than 1 hour to feed patient a bowl of soup during lunch earlier today.

## 2017-12-08 NOTE — PROGRESS NOTE ADULT - SUBJECTIVE AND OBJECTIVE BOX
Physical Medicine and Rehabilitation Progress Note    CORIN PERDOMO    MRN-[id number]    Patient is a 62y old  Male who presents with a chief complaint of AMS (04 Dec 2017 19:08)      Vital Signs Last 24 Hrs  T(C): 37.1 (08 Dec 2017 06:02), Max: 37.1 (08 Dec 2017 06:02)  T(F): 98.7 (08 Dec 2017 06:02), Max: 98.7 (08 Dec 2017 06:02)  HR: 110 (08 Dec 2017 09:13) (110 - 119)  BP: 166/95 (08 Dec 2017 09:13) (135/86 - 166/95)  BP(mean): 102 (07 Dec 2017 16:16) (102 - 102)  RR: 16 (08 Dec 2017 09:13) (16 - 20)  SpO2: 96% (08 Dec 2017 09:13) (95% - 97%)    Current Functional Status in Physical Therapy:  laying in bed. Awake, deconditioned and generally weak and Lt.arm spastic and restricted motion, shoulder.    Bed Mobility:  dependent, maximal assistance    Transfers:    Ambulation:      Impression:  1. Deconditioned.  2. RCC, mets to brain / spine.  3. ? T-M encephalopathy      Recommendations:  1. Continue PT for therapeutic ex. and bed mobility / transfer activity as tolerated.

## 2017-12-08 NOTE — SWALLOW BEDSIDE ASSESSMENT ADULT - SWALLOW EVAL: RECOMMENDED FEEDING/EATING TECHNIQUES
allow for swallow between intakes/check mouth frequently for oral residue/pocketing/crush medication (when feasible)/position upright (90 degrees)/maintain upright posture during/after eating for 30 mins

## 2017-12-09 LAB
ANION GAP SERPL CALC-SCNC: 12 MMOL/L — SIGNIFICANT CHANGE UP (ref 5–17)
ANION GAP SERPL CALC-SCNC: 13 MMOL/L — SIGNIFICANT CHANGE UP (ref 5–17)
ANION GAP SERPL CALC-SCNC: 9 MMOL/L — SIGNIFICANT CHANGE UP (ref 5–17)
APPEARANCE UR: CLEAR — SIGNIFICANT CHANGE UP
APPEARANCE UR: CLEAR — SIGNIFICANT CHANGE UP
APTT BLD: 31.1 SEC — SIGNIFICANT CHANGE UP (ref 27.5–37.4)
BACTERIA # UR AUTO: SIGNIFICANT CHANGE UP /HPF
BILIRUB UR-MCNC: NEGATIVE — SIGNIFICANT CHANGE UP
BILIRUB UR-MCNC: NEGATIVE — SIGNIFICANT CHANGE UP
BUN SERPL-MCNC: 28 MG/DL — HIGH (ref 7–23)
BUN SERPL-MCNC: 30 MG/DL — HIGH (ref 7–23)
BUN SERPL-MCNC: 30 MG/DL — HIGH (ref 7–23)
CALCIUM SERPL-MCNC: 7 MG/DL — LOW (ref 8.4–10.5)
CALCIUM SERPL-MCNC: 7.2 MG/DL — LOW (ref 8.4–10.5)
CALCIUM SERPL-MCNC: 7.5 MG/DL — LOW (ref 8.4–10.5)
CHLORIDE SERPL-SCNC: 100 MMOL/L — SIGNIFICANT CHANGE UP (ref 96–108)
CHLORIDE SERPL-SCNC: 101 MMOL/L — SIGNIFICANT CHANGE UP (ref 96–108)
CHLORIDE SERPL-SCNC: 101 MMOL/L — SIGNIFICANT CHANGE UP (ref 96–108)
CO2 SERPL-SCNC: 19 MMOL/L — LOW (ref 22–31)
CO2 SERPL-SCNC: 21 MMOL/L — LOW (ref 22–31)
CO2 SERPL-SCNC: 21 MMOL/L — LOW (ref 22–31)
COLOR SPEC: YELLOW — SIGNIFICANT CHANGE UP
COLOR SPEC: YELLOW — SIGNIFICANT CHANGE UP
CREAT ?TM UR-MCNC: 50 MG/DL — SIGNIFICANT CHANGE UP
CREAT ?TM UR-MCNC: 52 MG/DL — SIGNIFICANT CHANGE UP
CREAT SERPL-MCNC: 1.01 MG/DL — SIGNIFICANT CHANGE UP (ref 0.5–1.3)
CREAT SERPL-MCNC: 1.04 MG/DL — SIGNIFICANT CHANGE UP (ref 0.5–1.3)
CREAT SERPL-MCNC: 1.07 MG/DL — SIGNIFICANT CHANGE UP (ref 0.5–1.3)
DIFF PNL FLD: (no result)
DIFF PNL FLD: (no result)
EPI CELLS # UR: SIGNIFICANT CHANGE UP /HPF (ref 0–5)
GLUCOSE BLDC GLUCOMTR-MCNC: 116 MG/DL — HIGH (ref 70–99)
GLUCOSE BLDC GLUCOMTR-MCNC: 170 MG/DL — HIGH (ref 70–99)
GLUCOSE BLDC GLUCOMTR-MCNC: 178 MG/DL — HIGH (ref 70–99)
GLUCOSE BLDC GLUCOMTR-MCNC: 94 MG/DL — SIGNIFICANT CHANGE UP (ref 70–99)
GLUCOSE SERPL-MCNC: 107 MG/DL — HIGH (ref 70–99)
GLUCOSE SERPL-MCNC: 138 MG/DL — HIGH (ref 70–99)
GLUCOSE SERPL-MCNC: 147 MG/DL — HIGH (ref 70–99)
GLUCOSE UR QL: NEGATIVE — SIGNIFICANT CHANGE UP
GLUCOSE UR QL: NEGATIVE — SIGNIFICANT CHANGE UP
HBA1C BLD-MCNC: 7.7 % — HIGH (ref 4–5.6)
HCT VFR BLD CALC: 34.4 % — LOW (ref 39–50)
HGB BLD-MCNC: 11.3 G/DL — LOW (ref 13–17)
INR BLD: 1.07 — SIGNIFICANT CHANGE UP (ref 0.88–1.16)
KETONES UR-MCNC: NEGATIVE — SIGNIFICANT CHANGE UP
KETONES UR-MCNC: NEGATIVE — SIGNIFICANT CHANGE UP
LACTATE SERPL-SCNC: 2 MMOL/L — SIGNIFICANT CHANGE UP (ref 0.5–2)
LEUKOCYTE ESTERASE UR-ACNC: NEGATIVE — SIGNIFICANT CHANGE UP
LEUKOCYTE ESTERASE UR-ACNC: NEGATIVE — SIGNIFICANT CHANGE UP
MAGNESIUM SERPL-MCNC: 1.8 MG/DL — SIGNIFICANT CHANGE UP (ref 1.6–2.6)
MAGNESIUM SERPL-MCNC: 1.9 MG/DL — SIGNIFICANT CHANGE UP (ref 1.6–2.6)
MCHC RBC-ENTMCNC: 27.6 PG — SIGNIFICANT CHANGE UP (ref 27–34)
MCHC RBC-ENTMCNC: 32.8 G/DL — SIGNIFICANT CHANGE UP (ref 32–36)
MCV RBC AUTO: 83.9 FL — SIGNIFICANT CHANGE UP (ref 80–100)
NITRITE UR-MCNC: NEGATIVE — SIGNIFICANT CHANGE UP
NITRITE UR-MCNC: NEGATIVE — SIGNIFICANT CHANGE UP
OSMOLALITY SERPL: 286 MOSM/KG — SIGNIFICANT CHANGE UP (ref 280–301)
OSMOLALITY SERPL: 289 MOSM/KG — SIGNIFICANT CHANGE UP (ref 280–301)
OSMOLALITY UR: 391 MOSMOL/KG — SIGNIFICANT CHANGE UP (ref 100–650)
OSMOLALITY UR: 426 MOSMOL/KG — SIGNIFICANT CHANGE UP (ref 100–650)
PH UR: 6.5 — SIGNIFICANT CHANGE UP (ref 5–8)
PH UR: 6.5 — SIGNIFICANT CHANGE UP (ref 5–8)
PHOSPHATE SERPL-MCNC: 3.1 MG/DL — SIGNIFICANT CHANGE UP (ref 2.5–4.5)
PLATELET # BLD AUTO: 78 K/UL — LOW (ref 150–400)
POTASSIUM SERPL-MCNC: 4.6 MMOL/L — SIGNIFICANT CHANGE UP (ref 3.5–5.3)
POTASSIUM SERPL-MCNC: 4.7 MMOL/L — SIGNIFICANT CHANGE UP (ref 3.5–5.3)
POTASSIUM SERPL-MCNC: 5 MMOL/L — SIGNIFICANT CHANGE UP (ref 3.5–5.3)
POTASSIUM SERPL-SCNC: 4.6 MMOL/L — SIGNIFICANT CHANGE UP (ref 3.5–5.3)
POTASSIUM SERPL-SCNC: 4.7 MMOL/L — SIGNIFICANT CHANGE UP (ref 3.5–5.3)
POTASSIUM SERPL-SCNC: 5 MMOL/L — SIGNIFICANT CHANGE UP (ref 3.5–5.3)
PROT UR-MCNC: 100 MG/DL
PROT UR-MCNC: 100 MG/DL
PROTHROM AB SERPL-ACNC: 11.9 SEC — SIGNIFICANT CHANGE UP (ref 9.8–12.7)
RBC # BLD: 4.1 M/UL — LOW (ref 4.2–5.8)
RBC # FLD: 15.8 % — SIGNIFICANT CHANGE UP (ref 10.3–16.9)
RBC CASTS # UR COMP ASSIST: < 5 /HPF — SIGNIFICANT CHANGE UP
SODIUM SERPL-SCNC: 131 MMOL/L — LOW (ref 135–145)
SODIUM SERPL-SCNC: 132 MMOL/L — LOW (ref 135–145)
SODIUM SERPL-SCNC: 134 MMOL/L — LOW (ref 135–145)
SODIUM UR-SCNC: 28 MMOL/L — SIGNIFICANT CHANGE UP
SODIUM UR-SCNC: 35 MMOL/L — SIGNIFICANT CHANGE UP
SP GR SPEC: 1.01 — SIGNIFICANT CHANGE UP (ref 1–1.03)
SP GR SPEC: 1.01 — SIGNIFICANT CHANGE UP (ref 1–1.03)
TSH SERPL-MCNC: 0.67 UIU/ML — SIGNIFICANT CHANGE UP (ref 0.35–4.94)
UROBILINOGEN FLD QL: 0.2 E.U./DL — SIGNIFICANT CHANGE UP
UROBILINOGEN FLD QL: 0.2 E.U./DL — SIGNIFICANT CHANGE UP
WBC # BLD: 13.1 K/UL — HIGH (ref 3.8–10.5)
WBC # FLD AUTO: 13.1 K/UL — HIGH (ref 3.8–10.5)
WBC UR QL: < 5 /HPF — SIGNIFICANT CHANGE UP

## 2017-12-09 RX ADMIN — Medication 4 MILLIGRAM(S): at 17:22

## 2017-12-09 RX ADMIN — LACOSAMIDE 200 MILLIGRAM(S): 50 TABLET ORAL at 17:22

## 2017-12-09 RX ADMIN — Medication 50 MICROGRAM(S): at 06:01

## 2017-12-09 RX ADMIN — DIVALPROEX SODIUM 250 MILLIGRAM(S): 500 TABLET, DELAYED RELEASE ORAL at 06:01

## 2017-12-09 RX ADMIN — Medication 1 APPLICATION(S): at 14:31

## 2017-12-09 RX ADMIN — Medication 1: at 21:45

## 2017-12-09 RX ADMIN — MEGESTROL ACETATE 400 MILLIGRAM(S): 40 SUSPENSION ORAL at 14:30

## 2017-12-09 RX ADMIN — LATANOPROST 1 DROP(S): 0.05 SOLUTION/ DROPS OPHTHALMIC; TOPICAL at 21:45

## 2017-12-09 RX ADMIN — AMLODIPINE BESYLATE 5 MILLIGRAM(S): 2.5 TABLET ORAL at 06:01

## 2017-12-09 RX ADMIN — LACOSAMIDE 200 MILLIGRAM(S): 50 TABLET ORAL at 06:02

## 2017-12-09 RX ADMIN — ENOXAPARIN SODIUM 40 MILLIGRAM(S): 100 INJECTION SUBCUTANEOUS at 11:23

## 2017-12-09 RX ADMIN — DIVALPROEX SODIUM 250 MILLIGRAM(S): 500 TABLET, DELAYED RELEASE ORAL at 17:22

## 2017-12-09 RX ADMIN — Medication 1: at 17:44

## 2017-12-09 RX ADMIN — Medication 4 MILLIGRAM(S): at 06:01

## 2017-12-09 NOTE — PROGRESS NOTE ADULT - SUBJECTIVE AND OBJECTIVE BOX
coverage for Dr. Tarango    Pt seen and examined wife reports as more sleepy today but ate some breakfast      REVIEW OF SYSTEMS:  Constitutional: No fever,   otherwise sleepy and unable to give      MEDICATIONS:  MEDICATIONS  (STANDING):  amLODIPine   Tablet 5 milliGRAM(s) Oral daily  BACItracin   Ointment 1 Application(s) Topical daily  dexamethasone     Tablet 4 milliGRAM(s) Oral two times a day  dextrose 5%. 1000 milliLiter(s) (50 mL/Hr) IV Continuous <Continuous>  dextrose 50% Injectable 12.5 Gram(s) IV Push once  dextrose 50% Injectable 25 Gram(s) IV Push once  dextrose 50% Injectable 25 Gram(s) IV Push once  diVALproex  milliGRAM(s) Oral two times a day  enoxaparin Injectable 40 milliGRAM(s) SubCutaneous every 24 hours  insulin lispro (HumaLOG) corrective regimen sliding scale   SubCutaneous Before meals and at bedtime  lacosamide 200 milliGRAM(s) Oral two times a day  latanoprost 0.005% Ophthalmic Solution 1 Drop(s) Both EYES at bedtime  levothyroxine 50 MICROGram(s) Oral daily  megestrol Suspension 400 milliGRAM(s) Oral daily    MEDICATIONS  (PRN):  dextrose Gel 1 Dose(s) Oral once PRN Blood Glucose LESS THAN 70 milliGRAM(s)/deciliter  glucagon  Injectable 1 milliGRAM(s) IntraMuscular once PRN Glucose LESS THAN 70 milligrams/deciliter      Allergies    No Known Allergies    Intolerances        Vital Signs Last 24 Hrs  T(C): 36.6 (09 Dec 2017 05:47), Max: 36.8 (08 Dec 2017 21:09)  T(F): 97.8 (09 Dec 2017 05:47), Max: 98.3 (08 Dec 2017 21:09)  HR: 96 (09 Dec 2017 05:47) (96 - 114)  BP: 118/84 (09 Dec 2017 05:47) (118/84 - 152/94)  BP(mean): --  RR: 16 (09 Dec 2017 05:47) (15 - 16)  SpO2: 97% (09 Dec 2017 05:47) (97% - 98%)     @ 07:01  -   @ 07:00  --------------------------------------------------------  IN: 100 mL / OUT: 1200 mL / NET: -1100 mL        PHYSICAL EXAM:    General: somnolent; in no acute distress  HEENT: MMM, conjunctiva and sclera clear  Lungs: poor inspiration  Heart: regular  Gastrointestinal: Soft non-tender non-distended; Normal bowel sounds; No hepatosplenomegaly  Skin: Warm and dry. No obvious rash  Ext: edema    LABS:      CBC Full  -  ( 09 Dec 2017 07:44 )  WBC Count : 13.1 K/uL  Hemoglobin : 11.3 g/dL  Hematocrit : 34.4 %  Platelet Count - Automated : 78 K/uL  Mean Cell Volume : 83.9 fL  Mean Cell Hemoglobin : 27.6 pg  Mean Cell Hemoglobin Concentration : 32.8 g/dL  Auto Neutrophil # : x  Auto Lymphocyte # : x  Auto Monocyte # : x  Auto Eosinophil # : x  Auto Basophil # : x  Auto Neutrophil % : x  Auto Lymphocyte % : x  Auto Monocyte % : x  Auto Eosinophil % : x  Auto Basophil % : x        131<L>  |  101  |  30<H>  ----------------------------<  107<H>  4.7   |  21<L>  |  1.04    Ca    7.2<L>      09 Dec 2017 07:44  Phos  3.1       Mg     1.8         TPro  4.1<L>  /  Alb  2.4<L>  /  TBili  0.4  /  DBili  x   /  AST  43<H>  /  ALT  115<H>  /  AlkPhos  115      PT/INR - ( 09 Dec 2017 07:44 )   PT: 11.9 sec;   INR: 1.07          PTT - ( 09 Dec 2017 07:44 )  PTT:31.1 sec      Urinalysis Basic - ( 09 Dec 2017 06:43 )    Color: Yellow / Appearance: Clear / S.015 / pH: x  Gluc: x / Ketone: NEGATIVE  / Bili: Negative / Urobili: 0.2 E.U./dL   Blood: x / Protein: 100 mg/dL / Nitrite: NEGATIVE   Leuk Esterase: NEGATIVE / RBC: < 5 /HPF / WBC < 5 /HPF   Sq Epi: x / Non Sq Epi: 0-5 /HPF / Bacteria: None /HPF                RADIOLOGY & ADDITIONAL STUDIES (The following images were personally reviewed):

## 2017-12-09 NOTE — PROGRESS NOTE ADULT - ASSESSMENT
62M with metastatic RCC (s/p nephrectomy, s/p craniotomy x2, s/p gamma knife x3 most recently march 2017  Complicated by Seizures), Hypertension, Hypothyroidism, Chronic Kidney Disease, Glaucoma, who presented with Altered Mental Status, with Work-Up Thus Far (CT Head, MRI Head, MRI C-Spine, and MRI T-Spine) without Etiology, and Current Suspicion for Radiation Necrosis. Persistently Tachycardic

## 2017-12-09 NOTE — PROGRESS NOTE ADULT - SUBJECTIVE AND OBJECTIVE BOX
Neurology Follow up note    Name  CORIN PERDOMO    HPI:  62M with metastatic RCC (s/p nephrectomy, s/p craniotomy x2, s/p gamma knife x3 most recently march 2017, currently on Opdivo and Avastin with Dr. 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 Presenting to the ED brought in by wife for AMS. Patient is at bedside however unable to give history. Patient wife states that he was in his normal state of health until today when she was called by the home health aid stating that he had episodes of staring into space and not as responsive as he is on a regular basis. Patient denies HA, change in vision, cough, CP, abdominal pain, back pain, dysuria, hematuria, constipation, diarrhea.     In ED T 97.8 P100 /89 R18 O2Sat 97% RA, patient was seen and evaluated by Neuro (04 Dec 2017 19:08)      Interval History - continues to have weakness in the LE and aphasia        REVIEW OF SYSTEMS    Vital Signs Last 24 Hrs  T(C): 36.6 (09 Dec 2017 05:47), Max: 36.8 (08 Dec 2017 21:09)  T(F): 97.8 (09 Dec 2017 05:47), Max: 98.3 (08 Dec 2017 21:09)  HR: 96 (09 Dec 2017 05:47) (96 - 114)  BP: 118/84 (09 Dec 2017 05:47) (118/84 - 152/94)  BP(mean): --  RR: 16 (09 Dec 2017 05:47) (15 - 16)  SpO2: 97% (09 Dec 2017 05:47) (97% - 98%)    Physical Exam-  awake    Mental Status-expressive aphasia    Cranial Nerves- full EOM    Gait and station-n/a    Motor- moves all 4 extremities    Reflexes- decreased    Sensation-no sensory level    Coordination-no tremors    Vascular -no bruits    Medications  amLODIPine   Tablet 5 milliGRAM(s) Oral daily  BACItracin   Ointment 1 Application(s) Topical daily  dexamethasone     Tablet 4 milliGRAM(s) Oral two times a day  dextrose 5%. 1000 milliLiter(s) IV Continuous <Continuous>  dextrose 50% Injectable 12.5 Gram(s) IV Push once  dextrose 50% Injectable 25 Gram(s) IV Push once  dextrose 50% Injectable 25 Gram(s) IV Push once  dextrose Gel 1 Dose(s) Oral once PRN  diVALproex  milliGRAM(s) Oral two times a day  enoxaparin Injectable 40 milliGRAM(s) SubCutaneous every 24 hours  glucagon  Injectable 1 milliGRAM(s) IntraMuscular once PRN  insulin lispro (HumaLOG) corrective regimen sliding scale   SubCutaneous Before meals and at bedtime  lacosamide 200 milliGRAM(s) Oral two times a day  latanoprost 0.005% Ophthalmic Solution 1 Drop(s) Both EYES at bedtime  levothyroxine 50 MICROGram(s) Oral daily  megestrol Suspension 400 milliGRAM(s) Oral daily      Lab      Radiology    Assessment- Radiation necrosis     Plan as per Dr Ya

## 2017-12-10 LAB
ANION GAP SERPL CALC-SCNC: 16 MMOL/L — SIGNIFICANT CHANGE UP (ref 5–17)
BASOPHILS NFR BLD AUTO: 0.5 % — SIGNIFICANT CHANGE UP (ref 0–2)
BUN SERPL-MCNC: 28 MG/DL — HIGH (ref 7–23)
CALCIUM SERPL-MCNC: 7.6 MG/DL — LOW (ref 8.4–10.5)
CHLORIDE SERPL-SCNC: 99 MMOL/L — SIGNIFICANT CHANGE UP (ref 96–108)
CO2 SERPL-SCNC: 18 MMOL/L — LOW (ref 22–31)
CREAT SERPL-MCNC: 0.95 MG/DL — SIGNIFICANT CHANGE UP (ref 0.5–1.3)
EOSINOPHIL NFR BLD AUTO: 0.1 % — SIGNIFICANT CHANGE UP (ref 0–6)
FOLATE SERPL-MCNC: 4.5 NG/ML — LOW (ref 4.8–24.2)
GLUCOSE BLDC GLUCOMTR-MCNC: 132 MG/DL — HIGH (ref 70–99)
GLUCOSE BLDC GLUCOMTR-MCNC: 146 MG/DL — HIGH (ref 70–99)
GLUCOSE BLDC GLUCOMTR-MCNC: 179 MG/DL — HIGH (ref 70–99)
GLUCOSE BLDC GLUCOMTR-MCNC: 98 MG/DL — SIGNIFICANT CHANGE UP (ref 70–99)
GLUCOSE SERPL-MCNC: 217 MG/DL — HIGH (ref 70–99)
HCT VFR BLD CALC: 35.4 % — LOW (ref 39–50)
HGB BLD-MCNC: 11.6 G/DL — LOW (ref 13–17)
LYMPHOCYTES # BLD AUTO: 16.5 % — SIGNIFICANT CHANGE UP (ref 13–44)
MAGNESIUM SERPL-MCNC: 1.9 MG/DL — SIGNIFICANT CHANGE UP (ref 1.6–2.6)
MCHC RBC-ENTMCNC: 28 PG — SIGNIFICANT CHANGE UP (ref 27–34)
MCHC RBC-ENTMCNC: 32.8 G/DL — SIGNIFICANT CHANGE UP (ref 32–36)
MCV RBC AUTO: 85.5 FL — SIGNIFICANT CHANGE UP (ref 80–100)
MONOCYTES NFR BLD AUTO: 5.3 % — SIGNIFICANT CHANGE UP (ref 2–14)
NEUTROPHILS NFR BLD AUTO: 77.6 % — HIGH (ref 43–77)
PLATELET # BLD AUTO: 78 K/UL — LOW (ref 150–400)
POTASSIUM SERPL-MCNC: 5.3 MMOL/L — SIGNIFICANT CHANGE UP (ref 3.5–5.3)
POTASSIUM SERPL-SCNC: 5.3 MMOL/L — SIGNIFICANT CHANGE UP (ref 3.5–5.3)
RBC # BLD: 4.14 M/UL — LOW (ref 4.2–5.8)
RBC # FLD: 16 % — SIGNIFICANT CHANGE UP (ref 10.3–16.9)
SODIUM SERPL-SCNC: 133 MMOL/L — LOW (ref 135–145)
T PALLIDUM AB TITR SER: NEGATIVE — SIGNIFICANT CHANGE UP
VIT B12 SERPL-MCNC: 775 PG/ML — SIGNIFICANT CHANGE UP (ref 243–894)
WBC # BLD: 15.1 K/UL — HIGH (ref 3.8–10.5)
WBC # FLD AUTO: 15.1 K/UL — HIGH (ref 3.8–10.5)

## 2017-12-10 PROCEDURE — 93970 EXTREMITY STUDY: CPT | Mod: 26

## 2017-12-10 RX ORDER — BACITRACIN ZINC 500 UNIT/G
1 OINTMENT IN PACKET (EA) TOPICAL DAILY
Qty: 0 | Refills: 0 | Status: DISCONTINUED | OUTPATIENT
Start: 2017-12-10 | End: 2017-12-22

## 2017-12-10 RX ORDER — MAGNESIUM SULFATE 500 MG/ML
1 VIAL (ML) INJECTION ONCE
Qty: 0 | Refills: 0 | Status: COMPLETED | OUTPATIENT
Start: 2017-12-10 | End: 2017-12-10

## 2017-12-10 RX ADMIN — ENOXAPARIN SODIUM 40 MILLIGRAM(S): 100 INJECTION SUBCUTANEOUS at 09:10

## 2017-12-10 RX ADMIN — Medication 1 APPLICATION(S): at 17:19

## 2017-12-10 RX ADMIN — DIVALPROEX SODIUM 250 MILLIGRAM(S): 500 TABLET, DELAYED RELEASE ORAL at 17:20

## 2017-12-10 RX ADMIN — AMLODIPINE BESYLATE 5 MILLIGRAM(S): 2.5 TABLET ORAL at 07:37

## 2017-12-10 RX ADMIN — Medication 50 MICROGRAM(S): at 06:33

## 2017-12-10 RX ADMIN — LATANOPROST 1 DROP(S): 0.05 SOLUTION/ DROPS OPHTHALMIC; TOPICAL at 22:09

## 2017-12-10 RX ADMIN — Medication 100 GRAM(S): at 18:03

## 2017-12-10 RX ADMIN — LACOSAMIDE 200 MILLIGRAM(S): 50 TABLET ORAL at 07:38

## 2017-12-10 RX ADMIN — DIVALPROEX SODIUM 250 MILLIGRAM(S): 500 TABLET, DELAYED RELEASE ORAL at 07:37

## 2017-12-10 RX ADMIN — LACOSAMIDE 200 MILLIGRAM(S): 50 TABLET ORAL at 17:20

## 2017-12-10 RX ADMIN — Medication 4 MILLIGRAM(S): at 17:19

## 2017-12-10 RX ADMIN — MEGESTROL ACETATE 400 MILLIGRAM(S): 40 SUSPENSION ORAL at 17:19

## 2017-12-10 RX ADMIN — Medication 2: at 17:18

## 2017-12-10 RX ADMIN — Medication 4 MILLIGRAM(S): at 07:37

## 2017-12-10 NOTE — PROGRESS NOTE ADULT - SUBJECTIVE AND OBJECTIVE BOX
INTERVAL HPI/OVERNIGHT EVENTS: 62yMale  No acute events overnight per nursing or staff.  Patient seen and examined at bedside this morning. Not entirely responsive to questioning and stares blankly at times. ROS questionable but does not report any acute complaints. Denies chest pain, palpitations, SOB, cough, abdominal pain, N/V/C/D.     ROS: 10 system ROS otherwise negative except for those noted in HPI    VITAL SIGNS:  T(F): 98.8 (12-10-17 @ 06:08)  HR: 107 (12-10-17 @ 06:08)  BP: 152/95 (12-10-17 @ 06:08)  RR: 18 (12-10-17 @ 06:08)  SpO2: 97% (12-10-17 @ 06:08)  Wt(kg): --    PHYSICAL EXAM:  General: NAD, resting comfortably   HEENT: NC/AT, MMM.  Respiratory: Normal breath sounds b/l. No wheezes, crackles, rhonchi.  Cardiovascular: Normal S1 and S2. RRR. No rubs, murmurs.   Gastrointestinal: Soft, non-distended, non-tender. Positive bowel sounds.   Extremities: No LE edema.   Skin: No rashes or ulcers. No clubbing/cyanosis.   Vascular: 2+ dorsalis pedis pulses b/l  Neurological: No focal deficits. Alert and oriented to name and year.       MEDICATIONS  (STANDING):  amLODIPine   Tablet 5 milliGRAM(s) Oral daily  BACItracin   Ointment 1 Application(s) Topical daily  dexamethasone     Tablet 4 milliGRAM(s) Oral two times a day  dextrose 5%. 1000 milliLiter(s) (50 mL/Hr) IV Continuous <Continuous>  dextrose 50% Injectable 12.5 Gram(s) IV Push once  dextrose 50% Injectable 25 Gram(s) IV Push once  dextrose 50% Injectable 25 Gram(s) IV Push once  diVALproex  milliGRAM(s) Oral two times a day  enoxaparin Injectable 40 milliGRAM(s) SubCutaneous every 24 hours  insulin lispro (HumaLOG) corrective regimen sliding scale   SubCutaneous Before meals and at bedtime  lacosamide 200 milliGRAM(s) Oral two times a day  latanoprost 0.005% Ophthalmic Solution 1 Drop(s) Both EYES at bedtime  levothyroxine 50 MICROGram(s) Oral daily  megestrol Suspension 400 milliGRAM(s) Oral daily    MEDICATIONS  (PRN):  dextrose Gel 1 Dose(s) Oral once PRN Blood Glucose LESS THAN 70 milliGRAM(s)/deciliter  glucagon  Injectable 1 milliGRAM(s) IntraMuscular once PRN Glucose LESS THAN 70 milligrams/deciliter      Allergies    No Known Allergies    Intolerances        LABS:                        11.3   13.1  )-----------( 78       ( 09 Dec 2017 07:44 )             34.4         134<L>  |  101  |  28<H>  ----------------------------<  147<H>  4.6   |  21<L>  |  1.07    Ca    7.5<L>      09 Dec 2017 13:49  Phos  3.1       Mg     1.9         TPro  4.1<L>  /  Alb  2.4<L>  /  TBili  0.4  /  DBili  x   /  AST  43<H>  /  ALT  115<H>  /  AlkPhos  115  12    PT/INR - ( 09 Dec 2017 07:44 )   PT: 11.9 sec;   INR: 1.07          PTT - ( 09 Dec 2017 07:44 )  PTT:31.1 sec  Urinalysis Basic - ( 09 Dec 2017 13:49 )    Color: Yellow / Appearance: Clear / S.015 / pH: x  Gluc: x / Ketone: NEGATIVE  / Bili: Negative / Urobili: 0.2 E.U./dL   Blood: x / Protein: 100 mg/dL / Nitrite: NEGATIVE   Leuk Esterase: NEGATIVE / RBC: < 5 /HPF / WBC < 5 /HPF   Sq Epi: x / Non Sq Epi: 0-5 /HPF / Bacteria: Present /HPF        RADIOLOGY & ADDITIONAL TESTS:

## 2017-12-10 NOTE — PROGRESS NOTE ADULT - PROBLEM SELECTOR PLAN 4
-Thrombocytopenia on admission, however platelets downtrending (95 on 12/8, 78 on 12/9). With concern for HIT, heparin and lovenox was dc/d. HIT workup negative. Lovenox restarted 12/8  -Continue to trend CBC.

## 2017-12-10 NOTE — PROGRESS NOTE ADULT - PROBLEM SELECTOR PLAN 1
Improving. Patient presenting to the ED with 1 day AMS in association with 1 week urine/bowel incontinence.  -CT head showing no interval change from prior study.   -MRI head showing no new brain lesions, but new midline suboccipital subcutaneous collection. Progressive interval increase in ventricular size. Progressive interval increase in the periventricular FLAIR/T2 signal and left frontal lobe signal.  -Will follow up encephalopathy work up in the AM with RPR, Vitamin B12, folate.   -Neuro following, will present the case to Tumor Board and continue to follow recs. Improving. Patient presenting to the ED with 1 day AMS in association with 1 week urine/bowel incontinence.  -CT head showing no interval change from prior study.   -MRI head showing no new brain lesions, but new midline suboccipital subcutaneous collection. Progressive interval increase in ventricular size. Progressive interval increase in the periventricular FLAIR/T2 signal and left frontal lobe signal.  - RPR negative, Vitamin B12 WNL, folate slightly below normal limits and may consider supplementation   -Neuro following, will present the case to Tumor Board and continue to follow recs.

## 2017-12-10 NOTE — PROGRESS NOTE ADULT - PROBLEM SELECTOR PLAN 3
-Unknown etiology, could be 2/2 dehydration as patient has had poor PO intake.   Now improving, 134 on 12/9  - will continue to monitor

## 2017-12-10 NOTE — PROGRESS NOTE ADULT - SUBJECTIVE AND OBJECTIVE BOX
coverage for Dr. Tarango    Pt seen and examined able to answer questions in sentences    REVIEW OF SYSTEMS:  Constitutional: No fever, weight loss or fatigue  Cardiovascular: No chest pain, palpitations, dizziness or leg swelling  Gastrointestinal: No abdominal or epigastric pain. No nausea, vomiting or hematemesis; No diarrhea or constipation. No melena or hematochezia.  Skin: No itching, burning, rashes or lesions       MEDICATIONS:  MEDICATIONS  (STANDING):  amLODIPine   Tablet 5 milliGRAM(s) Oral daily  BACItracin   Ointment 1 Application(s) Topical daily  dexamethasone     Tablet 4 milliGRAM(s) Oral two times a day  dextrose 5%. 1000 milliLiter(s) (50 mL/Hr) IV Continuous <Continuous>  dextrose 50% Injectable 12.5 Gram(s) IV Push once  dextrose 50% Injectable 25 Gram(s) IV Push once  dextrose 50% Injectable 25 Gram(s) IV Push once  diVALproex  milliGRAM(s) Oral two times a day  enoxaparin Injectable 40 milliGRAM(s) SubCutaneous every 24 hours  insulin lispro (HumaLOG) corrective regimen sliding scale   SubCutaneous Before meals and at bedtime  lacosamide 200 milliGRAM(s) Oral two times a day  latanoprost 0.005% Ophthalmic Solution 1 Drop(s) Both EYES at bedtime  levothyroxine 50 MICROGram(s) Oral daily  megestrol Suspension 400 milliGRAM(s) Oral daily    MEDICATIONS  (PRN):  dextrose Gel 1 Dose(s) Oral once PRN Blood Glucose LESS THAN 70 milliGRAM(s)/deciliter  glucagon  Injectable 1 milliGRAM(s) IntraMuscular once PRN Glucose LESS THAN 70 milligrams/deciliter      Allergies    No Known Allergies    Intolerances        Vital Signs Last 24 Hrs  T(C): 37.1 (10 Dec 2017 06:08), Max: 37.1 (10 Dec 2017 06:08)  T(F): 98.8 (10 Dec 2017 06:08), Max: 98.8 (10 Dec 2017 06:08)  HR: 107 (10 Dec 2017 06:08) (105 - 111)  BP: 152/95 (10 Dec 2017 06:08) (145/93 - 154/105)  BP(mean): --  RR: 18 (10 Dec 2017 06:08) (18 - 18)  SpO2: 97% (10 Dec 2017 06:08) (96% - 98%)     @ 07:01  -  12-10 @ 07:00  --------------------------------------------------------  IN: 460 mL / OUT: 515 mL / NET: -55 mL        PHYSICAL EXAM:    General:  in no acute distress  HEENT: MMM, conjunctiva and sclera clear  Lungs: poor insp.  Heart: regular, tachy still  Gastrointestinal: Soft non-tender non-distended; Normal bowel sounds; No hepatosplenomegaly  Skin: Warm and dry. No obvious rash  Neuro: better    LABS:      CBC Full  -  ( 09 Dec 2017 07:44 )  WBC Count : 13.1 K/uL  Hemoglobin : 11.3 g/dL  Hematocrit : 34.4 %  Platelet Count - Automated : 78 K/uL  Mean Cell Volume : 83.9 fL  Mean Cell Hemoglobin : 27.6 pg  Mean Cell Hemoglobin Concentration : 32.8 g/dL  Auto Neutrophil # : x  Auto Lymphocyte # : x  Auto Monocyte # : x  Auto Eosinophil # : x  Auto Basophil # : x  Auto Neutrophil % : x  Auto Lymphocyte % : x  Auto Monocyte % : x  Auto Eosinophil % : x  Auto Basophil % : x        134<L>  |  101  |  28<H>  ----------------------------<  147<H>  4.6   |  21<L>  |  1.07    Ca    7.5<L>      09 Dec 2017 13:49  Phos  3.1       Mg     1.9         TPro  4.1<L>  /  Alb  2.4<L>  /  TBili  0.4  /  DBili  x   /  AST  43<H>  /  ALT  115<H>  /  AlkPhos  115      PT/INR - ( 09 Dec 2017 07:44 )   PT: 11.9 sec;   INR: 1.07          PTT - ( 09 Dec 2017 07:44 )  PTT:31.1 sec      Urinalysis Basic - ( 09 Dec 2017 13:49 )    Color: Yellow / Appearance: Clear / S.015 / pH: x  Gluc: x / Ketone: NEGATIVE  / Bili: Negative / Urobili: 0.2 E.U./dL   Blood: x / Protein: 100 mg/dL / Nitrite: NEGATIVE   Leuk Esterase: NEGATIVE / RBC: < 5 /HPF / WBC < 5 /HPF   Sq Epi: x / Non Sq Epi: 0-5 /HPF / Bacteria: Present /HPF                RADIOLOGY & ADDITIONAL STUDIES (The following images were personally reviewed):

## 2017-12-10 NOTE — PROGRESS NOTE ADULT - PROBLEM SELECTOR PLAN 2
-Patient has been persistently tachycardic. Unknown etiology. Differentials could include PE (however no hypoxia, no tachypnea) vs infection (however no fever and WBC elevated likely 2/2 steroids) vs dehydration vs uncontrolled hyperthyroidism.   -Will given patient a 500 cc NS bolus and IV albumin.   -F/u TSH  -Echo severe concentric left ventricular hypertrophy.  -F/u LE Dopplers   -F/u repeat BMP post fluid challenge -Patient has been persistently tachycardic. Unknown etiology. Differentials could include PE (however no hypoxia, no tachypnea) vs infection (however no fever and WBC elevated likely 2/2 steroids) vs dehydration vs uncontrolled hyperthyroidism.   - s/p 500 cc NS bolus and IV albumin.   - TSH WNL  -Echo shows severe concentric left ventricular hypertrophy.  -F/u LE Dopplers

## 2017-12-10 NOTE — PROGRESS NOTE ADULT - ASSESSMENT
62M with metastatic RCC (s/p nephrectomy, s/p craniotomy x2, s/p gamma knife x3 most recently march 2017  Complicated by Seizures), Hypertension, Hypothyroidism, Chronic Kidney Disease, Glaucoma, who presented with Altered Mental Status, with Work-Up Thus Far (CT Head, MRI Head, MRI C-Spine, and MRI T-Spine) without Etiology, and Current Suspicion for Radiation Necrosis. Persistently Tachycardic 63 yo M with PMHx of Renal Cell Carcinoma with mets presenting to the ED with altered mental status, admitted to [facility] for further management.

## 2017-12-11 LAB
ANION GAP SERPL CALC-SCNC: 11 MMOL/L — SIGNIFICANT CHANGE UP (ref 5–17)
ANION GAP SERPL CALC-SCNC: 13 MMOL/L — SIGNIFICANT CHANGE UP (ref 5–17)
APPEARANCE UR: CLEAR — SIGNIFICANT CHANGE UP
BASOPHILS NFR BLD AUTO: 0.5 % — SIGNIFICANT CHANGE UP (ref 0–2)
BILIRUB UR-MCNC: NEGATIVE — SIGNIFICANT CHANGE UP
BUN SERPL-MCNC: 27 MG/DL — HIGH (ref 7–23)
BUN SERPL-MCNC: 27 MG/DL — HIGH (ref 7–23)
CALCIUM SERPL-MCNC: 7.4 MG/DL — LOW (ref 8.4–10.5)
CALCIUM SERPL-MCNC: 7.5 MG/DL — LOW (ref 8.4–10.5)
CHLORIDE SERPL-SCNC: 97 MMOL/L — SIGNIFICANT CHANGE UP (ref 96–108)
CHLORIDE SERPL-SCNC: 99 MMOL/L — SIGNIFICANT CHANGE UP (ref 96–108)
CO2 SERPL-SCNC: 21 MMOL/L — LOW (ref 22–31)
CO2 SERPL-SCNC: 21 MMOL/L — LOW (ref 22–31)
COLOR SPEC: YELLOW — SIGNIFICANT CHANGE UP
CREAT ?TM UR-MCNC: 60 MG/DL — SIGNIFICANT CHANGE UP
CREAT SERPL-MCNC: 0.95 MG/DL — SIGNIFICANT CHANGE UP (ref 0.5–1.3)
CREAT SERPL-MCNC: 0.96 MG/DL — SIGNIFICANT CHANGE UP (ref 0.5–1.3)
DESMETHYL LACOSAMIDE: 2.8 UG/ML — SIGNIFICANT CHANGE UP
DIFF PNL FLD: (no result)
EOSINOPHIL NFR BLD AUTO: 0 % — SIGNIFICANT CHANGE UP (ref 0–6)
GLUCOSE BLDC GLUCOMTR-MCNC: 149 MG/DL — HIGH (ref 70–99)
GLUCOSE BLDC GLUCOMTR-MCNC: 169 MG/DL — HIGH (ref 70–99)
GLUCOSE BLDC GLUCOMTR-MCNC: 208 MG/DL — HIGH (ref 70–99)
GLUCOSE BLDC GLUCOMTR-MCNC: 98 MG/DL — SIGNIFICANT CHANGE UP (ref 70–99)
GLUCOSE SERPL-MCNC: 111 MG/DL — HIGH (ref 70–99)
GLUCOSE SERPL-MCNC: 160 MG/DL — HIGH (ref 70–99)
GLUCOSE UR QL: NEGATIVE — SIGNIFICANT CHANGE UP
HCT VFR BLD CALC: 33.6 % — LOW (ref 39–50)
HCT VFR BLD CALC: 34.2 % — LOW (ref 39–50)
HGB BLD-MCNC: 11.2 G/DL — LOW (ref 13–17)
HGB BLD-MCNC: 11.4 G/DL — LOW (ref 13–17)
KETONES UR-MCNC: NEGATIVE — SIGNIFICANT CHANGE UP
LACOSAMIDE (VIMPAT) RESULT: 24.5 UG/ML — HIGH (ref 1–10)
LEUKOCYTE ESTERASE UR-ACNC: NEGATIVE — SIGNIFICANT CHANGE UP
LYMPHOCYTES # BLD AUTO: 17.4 % — SIGNIFICANT CHANGE UP (ref 13–44)
MAGNESIUM SERPL-MCNC: 2 MG/DL — SIGNIFICANT CHANGE UP (ref 1.6–2.6)
MAGNESIUM SERPL-MCNC: 2.1 MG/DL — SIGNIFICANT CHANGE UP (ref 1.6–2.6)
MCHC RBC-ENTMCNC: 28 PG — SIGNIFICANT CHANGE UP (ref 27–34)
MCHC RBC-ENTMCNC: 28.1 PG — SIGNIFICANT CHANGE UP (ref 27–34)
MCHC RBC-ENTMCNC: 33.3 G/DL — SIGNIFICANT CHANGE UP (ref 32–36)
MCHC RBC-ENTMCNC: 33.3 G/DL — SIGNIFICANT CHANGE UP (ref 32–36)
MCV RBC AUTO: 84 FL — SIGNIFICANT CHANGE UP (ref 80–100)
MCV RBC AUTO: 84.4 FL — SIGNIFICANT CHANGE UP (ref 80–100)
MONOCYTES NFR BLD AUTO: 6.8 % — SIGNIFICANT CHANGE UP (ref 2–14)
NEUTROPHILS NFR BLD AUTO: 75.3 % — SIGNIFICANT CHANGE UP (ref 43–77)
NITRITE UR-MCNC: NEGATIVE — SIGNIFICANT CHANGE UP
OSMOLALITY SERPL: 280 MOSM/KG — SIGNIFICANT CHANGE UP (ref 280–301)
OSMOLALITY UR: 408 MOSMOL/KG — SIGNIFICANT CHANGE UP (ref 100–650)
PH UR: 6.5 — SIGNIFICANT CHANGE UP (ref 5–8)
PLATELET # BLD AUTO: 65 K/UL — LOW (ref 150–400)
PLATELET # BLD AUTO: 66 K/UL — LOW (ref 150–400)
POTASSIUM SERPL-MCNC: 4.8 MMOL/L — SIGNIFICANT CHANGE UP (ref 3.5–5.3)
POTASSIUM SERPL-MCNC: 4.8 MMOL/L — SIGNIFICANT CHANGE UP (ref 3.5–5.3)
POTASSIUM SERPL-SCNC: 4.8 MMOL/L — SIGNIFICANT CHANGE UP (ref 3.5–5.3)
POTASSIUM SERPL-SCNC: 4.8 MMOL/L — SIGNIFICANT CHANGE UP (ref 3.5–5.3)
PROT UR-MCNC: 100 MG/DL
RBC # BLD: 4 M/UL — LOW (ref 4.2–5.8)
RBC # BLD: 4.05 M/UL — LOW (ref 4.2–5.8)
RBC # FLD: 15.8 % — SIGNIFICANT CHANGE UP (ref 10.3–16.9)
RBC # FLD: 15.8 % — SIGNIFICANT CHANGE UP (ref 10.3–16.9)
SODIUM SERPL-SCNC: 131 MMOL/L — LOW (ref 135–145)
SODIUM SERPL-SCNC: 131 MMOL/L — LOW (ref 135–145)
SODIUM UR-SCNC: 27 MMOL/L — SIGNIFICANT CHANGE UP
SP GR SPEC: 1.01 — SIGNIFICANT CHANGE UP (ref 1–1.03)
UROBILINOGEN FLD QL: 0.2 E.U./DL — SIGNIFICANT CHANGE UP
WBC # BLD: 16.7 K/UL — HIGH (ref 3.8–10.5)
WBC # BLD: 16.8 K/UL — HIGH (ref 3.8–10.5)
WBC # FLD AUTO: 16.7 K/UL — HIGH (ref 3.8–10.5)
WBC # FLD AUTO: 16.8 K/UL — HIGH (ref 3.8–10.5)

## 2017-12-11 PROCEDURE — 95951: CPT | Mod: 26,52

## 2017-12-11 PROCEDURE — 99223 1ST HOSP IP/OBS HIGH 75: CPT

## 2017-12-11 RX ORDER — MINOCYCLINE HYDROCHLORIDE 45 MG/1
200 TABLET, EXTENDED RELEASE ORAL EVERY 12 HOURS
Qty: 0 | Refills: 0 | Status: DISCONTINUED | OUTPATIENT
Start: 2017-12-11 | End: 2017-12-13

## 2017-12-11 RX ORDER — LACOSAMIDE 50 MG/1
200 TABLET ORAL
Qty: 0 | Refills: 0 | Status: DISCONTINUED | OUTPATIENT
Start: 2017-12-11 | End: 2017-12-13

## 2017-12-11 RX ORDER — LEVETIRACETAM 250 MG/1
250 TABLET, FILM COATED ORAL
Qty: 0 | Refills: 0 | Status: DISCONTINUED | OUTPATIENT
Start: 2017-12-11 | End: 2017-12-13

## 2017-12-11 RX ADMIN — Medication 4 MILLIGRAM(S): at 18:33

## 2017-12-11 RX ADMIN — MEGESTROL ACETATE 400 MILLIGRAM(S): 40 SUSPENSION ORAL at 18:33

## 2017-12-11 RX ADMIN — Medication 2: at 22:34

## 2017-12-11 RX ADMIN — ENOXAPARIN SODIUM 40 MILLIGRAM(S): 100 INJECTION SUBCUTANEOUS at 10:22

## 2017-12-11 RX ADMIN — MINOCYCLINE HYDROCHLORIDE 200 MILLIGRAM(S): 45 TABLET, EXTENDED RELEASE ORAL at 18:35

## 2017-12-11 RX ADMIN — LATANOPROST 1 DROP(S): 0.05 SOLUTION/ DROPS OPHTHALMIC; TOPICAL at 22:34

## 2017-12-11 RX ADMIN — Medication 50 MICROGRAM(S): at 06:01

## 2017-12-11 RX ADMIN — LACOSAMIDE 200 MILLIGRAM(S): 50 TABLET ORAL at 06:49

## 2017-12-11 RX ADMIN — AMLODIPINE BESYLATE 5 MILLIGRAM(S): 2.5 TABLET ORAL at 06:49

## 2017-12-11 RX ADMIN — Medication 4 MILLIGRAM(S): at 06:49

## 2017-12-11 RX ADMIN — Medication 2: at 18:33

## 2017-12-11 RX ADMIN — LACOSAMIDE 200 MILLIGRAM(S): 50 TABLET ORAL at 18:35

## 2017-12-11 RX ADMIN — DIVALPROEX SODIUM 250 MILLIGRAM(S): 500 TABLET, DELAYED RELEASE ORAL at 06:49

## 2017-12-11 RX ADMIN — Medication 1 APPLICATION(S): at 12:04

## 2017-12-11 RX ADMIN — LEVETIRACETAM 250 MILLIGRAM(S): 250 TABLET, FILM COATED ORAL at 18:33

## 2017-12-11 NOTE — PROGRESS NOTE ADULT - SUBJECTIVE AND OBJECTIVE BOX
Chief Complaint:  63 yo M with PMHx of Renal Cell Carcinoma with mets presenting to the ED with altered mental status, admitted to [facility] for further management.     OVERNIGHT EVENTS: No acute events overnight noted by house staff.     SUBJECTIVE / INTERVAL HPI: Patient seen and examined at bedside. NAD. Patient lying comfortably in bed. Patient not responding to questions so unable to assess mental status. ROS not obtainable as patient not responding appropriately to questions.     VITAL SIGNS:  Vital Signs Last 24 Hrs  T(C): 36.8 (11 Dec 2017 13:48), Max: 37.1 (11 Dec 2017 05:37)  T(F): 98.2 (11 Dec 2017 13:48), Max: 98.8 (11 Dec 2017 05:37)  HR: 118 (11 Dec 2017 13:48) (98 - 118)  BP: 135/85 (11 Dec 2017 13:48) (135/85 - 146/90)  BP(mean): --  RR: 18 (11 Dec 2017 13:48) (17 - 18)  SpO2: 94% (11 Dec 2017 13:48) (94% - 99%)    PHYSICAL EXAM:    General: NAD.   HEENT: NC/AT; PERRL, anicteric sclera; MMM  Neck: supple  Cardiovascular: +S1/S2; tachycardic, regular.   Respiratory: CTA B/L; no W/R/R  Gastrointestinal: soft, NT/ND; +BSx4  Extremities: WWP; no edema, clubbing or cyanosis  Vascular: 2+ radial, DP/PT pulses B/L  Neurological: Unable to assess mental status as patient not responding appropriately to questions.     MEDICATIONS:  MEDICATIONS  (STANDING):  amLODIPine   Tablet 5 milliGRAM(s) Oral daily  BACItracin   Ointment 1 Application(s) Topical daily  BACItracin   Ointment 1 Application(s) Topical daily  dexamethasone     Tablet 4 milliGRAM(s) Oral two times a day  dextrose 5%. 1000 milliLiter(s) (50 mL/Hr) IV Continuous <Continuous>  dextrose 50% Injectable 12.5 Gram(s) IV Push once  dextrose 50% Injectable 25 Gram(s) IV Push once  dextrose 50% Injectable 25 Gram(s) IV Push once  insulin lispro (HumaLOG) corrective regimen sliding scale   SubCutaneous Before meals and at bedtime  lacosamide 200 milliGRAM(s) Oral two times a day  latanoprost 0.005% Ophthalmic Solution 1 Drop(s) Both EYES at bedtime  levothyroxine 50 MICROGram(s) Oral daily  megestrol Suspension 400 milliGRAM(s) Oral daily  minocycline 200 milliGRAM(s) Oral every 12 hours    MEDICATIONS  (PRN):  dextrose Gel 1 Dose(s) Oral once PRN Blood Glucose LESS THAN 70 milliGRAM(s)/deciliter  glucagon  Injectable 1 milliGRAM(s) IntraMuscular once PRN Glucose LESS THAN 70 milligrams/deciliter      ALLERGIES:  Allergies  No Known Allergies  Intolerances      LABS:                        11.4   16.7  )-----------( 66       ( 11 Dec 2017 11:46 )             34.2     12-11    131<L>  |  97  |  27<H>  ----------------------------<  160<H>  4.8   |  21<L>  |  0.95    Ca    7.5<L>      11 Dec 2017 12:21  Mg     2.1     12-11        CAPILLARY BLOOD GLUCOSE    POCT Blood Glucose.: 149 mg/dL (11 Dec 2017 11:58)    RADIOLOGY & ADDITIONAL TESTS: Reviewed.

## 2017-12-11 NOTE — PROGRESS NOTE ADULT - PROBLEM SELECTOR PLAN 3
-Unknown etiology, based on urine studies likely 2/2 SIADH as patient's urine has urine osm>100 and urine Na>20  -Sodium improving over the weekend however decreased to 131 this AM. Will follow up repeat BMP along with repeat urine Na, osm and creatinine and decide further management.

## 2017-12-11 NOTE — PROGRESS NOTE ADULT - ASSESSMENT
The patient has improved slightly.  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 The patient has increasing thrombocytopenia of uncertain etiology but Depakote Commonly causes thrombocytopenia.  Norvasc is an unlikely cause of thrombocytopenia.

## 2017-12-11 NOTE — PROGRESS NOTE ADULT - PROBLEM SELECTOR PLAN 4
-Thrombocytopenia on admission, however platelets downtrending, With concern for HIT, heparin and lovenox was dc/d. HIT workup negative. Lovenox restarted 12/8. However platelets continue to downtrend. Lovenox and HSQ dc/d today. Dr Ya from heme/onc following. Possibility of thrombocytopenia to be a side effect of the Depakote patient was receiving. Depakote dc/d today. Dr. Bhatt from epilepsy consulted regarding alternative AEDs. Will f/u recs.

## 2017-12-11 NOTE — PROGRESS NOTE ADULT - SUBJECTIVE AND OBJECTIVE BOX
The patient feels somewhat better and his spouse states that he has improved some.    Examination of the head ears eyes nose and throat demonstrates a 2 x 2 centimeter by 1 cm fluctuant area on the patient's scalp just above the occiput and to the right of midline.  Examination of the neck reveals no palpable lymphadenopathy, jugular venous distention or thyroidomegaly.  The lungs are clear to percussion and auscultation.  The heart sounds are regular with no auscultatory evidence for murmur, rub or gallop.  The patient has no truncal obesity and there is no palpable or percussible hepatomegaly or splenomegaly.  The patient has no chronic venous stasis changes in the lower extremities.  Neurologically the patient has no gross abnormality in higher cortical function but does have Expressive aphasia to some degree and cerebellar dysfunction on the left side than right side.  The patient has some degree of weakness on the left side ut this has improved slightly since last week..

## 2017-12-11 NOTE — PROGRESS NOTE ADULT - PROBLEM SELECTOR PLAN 5
-Patient last chemotherapy on Sept 2017. Patients wife reports plans for chemo in Jan 2018  -On Opdivo and Avastin during last regimen  -Awaiting Dr. Ya's recs regarding further management.

## 2017-12-11 NOTE — PROGRESS NOTE ADULT - PROBLEM SELECTOR PLAN 2
-Patient has been persistently tachycardic. Unknown etiology. Differentials could include PE (however no hypoxia, no tachypnea) vs infection (however no fever and WBC elevated likely 2/2 steroids) vs dehydration.  -Tachycardia improving.   -Echo shows severe concentric left ventricular hypertrophy, no pericardial effusion seen  -Continue to monitor HR

## 2017-12-11 NOTE — PROGRESS NOTE ADULT - PROBLEM SELECTOR PLAN 1
Improving. Patient presenting to the ED with 1 day AMS in association with 1 week urine/bowel incontinence.  -CT head showing no interval change from prior study.   -MRI head showing no new brain lesions, but new midline suboccipital subcutaneous collection. Progressive interval increase in ventricular size. Progressive interval increase in the periventricular FLAIR/T2 signal and left frontal lobe signal.  -RPR negative, Vitamin B12 WNL, folate slightly below normal limits and may consider supplementation

## 2017-12-11 NOTE — PROGRESS NOTE ADULT - ATTENDING COMMENTS
Discontinue Depakote and consult Dr. Gerald You for recommendations on alternative anti-seizure medications.  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

## 2017-12-11 NOTE — PROGRESS NOTE ADULT - SUBJECTIVE AND OBJECTIVE BOX
Neurology Follow up note    Name  CORIN PERDOMO    HPI:  62M with metastatic RCC (s/p nephrectomy, s/p craniotomy x2, s/p gamma knife x3 most recently march 2017, currently on Opdivo and Avastin with Dr. 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 Presenting to the ED brought in by wife for AMS. Patient is at bedside however unable to give history. Patient wife states that he was in his normal state of health until today when she was called by the home health aid stating that he had episodes of staring into space and not as responsive as he is on a regular basis. Patient denies HA, change in vision, cough, CP, abdominal pain, back pain, dysuria, hematuria, constipation, diarrhea.     In ED T 97.8 P100 /89 R18 O2Sat 97% RA, patient was seen and evaluated by Neuro (04 Dec 2017 19:08)      Interval History - less lethargic- does not appear to be in pain        REVIEW OF SYSTEMS    Vital Signs Last 24 Hrs  T(C): 37.1 (11 Dec 2017 05:37), Max: 37.1 (11 Dec 2017 05:37)  T(F): 98.8 (11 Dec 2017 05:37), Max: 98.8 (11 Dec 2017 05:37)  HR: 98 (11 Dec 2017 05:37) (98 - 121)  BP: 146/90 (11 Dec 2017 05:37) (136/89 - 147/101)  BP(mean): --  RR: 17 (11 Dec 2017 05:37) (17 - 18)  SpO2: 97% (11 Dec 2017 05:37) (96% - 99%)    Physical Exam-     Mental Status- awake- aphasia    Cranial Nerves- full EOM    Gait and station-n/a    Motor- LE 3-/5- generalized slowness    Reflexes- decreased    Sensation-n/a    Coordination-no tremors    Vascular -no bruits    Medications  amLODIPine   Tablet 5 milliGRAM(s) Oral daily  BACItracin   Ointment 1 Application(s) Topical daily  BACItracin   Ointment 1 Application(s) Topical daily  dexamethasone     Tablet 4 milliGRAM(s) Oral two times a day  dextrose 5%. 1000 milliLiter(s) IV Continuous <Continuous>  dextrose 50% Injectable 12.5 Gram(s) IV Push once  dextrose 50% Injectable 25 Gram(s) IV Push once  dextrose 50% Injectable 25 Gram(s) IV Push once  dextrose Gel 1 Dose(s) Oral once PRN  diVALproex  milliGRAM(s) Oral two times a day  enoxaparin Injectable 40 milliGRAM(s) SubCutaneous every 24 hours  glucagon  Injectable 1 milliGRAM(s) IntraMuscular once PRN  insulin lispro (HumaLOG) corrective regimen sliding scale   SubCutaneous Before meals and at bedtime  lacosamide 200 milliGRAM(s) Oral two times a day  latanoprost 0.005% Ophthalmic Solution 1 Drop(s) Both EYES at bedtime  levothyroxine 50 MICROGram(s) Oral daily  megestrol Suspension 400 milliGRAM(s) Oral daily      Lab      Radiology    Assessment- Generalized weakness and aphasia    Plan- as per Dr Ya- Radiation necrosis- T8 lesion

## 2017-12-11 NOTE — PROGRESS NOTE ADULT - ASSESSMENT
61 yo M with PMHx of Renal Cell Carcinoma with mets presenting to the ED with altered mental status, admitted to [facility] for further management.

## 2017-12-12 DIAGNOSIS — D62 ACUTE POSTHEMORRHAGIC ANEMIA: ICD-10-CM

## 2017-12-12 LAB
ALBUMIN SERPL ELPH-MCNC: 2.2 G/DL — LOW (ref 3.3–5)
ALP SERPL-CCNC: 154 U/L — HIGH (ref 40–120)
ALT FLD-CCNC: 119 U/L — HIGH (ref 10–45)
ANION GAP SERPL CALC-SCNC: 12 MMOL/L — SIGNIFICANT CHANGE UP (ref 5–17)
ANION GAP SERPL CALC-SCNC: 9 MMOL/L — SIGNIFICANT CHANGE UP (ref 5–17)
APTT BLD: 28.4 SEC — SIGNIFICANT CHANGE UP (ref 27.5–37.4)
AST SERPL-CCNC: 54 U/L — HIGH (ref 10–40)
BILIRUB DIRECT SERPL-MCNC: <0.2 MG/DL — SIGNIFICANT CHANGE UP (ref 0–0.2)
BILIRUB INDIRECT FLD-MCNC: >0.1 MG/DL — LOW (ref 0.2–1)
BILIRUB SERPL-MCNC: 0.3 MG/DL — SIGNIFICANT CHANGE UP (ref 0.2–1.2)
BLD GP AB SCN SERPL QL: NEGATIVE — SIGNIFICANT CHANGE UP
BUN SERPL-MCNC: 37 MG/DL — HIGH (ref 7–23)
BUN SERPL-MCNC: 38 MG/DL — HIGH (ref 7–23)
CALCIUM SERPL-MCNC: 7.1 MG/DL — LOW (ref 8.4–10.5)
CALCIUM SERPL-MCNC: 7.1 MG/DL — LOW (ref 8.4–10.5)
CHLORIDE SERPL-SCNC: 98 MMOL/L — SIGNIFICANT CHANGE UP (ref 96–108)
CHLORIDE SERPL-SCNC: 99 MMOL/L — SIGNIFICANT CHANGE UP (ref 96–108)
CLOSURE TME COLL+EPINEP BLD: 105 K/UL — LOW (ref 150–400)
CLOSURE TME COLL+EPINEP BLD: 112 K/UL — LOW (ref 150–400)
CO2 SERPL-SCNC: 20 MMOL/L — LOW (ref 22–31)
CO2 SERPL-SCNC: 22 MMOL/L — SIGNIFICANT CHANGE UP (ref 22–31)
CREAT SERPL-MCNC: 1.25 MG/DL — SIGNIFICANT CHANGE UP (ref 0.5–1.3)
CREAT SERPL-MCNC: 1.27 MG/DL — SIGNIFICANT CHANGE UP (ref 0.5–1.3)
GLUCOSE BLDC GLUCOMTR-MCNC: 131 MG/DL — HIGH (ref 70–99)
GLUCOSE BLDC GLUCOMTR-MCNC: 137 MG/DL — HIGH (ref 70–99)
GLUCOSE BLDC GLUCOMTR-MCNC: 142 MG/DL — HIGH (ref 70–99)
GLUCOSE BLDC GLUCOMTR-MCNC: 151 MG/DL — HIGH (ref 70–99)
GLUCOSE SERPL-MCNC: 136 MG/DL — HIGH (ref 70–99)
GLUCOSE SERPL-MCNC: 157 MG/DL — HIGH (ref 70–99)
HCT VFR BLD CALC: 24.8 % — LOW (ref 39–50)
HCT VFR BLD CALC: 25 % — LOW (ref 39–50)
HCT VFR BLD CALC: 26.3 % — LOW (ref 39–50)
HCT VFR BLD CALC: 27.6 % — LOW (ref 39–50)
HGB BLD-MCNC: 8.2 G/DL — LOW (ref 13–17)
HGB BLD-MCNC: 8.5 G/DL — LOW (ref 13–17)
HGB BLD-MCNC: 8.8 G/DL — LOW (ref 13–17)
HGB BLD-MCNC: 9.1 G/DL — LOW (ref 13–17)
INR BLD: 1.13 — SIGNIFICANT CHANGE UP (ref 0.88–1.16)
LACTATE SERPL-SCNC: 1.1 MMOL/L — SIGNIFICANT CHANGE UP (ref 0.5–2)
MAGNESIUM SERPL-MCNC: 1.9 MG/DL — SIGNIFICANT CHANGE UP (ref 1.6–2.6)
MCHC RBC-ENTMCNC: 27.8 PG — SIGNIFICANT CHANGE UP (ref 27–34)
MCHC RBC-ENTMCNC: 27.9 PG — SIGNIFICANT CHANGE UP (ref 27–34)
MCHC RBC-ENTMCNC: 28.1 PG — SIGNIFICANT CHANGE UP (ref 27–34)
MCHC RBC-ENTMCNC: 28.8 PG — SIGNIFICANT CHANGE UP (ref 27–34)
MCHC RBC-ENTMCNC: 32.8 G/DL — SIGNIFICANT CHANGE UP (ref 32–36)
MCHC RBC-ENTMCNC: 33 G/DL — SIGNIFICANT CHANGE UP (ref 32–36)
MCHC RBC-ENTMCNC: 33.5 G/DL — SIGNIFICANT CHANGE UP (ref 32–36)
MCHC RBC-ENTMCNC: 34.3 G/DL — SIGNIFICANT CHANGE UP (ref 32–36)
MCV RBC AUTO: 84 FL — SIGNIFICANT CHANGE UP (ref 80–100)
MCV RBC AUTO: 84.1 FL — SIGNIFICANT CHANGE UP (ref 80–100)
MCV RBC AUTO: 84.7 FL — SIGNIFICANT CHANGE UP (ref 80–100)
MCV RBC AUTO: 84.7 FL — SIGNIFICANT CHANGE UP (ref 80–100)
PHOSPHATE SERPL-MCNC: 3.6 MG/DL — SIGNIFICANT CHANGE UP (ref 2.5–4.5)
PLATELET # BLD AUTO: 121 K/UL — LOW (ref 150–400)
PLATELET # BLD AUTO: 133 K/UL — LOW (ref 150–400)
PLATELET # BLD AUTO: 137 K/UL — LOW (ref 150–400)
PLATELET # BLD AUTO: 137 K/UL — LOW (ref 150–400)
POTASSIUM SERPL-MCNC: 4.8 MMOL/L — SIGNIFICANT CHANGE UP (ref 3.5–5.3)
POTASSIUM SERPL-MCNC: 4.9 MMOL/L — SIGNIFICANT CHANGE UP (ref 3.5–5.3)
POTASSIUM SERPL-SCNC: 4.8 MMOL/L — SIGNIFICANT CHANGE UP (ref 3.5–5.3)
POTASSIUM SERPL-SCNC: 4.9 MMOL/L — SIGNIFICANT CHANGE UP (ref 3.5–5.3)
PROT SERPL-MCNC: 3.6 G/DL — LOW (ref 6–8.3)
PROTHROM AB SERPL-ACNC: 12.6 SEC — SIGNIFICANT CHANGE UP (ref 9.8–12.7)
RBC # BLD: 2.95 M/UL — LOW (ref 4.2–5.8)
RBC # BLD: 2.95 M/UL — LOW (ref 4.2–5.8)
RBC # BLD: 3.13 M/UL — LOW (ref 4.2–5.8)
RBC # BLD: 3.26 M/UL — LOW (ref 4.2–5.8)
RBC # FLD: 15.1 % — SIGNIFICANT CHANGE UP (ref 10.3–16.9)
RBC # FLD: 15.2 % — SIGNIFICANT CHANGE UP (ref 10.3–16.9)
RBC # FLD: 15.7 % — SIGNIFICANT CHANGE UP (ref 10.3–16.9)
RBC # FLD: 15.7 % — SIGNIFICANT CHANGE UP (ref 10.3–16.9)
RH IG SCN BLD-IMP: POSITIVE — SIGNIFICANT CHANGE UP
SODIUM SERPL-SCNC: 129 MMOL/L — LOW (ref 135–145)
SODIUM SERPL-SCNC: 131 MMOL/L — LOW (ref 135–145)
WBC # BLD: 18.2 K/UL — HIGH (ref 3.8–10.5)
WBC # BLD: 18.8 K/UL — HIGH (ref 3.8–10.5)
WBC # BLD: 19.3 K/UL — HIGH (ref 3.8–10.5)
WBC # BLD: 20.9 K/UL — HIGH (ref 3.8–10.5)
WBC # FLD AUTO: 18.2 K/UL — HIGH (ref 3.8–10.5)
WBC # FLD AUTO: 18.8 K/UL — HIGH (ref 3.8–10.5)
WBC # FLD AUTO: 19.3 K/UL — HIGH (ref 3.8–10.5)
WBC # FLD AUTO: 20.9 K/UL — HIGH (ref 3.8–10.5)

## 2017-12-12 PROCEDURE — 95951: CPT | Mod: 26

## 2017-12-12 PROCEDURE — 99232 SBSQ HOSP IP/OBS MODERATE 35: CPT

## 2017-12-12 PROCEDURE — 99232 SBSQ HOSP IP/OBS MODERATE 35: CPT | Mod: GC

## 2017-12-12 PROCEDURE — 71010: CPT | Mod: 26

## 2017-12-12 PROCEDURE — 74177 CT ABD & PELVIS W/CONTRAST: CPT | Mod: 26

## 2017-12-12 RX ORDER — SODIUM CHLORIDE 9 MG/ML
1000 INJECTION INTRAMUSCULAR; INTRAVENOUS; SUBCUTANEOUS
Qty: 0 | Refills: 0 | Status: DISCONTINUED | OUTPATIENT
Start: 2017-12-12 | End: 2017-12-13

## 2017-12-12 RX ORDER — SODIUM CHLORIDE 9 MG/ML
1000 INJECTION INTRAMUSCULAR; INTRAVENOUS; SUBCUTANEOUS
Qty: 0 | Refills: 0 | Status: DISCONTINUED | OUTPATIENT
Start: 2017-12-12 | End: 2017-12-12

## 2017-12-12 RX ADMIN — Medication 4 MILLIGRAM(S): at 07:16

## 2017-12-12 RX ADMIN — LACOSAMIDE 200 MILLIGRAM(S): 50 TABLET ORAL at 07:16

## 2017-12-12 RX ADMIN — LEVETIRACETAM 250 MILLIGRAM(S): 250 TABLET, FILM COATED ORAL at 07:16

## 2017-12-12 RX ADMIN — SODIUM CHLORIDE 80 MILLILITER(S): 9 INJECTION INTRAMUSCULAR; INTRAVENOUS; SUBCUTANEOUS at 18:23

## 2017-12-12 RX ADMIN — Medication 1 APPLICATION(S): at 11:05

## 2017-12-12 RX ADMIN — Medication 50 MICROGRAM(S): at 07:16

## 2017-12-12 RX ADMIN — Medication 2: at 09:27

## 2017-12-12 RX ADMIN — LATANOPROST 1 DROP(S): 0.05 SOLUTION/ DROPS OPHTHALMIC; TOPICAL at 21:59

## 2017-12-12 RX ADMIN — MINOCYCLINE HYDROCHLORIDE 200 MILLIGRAM(S): 45 TABLET, EXTENDED RELEASE ORAL at 07:17

## 2017-12-12 RX ADMIN — MEGESTROL ACETATE 400 MILLIGRAM(S): 40 SUSPENSION ORAL at 11:04

## 2017-12-12 RX ADMIN — AMLODIPINE BESYLATE 5 MILLIGRAM(S): 2.5 TABLET ORAL at 07:16

## 2017-12-12 NOTE — PROGRESS NOTE ADULT - ASSESSMENT
61 yo M with PMHx of Renal Cell Carcinoma with mets presenting to the ED with altered mental status, admitted to [facility] for further management. 63 yo M with PMHx of Renal Cell Carcinoma with mets presenting to the ED with altered mental status, admitted to [facility] for further management, with an acute drop in Hgb this morning, with an unidentifiable source of bleeding, CT abd/pelvis negative for RP bleed, ICU consulted for need of closer monitoring, awaiting ICU consult decision.

## 2017-12-12 NOTE — PROGRESS NOTE ADULT - PROBLEM SELECTOR PLAN 9
Regular Diet.  FULL CODE  PPx: Dc/d Lovenox and HSQ yesterday 2/2 platelets downtrending.   RMF Regular Diet.  FULL CODE. Wife is the HCP. Spoke to her again regarding code status. She would still like to keep the patient full code and have a discussion with her children again.   PPx: Dc/d Lovenox and HSQ yesterday 2/2 platelets downtrending.   RMF, pending ICU consult decision.

## 2017-12-12 NOTE — PROGRESS NOTE ADULT - PROBLEM SELECTOR PLAN 2
-Hgb dropped by 2 points this AM. Repeat CBC done consistent with Hgb drop. Unknown etiology. No hemoptysis, hematemesis, hematochezia, or any other sources of bleed identified. Physical exam unchanged. Patient is also tachycardic however consistent with baseline.   -Will get T&S, coags, repeat CBC  -Dr. Tarango informed  -Dr. Jiménez from GI consulted, will f/u recs  -Will continue to monitor vitals closely. Hgb drop by 2 points this AM 12/12.. Unknown source. No hematemesis, no melena/brown stool in AM/no luisa blood though guaiac positive,, no hemoptysis, no other sources of bleeding identified. Attending notified, Dr. Jiménez aware. CT abd/pelvis ordered to r/o RP bleed.   Ongoing tachycardia, -120 throughout hospital course, unchanged. Mental status unchanged throughout entire hospital course including 12/12.   Lactic acid done on 12/12 WNL. NEW RELATIVE HYPOTENSION TO SBP 110s WHERE PREVIOUSLY 140-150s DURING ENTIRE HOSPITAL COURSE. -Waxing and Waning. Patient presenting to the ED with 1 day AMS in association with 1 week urine/bowel incontinence.  -CT head showing no interval change from prior study.   -MRI head showing no new brain lesions, but new midline suboccipital subcutaneous collection. Progressive interval increase in ventricular size. Progressive interval increase in the periventricular FLAIR/T2 signal and left frontal lobe signal.  -RPR negative, Vitamin B12 WNL, folate slightly below normal limits and may consider supplementation  -Dr. Ya from heme/onc following, most likely diagnosis considered to be radiation necrosis. Minocycline started on 12/11. Plan for chemo once patient stable.  -Continue with vEEG monitoring.

## 2017-12-12 NOTE — CONSULT NOTE ADULT - ASSESSMENT
62M with Renal Cell Carcinoma with metastases to brain, s/p crani by Dr. Ibarra, presenting to ED with AMS x 2 weeks. CTH from 12/5 shows no interval change in ventricle size. Pt currently undergoing treatment by Dr. Ya
63 y/o gentleman with h/o metastatic RCC to brain and lung s/p crani x2, gamma knife (most recently 3/2017), and Avastin/opdivo, right nephrectomy, tonsillectomy, glaucoma, and HTN, presenting from home with AMS and progressive functional/mental decline over ~2wks, seen for support
I feel that the situation is extremely grave with rapid deterioration of the patient after an MRI only weeks ago demonstrated no evidence of metastases.  The possibility of radiation necrosis caused by opdivo would be highly unlikely list as would leptomeningeal disease with the latter being unlikely given the slow progression of his disease in the past.
This is a 63yo M w/ PMH RCC (s/p nephrectomy, on CTX/rads w/ mets to brain) who originally presented 12/4 with AMS, now with downtrending Hgb, episode of HoTN prompting ICU consultation.    #Hypotension - seems to be an isolated measurement as BP currently back to baseline with multiple rechecks and without acute intervention  - continue to monitor vital signs closely  - agree w/ infectious workup however no need for empiric ABx at this time    #Anemia - etiology not clear however does not seem to be prompting hemodynamic instability given improvement of BP w/o intervention; low suspicion for active bleed given exam, imaging thus far  - would recommend continuing to monitor CBC with interval at primary team discretion  - agree with working up hemolysis as etiology    Pt does not require telemetry or ICU level of care at this time, may continue primary team management on regional medicine.    Patient seen, examined and case discussed with [facility] attending Dr. Nieto    Please call back with any questions/reconsult PRN
61 y/o man with renal cell carcinoma (brain mets; s/p nephrectomy, craniotomy x2, gamma knife x3, Opdivo, Avastin), hx of seizures (partial left frontotemporal status), admitted with AMS x2 weeks, epilepsy consulted for AED management. Exam was notable for limited ability to follow commands, almost no verbal output, paratonia throughout, and diffuse diminished/absent reflexes as described above. MRI brain w/wo con 12/5/17 was notable for progressive interval increase in ventricular size; progressive interval increase in the periventricular FLAIR/T2 signal and left frontal lobe signal abnormality which may represent treatment related effects; no new enhancing lesions. He previously had focal status in 2016, and Keppra was switched to Depakote for "behavior issues." He is currently thrombocytopenic and has purpura, so Depakote should likely be switched for something else. I believe it is worthwhile to give Keppra another chance, and if he cannot tolerate this because of side effects, then we can consider an alternative medication.     - connect to continuous EEG.  - recommend starting Keppra 500 mg bid, and stopping Depakote.  - continue Vimpat 200 mg bid.  - AMS work-up per Dr. Reynoso. Consider LP.

## 2017-12-12 NOTE — CONSULT NOTE ADULT - CONSULT REASON
Rehabilitation
Seizure medication adjustment because of thrombocytopenia and purpura
Weakness
anemia, hypotension
concern for possible hydro on CTH
Patient with metastatic renal cancer to the brain now with altered mental status and motor changes.
support

## 2017-12-12 NOTE — PROGRESS NOTE ADULT - PROBLEM SELECTOR PLAN 5
-Thrombocytopenia on admission, however platelets downtrending, With concern for HIT, heparin and lovenox was dc/d. HIT workup negative. Lovenox restarted 12/8. However platelets continue to downtrend. Lovenox and HSQ dc/d yesterday. Dr Ya from heme/onc following. Possibility of thrombocytopenia to be a side effect of the Depakote patient was receiving. Depakote dc/d yesterday. Dr. Bhatt from epilepsy consulted regarding alternative AEDs. Keppra added in place of Depakote. Continue with vEEG and f/u epilepsy recs. -Thrombocytopenia on admission, however platelets downtrending, With concern for HIT, heparin and lovenox was dc/d. HIT workup negative. Lovenox restarted 12/8. However platelets continued to downtrend. Lovenox and HSQ dc/d yesterday. Dr Ya from heme/onc following. Possibility of thrombocytopenia to be a side effect of the Depakote patient was receiving. Depakote dc/d yesterday. Dr. Bhatt from epilepsy consulted regarding alternative AEDs. Keppra added in place of Depakote. Continue with vEEG and f/u epilepsy recs.  -Continue to trend CBC

## 2017-12-12 NOTE — CONSULT NOTE ADULT - CONSULT REQUESTED DATE/TIME
05-Dec-2017 09:34
05-Dec-2017 10:56
11-Dec-2017 14:00
12-Dec-2017 19:26
07-Dec-2017
04-Dec-2017 19:00
06-Dec-2017

## 2017-12-12 NOTE — PROGRESS NOTE ADULT - SUBJECTIVE AND OBJECTIVE BOX
Physical Medicine and Rehabilitation Progress Note    CORIN PERDOMO    MRN-[id number]    Patient is a 62y old  Male who presents with a chief complaint of AMS (04 Dec 2017 19:08)      Vital Signs Last 24 Hrs  T(C): 36.8 (12 Dec 2017 12:55), Max: 37.6 (12 Dec 2017 06:05)  T(F): 98.3 (12 Dec 2017 12:55), Max: 99.6 (12 Dec 2017 06:05)  HR: 121 (12 Dec 2017 12:55) (115 - 122)  BP: 106/72 (12 Dec 2017 12:55) (106/72 - 144/99)  BP(mean): --  RR: 18 (12 Dec 2017 12:55) (18 - 19)  SpO2: 98% (12 Dec 2017 12:55) (96% - 98%)    Current Functional Status in Physical Therapy: remain deconditioned, severe. Edema both legs. Seen by PTyesterday.    Bed Mobility: maximal assistance    Transfers: maximal assistance to stand / transfer activity.    Ambulation:      Impression:      Recommendations: continue  bed side PT as tolerated.

## 2017-12-12 NOTE — PROGRESS NOTE ADULT - SUBJECTIVE AND OBJECTIVE BOX
Neurology Follow up note    Name  CORIN PERDOMO    HPI:  62M with metastatic RCC (s/p nephrectomy, s/p craniotomy x2, s/p gamma knife x3 most recently march 2017, currently on Opdivo and Avastin with Dr. 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 Presenting to the ED brought in by wife for AMS. Patient is at bedside however unable to give history. Patient wife states that he was in his normal state of health until today when she was called by the home health aid stating that he had episodes of staring into space and not as responsive as he is on a regular basis. Patient denies HA, change in vision, cough, CP, abdominal pain, back pain, dysuria, hematuria, constipation, diarrhea.     In ED T 97.8 P100 /89 R18 O2Sat 97% RA, patient was seen and evaluated by Neuro (04 Dec 2017 19:08)      Interval History - no change in mental status        REVIEW OF SYSTEMS    Vital Signs Last 24 Hrs  T(C): 37.6 (12 Dec 2017 06:05), Max: 37.6 (12 Dec 2017 06:05)  T(F): 99.6 (12 Dec 2017 06:05), Max: 99.6 (12 Dec 2017 06:05)  HR: 121 (12 Dec 2017 06:05) (115 - 122)  BP: 129/84 (12 Dec 2017 06:05) (129/84 - 144/99)  BP(mean): --  RR: 19 (12 Dec 2017 06:05) (18 - 19)  SpO2: 98% (12 Dec 2017 06:05) (94% - 98%)    Physical Exam-     Mental Status- aphasia    Cranial Nerves- full EOM    Gait and station-n/a    Motor- moves all 4 extremities    Reflexes- decreased    Sensation-n/a    Coordination-no tremors    Vascular -    Medications  amLODIPine   Tablet 5 milliGRAM(s) Oral daily  BACItracin   Ointment 1 Application(s) Topical daily  BACItracin   Ointment 1 Application(s) Topical daily  dexamethasone     Tablet 4 milliGRAM(s) Oral two times a day  dextrose 5%. 1000 milliLiter(s) IV Continuous <Continuous>  dextrose 50% Injectable 12.5 Gram(s) IV Push once  dextrose 50% Injectable 25 Gram(s) IV Push once  dextrose 50% Injectable 25 Gram(s) IV Push once  dextrose Gel 1 Dose(s) Oral once PRN  glucagon  Injectable 1 milliGRAM(s) IntraMuscular once PRN  insulin lispro (HumaLOG) corrective regimen sliding scale   SubCutaneous Before meals and at bedtime  lacosamide 200 milliGRAM(s) Oral two times a day  latanoprost 0.005% Ophthalmic Solution 1 Drop(s) Both EYES at bedtime  levETIRAcetam 250 milliGRAM(s) Oral two times a day  levothyroxine 50 MICROGram(s) Oral daily  megestrol Suspension 400 milliGRAM(s) Oral daily  minocycline 200 milliGRAM(s) Oral every 12 hours      Lab      Radiology    Assessment- Radiation necrosis    Plan EEG to monitor seizures

## 2017-12-12 NOTE — PROGRESS NOTE ADULT - PROBLEM SELECTOR PLAN 3
-Patient has been persistently tachycardic. Unknown etiology. Differentials could include PE (however no hypoxia, no tachypnea) vs infection (however no fever and WBC elevated likely 2/2 steroids) vs dehydration vs acute blood loss as Hgb dropped by 2 points today as mentioned above  -Echo shows severe concentric left ventricular hypertrophy, no pericardial effusion seen  -Continue to monitor HR -Patient has been persistently tachycardic. Unknown etiology. Differentials could include PE (however no hypoxia, no tachypnea) vs infection (however no fever and WBC elevated likely 2/2 steroids) vs dehydration vs acute blood loss as Hgb dropped by 2 points today as mentioned above  -Echo shows severe concentric left ventricular hypertrophy, no pericardial effusion seen  -Continue to monitor HR. Follow up ICU consult decision

## 2017-12-12 NOTE — PROGRESS NOTE ADULT - PROBLEM SELECTOR PLAN 7
-Worsening since last admission  -Physical Therapy Consult -Worsening since last admission  -F/u PT recs

## 2017-12-12 NOTE — PROGRESS NOTE ADULT - PROBLEM SELECTOR PLAN 8
-Norvasc dc/d today given Hgb drop. -Norvasc dc/d today given Hgb drop to avoid hypotension. Continue to monitor blood pressures.

## 2017-12-12 NOTE — PROGRESS NOTE ADULT - SUBJECTIVE AND OBJECTIVE BOX
Chief Complaint:  61 yo M with PMHx of Renal Cell Carcinoma with mets presenting to the ED with altered mental status, admitted to [facility] for further management.     OVERNIGHT EVENTS: No acute events overnight noted by house staff.     SUBJECTIVE / INTERVAL HPI: Patient seen and examined at bedside. NAD. Patient lying comfortably in bed. Patient not responding to questions so unable to assess mental status. ROS not obtainable as patient not responding appropriately to questions.     VITAL SIGNS:  Vital Signs Last 24 Hrs  T(C): 37 (12 Dec 2017 14:12), Max: 37.6 (12 Dec 2017 06:05)  T(F): 98.6 (12 Dec 2017 14:12), Max: 99.6 (12 Dec 2017 06:05)  HR: 113 (12 Dec 2017 14:12) (113 - 122)  BP: 109/76 (12 Dec 2017 14:12) (106/72 - 144/99)  BP(mean): --  RR: 18 (12 Dec 2017 14:12) (18 - 19)  SpO2: 97% (12 Dec 2017 14:12) (96% - 98%)    PHYSICAL EXAM:    General: NAD. vEEG leads in place.   HEENT: NC/AT; PERRL, anicteric sclera; MMM  Neck: supple  Cardiovascular: +S1/S2; tachycardic, regular.   Respiratory: CTA B/L; no W/R/R  Gastrointestinal: soft, NT/ND; +BSx4  Extremities: WWP; no edema, clubbing or cyanosis  Vascular: 2+ radial, DP/PT pulses B/L  Neurological: Unable to assess mental status as patient not responding appropriately to questions.       MEDICATIONS:  MEDICATIONS  (STANDING):  BACItracin   Ointment 1 Application(s) Topical daily  BACItracin   Ointment 1 Application(s) Topical daily  dexamethasone     Tablet 4 milliGRAM(s) Oral two times a day  dextrose 5%. 1000 milliLiter(s) (50 mL/Hr) IV Continuous <Continuous>  dextrose 50% Injectable 12.5 Gram(s) IV Push once  dextrose 50% Injectable 25 Gram(s) IV Push once  dextrose 50% Injectable 25 Gram(s) IV Push once  insulin lispro (HumaLOG) corrective regimen sliding scale   SubCutaneous Before meals and at bedtime  lacosamide 200 milliGRAM(s) Oral two times a day  latanoprost 0.005% Ophthalmic Solution 1 Drop(s) Both EYES at bedtime  levETIRAcetam 250 milliGRAM(s) Oral two times a day  levothyroxine 50 MICROGram(s) Oral daily  megestrol Suspension 400 milliGRAM(s) Oral daily  minocycline 200 milliGRAM(s) Oral every 12 hours  sodium chloride 0.9%. 1000 milliLiter(s) (80 mL/Hr) IV Continuous <Continuous>    MEDICATIONS  (PRN):  dextrose Gel 1 Dose(s) Oral once PRN Blood Glucose LESS THAN 70 milliGRAM(s)/deciliter  glucagon  Injectable 1 milliGRAM(s) IntraMuscular once PRN Glucose LESS THAN 70 milligrams/deciliter      ALLERGIES:  Allergies  No Known Allergies  Intolerances    LABS:                        8.8    18.8  )-----------( 137      ( 12 Dec 2017 10:56 )             26.3     12-12    129<L>  |  98  |  38<H>  ----------------------------<  136<H>  4.9   |  22  |  1.27    Ca    7.1<L>      12 Dec 2017 10:08  Phos  3.6     12-12  Mg     1.9     12-12      PT/INR - ( 12 Dec 2017 10:56 )   PT: 12.6 sec;   INR: 1.13          PTT - ( 12 Dec 2017 10:56 )  PTT:28.4 sec  Urinalysis Basic - ( 11 Dec 2017 15:57 )    Color: Yellow / Appearance: Clear / S.010 / pH: x  Gluc: x / Ketone: NEGATIVE  / Bili: Negative / Urobili: 0.2 E.U./dL   Blood: x / Protein: 100 mg/dL / Nitrite: NEGATIVE   Leuk Esterase: NEGATIVE / RBC: Many /HPF / WBC < 5 /HPF   Sq Epi: x / Non Sq Epi: 0-5 /HPF / Bacteria: Present /HPF      CAPILLARY BLOOD GLUCOSE    POCT Blood Glucose.: 131 mg/dL (12 Dec 2017 11:30)    RADIOLOGY & ADDITIONAL TESTS: Reviewed. Hospital Course:   63 yo M with PMHx of Renal Cell Carcinoma with mets presenting to the ED with altered mental status, admitted to [facility] for further management. CT head showing no interval change from prior study. MRI head showing no new brain lesions. Patient was placed on EEG monitoring for an hour which was negative. Dr. Ya from heme/onc following, most likely diagnosis for the patient suggested to be radiation necrosis, recommended to start minocycline which was started on , and recommended that the patient would likely need chemo. Patient persistently tachy ever since admission, unknown etiology. This morning, patient's Hgb dropped acutely from 11 to 9, and was continuously monitored q4 hours, Hgb downtrending with every repeat lab. Unknown source. No hematemesis, no melena/brown stool in AM/no luisa blood though guaiac positive,, no hemoptysis, no other sources of bleeding identified. Attending notified, Dr. Jiménez aware. CT abd/pelvis ordered to r/o RP bleed which was negative.     OVERNIGHT EVENTS: No acute events overnight noted by house staff.     SUBJECTIVE / INTERVAL HPI: Patient seen and examined at bedside. NAD. Patient lying comfortably in bed. Patient not responding to questions so unable to assess mental status. ROS not obtainable as patient not responding appropriately to questions.     VITAL SIGNS:  Vital Signs Last 24 Hrs  T(C): 37 (12 Dec 2017 14:12), Max: 37.6 (12 Dec 2017 06:05)  T(F): 98.6 (12 Dec 2017 14:12), Max: 99.6 (12 Dec 2017 06:05)  HR: 113 (12 Dec 2017 14:12) (113 - 122)  BP: 109/76 (12 Dec 2017 14:12) (106/72 - 144/99)  BP(mean): --  RR: 18 (12 Dec 2017 14:12) (18 - 19)  SpO2: 97% (12 Dec 2017 14:12) (96% - 98%)    PHYSICAL EXAM:    General: NAD. vEEG leads in place.   HEENT: NC/AT; PERRL, anicteric sclera; MMM  Neck: supple  Cardiovascular: +S1/S2; tachycardic, regular.   Respiratory: CTA B/L; no W/R/R  Gastrointestinal: soft, NT/ND; +BSx4  Extremities: WWP; no edema, clubbing or cyanosis  Vascular: 2+ radial, DP/PT pulses B/L  Neurological: Unable to assess mental status as patient not responding appropriately to questions.       MEDICATIONS:  MEDICATIONS  (STANDING):  BACItracin   Ointment 1 Application(s) Topical daily  BACItracin   Ointment 1 Application(s) Topical daily  dexamethasone     Tablet 4 milliGRAM(s) Oral two times a day  dextrose 5%. 1000 milliLiter(s) (50 mL/Hr) IV Continuous <Continuous>  dextrose 50% Injectable 12.5 Gram(s) IV Push once  dextrose 50% Injectable 25 Gram(s) IV Push once  dextrose 50% Injectable 25 Gram(s) IV Push once  insulin lispro (HumaLOG) corrective regimen sliding scale   SubCutaneous Before meals and at bedtime  lacosamide 200 milliGRAM(s) Oral two times a day  latanoprost 0.005% Ophthalmic Solution 1 Drop(s) Both EYES at bedtime  levETIRAcetam 250 milliGRAM(s) Oral two times a day  levothyroxine 50 MICROGram(s) Oral daily  megestrol Suspension 400 milliGRAM(s) Oral daily  minocycline 200 milliGRAM(s) Oral every 12 hours  sodium chloride 0.9%. 1000 milliLiter(s) (80 mL/Hr) IV Continuous <Continuous>    MEDICATIONS  (PRN):  dextrose Gel 1 Dose(s) Oral once PRN Blood Glucose LESS THAN 70 milliGRAM(s)/deciliter  glucagon  Injectable 1 milliGRAM(s) IntraMuscular once PRN Glucose LESS THAN 70 milligrams/deciliter      ALLERGIES:  Allergies  No Known Allergies  Intolerances    LABS:                        8.8    18.8  )-----------( 137      ( 12 Dec 2017 10:56 )             26.3     12-12    129<L>  |  98  |  38<H>  ----------------------------<  136<H>  4.9   |  22  |  1.27    Ca    7.1<L>      12 Dec 2017 10:08  Phos  3.6     12-12  Mg     1.9     12-12      PT/INR - ( 12 Dec 2017 10:56 )   PT: 12.6 sec;   INR: 1.13          PTT - ( 12 Dec 2017 10:56 )  PTT:28.4 sec  Urinalysis Basic - ( 11 Dec 2017 15:57 )    Color: Yellow / Appearance: Clear / S.010 / pH: x  Gluc: x / Ketone: NEGATIVE  / Bili: Negative / Urobili: 0.2 E.U./dL   Blood: x / Protein: 100 mg/dL / Nitrite: NEGATIVE   Leuk Esterase: NEGATIVE / RBC: Many /HPF / WBC < 5 /HPF   Sq Epi: x / Non Sq Epi: 0-5 /HPF / Bacteria: Present /HPF      CAPILLARY BLOOD GLUCOSE    POCT Blood Glucose.: 131 mg/dL (12 Dec 2017 11:30)    RADIOLOGY & ADDITIONAL TESTS: Reviewed. Hospital Course:   63 yo M with PMHx of Renal Cell Carcinoma with mets presenting to the ED with altered mental status, admitted to [facility] for [facility] management. CT head showing no interval change from prior study. MRI head showing no new brain lesions. Patient was placed on EEG monitoring initially when admitted for an hour which was negative however is back on EEG monitoring given medications adjustments as mentioned in the plan below . Dr. Ya from heme/onc following, most likely diagnosis for the patient suggested to be radiation necrosis, recommended to start minocycline which was started on , and recommended that the patient would likely need chemo. Patient persistently tachy ever since admission, unknown etiology. This morning, patient's Hgb dropped acutely from 11 to 9, and was continuously monitored q4 hours, Hgb downtrending with every repeat lab. Unknown source. No hematemesis, no melena/brown stool in AM/no luisa blood though guaiac positive,, no hemoptysis, no other sources of bleeding identified. Attending notified, Dr. Jiménez aware. CT abd/pelvis ordered to r/o RP bleed which was negative. CBC repeated, still downtrending. ICU consulted at 7:30 pm. Awaiting ICU consult decision regarding further management.     OVERNIGHT EVENTS: No acute events overnight noted by house staff.     SUBJECTIVE / INTERVAL HPI: Patient seen and examined at bedside. NAD. Patient lying comfortably in bed. Patient not responding to questions so unable to assess mental status. ROS not obtainable as patient not responding appropriately to questions.     VITAL SIGNS:  Vital Signs Last 24 Hrs  T(C): 37 (12 Dec 2017 14:12), Max: 37.6 (12 Dec 2017 06:05)  T(F): 98.6 (12 Dec 2017 14:12), Max: 99.6 (12 Dec 2017 06:05)  HR: 113 (12 Dec 2017 14:12) (113 - 122)  BP: 109/76 (12 Dec 2017 14:12) (106/72 - 144/99)  BP(mean): --  RR: 18 (12 Dec 2017 14:12) (18 - 19)  SpO2: 97% (12 Dec 2017 14:12) (96% - 98%)    PHYSICAL EXAM:    General: NAD. vEEG leads in place.   HEENT: NC/AT; PERRL, anicteric sclera; MMM  Neck: supple  Cardiovascular: +S1/S2; tachycardic, regular.   Respiratory: CTA B/L; no W/R/R  Gastrointestinal: soft, NT/ND; +BSx4  Extremities: WWP; no edema, clubbing or cyanosis  Vascular: 2+ radial, DP/PT pulses B/L  Neurological: Unable to assess mental status as patient not responding appropriately to questions.       MEDICATIONS:  MEDICATIONS  (STANDING):  BACItracin   Ointment 1 Application(s) Topical daily  BACItracin   Ointment 1 Application(s) Topical daily  dexamethasone     Tablet 4 milliGRAM(s) Oral two times a day  dextrose 5%. 1000 milliLiter(s) (50 mL/Hr) IV Continuous <Continuous>  dextrose 50% Injectable 12.5 Gram(s) IV Push once  dextrose 50% Injectable 25 Gram(s) IV Push once  dextrose 50% Injectable 25 Gram(s) IV Push once  insulin lispro (HumaLOG) corrective regimen sliding scale   SubCutaneous Before meals and at bedtime  lacosamide 200 milliGRAM(s) Oral two times a day  latanoprost 0.005% Ophthalmic Solution 1 Drop(s) Both EYES at bedtime  levETIRAcetam 250 milliGRAM(s) Oral two times a day  levothyroxine 50 MICROGram(s) Oral daily  megestrol Suspension 400 milliGRAM(s) Oral daily  minocycline 200 milliGRAM(s) Oral every 12 hours  sodium chloride 0.9%. 1000 milliLiter(s) (80 mL/Hr) IV Continuous <Continuous>    MEDICATIONS  (PRN):  dextrose Gel 1 Dose(s) Oral once PRN Blood Glucose LESS THAN 70 milliGRAM(s)/deciliter  glucagon  Injectable 1 milliGRAM(s) IntraMuscular once PRN Glucose LESS THAN 70 milligrams/deciliter      ALLERGIES:  Allergies  No Known Allergies  Intolerances    LABS:                        8.8    18.8  )-----------( 137      ( 12 Dec 2017 10:56 )             26.3     12-12    129<L>  |  98  |  38<H>  ----------------------------<  136<H>  4.9   |  22  |  1.27    Ca    7.1<L>      12 Dec 2017 10:08  Phos  3.6     12-12  Mg     1.9     12-12      PT/INR - ( 12 Dec 2017 10:56 )   PT: 12.6 sec;   INR: 1.13          PTT - ( 12 Dec 2017 10:56 )  PTT:28.4 sec  Urinalysis Basic - ( 11 Dec 2017 15:57 )    Color: Yellow / Appearance: Clear / S.010 / pH: x  Gluc: x / Ketone: NEGATIVE  / Bili: Negative / Urobili: 0.2 E.U./dL   Blood: x / Protein: 100 mg/dL / Nitrite: NEGATIVE   Leuk Esterase: NEGATIVE / RBC: Many /HPF / WBC < 5 /HPF   Sq Epi: x / Non Sq Epi: 0-5 /HPF / Bacteria: Present /HPF      CAPILLARY BLOOD GLUCOSE    POCT Blood Glucose.: 131 mg/dL (12 Dec 2017 11:30)    RADIOLOGY & ADDITIONAL TESTS: Reviewed.

## 2017-12-12 NOTE — CONSULT NOTE ADULT - CONSULT REQUESTED BY NAME
Anat
Dr. KAVIN Tarango
Michael Tarango MD
Dr. Tarango
Dr. Reynoso
Dr. Michael Rdz and Dr. Nain Reynoso
Emelina
Monthly

## 2017-12-12 NOTE — PROGRESS NOTE ADULT - SUBJECTIVE AND OBJECTIVE BOX
Subjective  No events overnight. No complaints currently.    ROS  As above, otherwise negative for constitutional/HEENT/CV/pulm/GI//MSK/neuro/derm/endocrine/psych.     MEDICATIONS  (STANDING):  BACItracin   Ointment 1 Application(s) Topical daily  BACItracin   Ointment 1 Application(s) Topical daily  dexamethasone     Tablet 4 milliGRAM(s) Oral two times a day  dextrose 5%. 1000 milliLiter(s) (50 mL/Hr) IV Continuous <Continuous>  dextrose 50% Injectable 12.5 Gram(s) IV Push once  dextrose 50% Injectable 25 Gram(s) IV Push once  dextrose 50% Injectable 25 Gram(s) IV Push once  insulin lispro (HumaLOG) corrective regimen sliding scale   SubCutaneous Before meals and at bedtime  lacosamide 200 milliGRAM(s) Oral two times a day  latanoprost 0.005% Ophthalmic Solution 1 Drop(s) Both EYES at bedtime  levETIRAcetam 250 milliGRAM(s) Oral two times a day  levothyroxine 50 MICROGram(s) Oral daily  megestrol Suspension 400 milliGRAM(s) Oral daily  minocycline 200 milliGRAM(s) Oral every 12 hours  sodium chloride 0.9%. 1000 milliLiter(s) (80 mL/Hr) IV Continuous <Continuous>    MEDICATIONS  (PRN):  dextrose Gel 1 Dose(s) Oral once PRN Blood Glucose LESS THAN 70 milliGRAM(s)/deciliter  glucagon  Injectable 1 milliGRAM(s) IntraMuscular once PRN Glucose LESS THAN 70 milligrams/deciliter      T(C): 37 (12-12-17 @ 14:12), Max: 37.6 (12-12-17 @ 06:05)  HR: 113 (12-12-17 @ 14:12) (113 - 122)  BP: 109/76 (12-12-17 @ 14:12) (106/72 - 144/99)  RR: 18 (12-12-17 @ 14:12) (18 - 19)  SpO2: 97% (12-12-17 @ 14:12) (96% - 98%)  Wt(kg): --    gen: no acute distress.  HEENT: trachea midline, no jaundice or icterus.  CV: RRR, s1+s2, - m/r/g  Pulm: CTAB  Abd: soft, nt, nd  Ext: 2+ edema in lower legs and feet. Purpura left upper arm.   Alertness: eyes open, pt attentive to examiner.  Orientation: will not answer any orientation questions.  Language: He shook his head no when given choices of his name that were inaccurate, and accurately identified his name. He closed his eyes and stuck out tongue on commands, also lifted arms and legs on commands. Otherwise nonverbal.   II: tracks in all fields, counts fingers in all fields.  III: EOMI. No nystagmus. PERRLA 3>2 bilaterally.   VII: smile symmetrical. Eyebrow elevation symmetrical. No flattening of nasolabial fold.  VIII: grossly intact.  IX, X: palate elevation could not visualize.  XII: no tongue deviation.  Motor: no atrophy or fasciculations. ~7 Hz asynchronous tremor in R>L arms at rest, intermittently. No hypo/hyperkinesia. Tone paratonic throughout. Lifts arms and legs with no obvious asymmetry, no withdraw to proximal skin pinch in any limb, though there was symmetric grimace.   Coordination: unable to cooperate.    CBC Full  -  ( 12 Dec 2017 10:56 )  WBC Count : 18.8 K/uL  Hemoglobin : 8.8 g/dL  Hematocrit : 26.3 %  Platelet Count - Automated : 137 K/uL  Mean Cell Volume : 84.0 fL  Mean Cell Hemoglobin : 28.1 pg  Mean Cell Hemoglobin Concentration : 33.5 g/dL  Auto Neutrophil # : x  Auto Lymphocyte # : x  Auto Monocyte # : x  Auto Eosinophil # : x  Auto Basophil # : x  Auto Neutrophil % : x  Auto Lymphocyte % : x  Auto Monocyte % : x  Auto Eosinophil % : x  Auto Basophil % : x    12-12    129<L>  |  98  |  38<H>  ----------------------------<  136<H>  4.9   |  22  |  1.27    Ca    7.1<L>      12 Dec 2017 10:08  Phos  3.6     12-12  Mg     1.9     12-12        PT/INR - ( 12 Dec 2017 10:56 )   PT: 12.6 sec;   INR: 1.13          PTT - ( 12 Dec 2017 10:56 )  PTT:28.4 sec

## 2017-12-12 NOTE — CONSULT NOTE ADULT - SUBJECTIVE AND OBJECTIVE BOX
CORIN PERDOMO          MRN-[id number]           : 1955    HPI:  62M with metastatic RCC (s/p nephrectomy, s/p craniotomy x2, s/p gamma knife x3 most recently 2017, currently on Opdivo and Avastin with Dr. 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 Presenting to the ED brought in by wife for AMS. Patient is at bedside however unable to give history. Patient wife states that he was in his normal state of health until today when she was called by the home health aid stating that he had episodes of staring into space and not as responsive as he is on a regular basis. Patient denies HA, change in vision, cough, CP, abdominal pain, back pain, dysuria, hematuria, constipation, diarrhea.     In ED T 97.8 P100 /89 R18 O2Sat 97% RA, patient was seen and evaluated by Neuro (04 Dec 2017 19:08)    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 metastases  Malignant neoplasm of kidney: Renal cell cancer  Glaucoma: Dx , s/p b/l surgical repair  Essential hypertension: HTN (hypertension)  History of nephrectomy, unilateral: right nephrectomy  Other postprocedural status: S/P craniotomy  Other postprocedural status: S/P tonsillectomy    FAMILY HISTORY: sister and father both  of pancreatic ca, another sister with breast ca  SOCIAL HISTORY: , lives with wife who works full time, pvt hire aide 8hrs x5 days, 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, 2 children, 2 grandchildren    ROS:    Unable to attain due to: AMS                     DYSPNEA (Y/N):	  N/V (Y/N):	  SECRETIONS (Y/N):	  AGITATION (Y/N):  PAIN(Y/N):        -Provocation/Palliation:     -Quality/Quantity:     -Radiating:     -Severity:     -Timing/Frequency:     -Impact on ADLs:    GENERAL: ROS per pvt aide, pt with worsening mental/physical function over ~2wks   HEENT: has had multiple brain surgeries, hearing is intact  NECK: denies   CVS:  denies   RESP:  denies   GI: at a regular diet at home with some assist  : denies  MUSC:  unsteady gait even prior to admit requiring use of walker  NEURO: has had many brain surgeries, gets dizzy spells, has normal conversations and recognizes family  PSYCH:  denies, has not worked in ~2yrs  SKIN: denies  LYMPH: denies     Allergies    No Known Allergies    Intolerances    OPIATE NAIVE (Y/N): y  -iStop reviewed (Y/N): Y, Ref# [id number] (vimpat)    MEDICATIONS:      MEDICATIONS  (STANDING):  amLODIPine   Tablet 5 milliGRAM(s) Oral daily  dexamethasone     Tablet 4 milliGRAM(s) Oral two times a day  diVALproex  milliGRAM(s) Oral two times a day  lacosamide 200 milliGRAM(s) Oral two times a day  latanoprost 0.005% Ophthalmic Solution 1 Drop(s) Both EYES at bedtime  levothyroxine 50 MICROGram(s) Oral daily  megestrol Suspension 200 milliGRAM(s) Oral daily    MEDICATIONS  (PRN):    LABS:    CBC:                        13.0   12.7  )-----------( 92       ( 06 Dec 2017 06:43 )             39.5     CMP:        136  |  102  |  44<H>  ----------------------------<  111<H>  5.0   |  21<L>  |  1.30    Ca    7.8<L>      06 Dec 2017 06:43  Mg     2.1         Urinalysis Basic - ( 04 Dec 2017 16:44 )    Color: Yellow / Appearance: Clear / S.015 / pH: x  Gluc: x / Ketone: NEGATIVE  / Bili: Negative / Urobili: 0.2 E.U./dL   Blood: x / Protein: 100 mg/dL / Nitrite: NEGATIVE   Leuk Esterase: NEGATIVE / RBC: < 5 /HPF / WBC < 5 /HPF   Sq Epi: x / Non Sq Epi: 0-5 /HPF / Bacteria: Present /HPF    IMAGING:  Reviewed  < from: MR Head w/ IV Cont (17 @ 18:29) >  EXAM:  MR BRAIN IC                          PROCEDURE DATE:  2017   < from: MR Head w/ IV Cont (17 @ 18:29) >  IMPRESSION: New midline suboccipital subcutaneous collection. Progressive   interval increase in ventricular size. Progressive interval increase in   the periventricular FLAIR/T2 signal and left frontal lobe signal   abnormality which may represent treatment related effects. No new   enhancing lesions.    < end of copied text >    PHYSICAL EXAM:  T(C): 36.6 (17 @ 09:00), Max: 36.8 (17 @ 21:00)  HR: 102 (17 @ 09:00) (84 - 110)  BP: 152/77 (17 @ 09:00) (146/95 - 152/77)  RR: 18 (17 @ 09:00) (17 - 18)  SpO2: 96% (17 @ 09:00) (96% - 99%)  Wt(kg): --  Daily     Daily   CAPILLARY BLOOD GLUCOSE    I&O's Summary    05 Dec 2017 07:01  -  06 Dec 2017 07:00  --------------------------------------------------------  IN: 0 mL / OUT: 300 mL / NET: -300 mL    GENERAL: Awake and alert  HEENT: shaved head, old well healed cranial incisions, soft mobile nir-size lesion in occipital area,  EOMs generally intact, hearing appears intact, moist mucous membranes    NECK: supple   CVS: nl S1S2, no murmurs appreciated    RESP: RA, resp even and not labored  GI: +BS   : [place] [facility]   MUSC: left sided 2-3/5 MS, right sided 4/5 MS   NEURO: alert, follows some simple commands, brief periods of attentiveness, minimally verbal, slow to react, tongue midline, impaired coordination, able to lift head off pillow slightly  PSYCH: flat affect   SKIN: no rash   LYMPH: no supraclavicular LAD    PREADMIT Karnofsky: %           CURRENT Karnofsky: %  CACHEXIA (Y/N): n  BMI: 21.4    ADVANCE DIRECTIVES: Full Code      Decision maker:   Legal surrogate: Leia Perdomo [phone]    PSYCHOSOCIAL-SPIRITUAL ASSESSMENT: pending    GOALS OF CARE DISCUSSION:  Palliative care info/counseling provided	      Documentation of GOC: to be determined    AGENCY CHOICE DISCUSSED:   none          REFERRALS:	   Palliative Med   Unit SW/Case Mgmt  Speech/Swallow  Patient/Family Support  Music Therapy  PT/OT
HISTORY OF PRESENT ILLNESS:   62M with metastatic RCC (s/p nephrectomy, s/p craniotomy x2, s/p gamma knife x3 most recently march 2017, currently on Opdivo and Avastin with Dr. 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 Presenting to the ED brought in by wife for AMS. Patient is at bedside however unable to give history. Patient wife states that he was in his normal state of health until today when she was called by the home health aid stating that he had episodes of staring into space and not as responsive as he is on a regular basis. Patient denies HA, change in vision, cough, CP, abdominal pain, back pain, dysuria, hematuria, constipation, diarrhea.   In ED T 97.8 P100 /89 R18 O2Sat 97% RA, patient was seen and evaluated by Neuro    Neurosurgery consulted for possible increasing hydro on CTH?      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 metastases  Malignant neoplasm of kidney: Renal cell cancer  Glaucoma: Dx 2014, s/p b/l surgical repair  Essential hypertension: HTN (hypertension)  History of nephrectomy, unilateral: right nephrectomy  Other postprocedural status: S/P craniotomy  Other postprocedural status: S/P tonsillectomy    FAMILY HISTORY:  Family history of malignant neoplasm of gastrointestinal tract: Family history of gastric cancer  Family history of malignant neoplasm of breast (Mother): Family history of breast cancer in sister  Family history of malignant neoplasm of gastrointestinal tract: Family history of pancreatic cancer      SOCIAL HISTORY:  Tobacco Use:  EtOH use:   Substance:    Allergies    No Known Allergies    Intolerances      MEDICATIONS:  Antibiotics:    Neuro:  diVALproex  milliGRAM(s) Oral two times a day  lacosamide 200 milliGRAM(s) Oral two times a day    Anticoagulation:    OTHER:  amLODIPine   Tablet 5 milliGRAM(s) Oral daily  dexamethasone     Tablet 4 milliGRAM(s) Oral two times a day  latanoprost 0.005% Ophthalmic Solution 1 Drop(s) Both EYES at bedtime  levothyroxine 50 MICROGram(s) Oral daily  megestrol Suspension 400 milliGRAM(s) Oral daily    IVF:  sodium chloride 0.9%. 1000 milliLiter(s) IV Continuous <Continuous>      Vital Signs Last 24 Hrs  T(C): 36.7 (07 Dec 2017 09:15), Max: 36.9 (06 Dec 2017 15:37)  T(F): 98 (07 Dec 2017 09:15), Max: 98.4 (06 Dec 2017 15:37)  HR: 108 (07 Dec 2017 09:15) (105 - 133)  BP: 133/102 (07 Dec 2017 09:15) (133/102 - 156/98)  BP(mean): --  RR: 18 (07 Dec 2017 09:15) (18 - 19)  SpO2: 96% (07 Dec 2017 09:15) (96% - 98%)    PHYSICAL EXAM:  Neuro:  Awake and alert, oriented x 1 (not to place or time), mental status difficult to assess due to aphasia? mild perseverating, follows commands   CNII-XII grossly intact, PERRL, EOMI, face symmetric   MAEx4, antigravity- essential tremor noted in UE (L>R), no drift  SILT throughout b/l      LABS:                        13.0   12.7  )-----------( 92       ( 06 Dec 2017 06:43 )             39.5     12-06    136  |  102  |  44<H>  ----------------------------<  111<H>  5.0   |  21<L>  |  1.30    Ca    7.8<L>      06 Dec 2017 06:43  Mg     2.1     12-06          CULTURES:  Culture Results:   No growth (12-04 @ 18:14)      RADIOLOGY & ADDITIONAL STUDIES:    EXAM:  CT BRAIN                          PROCEDURE DATE:  12/05/2017         INTERPRETATION:  -  CT OF THE HEAD:    IMPRESSION:-    1.  No significant interval change.     2.  Status post left fronto-parietal craniotomy, midline occipital   craniectomy with cranioplasty and right parieto-occipital craniectomy   with cranioplasty. There are postsurgical hypodense areas in the left   posterior frontal lobe,  left cerebellar hemisphere and right occipital   lobe.    3.  Several tiny hyperdense spots are seen in left posterior temporal   lobe and right parietal lobe, probably representing hemorrhagic   metastatic nodules.
[facility] SERVICE CONSULTATION NOTE    HPI:  This is a 63yo M w/ PMH RCC (s/p nephrectomy, on CTX/rads w/ mets to brain) who originally presented  with AMS, admitted to regional floors and neurologic w/u initiated. Head CT and MRI w/o interval change; placed on EEG monitoring. Pt's oncologist Dr. Ya believes mental status change 2/2 "radiation necrosis" and suggested starting minocycline on  and the possibility of additional CTX. Throughout hospitalization, patient had been consistently tachycardic between 110s-120s (etiology remains unclear per primary team) however all w/ stable blood pressure ranging ~120-145, afebrile. Today, primary team noted sudden Hgb drop from 11.4-->9.1 that further downtrended to current shilpi of 8.2. Acute blood loss anemia w/u was unrevealing including CT A/P to r/o retroperitoneal bleed. Also coincident with anemia w/u that was initiated today was a episode of HoTN to shilpi of 106/72 that was notable given the relative stability of all prior BP readings. Though no new changes to mgmt other than minocycline had been made, wife had also expressed she felt his mental state had been worsening still over last 1-2 days (pt unable to provide history). Infectious w/u and hemolysis w/u initiated by primary team and ICU requested to evaluate patient for possibility of HD instability.    PMH:  RCC    PSH:  s/p nephrectomy     Meds:  see EMR    Allergies:  NKA    FH:  non-pertinent    SH:  non-pertinent      PHYSICAL EXAM   Vital Signs Last 24 Hrs  T(C): 37.9 (12 Dec 2017 19:49), Max: 37.9 (12 Dec 2017 19:49)  T(F): 100.2 (12 Dec 2017 19:49), Max: 100.2 (12 Dec 2017 19:49)  HR: 119 (12 Dec 2017 18:00) (113 - 122)  BP: 131/66 (12 Dec 2017 18:00) (106/72 - 144/99)  BP(mean): --  RR: 18 (12 Dec 2017 18:00) (18 - 19)  SpO2: 100% (12 Dec 2017 18:00) (96% - 100%)    Constitutional: non-toxic appearing male resting comfortably in bed; NAD  HEENT: +EEG leads in place; PERRL, no rhinorrhea, no oropharyngeal erythema or exudates; MMM  Neck: supple  Cardiovascular: +S1/S2, tachycardic and regular  Respiratory: lungs CTAB, breathing appearing non-labored  Gastrointestinal: abdomen soft, NT/ND  Genitourinary: uncircumcised phallus, no discharge or lesions  Extremities: WWP w/ 2+ pitting LE edema extending to level of knees  Vascular: 2+ radial, DP/PT pulses B/L  Dermatologic: 2 small skin tears on bilateral medial thighs; otherwise warm and dry, no petechiae or purpura  Neurologic: can follow simple verbal commands, tracks      LABS                        8.2    20.9  )-----------( 137      ( 12 Dec 2017 18:04 )             25.0         129<L>  |  98  |  38<H>  ----------------------------<  136<H>  4.9   |  22  |  1.27    Ca    7.1<L>      12 Dec 2017 10:08  Phos  3.6     -  Mg     1.9     12      PT/INR - ( 12 Dec 2017 10:56 )   PT: 12.6 sec;   INR: 1.13          PTT - ( 12 Dec 2017 10:56 )  PTT:28.4 sec  Lactate, Blood: 1.1 mmoL/L (17 @ 14:30)    Urinalysis Basic - ( 11 Dec 2017 15:57 )    Color: Yellow / Appearance: Clear / S.010 / pH: x  Gluc: x / Ketone: NEGATIVE  / Bili: Negative / Urobili: 0.2 E.U./dL   Blood: x / Protein: 100 mg/dL / Nitrite: NEGATIVE   Leuk Esterase: NEGATIVE / RBC: Many /HPF / WBC < 5 /HPF   Sq Epi: x / Non Sq Epi: 0-5 /HPF / Bacteria: Present /HPF    RADIOLOGY & ADDITIONAL TESTS: Reviewed.
The patient was admitted last evening for altered mental status and for altered motor movements. The patient was seen in my office just last week where the patient had marked changes and cerebellar function with Left greater than right ataxia, left-sided weakness and difficulty giving complete history. The patient was referred to Dr. 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  The patient had been receiving Avastin (a treatment for radiation necrosis) and opdivo.  The patient had been free of any alteration in neurologic status for many months but has received in the past CyberKnife treatments for metastatic lesions or for resected metastatic sites in the brain.  The patient's appearance today is an exaggeration of the symptoms that he had last week but to a more profound degree.    History of Present Illness: The patient is not doing well and is unable to stand.  The patient has weakness in his lower extremity and loss of coordination and a great deal of tremors.  The patient's aid noticed that the patient had a swelling in the occiput of his scalp which is new.    Review of Systems: the patient denies headache, visual changes, hearing loss or tinnitus, nosebleeds or nasal discharge, oral sores, oral lesions or poor dental health.  the patient denies shortness of breath, cardiac palpitations or chest pain on exertion, cough or orthostasis.  the patient denies difficulty swallowing, heartburn, nausea, indigestion, constipation, diarrhea, rectal bleeding or change in bowel habits.  the patient denies dysuria, hematuria, urinary frequency, scrotal or penile rashes.  the patient denies skin rashes, lesions, pruritus or alteration in nail bed shaped or integrity.    Past Medical History: Patient is no pastmedical history other than the present illness.    Family History: The patient's father  of pancreatic cancer at age 71. The patient's mother  of senile dementia at age 79. The patient's a sister  of pancreatic cancer at age 65. Another sister has breast cancer, endometriosis and gastric cancer. The patient has an older brother who has Alzheimer's disease. The patient has another brother who  of unknown causes.    Social History: [Tobacco: Never smoker]      Allergies: No Known Allergies    Medications: 1) Afinitor 5 mg oral tablet, Take 1 pill by mouth QD (Daily) X 1 Month (30d)  2) carvedilol 10 mg oral capsule, extended release, Take 1 pill by mouth BID X 1 Month (30d)  3) Depakote 250 mg oral delayed release tablet, Take 1 pill by mouth BID  4) dexamethasone 4 mg oral tablet, 1 tablet twice a day  5) Dyazide 37.5 mg-25 mg oral capsule, Take 1 pill by mouth QD as needed for edema  6) Emla 2.5%-2.5% topical cream, apply to the skin over the port one hour prior to access  7) lacosamide 200 mg oral tablet, one tablet twice daily  8) latanoprost 0.005% ophthalmic solution, one drop in each eye at bedtime  9) Lenvima 10 mg oral capsule, Take 1 pill by mouth QD (Daily) X 1 Month (30d)  10) Megace 40 mg/mL oral suspension, 10 ML every morning  11) minocycline 100 mg oral capsule, 2 tablets twice a day  12) Opdivo 10 mg/mL intravenous solution, 220 mg IV piggyback every 2 weeks  13) Oxandrin 10 mg oral tablet, one tablet b.i.d.  14) Pazopanib, Take 1 pill by mouth QAM X 1 Month (30d)  15) Synthroid 50 mcg (0.05 mg) oral tablet, Take 1 pill by mouth QD (Daily) X 1 Month (30d)      Physical Examination: Ht/Ln: 72 in  BP: 139/98  Pulse: 118  RR: 18  Temp: 98.8F  SuppO2: 98%  Examination of the head ears eyes nose and throat demonstrates a 2 x 2 centimeter by 1 cm fluctuant area on the patient's scalp just above the occiput and to the right of midline.  Examination of the neck reveals no palpable lymphadenopathy, jugular venous distention or thyroidomegaly.  The lungs are clear to percussion and auscultation.  The heart sounds are regular with no auscultatory evidence for murmur, rub or gallop.  The patient has no truncal obesity and there is no palpable or percussible hepatomegaly or splenomegaly.  The patient has no chronic venous stasis changes in the lower extremities.  Neurologically the patient has no gross abnormality in higher cortical function but does have worse cerebellar function on the left side than right side.  The patient has some degree of weakness on the left side.
CORIN PERDOMO    [id number]    Patient is a 62y old  Male who presents with a chief complaint of AMS (04 Dec 2017 19:08)      HPI:  62M with metastatic RCC (s/p nephrectomy, s/p craniotomy x2, s/p gamma knife x3 most recently march 2017, currently on Opdivo and Avastin with Dr. 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 Presenting to the ED brought in by wife for AMS. Patient is at bedside however unable to give history. Patient wife states that he was in his normal state of health until today when she was called by the home health aid stating that he had episodes of staring into space and not as responsive as he is on a regular basis. Patient denies HA, change in vision, cough, CP, abdominal pain, back pain, dysuria, hematuria, constipation, diarrhea.     In ED T 97.8 P100 /89 R18 O2Sat 97% RA, patient was seen and evaluated by Neuro (04 Dec 2017 19:08)      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 metastases  Malignant neoplasm of kidney: Renal cell cancer  Glaucoma: Dx 2014, s/p b/l surgical repair  Essential hypertension: HTN (hypertension)  History of nephrectomy, unilateral: right nephrectomy  Other postprocedural status: S/P craniotomy  Other postprocedural status: S/P tonsillectomy        Social History:    FAMILY HISTORY:  Family history of malignant neoplasm of gastrointestinal tract: Family history of gastric cancer  Family history of malignant neoplasm of breast (Mother): Family history of breast cancer in sister  Family history of malignant neoplasm of gastrointestinal tract: Family history of pancreatic cancer      Functional Level Prior to Admission:                          14.4   16.0  )-----------( 123      ( 04 Dec 2017 16:25 )             42.4       12-04    135  |  102  |  44<H>  ----------------------------<  163<H>  6.2<HH>   |  20<L>  |  1.12    Ca    7.8<L>      04 Dec 2017 17:52        Vital Signs Last 24 Hrs  T(C): 37.2 (05 Dec 2017 09:08), Max: 37.2 (05 Dec 2017 09:08)  T(F): 98.9 (05 Dec 2017 09:08), Max: 98.9 (05 Dec 2017 09:08)  HR: 111 (05 Dec 2017 09:08) (97 - 111)  BP: 128/89 (05 Dec 2017 09:08) (128/89 - 147/96)  BP(mean): --  RR: 18 (05 Dec 2017 09:08) (18 - 19)  SpO2: 97% (05 Dec 2017 09:08) (96% - 97%)      PHYSICAL EXAM: This 62 y-o male was re-admitted on 12/04/17 with altered mental status. Recently admitted and discharged  on 11/10/17 with same condition with h/o mets RCC,mets jerrica spine and brain, s/o craniotomy and Kamma Knife.       Constitutional: laying in bed, awake, confused and disoriented and slow response.    Eyes:  h/o graucoma    ENMT:eg.    Neck:upple, no neck pain    Breasts:    Back: no back pain    Respiratory:    Cardiovascular:njo chest pain or palpitation    Gastrointestinal:o abd.ain    Genitourinary:    Rectal:    Extremities:  ? spastic  upper and lower extremities, weak, generally, ? 3- /5. Maximal assistance of bed mobility at this time.    Vascular:    Neurological: Confused and disoriented. Generally weak and ? spastic.    Skin:    Lymph Nodes:    Musculoskeletal:    Psychiatric:            Impression:  1. Altered mental status , r/o  T-M encephalopathy. 2. Mets brain tumor / RCC.    Recommendations: 1. PT for therapeutic ex., bed mobility and transfer activities / ? gait training.  2. Disposition: pending functional progress.
HPI  63 y/o man with renal cell carcinoma (brain mets; s/p nephrectomy, craniotomy x2, gamma knife x3, Opdivo, Avastin), hx of seizures (partial left frontotemporal status), admitted with AMS, epilepsy consulted for AED management. Routine EEG 12/6/17 was notable for moderate generalized background slowing (worse over the left frontal/central/parietal regions) and breach rhythm over the left frontal/central region. There were no EEG seizures, epileptiform discharges, or paroxysmal clinical events. The pt has thrombocytopenia and purpura, and his medical team has requested to come off of Depakote and replace with another agent. He was previously on Keppra was switched for Depakote the summer of 2016 because of "behavioral issues."  The pt is not conversational so history is limited to chart review.    ROS  Per HPI, otherwise negative for constitutional/eyes/ENMT/cardiovascular/respiratory/GI//musculoskeletal/skin/breasts/psych/hem/lymph.     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 metastases  Malignant neoplasm of kidney: Renal cell cancer  Glaucoma: Dx 2014, s/p b/l surgical repair  Essential hypertension: HTN (hypertension)  History of nephrectomy, unilateral: right nephrectomy  Other postprocedural status: S/P craniotomy  Other postprocedural status: S/P tonsillectomy       FAMILY HISTORY:  Family history of malignant neoplasm of gastrointestinal tract: Family history of gastric cancer  Family history of malignant neoplasm of breast (Mother): Family history of breast cancer in sister  Family history of malignant neoplasm of gastrointestinal tract: Family history of pancreatic cancer         Allergies  No Known Allergies       MEDICATIONS  (STANDING):  amLODIPine   Tablet 5 milliGRAM(s) Oral daily  BACItracin   Ointment 1 Application(s) Topical daily  BACItracin   Ointment 1 Application(s) Topical daily  dexamethasone     Tablet 4 milliGRAM(s) Oral two times a day  dextrose 5%. 1000 milliLiter(s) (50 mL/Hr) IV Continuous <Continuous>  dextrose 50% Injectable 12.5 Gram(s) IV Push once  dextrose 50% Injectable 25 Gram(s) IV Push once  dextrose 50% Injectable 25 Gram(s) IV Push once  insulin lispro (HumaLOG) corrective regimen sliding scale   SubCutaneous Before meals and at bedtime  lacosamide 200 milliGRAM(s) Oral two times a day  latanoprost 0.005% Ophthalmic Solution 1 Drop(s) Both EYES at bedtime  levETIRAcetam 250 milliGRAM(s) Oral two times a day  levothyroxine 50 MICROGram(s) Oral daily  megestrol Suspension 400 milliGRAM(s) Oral daily  minocycline 200 milliGRAM(s) Oral every 12 hours    MEDICATIONS  (PRN):  dextrose Gel 1 Dose(s) Oral once PRN Blood Glucose LESS THAN 70 milliGRAM(s)/deciliter  glucagon  Injectable 1 milliGRAM(s) IntraMuscular once PRN Glucose LESS THAN 70 milligrams/deciliter       T(C): 36.8 (12-11-17 @ 13:48), Max: 37.1 (12-11-17 @ 05:37)  HR: 118 (12-11-17 @ 13:48) (98 - 118)  BP: 135/85 (12-11-17 @ 13:48) (135/85 - 146/90)  RR: 18 (12-11-17 @ 13:48) (17 - 18)  SpO2: 94% (12-11-17 @ 13:48) (94% - 99%)  Wt(kg): --    gen: no acute distress.  HEENT: trachea midline, no jaundice or icterus.  CV: RRR, s1+s2, - m/r/g  Pulm: CTAB  Abd: soft, nt, nd  Ext: 2+ edema in lower legs and feet. Purpura left upper arm.   Alertness: eyes open, pt attentive to examiner.  Orientation: will not answer any orientation questions.  Language: He shook his head no when given choices of his name that were inaccurate, and accurately identified his name. He closed his eyes and stuck out tongue on commands, also lifted arms and legs on commands. Otherwise nonverbal.   II: tracks in all fields   III: EOMI. No nystagmus. PERRLA 3>2 bilaterally.   VII: smile symmetrical. Eyebrow elevation symmetrical. No flattening of nasolabial fold.  VIII: grossly intact.  IX, X: palate elevation could not visualize.  XII: no tongue deviation.  Motor: no atrophy or fasciculations. ~7 Hz asynchronous tremor in R>L arms at rest, intermittently. No hypo/hyperkinesia. Tone paratonic throughout. Lifts arms and legs with no obvious asymmetry, no withdraw to proximal skin pinch in any limb, though there was symmetric grimace.   Reflexes: biceps 2R/2L, triceps could not eval because of poor positioning, brachioradialis 1R/1L, patellar 1R/1L, ankles 0R/0L. Toes mute R/L  Coordination: unable to cooperate.     Labs  CBC Full  -  ( 11 Dec 2017 11:46 )  WBC Count : 16.7 K/uL  Hemoglobin : 11.4 g/dL  Hematocrit : 34.2 %  Platelet Count - Automated : 66 K/uL  Mean Cell Volume : 84.4 fL  Mean Cell Hemoglobin : 28.1 pg  Mean Cell Hemoglobin Concentration : 33.3 g/dL  Auto Neutrophil # : x  Auto Lymphocyte # : x  Auto Monocyte # : x  Auto Eosinophil # : x  Auto Basophil # : x  Auto Neutrophil % : x  Auto Lymphocyte % : x  Auto Monocyte % : x  Auto Eosinophil % : x  Auto Basophil % : x    12-11    131<L>  |  97  |  27<H>  ----------------------------<  160<H>  4.8   |  21<L>  |  0.95    Ca    7.5<L>      11 Dec 2017 12:21  Mg     2.1     12-11      MRI brain w/wo con 12/5/17:  IMPRESSION: New midline suboccipital subcutaneous collection. Progressive   interval increase in ventricular size. Progressive interval increase in   the periventricular FLAIR/T2 signal and left frontal lobe signal   abnormality which may represent treatment related effects. No new   enhancing lesions.
Patient is a 62y old  Male who presents with a chief complaint of AMS (04 Dec 2017 19:08)        HPI:  62M with metastatic RCC (s/p nephrectomy, s/p craniotomy x2, s/p gamma knife x3 most recently march 2017, currently on Opdivo and Avastin with Dr. 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 Presenting to the ED brought in by wife for AMS. Patient is at bedside however unable to give history. Patient wife states that he was in his normal state of health until today when she was called by the home health aid stating that he had episodes of staring into space and not as responsive as he is on a regular basis. Patient denies HA, change in vision, cough, CP, abdominal pain, back pain, dysuria, hematuria, constipation, diarrhea.     In ED T 97.8 P100 /89 R18 O2Sat 97% RA, patient was seen and evaluated by Neuro (04 Dec 2017 19:08)     less speech and decrease movement of UE/ LE      Allergies  No Known Allergies      Health Issues  WEAKNESS  No h/o HF  Family history of malignant neoplasm of gastrointestinal tract  Family history of malignant neoplasm of breast (Mother)  Family history of malignant neoplasm of gastrointestinal tract  Handoff  MEWS Score  Secondary malignant neoplasm of brain and spinal cord  Malignant neoplasm of kidney  Glaucoma  Essential hypertension  Weakness  Nutrition, metabolism, and development symptoms  Essential hypertension  Weakness  Malignant neoplasm of kidney  Altered mental status  History of nephrectomy, unilateral  Other postprocedural status  Other postprocedural status  WEAKNESS  24        FAMILY HISTORY:  Family history of malignant neoplasm of gastrointestinal tract: Family history of gastric cancer  Family history of malignant neoplasm of breast (Mother): Family history of breast cancer in sister  Family history of malignant neoplasm of gastrointestinal tract: Family history of pancreatic cancer      MEDICATIONS  (STANDING):  amLODIPine   Tablet 5 milliGRAM(s) Oral daily  dexamethasone     Tablet 4 milliGRAM(s) Oral two times a day  diVALproex  milliGRAM(s) Oral two times a day  heparin  Injectable 5000 Unit(s) SubCutaneous every 8 hours  lacosamide 200 milliGRAM(s) Oral two times a day  latanoprost 0.005% Ophthalmic Solution 1 Drop(s) Both EYES at bedtime  levothyroxine 50 MICROGram(s) Oral daily  megestrol Suspension 200 milliGRAM(s) Oral daily    MEDICATIONS  (PRN):      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 metastases  Malignant neoplasm of kidney: Renal cell cancer  Glaucoma: Dx 2014, s/p b/l surgical repair  Essential hypertension: HTN (hypertension)  History of nephrectomy, unilateral: right nephrectomy  Other postprocedural status: S/P craniotomy  Other postprocedural status: S/P tonsillectomy      Labs                          14.4   16.0  )-----------( 123      ( 04 Dec 2017 16:25 )             42.4     12-04    135  |  102  |  44<H>  ----------------------------<  163<H>  6.2<HH>   |  20<L>  |  1.12    Ca    7.8<L>      04 Dec 2017 17:52        Radiology:    Physical Exam    MENTAL STATUS  -Level of Consciousness- awake    Orientation- person, place time  Language- aphasia/ dysarthria- no speech        Cranial Nerve 1- 12  Pupils- equal and reactive  Eye movements-full  Facial - no asymmetry   Lower CN-nl    Gait and Station-n/a    MOTOR  Upper- bilateral weakness  Lower- decrease movement    Reflexes- decreased    Sensation- no sensory level    Cerebellar- no tremors    vascular -    Assessment- R/O new brain and spinal lesions    Plan MRI brain and total spine- with contrast

## 2017-12-12 NOTE — PROGRESS NOTE ADULT - SUBJECTIVE AND OBJECTIVE BOX
The patient is more   somnolent today and is able only to give yes and no answer.    Examination of the head ears eyes nose and throat demonstrates a 2 x 2 centimeter by 1 cm fluctuant area on the patient's scalp just above the occiput and to the right of midline.  Examination of the neck reveals no palpable lymphadenopathy, jugular venous distention or thyroidomegaly.  The lungs are clear to percussion and auscultation.  The heart sounds are regular with no auscultatory evidence for murmur, rub or gallop.  The patient has no truncal obesity and there is no palpable or percussible hepatomegaly or splenomegaly.  The patient has no chronic venous stasis changes in the lower extremities.  Neurologically the patient has No response or little response to  verbal command and when roused will only attempt to say no or yes.

## 2017-12-12 NOTE — PROGRESS NOTE ADULT - ATTENDING COMMENTS
Avastin will be given Today.  Disposition to rehabilitative facility which can allow for every 2 weeks Avastin And daily hyperbaric oxygen therapy would be needed in an optimal situation.

## 2017-12-12 NOTE — PROGRESS NOTE ADULT - PROBLEM SELECTOR PLAN 6
-Patient last chemotherapy on Sept 2017. Patients wife reports plans for chemo in Jan 2018  -Awaiting Dr. Ya's recs regarding further management. -Patient last chemotherapy on Sept 2017.   -Awaiting Dr. Ya's recs regarding further management.

## 2017-12-12 NOTE — CONSULT NOTE ADULT - ATTENDING COMMENTS
MRI of the brain with gadolinium, MRI of the cervical spine with gadolinium MRI of the thoracic spine with gadolinium.  Neurologic evaluation for seizure activity.  Transfer the patient to the seizure facility on 7 warm and because of the patient's proximity there to nurses who can deal with his very poor venous access and the port that has been placed in his chest for administration of medications and for blood drawing.
history as per dr. lopez. SH: non smoker or drinker. fh: negative. ros negative other than positive elements noted in HPI. anemia with no evidence of bleeding. no current hemodynamic changes. no idication for tele/icu at this time.

## 2017-12-12 NOTE — CHART NOTE - NSCHARTNOTEFT_GEN_A_CORE
Admitting Diagnosis:   62M with metastatic RCC (s/p nephrectomy, s/p craniotomy x2, s/p gamma knife x3 most recently march 2017, currently on Opdivo and Avastin with Dr. 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 Pt with radiation necrosis.       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 metastases  Malignant neoplasm of kidney: Renal cell cancer  Glaucoma: Dx 2014, s/p b/l surgical repair  Essential hypertension: HTN (hypertension)  History of nephrectomy, unilateral: right nephrectomy  Other postprocedural status: S/P craniotomy  Other postprocedural status: S/P tonsillectomy      Current Nutrition Order: Full Liquid Diet with 1000 cc fluid restriction (hyponatremia)     PO Intake: Good (%) [   ]  Fair (50-75%) [   ] Poor (<25%) [ X  ]    GI Issues: No n/v/d/c noted     Pain: Pt appears comfortable     Skin Integrity: Intact     Labs:   12-12    129<L>  |  98  |  38<H>  ----------------------------<  136<H>  4.9   |  22  |  1.27    Ca    7.1<L>      12 Dec 2017 10:08  Phos  3.6     12-12  Mg     1.9     12-12      CAPILLARY BLOOD GLUCOSE      POCT Blood Glucose.: 131 mg/dL (12 Dec 2017 11:30)  POCT Blood Glucose.: 151 mg/dL (12 Dec 2017 07:29)  POCT Blood Glucose.: 208 mg/dL (11 Dec 2017 20:58)  POCT Blood Glucose.: 169 mg/dL (11 Dec 2017 16:56)      Medications:  MEDICATIONS  (STANDING):  amLODIPine   Tablet 5 milliGRAM(s) Oral daily  BACItracin   Ointment 1 Application(s) Topical daily  BACItracin   Ointment 1 Application(s) Topical daily  dexamethasone     Tablet 4 milliGRAM(s) Oral two times a day  dextrose 5%. 1000 milliLiter(s) (50 mL/Hr) IV Continuous <Continuous>  dextrose 50% Injectable 12.5 Gram(s) IV Push once  dextrose 50% Injectable 25 Gram(s) IV Push once  dextrose 50% Injectable 25 Gram(s) IV Push once  insulin lispro (HumaLOG) corrective regimen sliding scale   SubCutaneous Before meals and at bedtime  lacosamide 200 milliGRAM(s) Oral two times a day  latanoprost 0.005% Ophthalmic Solution 1 Drop(s) Both EYES at bedtime  levETIRAcetam 250 milliGRAM(s) Oral two times a day  levothyroxine 50 MICROGram(s) Oral daily  megestrol Suspension 400 milliGRAM(s) Oral daily  minocycline 200 milliGRAM(s) Oral every 12 hours  sodium chloride 0.9%. 1000 milliLiter(s) (80 mL/Hr) IV Continuous <Continuous>    MEDICATIONS  (PRN):  dextrose Gel 1 Dose(s) Oral once PRN Blood Glucose LESS THAN 70 milliGRAM(s)/deciliter  glucagon  Injectable 1 milliGRAM(s) IntraMuscular once PRN Glucose LESS THAN 70 milligrams/deciliter      Weight: 71.7 kg       Weight Change: No new wt to assess     Estimated energy needs: moderate protein needs 2/2 CKD; increased tomer 2/2 cancer   Estimated Energy Needs (30-35 calories/kg):  · Weight  (lbs)	158 lb  · Weight (kg)	71.6 kg  · From (30 tmoer/kg)	2148  · To (35 calories/kg)	2506       Estimated Protein Needs (0.75-1.0 gm/kg):  · Weight  (lbs)	158  · Weight (kg)	71.6 kg  · From (0.75 g/kg)	53.7  · To (1 g/kg)	71.6     Estimated Fluid Needs (25-30 ml/kg):  · Weight  (lbs)	158  · Weight (kg)	71.6  · From (25 ml/kg)	1790  · To (30 ml/kg)	2148        Subjective: As per family member, pt with very little PO intake during hospital admission. Pt noted to be holding bolus of food in mouth. SLP rec. full liquid diet. Per family member, pt still holding food in mouth and taking minimal amounts. Family member agreed that pt may like/tolerate Ensure pudding. Family member denies any wt loss PTA.     Previous Nutrition Diagnosis: Self-feeding difficulty R/T change in MS/Mets to brain AEB: requires total assistance with feeding      Active [ X  ]  Resolved [   ]    New PES: Inadequate oral intake r/t swallowing difficulty, mental status AEB pt with <25% intake of food/beverage     Goal: 1.)Pt to meet 75% of needs PO     Recommendations:  1.) ensure pudding TID     Education: Education not appropriate at this time     Risk Level: High [ X ] Moderate [   ] Low [   ]

## 2017-12-12 NOTE — PROGRESS NOTE ADULT - PROBLEM SELECTOR PLAN 4
-Unknown etiology, based on urine studies likely 2/2 SIADH as patient's urine has urine osm>100 and urine Na>20  -Sodium improving over the weekend however decreased to 131 this AM. Will follow up repeat BMP along with repeat urine Na, osm and creatinine and decide further management. -Unknown etiology, based on urine studies likely 2/2 SIADH as patient's urine has urine osm>100 and urine Na>20  -Continue with fluid restricted diet. Continue to trend BMP

## 2017-12-12 NOTE — PROGRESS NOTE ADULT - ASSESSMENT
The patient's condition Is deteriorating. 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  The patient will need Avastin, minocycline and hyperbaric oxygen therapy while undergoing rehabilitation.  He will need to be on a Avastin every 2 weeks.  Hyperbaric oxygen is given daily.

## 2017-12-12 NOTE — PROGRESS NOTE ADULT - PROBLEM SELECTOR PLAN 1
Improving. Patient presenting to the ED with 1 day AMS in association with 1 week urine/bowel incontinence.  -CT head showing no interval change from prior study.   -MRI head showing no new brain lesions, but new midline suboccipital subcutaneous collection. Progressive interval increase in ventricular size. Progressive interval increase in the periventricular FLAIR/T2 signal and left frontal lobe signal.  -RPR negative, Vitamin B12 WNL, folate slightly below normal limits and may consider supplementation -Hgb drop by 2 points this AM 12/12.. Unknown source. No hematemesis, no melena/brown stool in AM/no luisa blood though guaiac positive,, no hemoptysis, no other sources of bleeding identified. Attending notified, Dr. Jiménez aware. CT abd/pelvis ordered to r/o RP bleed which was negative.  -Hgb trended q4 hours today, downtrending, unknown source. ICU consulted for the need of closer monitoring. Awaiting ICU consult decision. Active T&S in case of blood transfusion.   -Ongoing tachycardia, -120 throughout hospital course, unchanged. Mental status unchanged throughout entire hospital course including 12/12.   Lactic acid done on 12/12 WNL. NEW RELATIVE HYPOTENSION TO SBP 110s WHERE PREVIOUSLY 140-150s DURING ENTIRE HOSPITAL COURSE. Will need to monitor vitals more closely.

## 2017-12-12 NOTE — PROGRESS NOTE ADULT - ASSESSMENT
61 y/o man with renal cell carcinoma (brain mets; s/p nephrectomy, craniotomy x2, gamma knife x3, Opdivo, Avastin), hx of seizures (partial left frontotemporal status), admitted with AMS x2 weeks, epilepsy consulted for AED management. Exam was notable for limited ability to follow commands, almost no verbal output, paratonia throughout. MRI brain w/wo con 12/5/17 was notable for progressive interval increase in ventricular size; progressive interval increase in the periventricular FLAIR/T2 signal and left frontal lobe signal abnormality which may represent treatment related effects; no new enhancing lesions. He previously had focal status in 2016, and Keppra was switched to Depakote for "behavior issues." Because of thrombocytopenic and purpura, Depakote switched to Keppra 12/11 while on EEG. He seems to be tolerating Keppra well currently, so we will continue this and keep off Depakote if seizure-free for one more day.    - continue EEG.  - continue Keppra 500 mg bid.  - continue Vimpat 200 mg bid.  - AMS work-up per Dr. Reynoso. Consider LP.

## 2017-12-13 DIAGNOSIS — Z71.89 OTHER SPECIFIED COUNSELING: ICD-10-CM

## 2017-12-13 LAB
ALBUMIN SERPL ELPH-MCNC: 2.3 G/DL — LOW (ref 3.3–5)
ALP SERPL-CCNC: 152 U/L — HIGH (ref 40–120)
ALT FLD-CCNC: 112 U/L — HIGH (ref 10–45)
ANION GAP SERPL CALC-SCNC: 13 MMOL/L — SIGNIFICANT CHANGE UP (ref 5–17)
ANISOCYTOSIS BLD QL: SLIGHT — SIGNIFICANT CHANGE UP
AST SERPL-CCNC: 49 U/L — HIGH (ref 10–40)
BILIRUB DIRECT SERPL-MCNC: <0.2 MG/DL — SIGNIFICANT CHANGE UP (ref 0–0.2)
BILIRUB INDIRECT FLD-MCNC: >0.3 MG/DL — SIGNIFICANT CHANGE UP (ref 0.2–1)
BILIRUB SERPL-MCNC: 0.5 MG/DL — SIGNIFICANT CHANGE UP (ref 0.2–1.2)
BUN SERPL-MCNC: 55 MG/DL — HIGH (ref 7–23)
CALCIUM SERPL-MCNC: 7.7 MG/DL — LOW (ref 8.4–10.5)
CHLORIDE SERPL-SCNC: 97 MMOL/L — SIGNIFICANT CHANGE UP (ref 96–108)
CO2 SERPL-SCNC: 19 MMOL/L — LOW (ref 22–31)
CREAT SERPL-MCNC: 1.38 MG/DL — HIGH (ref 0.5–1.3)
CULTURE RESULTS: SIGNIFICANT CHANGE UP
CULTURE RESULTS: SIGNIFICANT CHANGE UP
GIANT PLATELETS BLD QL SMEAR: PRESENT — SIGNIFICANT CHANGE UP
GLUCOSE BLDC GLUCOMTR-MCNC: 116 MG/DL — HIGH (ref 70–99)
GLUCOSE BLDC GLUCOMTR-MCNC: 147 MG/DL — HIGH (ref 70–99)
GLUCOSE BLDC GLUCOMTR-MCNC: 171 MG/DL — HIGH (ref 70–99)
GLUCOSE SERPL-MCNC: 158 MG/DL — HIGH (ref 70–99)
HAPTOGLOB SERPL-MCNC: 311 MG/DL — HIGH (ref 34–200)
HCT VFR BLD CALC: 22.6 % — LOW (ref 39–50)
HCT VFR BLD CALC: 24.2 % — LOW (ref 39–50)
HCT VFR BLD CALC: 26.4 % — LOW (ref 39–50)
HGB BLD-MCNC: 7.5 G/DL — LOW (ref 13–17)
HGB BLD-MCNC: 7.8 G/DL — LOW (ref 13–17)
HGB BLD-MCNC: 8.4 G/DL — LOW (ref 13–17)
LDH SERPL L TO P-CCNC: 492 U/L — HIGH (ref 50–242)
LG PLATELETS BLD QL AUTO: PRESENT — SIGNIFICANT CHANGE UP
LYMPHOCYTES # BLD AUTO: 3 % — LOW (ref 13–44)
MACROCYTES BLD QL: SLIGHT — SIGNIFICANT CHANGE UP
MAGNESIUM SERPL-MCNC: 2 MG/DL — SIGNIFICANT CHANGE UP (ref 1.6–2.6)
MANUAL SMEAR VERIFICATION: SIGNIFICANT CHANGE UP
MCHC RBC-ENTMCNC: 27.6 PG — SIGNIFICANT CHANGE UP (ref 27–34)
MCHC RBC-ENTMCNC: 27.7 PG — SIGNIFICANT CHANGE UP (ref 27–34)
MCHC RBC-ENTMCNC: 28.2 PG — SIGNIFICANT CHANGE UP (ref 27–34)
MCHC RBC-ENTMCNC: 31.8 G/DL — LOW (ref 32–36)
MCHC RBC-ENTMCNC: 32.2 G/DL — SIGNIFICANT CHANGE UP (ref 32–36)
MCHC RBC-ENTMCNC: 33.2 G/DL — SIGNIFICANT CHANGE UP (ref 32–36)
MCV RBC AUTO: 85 FL — SIGNIFICANT CHANGE UP (ref 80–100)
MCV RBC AUTO: 85.5 FL — SIGNIFICANT CHANGE UP (ref 80–100)
MCV RBC AUTO: 87.1 FL — SIGNIFICANT CHANGE UP (ref 80–100)
METAMYELOCYTES # FLD: 4 % — HIGH
MICROCYTES BLD QL: SLIGHT — SIGNIFICANT CHANGE UP
MONOCYTES NFR BLD AUTO: 6 % — SIGNIFICANT CHANGE UP (ref 2–14)
MYELOCYTES NFR BLD: 9 % — HIGH
NEUTROPHILS NFR BLD AUTO: 70 % — SIGNIFICANT CHANGE UP (ref 43–77)
NEUTS BAND # BLD: 8 % — SIGNIFICANT CHANGE UP
PLAT MORPH BLD: (no result)
PLATELET # BLD AUTO: 161 K/UL — SIGNIFICANT CHANGE UP (ref 150–400)
PLATELET # BLD AUTO: 176 K/UL — SIGNIFICANT CHANGE UP (ref 150–400)
PLATELET # BLD AUTO: 91 K/UL — LOW (ref 150–400)
PLATELET COUNT - ESTIMATE: (no result)
POIKILOCYTOSIS BLD QL AUTO: SLIGHT — SIGNIFICANT CHANGE UP
POTASSIUM SERPL-MCNC: 5 MMOL/L — SIGNIFICANT CHANGE UP (ref 3.5–5.3)
POTASSIUM SERPL-SCNC: 5 MMOL/L — SIGNIFICANT CHANGE UP (ref 3.5–5.3)
PROT SERPL-MCNC: 4.1 G/DL — LOW (ref 6–8.3)
RBC # BLD: 2.66 M/UL — LOW (ref 4.2–5.8)
RBC # BLD: 2.83 M/UL — LOW (ref 4.2–5.8)
RBC # BLD: 3.03 M/UL — LOW (ref 4.2–5.8)
RBC # FLD: 15.9 % — SIGNIFICANT CHANGE UP (ref 10.3–16.9)
RBC # FLD: 16.1 % — SIGNIFICANT CHANGE UP (ref 10.3–16.9)
RBC # FLD: 16.5 % — SIGNIFICANT CHANGE UP (ref 10.3–16.9)
RBC BLD AUTO: (no result)
SMUDGE CELLS # BLD: SIGNIFICANT CHANGE UP
SODIUM SERPL-SCNC: 129 MMOL/L — LOW (ref 135–145)
SPECIMEN SOURCE: SIGNIFICANT CHANGE UP
SPECIMEN SOURCE: SIGNIFICANT CHANGE UP
WBC # BLD: 21.8 K/UL — HIGH (ref 3.8–10.5)
WBC # BLD: 22.7 K/UL — HIGH (ref 3.8–10.5)
WBC # BLD: 23.2 K/UL — HIGH (ref 3.8–10.5)
WBC # FLD AUTO: 21.8 K/UL — HIGH (ref 3.8–10.5)
WBC # FLD AUTO: 22.7 K/UL — HIGH (ref 3.8–10.5)
WBC # FLD AUTO: 23.2 K/UL — HIGH (ref 3.8–10.5)

## 2017-12-13 PROCEDURE — 99232 SBSQ HOSP IP/OBS MODERATE 35: CPT

## 2017-12-13 PROCEDURE — 95951: CPT | Mod: 26

## 2017-12-13 RX ORDER — DEXAMETHASONE 0.5 MG/5ML
4 ELIXIR ORAL
Qty: 0 | Refills: 0 | Status: DISCONTINUED | OUTPATIENT
Start: 2017-12-13 | End: 2017-12-22

## 2017-12-13 RX ORDER — LEVETIRACETAM 250 MG/1
250 TABLET, FILM COATED ORAL EVERY 12 HOURS
Qty: 0 | Refills: 0 | Status: DISCONTINUED | OUTPATIENT
Start: 2017-12-13 | End: 2017-12-14

## 2017-12-13 RX ORDER — LACOSAMIDE 50 MG/1
200 TABLET ORAL EVERY 12 HOURS
Qty: 0 | Refills: 0 | Status: DISCONTINUED | OUTPATIENT
Start: 2017-12-13 | End: 2017-12-19

## 2017-12-13 RX ORDER — MINOCYCLINE HYDROCHLORIDE 45 MG/1
200 TABLET, EXTENDED RELEASE ORAL EVERY 12 HOURS
Qty: 0 | Refills: 0 | Status: DISCONTINUED | OUTPATIENT
Start: 2017-12-13 | End: 2017-12-15

## 2017-12-13 RX ORDER — LEVOTHYROXINE SODIUM 125 MCG
25 TABLET ORAL DAILY
Qty: 0 | Refills: 0 | Status: DISCONTINUED | OUTPATIENT
Start: 2017-12-14 | End: 2017-12-17

## 2017-12-13 RX ORDER — POLYETHYLENE GLYCOL 3350 17 G/17G
17 POWDER, FOR SOLUTION ORAL DAILY
Qty: 0 | Refills: 0 | Status: DISCONTINUED | OUTPATIENT
Start: 2017-12-13 | End: 2017-12-14

## 2017-12-13 RX ADMIN — Medication 1 APPLICATION(S): at 11:43

## 2017-12-13 RX ADMIN — Medication 4 MILLIGRAM(S): at 07:33

## 2017-12-13 RX ADMIN — LEVETIRACETAM 400 MILLIGRAM(S): 250 TABLET, FILM COATED ORAL at 18:41

## 2017-12-13 RX ADMIN — Medication 1 APPLICATION(S): at 11:44

## 2017-12-13 RX ADMIN — LACOSAMIDE 140 MILLIGRAM(S): 50 TABLET ORAL at 18:41

## 2017-12-13 RX ADMIN — LACOSAMIDE 200 MILLIGRAM(S): 50 TABLET ORAL at 07:35

## 2017-12-13 RX ADMIN — LATANOPROST 1 DROP(S): 0.05 SOLUTION/ DROPS OPHTHALMIC; TOPICAL at 22:45

## 2017-12-13 RX ADMIN — Medication 1: at 11:52

## 2017-12-13 RX ADMIN — Medication 4 MILLIGRAM(S): at 17:58

## 2017-12-13 RX ADMIN — MINOCYCLINE HYDROCHLORIDE 200 MILLIGRAM(S): 45 TABLET, EXTENDED RELEASE ORAL at 07:33

## 2017-12-13 RX ADMIN — Medication 50 MICROGRAM(S): at 07:33

## 2017-12-13 RX ADMIN — LEVETIRACETAM 250 MILLIGRAM(S): 250 TABLET, FILM COATED ORAL at 07:33

## 2017-12-13 NOTE — PROGRESS NOTE ADULT - SUBJECTIVE AND OBJECTIVE BOX
Chief Complaint/Reason for Consult: tachycardia  INTERVAL HPI: events noted for asymptmatic tachy anemia no cp sob palp  	  MEDICATIONS:    minocycline 200 milliGRAM(s) Oral every 12 hours      lacosamide IVPB 200 milliGRAM(s) IV Intermittent every 12 hours  levETIRAcetam  IVPB 250 milliGRAM(s) IV Intermittent every 12 hours    bisacodyl Suppository 10 milliGRAM(s) Rectal daily  polyethylene glycol 3350 17 Gram(s) Oral daily    dexamethasone  Injectable 4 milliGRAM(s) IV Push two times a day  dextrose 50% Injectable 12.5 Gram(s) IV Push once  dextrose 50% Injectable 25 Gram(s) IV Push once  dextrose 50% Injectable 25 Gram(s) IV Push once  dextrose Gel 1 Dose(s) Oral once PRN  glucagon  Injectable 1 milliGRAM(s) IntraMuscular once PRN  insulin lispro (HumaLOG) corrective regimen sliding scale   SubCutaneous Before meals and at bedtime  megestrol Suspension 400 milliGRAM(s) Oral daily    BACItracin   Ointment 1 Application(s) Topical daily  BACItracin   Ointment 1 Application(s) Topical daily  dextrose 5%. 1000 milliLiter(s) IV Continuous <Continuous>  latanoprost 0.005% Ophthalmic Solution 1 Drop(s) Both EYES at bedtime      REVIEW OF SYSTEMS:  [x] As per HPI  CONSTITUTIONAL: No fever, weight loss, or fatigue  RESPIRATORY: No cough, wheezing, chills or hemoptysis; No Shortness of Breath  CARDIOVASCULAR: No chest pain, palpitations, dizziness, or leg swelling  GASTROINTESTINAL: No abdominal or epigastric pain. No nausea, vomiting, or hematemesis; No diarrhea or constipation. No melena or hematochezia.  MUSCULOSKELETAL: No joint pain or swelling; No muscle, back, or extremity pain  [x] All others negative	  [ ] Unable to obtain    PHYSICAL EXAM:  T(C): 36.8 (12-13-17 @ 15:35), Max: 37.9 (12-12-17 @ 19:49)  HR: 116 (12-13-17 @ 15:35) (116 - 126)  BP: 123/83 (12-13-17 @ 15:35) (99/65 - 131/66)  RR: 20 (12-13-17 @ 15:35) (18 - 20)  SpO2: 95% (12-13-17 @ 15:35) (91% - 100%)  Wt(kg): --  I&O's Summary    12 Dec 2017 07:01  -  13 Dec 2017 07:00  --------------------------------------------------------  IN: 660 mL / OUT: 550 mL / NET: 110 mL          Appearance: Normal	  HEENT:   Normal oral mucosa  Cardiovascular: Normal S1 S2, No JVD, No murmurs, No edema  Respiratory: Lungs clear to auscultation	  Gastrointestinal:  Soft, Non-tender, + BS	  Extremities: Normal range of motion, No clubbing, cyanosis or edema  Vascular: Peripheral pulses palpable 2+ bilaterally    TELEMETRY: 	    ECG: < from: 12 Lead ECG (12.08.17 @ 10:01) >  Diagnosis Line Sinus tachycardia  Poor R Wave Progression  Possible inferior wall MI    < end of copied text >    	  RADIOLOGY:   CXR:  CT:  US:    CARDIAC TESTING:  Echocardiogram:  Catheterization:  Stress Test:      LABS:	 	    CARDIAC MARKERS:                                  7.8    23.2  )-----------( 161      ( 13 Dec 2017 11:28 )             24.2     12-13    129<L>  |  97  |  55<H>  ----------------------------<  158<H>  5.0   |  19<L>  |  1.38<H>    Ca    7.7<L>      13 Dec 2017 11:28  Phos  3.6     12-12  Mg     2.0     12-13    TPro  4.1<L>  /  Alb  2.3<L>  /  TBili  0.5  /  DBili  <0.2  /  AST  49<H>  /  ALT  112<H>  /  AlkPhos  152<H>  12-13    proBNP:   Lipid Profile:   HgA1c:   TSH:     ASSESSMENT/PLAN: 	    # Tachycardia - sinus tach, 2/2 anemia goal HgB >8  r/o infectious etiology  no primary cardiac etiology identified at this time

## 2017-12-13 NOTE — PROGRESS NOTE ADULT - ASSESSMENT
61 y/o man with renal cell carcinoma (brain mets; s/p nephrectomy, craniotomy x2, gamma knife x3, Opdivo, Avastin), hx of seizures (partial left frontotemporal status), admitted with AMS x2 weeks, epilepsy consulted for AED management. Exam was notable for limited ability to follow commands, no verbal output, paratonia throughout, and tremor in arms as described above. MRI brain w/wo con 12/5/17 was notable for progressive interval increase in ventricular size; progressive interval increase in the periventricular FLAIR/T2 signal and left frontal lobe signal abnormality which may represent treatment related effects; no new enhancing lesions. He previously had focal status in 2016, and Keppra was switched to Depakote for "behavior issues." Because of thrombocytopenia and purpura, Depakote switched to Keppra 12/11 while on EEG. He seems to be tolerating Keppra well currently, and has had no seizures on EEG since Depakote was stopped.     - EEG may be discontinued  - he is on Keppra 250 mg bid, but I recommend 500 mg bid. Remain off Depakote.  - AMS work-up per Dr. Reynoso. Consider LP.

## 2017-12-13 NOTE — PROGRESS NOTE ADULT - SUBJECTIVE AND OBJECTIVE BOX
Chief Complaint:  63 yo M with PMHx of Renal Cell Carcinoma with mets presenting to the ED with altered mental status, admitted to [facility] for further management.     INTERVAL HPI/OVERNIGHT EVENTS:  Patient was seen and examined at bedside. As per nurse and patient, no o/n events, patient resting comfortably. No complaints at this time. Patient denies: fever, chills, dizziness, weakness, HA, Changes in vision, CP, palpitations, SOB, cough, N/V/D/C, dysuria, changes in bowel movements, LE edema.    VITAL SIGNS:  T(F): 98.3 (17 @ 09:51)  HR: 124 (17 @ 09:51)  BP: 128/64 (17 @ 09:51)  RR: 18 (17 @ 09:51)  SpO2: 91% (17 @ 09:51)  Wt(kg): --    PHYSICAL EXAM:    Constitutional: WDWN, NAD  Eyes: PERRL, EOMI, sclera non-icteric  Neck: supple, trachea midline, no masses, no JVD  Respiratory: CTA b/l, good air entry b/l, no wheezing, no rhonchi, no rales, without accessory muscle use and no intercostal retractions  Cardiovascular: RRR, normal S1S2, no M/R/G  Gastrointestinal: soft, NTND, no masses palpable, BS normal  Extremities: Warm, well perfused, pulses equal bilateral upper and lower extremities, no edema, no clubbing  Neurological: AAOx3, CN Grossly intact  Skin: Normal temperature, warm, dry    MEDICATIONS  (STANDING):  BACItracin   Ointment 1 Application(s) Topical daily  BACItracin   Ointment 1 Application(s) Topical daily  bisacodyl Suppository 10 milliGRAM(s) Rectal daily  dexamethasone     Tablet 4 milliGRAM(s) Oral two times a day  dextrose 5%. 1000 milliLiter(s) (50 mL/Hr) IV Continuous <Continuous>  dextrose 50% Injectable 12.5 Gram(s) IV Push once  dextrose 50% Injectable 25 Gram(s) IV Push once  dextrose 50% Injectable 25 Gram(s) IV Push once  insulin lispro (HumaLOG) corrective regimen sliding scale   SubCutaneous Before meals and at bedtime  lacosamide 200 milliGRAM(s) Oral two times a day  latanoprost 0.005% Ophthalmic Solution 1 Drop(s) Both EYES at bedtime  levETIRAcetam 250 milliGRAM(s) Oral two times a day  levothyroxine 50 MICROGram(s) Oral daily  megestrol Suspension 400 milliGRAM(s) Oral daily  minocycline 200 milliGRAM(s) Oral every 12 hours  polyethylene glycol 3350 17 Gram(s) Oral daily  sodium chloride 0.9%. 1000 milliLiter(s) (80 mL/Hr) IV Continuous <Continuous>    MEDICATIONS  (PRN):  dextrose Gel 1 Dose(s) Oral once PRN Blood Glucose LESS THAN 70 milliGRAM(s)/deciliter  glucagon  Injectable 1 milliGRAM(s) IntraMuscular once PRN Glucose LESS THAN 70 milligrams/deciliter      Allergies    No Known Allergies    Intolerances        LABS:                        8.4    21.8  )-----------( 91       ( 12 Dec 2017 23:18 )             26.4     12-12    129<L>  |  98  |  38<H>  ----------------------------<  136<H>  4.9   |  22  |  1.27    Ca    7.1<L>      12 Dec 2017 10:08  Phos  3.6     12-12  Mg     1.9     12-12    TPro  3.6<L>  /  Alb  2.2<L>  /  TBili  0.3  /  DBili  <0.2  /  AST  54<H>  /  ALT  119<H>  /  AlkPhos  154<H>  12-12    PT/INR - ( 12 Dec 2017 10:56 )   PT: 12.6 sec;   INR: 1.13          PTT - ( 12 Dec 2017 10:56 )  PTT:28.4 sec  Urinalysis Basic - ( 11 Dec 2017 15:57 )    Color: Yellow / Appearance: Clear / S.010 / pH: x  Gluc: x / Ketone: NEGATIVE  / Bili: Negative / Urobili: 0.2 E.U./dL   Blood: x / Protein: 100 mg/dL / Nitrite: NEGATIVE   Leuk Esterase: NEGATIVE / RBC: Many /HPF / WBC < 5 /HPF   Sq Epi: x / Non Sq Epi: 0-5 /HPF / Bacteria: Present /HPF        RADIOLOGY & ADDITIONAL TESTS: Chief Complaint:  61 yo M with PMHx of Renal Cell Carcinoma with mets presenting to the ED with altered mental status, admitted to [facility] for further management.     OVERNIGHT EVENTS: Yesterday, sudden Hgb drop from 11.4-->9.1 that further downtrended to current shilpi of 8.2. Acute blood loss anemia w/u was unrevealing including CT A/P to r/o retroperitoneal bleed. Also coincident with anemia w/u that was initiated today was a episode of HoTN to shilpi of 106/72 that was notable given the relative stability of all prior BP readings. Though no new changes to mgmt other than minocycline had been made, wife had also expressed she felt his mental state had been worsening still over last 1-2 days (pt unable to provide history). Infectious w/u and hemolysis w/u initiated and ICU was consulted, who deemed pt was stable to [facility].     SUBJECTIVE / INTERVAL HPI: Patient seen and examined at bedside. NAD. Patient lying comfortably in bed. Patient not responding to questions so unable to assess mental status. ROS not obtainable as patient not responding appropriately to questions. Pt can follow simple commands of squeezing hands and smiling.     VITAL SIGNS:  T(F): 98.3 (17 @ 09:51)  HR: 124 (17 @ 09:51)  BP: 128/64 (17 @ 09:51)  RR: 18 (17 @ 09:51)  SpO2: 91% (17 @ 09:51)  Wt(kg): --    PHYSICAL EXAM:    Constitutional: non-toxic appearing male resting comfortably in bed; NAD  HEENT: +EEG leads in place; PERRL, no rhinorrhea, no oropharyngeal erythema or exudates; MMM  Neck: supple  Cardiovascular: +S1/S2, tachycardic and regular  Respiratory: lungs CTAB, breathing appearing non-labored  Gastrointestinal: abdomen soft, NT/ND  Extremities: WWP w/ 2+ pitting LE edema extending to level of knees  Vascular: 2+ radial, DP/PT pulses B/L  Dermatologic: 2 small skin tears on bilateral medial thighs; otherwise warm and dry, no petechiae or purpura  Neurologic: can follow simple verbal commands, tracks    MEDICATIONS  (STANDING):  BACItracin   Ointment 1 Application(s) Topical daily  BACItracin   Ointment 1 Application(s) Topical daily  bisacodyl Suppository 10 milliGRAM(s) Rectal daily  dexamethasone     Tablet 4 milliGRAM(s) Oral two times a day  dextrose 5%. 1000 milliLiter(s) (50 mL/Hr) IV Continuous <Continuous>  dextrose 50% Injectable 12.5 Gram(s) IV Push once  dextrose 50% Injectable 25 Gram(s) IV Push once  dextrose 50% Injectable 25 Gram(s) IV Push once  insulin lispro (HumaLOG) corrective regimen sliding scale   SubCutaneous Before meals and at bedtime  lacosamide 200 milliGRAM(s) Oral two times a day  latanoprost 0.005% Ophthalmic Solution 1 Drop(s) Both EYES at bedtime  levETIRAcetam 250 milliGRAM(s) Oral two times a day  levothyroxine 50 MICROGram(s) Oral daily  megestrol Suspension 400 milliGRAM(s) Oral daily  minocycline 200 milliGRAM(s) Oral every 12 hours  polyethylene glycol 3350 17 Gram(s) Oral daily  sodium chloride 0.9%. 1000 milliLiter(s) (80 mL/Hr) IV Continuous <Continuous>    MEDICATIONS  (PRN):  dextrose Gel 1 Dose(s) Oral once PRN Blood Glucose LESS THAN 70 milliGRAM(s)/deciliter  glucagon  Injectable 1 milliGRAM(s) IntraMuscular once PRN Glucose LESS THAN 70 milligrams/deciliter      Allergies    No Known Allergies    Intolerances        LABS:                        8.4    21.8  )-----------( 91       ( 12 Dec 2017 23:18 )             26.4     12-12    129<L>  |  98  |  38<H>  ----------------------------<  136<H>  4.9   |  22  |  1.27    Ca    7.1<L>      12 Dec 2017 10:08  Phos  3.6     12-12  Mg     1.9     12-12    TPro  3.6<L>  /  Alb  2.2<L>  /  TBili  0.3  /  DBili  <0.2  /  AST  54<H>  /  ALT  119<H>  /  AlkPhos  154<H>  12-12    PT/INR - ( 12 Dec 2017 10:56 )   PT: 12.6 sec;   INR: 1.13          PTT - ( 12 Dec 2017 10:56 )  PTT:28.4 sec  Urinalysis Basic - ( 11 Dec 2017 15:57 )    Color: Yellow / Appearance: Clear / S.010 / pH: x  Gluc: x / Ketone: NEGATIVE  / Bili: Negative / Urobili: 0.2 E.U./dL   Blood: x / Protein: 100 mg/dL / Nitrite: NEGATIVE   Leuk Esterase: NEGATIVE / RBC: Many /HPF / WBC < 5 /HPF   Sq Epi: x / Non Sq Epi: 0-5 /HPF / Bacteria: Present /HPF        RADIOLOGY & ADDITIONAL TESTS:  < from: CT Abdomen and Pelvis w/ IV Cont (17 @ 18:59) >  IMPRESSION:    Negative for retroperitoneal hematoma.    Interval development of small bilateral pleural effusions and bibasilar   compressive atelectasis.    Increased volume of pericholecystic fluid; HIDA/sonographic correlation   is recommended, if there is concern for cholecystitis.    < end of copied text >

## 2017-12-13 NOTE — PROGRESS NOTE ADULT - PROBLEM SELECTOR PLAN 4
-Unknown etiology, based on urine studies likely 2/2 SIADH as patient's urine has urine osm>100 and urine Na>20  -Continue with fluid restricted diet. Continue to trend BMP

## 2017-12-13 NOTE — OCCUPATIONAL THERAPY INITIAL EVALUATION ADULT - GENERAL OBSERVATIONS, REHAB EVAL
Chart reviewed, patient cleared for OT eval by BRAULIO Vaughan and medical resident Allan. Received semi-supine, NAD.

## 2017-12-13 NOTE — PROGRESS NOTE ADULT - PROBLEM SELECTOR PLAN 2
Waxing and Waning. Patient presenting to the ED with 1 day AMS in association with 1 week urine/bowel incontinence.  -CT head showing no interval change from prior study.   -MRI head showing no new brain lesions, but new midline suboccipital subcutaneous collection. Progressive interval increase in ventricular size. Progressive interval increase in the periventricular FLAIR/T2 signal and left frontal lobe signal.  -RPR negative, Vitamin B12 WNL, folate slightly below normal limits and may consider supplementation  -Dr. Ya from heme/onc following, most likely diagnosis considered to be radiation necrosis. Minocycline started on 12/11. Plan for chemo once patient stable.  - no activity on vEEG, d/c today

## 2017-12-13 NOTE — PROGRESS NOTE ADULT - SUBJECTIVE AND OBJECTIVE BOX
Neurology Follow up note    Name  CORIN PERDOMO    HPI:  62M with metastatic RCC (s/p nephrectomy, s/p craniotomy x2, s/p gamma knife x3 most recently march 2017, currently on Opdivo and Avastin with Dr. 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 Presenting to the ED brought in by wife for AMS. Patient is at bedside however unable to give history. Patient wife states that he was in his normal state of health until today when she was called by the home health aid stating that he had episodes of staring into space and not as responsive as he is on a regular basis. Patient denies HA, change in vision, cough, CP, abdominal pain, back pain, dysuria, hematuria, constipation, diarrhea.     In ED T 97.8 P100 /89 R18 O2Sat 97% RA, patient was seen and evaluated by Neuro (04 Dec 2017 19:08)      Interval History - no change in mental status- no apparent pain        REVIEW OF SYSTEMS    Vital Signs Last 24 Hrs  T(C): 36.8 (13 Dec 2017 05:52), Max: 37.9 (12 Dec 2017 19:49)  T(F): 98.2 (13 Dec 2017 05:52), Max: 100.2 (12 Dec 2017 19:49)  HR: 126 (13 Dec 2017 05:52) (113 - 126)  BP: 99/65 (13 Dec 2017 05:52) (99/65 - 131/66)  BP(mean): --  RR: 18 (13 Dec 2017 05:52) (18 - 19)  SpO2: 94% (13 Dec 2017 05:52) (94% - 100%)    Physical Exam-     Mental Status- awake - aphasia    Cranial Nerves- full EOM    Gait and station-n/a    Motor- bilateral LE weakness    Reflexes- decreased    Sensation-no sensory level    Coordination- no tremors    Vascular -    Medications  BACItracin   Ointment 1 Application(s) Topical daily  BACItracin   Ointment 1 Application(s) Topical daily  bisacodyl Suppository 10 milliGRAM(s) Rectal daily  dexamethasone     Tablet 4 milliGRAM(s) Oral two times a day  dextrose 5%. 1000 milliLiter(s) IV Continuous <Continuous>  dextrose 50% Injectable 12.5 Gram(s) IV Push once  dextrose 50% Injectable 25 Gram(s) IV Push once  dextrose 50% Injectable 25 Gram(s) IV Push once  dextrose Gel 1 Dose(s) Oral once PRN  glucagon  Injectable 1 milliGRAM(s) IntraMuscular once PRN  insulin lispro (HumaLOG) corrective regimen sliding scale   SubCutaneous Before meals and at bedtime  lacosamide 200 milliGRAM(s) Oral two times a day  latanoprost 0.005% Ophthalmic Solution 1 Drop(s) Both EYES at bedtime  levETIRAcetam 250 milliGRAM(s) Oral two times a day  levothyroxine 50 MICROGram(s) Oral daily  megestrol Suspension 400 milliGRAM(s) Oral daily  minocycline 200 milliGRAM(s) Oral every 12 hours  polyethylene glycol 3350 17 Gram(s) Oral daily  sodium chloride 0.9%. 1000 milliLiter(s) IV Continuous <Continuous>      Lab      Radiology    Assessment- Radiation necrosis    Plan as per Dr Ya

## 2017-12-13 NOTE — OCCUPATIONAL THERAPY INITIAL EVALUATION ADULT - ADDITIONAL COMMENTS
Patient unable to provide history, per chart patient was ambulating with RW and assist 2 weeks prior to admission. HHA 9-5 and assist from spouse in evening hours.

## 2017-12-13 NOTE — PROGRESS NOTE ADULT - PROBLEM SELECTOR PLAN 3
-Unknown etiology. Differentials could include PE (however no hypoxia, no tachypnea) vs infection (however no fever and WBC elevated likely 2/2 steroids) vs dehydration vs acute blood loss as Hgb dropped by 2 points  -Echo shows severe concentric left ventricular hypertrophy, no pericardial effusion seen  -Continue to monitor HR

## 2017-12-13 NOTE — PROGRESS NOTE ADULT - ASSESSMENT
61 yo M with PMHx of Renal Cell Carcinoma with mets presenting to the ED with altered mental status, admitted to [facility] for further management.

## 2017-12-13 NOTE — PROGRESS NOTE ADULT - ATTENDING COMMENTS
If hemolysis is ruled out, upper endoscopy is indicated and Avastin for radiation necrosis must be held.

## 2017-12-13 NOTE — PROGRESS NOTE ADULT - SUBJECTIVE AND OBJECTIVE BOX
Subjective  No events overnight. No complaints currently.    ROS  As above, otherwise negative for constitutional/HEENT/CV/pulm/GI//MSK/neuro/derm/endocrine/psych.     MEDICATIONS  (STANDING):  BACItracin   Ointment 1 Application(s) Topical daily  BACItracin   Ointment 1 Application(s) Topical daily  bisacodyl Suppository 10 milliGRAM(s) Rectal daily  dexamethasone  Injectable 4 milliGRAM(s) IV Push two times a day  dextrose 5%. 1000 milliLiter(s) (50 mL/Hr) IV Continuous <Continuous>  dextrose 50% Injectable 12.5 Gram(s) IV Push once  dextrose 50% Injectable 25 Gram(s) IV Push once  dextrose 50% Injectable 25 Gram(s) IV Push once  insulin lispro (HumaLOG) corrective regimen sliding scale   SubCutaneous Before meals and at bedtime  lacosamide IVPB 200 milliGRAM(s) IV Intermittent every 12 hours  latanoprost 0.005% Ophthalmic Solution 1 Drop(s) Both EYES at bedtime  levETIRAcetam  IVPB 250 milliGRAM(s) IV Intermittent every 12 hours  megestrol Suspension 400 milliGRAM(s) Oral daily  minocycline 200 milliGRAM(s) Oral every 12 hours  polyethylene glycol 3350 17 Gram(s) Oral daily    MEDICATIONS  (PRN):  dextrose Gel 1 Dose(s) Oral once PRN Blood Glucose LESS THAN 70 milliGRAM(s)/deciliter  glucagon  Injectable 1 milliGRAM(s) IntraMuscular once PRN Glucose LESS THAN 70 milligrams/deciliter      T(C): 36.8 (12-13-17 @ 09:51), Max: 37.9 (12-12-17 @ 19:49)  HR: 124 (12-13-17 @ 09:51) (117 - 126)  BP: 128/64 (12-13-17 @ 09:51) (99/65 - 131/66)  RR: 18 (12-13-17 @ 09:51) (18 - 19)  SpO2: 91% (12-13-17 @ 09:51) (91% - 100%)  Wt(kg): --    gen: no acute distress.  HEENT: trachea midline, no jaundice or icterus.  CV: RRR, s1+s2, - m/r/g  Pulm: CTAB  Abd: soft, nt, nd  Ext: 2+ edema in lower legs and feet. Purpura left upper arm.   Alertness: eyes closed, open to loud voice.   Orientation: will not answer any orientation questions.  Language: nonverbal. Closed/open eyes on command, stuck out tongue on command.   II: tracks in all fields.  III: EOMI. No nystagmus. PERRLA 3>2 bilaterally.   VII: smile symmetrical. Eyebrow elevation symmetrical. No flattening of nasolabial fold.  VIII: grossly intact.  IX, X: palate elevation could not visualize.  XII: no tongue deviation.  Motor: no atrophy or fasciculations. ~7 Hz asynchronous tremor in R>L arms at rest, intermittently. No hypo/hyperkinesia. Tone paratonic throughout. Lifts arms and legs with no obvious asymmetry, no withdraw to proximal skin pinch in any limb, though there was symmetric grimace.   Coordination: unable to cooperate.    CBC Full  -  ( 13 Dec 2017 11:28 )  WBC Count : 23.2 K/uL  Hemoglobin : 7.8 g/dL  Hematocrit : 24.2 %  Platelet Count - Automated : 161 K/uL  Mean Cell Volume : 85.5 fL  Mean Cell Hemoglobin : 27.6 pg  Mean Cell Hemoglobin Concentration : 32.2 g/dL  Auto Neutrophil # : x  Auto Lymphocyte # : x  Auto Monocyte # : x  Auto Eosinophil # : x  Auto Basophil # : x  Auto Neutrophil % : x  Auto Lymphocyte % : x  Auto Monocyte % : x  Auto Eosinophil % : x  Auto Basophil % : x    12-13    129<L>  |  97  |  55<H>  ----------------------------<  158<H>  5.0   |  19<L>  |  1.38<H>    Ca    7.7<L>      13 Dec 2017 11:28  Phos  3.6     12-12  Mg     2.0     12-13    TPro  4.1<L>  /  Alb  2.3<L>  /  TBili  0.5  /  DBili  <0.2  /  AST  49<H>  /  ALT  112<H>  /  AlkPhos  152<H>  12-13    LIVER FUNCTIONS - ( 13 Dec 2017 11:28 )  Alb: 2.3 g/dL / Pro: 4.1 g/dL / ALK PHOS: 152 U/L / ALT: 112 U/L / AST: 49 U/L / GGT: x           PT/INR - ( 12 Dec 2017 10:56 )   PT: 12.6 sec;   INR: 1.13          PTT - ( 12 Dec 2017 10:56 )  PTT:28.4 sec

## 2017-12-13 NOTE — PROGRESS NOTE ADULT - PROBLEM SELECTOR PLAN 1
Hgb remains stable from yesterdays acute drop. Unknown source. No hematemesis, no melena, no hemoptysis, no other sources of bleeding identified. CT abd/pelvis ordered to r/o RP bleed which was negative.  - Active T&S in case of blood transfusion.   - Lactic acid done on 12/12 WNL.   - f/u hemolysis labs   - monitor CBC  - monitor vitals for hypotension, currently back to baseline

## 2017-12-13 NOTE — OCCUPATIONAL THERAPY INITIAL EVALUATION ADULT - PERTINENT HX OF CURRENT PROBLEM, REHAB EVAL
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 Presenting to the ED brought in by wife for AMS. Patient wife states that he was in his normal state of health until today when she was called by the home health aid stating that he had episodes of staring into space and not as responsive as he is on a regular basis.

## 2017-12-13 NOTE — PROGRESS NOTE ADULT - PROBLEM SELECTOR PLAN 9
Regular Diet.  FULL CODE. Wife is the HCP. Spoke to her again regarding code status. She would still like to keep the patient full code and have a discussion with her children again.   PPx: Dc/d Lovenox and HSQ yesterday 2/2 platelets downtrending.   RMF, pending ICU consult decision.

## 2017-12-13 NOTE — PROGRESS NOTE ADULT - SUBJECTIVE AND OBJECTIVE BOX
The patient appears comfortable but is awake but communicates with great difficulty. He is undergoing seizure surveillance.  He did not receive  Avastin.    Examination of the head ears eyes nose and throat demonstrates a 2 x 2 centimeter by 1 cm fluctuant area on the patient's scalp just above the occiput and to the right of midline and the seizure surveillance leads bandaged to his head.  Examination of the neck reveals no palpable lymphadenopathy, jugular venous distention or thyroidomegaly.  The lungs are clear to percussion and auscultation.  The heart sounds are regular with no auscultatory evidence for murmur, rub or gallop.  The patient has no truncal obesity and there is no palpable or percussible hepatomegaly or splenomegaly.  The patient has no chronic venous stasis changes in the lower extremities.  Neurologically the patient has some response to  verbal command and when roused will only attempt to say no or yes.

## 2017-12-13 NOTE — OCCUPATIONAL THERAPY INITIAL EVALUATION ADULT - RANGE OF MOTION EXAMINATION, UPPER EXTREMITY
impaired bilateral upper extremity PROM 2/2 increased tone. Active right digit flexion/extension on command times (full flexion, impaired extension).

## 2017-12-13 NOTE — PROGRESS NOTE ADULT - PROBLEM SELECTOR PLAN 6
-Patient last chemotherapy on Sept 2017.   -Awaiting Dr. Ya's recs regarding further management. -Patient last chemotherapy on Sept 2017.   -Awaiting Dr. Ya's recs regarding further management.  - c/w decadron

## 2017-12-13 NOTE — PROGRESS NOTE ADULT - ASSESSMENT
The patient did not receive Avastin because of potential GI bleed.  The patient's stool was heme positive but brown.  Hemolysis was not ruled out.

## 2017-12-14 LAB
ANION GAP SERPL CALC-SCNC: 13 MMOL/L — SIGNIFICANT CHANGE UP (ref 5–17)
APTT BLD: 27.4 SEC — LOW (ref 27.5–37.4)
BLD GP AB SCN SERPL QL: NEGATIVE — SIGNIFICANT CHANGE UP
BUN SERPL-MCNC: 54 MG/DL — HIGH (ref 7–23)
CALCIUM SERPL-MCNC: 7.8 MG/DL — LOW (ref 8.4–10.5)
CHLORIDE SERPL-SCNC: 103 MMOL/L — SIGNIFICANT CHANGE UP (ref 96–108)
CLOSURE TME COLL+EPINEP BLD: 139 K/UL — LOW (ref 150–400)
CO2 SERPL-SCNC: 21 MMOL/L — LOW (ref 22–31)
CREAT SERPL-MCNC: 1.21 MG/DL — SIGNIFICANT CHANGE UP (ref 0.5–1.3)
GLUCOSE BLDC GLUCOMTR-MCNC: 137 MG/DL — HIGH (ref 70–99)
GLUCOSE BLDC GLUCOMTR-MCNC: 145 MG/DL — HIGH (ref 70–99)
GLUCOSE BLDC GLUCOMTR-MCNC: 164 MG/DL — HIGH (ref 70–99)
GLUCOSE BLDC GLUCOMTR-MCNC: 166 MG/DL — HIGH (ref 70–99)
GLUCOSE SERPL-MCNC: 160 MG/DL — HIGH (ref 70–99)
HCT VFR BLD CALC: 20.6 % — CRITICAL LOW (ref 39–50)
HGB BLD-MCNC: 6.9 G/DL — CRITICAL LOW (ref 13–17)
INR BLD: 1.15 — SIGNIFICANT CHANGE UP (ref 0.88–1.16)
MAGNESIUM SERPL-MCNC: 2.1 MG/DL — SIGNIFICANT CHANGE UP (ref 1.6–2.6)
MCHC RBC-ENTMCNC: 28.4 PG — SIGNIFICANT CHANGE UP (ref 27–34)
MCHC RBC-ENTMCNC: 33.5 G/DL — SIGNIFICANT CHANGE UP (ref 32–36)
MCV RBC AUTO: 84.8 FL — SIGNIFICANT CHANGE UP (ref 80–100)
PLATELET # BLD AUTO: 178 K/UL — SIGNIFICANT CHANGE UP (ref 150–400)
POTASSIUM SERPL-MCNC: 4.7 MMOL/L — SIGNIFICANT CHANGE UP (ref 3.5–5.3)
POTASSIUM SERPL-SCNC: 4.7 MMOL/L — SIGNIFICANT CHANGE UP (ref 3.5–5.3)
PROTHROM AB SERPL-ACNC: 12.8 SEC — HIGH (ref 9.8–12.7)
RBC # BLD: 2.43 M/UL — LOW (ref 4.2–5.8)
RBC # FLD: 16 % — SIGNIFICANT CHANGE UP (ref 10.3–16.9)
RH IG SCN BLD-IMP: POSITIVE — SIGNIFICANT CHANGE UP
SODIUM SERPL-SCNC: 137 MMOL/L — SIGNIFICANT CHANGE UP (ref 135–145)
WBC # BLD: 21.6 K/UL — HIGH (ref 3.8–10.5)
WBC # FLD AUTO: 21.6 K/UL — HIGH (ref 3.8–10.5)

## 2017-12-14 PROCEDURE — 99232 SBSQ HOSP IP/OBS MODERATE 35: CPT

## 2017-12-14 RX ORDER — LEVETIRACETAM 250 MG/1
500 TABLET, FILM COATED ORAL EVERY 12 HOURS
Qty: 0 | Refills: 0 | Status: DISCONTINUED | OUTPATIENT
Start: 2017-12-14 | End: 2017-12-22

## 2017-12-14 RX ORDER — POLYETHYLENE GLYCOL 3350 17 G/17G
17 POWDER, FOR SOLUTION ORAL DAILY
Qty: 0 | Refills: 0 | Status: DISCONTINUED | OUTPATIENT
Start: 2017-12-14 | End: 2017-12-15

## 2017-12-14 RX ADMIN — Medication 1: at 12:43

## 2017-12-14 RX ADMIN — LEVETIRACETAM 400 MILLIGRAM(S): 250 TABLET, FILM COATED ORAL at 06:50

## 2017-12-14 RX ADMIN — LACOSAMIDE 140 MILLIGRAM(S): 50 TABLET ORAL at 07:44

## 2017-12-14 RX ADMIN — Medication 1: at 18:19

## 2017-12-14 RX ADMIN — LEVETIRACETAM 420 MILLIGRAM(S): 250 TABLET, FILM COATED ORAL at 18:25

## 2017-12-14 RX ADMIN — Medication 25 MICROGRAM(S): at 06:11

## 2017-12-14 RX ADMIN — Medication 1 APPLICATION(S): at 11:51

## 2017-12-14 RX ADMIN — LACOSAMIDE 140 MILLIGRAM(S): 50 TABLET ORAL at 19:13

## 2017-12-14 RX ADMIN — Medication 4 MILLIGRAM(S): at 18:26

## 2017-12-14 RX ADMIN — Medication 1 APPLICATION(S): at 11:52

## 2017-12-14 RX ADMIN — Medication 10 MILLIGRAM(S): at 11:51

## 2017-12-14 RX ADMIN — Medication 4 MILLIGRAM(S): at 06:10

## 2017-12-14 RX ADMIN — LATANOPROST 1 DROP(S): 0.05 SOLUTION/ DROPS OPHTHALMIC; TOPICAL at 21:58

## 2017-12-14 NOTE — PROGRESS NOTE ADULT - SUBJECTIVE AND OBJECTIVE BOX
INTERVAL HISTORY:  persistent sinus tach  anemia  chronic neuro deficits  	  MEDICATIONS:  minocycline 200 milliGRAM(s) Oral every 12 hours  lacosamide IVPB 200 milliGRAM(s) IV Intermittent every 12 hours  levETIRAcetam  IVPB 250 milliGRAM(s) IV Intermittent every 12 hours  bisacodyl Suppository 10 milliGRAM(s) Rectal daily  polyethylene glycol 3350 17 Gram(s) Oral daily  dexamethasone  Injectable 4 milliGRAM(s) IV Push two times a day  dextrose 50% Injectable 12.5 Gram(s) IV Push once  dextrose 50% Injectable 25 Gram(s) IV Push once  dextrose 50% Injectable 25 Gram(s) IV Push once  dextrose Gel 1 Dose(s) Oral once PRN  glucagon  Injectable 1 milliGRAM(s) IntraMuscular once PRN  insulin lispro (HumaLOG) corrective regimen sliding scale   SubCutaneous Before meals and at bedtime  levothyroxine Injectable 25 MICROGram(s) IV Push daily  megestrol Suspension 400 milliGRAM(s) Oral daily    BACItracin   Ointment 1 Application(s) Topical daily  BACItracin   Ointment 1 Application(s) Topical daily  dextrose 5%. 1000 milliLiter(s) IV Continuous <Continuous>  latanoprost 0.005% Ophthalmic Solution 1 Drop(s) Both EYES at bedtime    ROS: left sided weakness/paralysis, not speaking, rapid HR    PHYSICAL EXAM:  T(C): 36.8 (12-14-17 @ 05:11), Max: 36.8 (12-13-17 @ 09:51)  HR: 126 (12-14-17 @ 05:11) (111 - 126)  BP: 90/52 (12-14-17 @ 05:11) (90/52 - 128/64)  RR: 18 (12-14-17 @ 05:11) (16 - 20)  SpO2: 96% (12-14-17 @ 05:11) (91% - 96%)  Wt(kg): --  I&O's Summary        Appearance: non verbal  HEENT:   Normal oral mucosa, PERRL, eyes deviate to left  Lymphatic: No lymphadenopathy  Cardiovascular: Normal S1 S2, No JVD, No murmurs, + edema  Respiratory: Lungs clear to auscultation	  Psychiatry: A & O x 3, Mood & affect appropriate  Gastrointestinal:  Soft, Non-tender, + BS	  Skin: No rashes, No ecchymoses, No cyanosis  Neurologic: non verbal, left hemiparesis  Extremities: Normal range of motion, No clubbing, cyanosis, + edema  Vascular: Peripheral pulses palpable 2+ bilaterally    TELEMETRY: 	    ECG:  	  RADIOLOGY:   DIAGNOSTIC TESTING:  [ ] Echocardiogram:  [ ]  Catheterization:  [ ] Stress Test:    OTHER: 	    LABS:	 	    CARDIAC MARKERS:                                  7.5    22.7  )-----------( 176      ( 13 Dec 2017 23:09 )             22.6     12-13    129<L>  |  97  |  55<H>  ----------------------------<  158<H>  5.0   |  19<L>  |  1.38<H>    Ca    7.7<L>      13 Dec 2017 11:28  Phos  3.6     12-12  Mg     2.0     12-13    TPro  4.1<L>  /  Alb  2.3<L>  /  TBili  0.5  /  DBili  <0.2  /  AST  49<H>  /  ALT  112<H>  /  AlkPhos  152<H>  12-13    proBNP:   Lipid Profile:   HgA1c:   TSH:     ASSESSMENT/PLAN:

## 2017-12-14 NOTE — CHART NOTE - NSCHARTNOTEFT_GEN_A_CORE
Admitting Diagnosis:   63 yo M with PMHx of Renal Cell Carcinoma with mets presenting to the ED with altered mental status, admitted to [facility] for further management. Pt with radiation necrosis.  GOC meeting pending with family.       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 metastases  Malignant neoplasm of kidney: Renal cell cancer  Glaucoma: Dx 2014, s/p b/l surgical repair  Essential hypertension: HTN (hypertension)  History of nephrectomy, unilateral: right nephrectomy  Other postprocedural status: S/P craniotomy  Other postprocedural status: S/P tonsillectomy      Current Nutrition Order: NPO     PO Intake: Good (%) [   ]  Fair (50-75%) [   ] Poor (<25%) [   ] N/A - NPO     GI Issues: No n/v/d/c noted     Pain: No pain     Skin Integrity: Intact     Labs:   12-14    137  |  103  |  54<H>  ----------------------------<  160<H>  4.7   |  21<L>  |  1.21    Ca    7.8<L>      14 Dec 2017 13:46  Mg     2.1     12-14    TPro  4.1<L>  /  Alb  2.3<L>  /  TBili  0.5  /  DBili  <0.2  /  AST  49<H>  /  ALT  112<H>  /  AlkPhos  152<H>  12-13    CAPILLARY BLOOD GLUCOSE      POCT Blood Glucose.: 164 mg/dL (14 Dec 2017 12:23)  POCT Blood Glucose.: 137 mg/dL (14 Dec 2017 07:18)  POCT Blood Glucose.: 147 mg/dL (13 Dec 2017 17:01)      Medications:  MEDICATIONS  (STANDING):  BACItracin   Ointment 1 Application(s) Topical daily  BACItracin   Ointment 1 Application(s) Topical daily  dexamethasone  Injectable 4 milliGRAM(s) IV Push two times a day  dextrose 5%. 1000 milliLiter(s) (50 mL/Hr) IV Continuous <Continuous>  dextrose 50% Injectable 12.5 Gram(s) IV Push once  dextrose 50% Injectable 25 Gram(s) IV Push once  dextrose 50% Injectable 25 Gram(s) IV Push once  insulin lispro (HumaLOG) corrective regimen sliding scale   SubCutaneous Before meals and at bedtime  lacosamide IVPB 200 milliGRAM(s) IV Intermittent every 12 hours  latanoprost 0.005% Ophthalmic Solution 1 Drop(s) Both EYES at bedtime  levETIRAcetam  IVPB 500 milliGRAM(s) IV Intermittent every 12 hours  levothyroxine Injectable 25 MICROGram(s) IV Push daily  megestrol Suspension 400 milliGRAM(s) Oral daily  minocycline 200 milliGRAM(s) Oral every 12 hours  polyethylene glycol 3350 17 Gram(s) Oral daily    MEDICATIONS  (PRN):  dextrose Gel 1 Dose(s) Oral once PRN Blood Glucose LESS THAN 70 milliGRAM(s)/deciliter  glucagon  Injectable 1 milliGRAM(s) IntraMuscular once PRN Glucose LESS THAN 70 milligrams/deciliter      Weight: 71.7 kg (12/8)     Weight Change: No new wt to assess     Estimated energy needs: moderate protein needs 2/2 CKD; increased tomer 2/2 cancer   Estimated Energy Needs (30-35 calories/kg):  · Weight  (lbs)	158 lb  · Weight (kg)	71.6 kg  · From (30 tomer/kg)	2148  · To (35 calories/kg)	2506       Estimated Protein Needs (0.75-1.0 gm/kg):  · Weight  (lbs)	158  · Weight (kg)	71.6 kg  · From (0.75 g/kg)	53.7  · To (1 g/kg)	71.6     Estimated Fluid Needs (25-30 ml/kg):  · Weight  (lbs)	158  · Weight (kg)	71.6  · From (25 ml/kg)	1790  · To (30 ml/kg)	2148    Subjective: Pt with AMS. Pending GOC with family today. Pt currently NPO and unable to tolerate/swallow food or pills.     Previous Nutrition Diagnosis: Self-feeding difficulty R/T change in MS/Mets to brain AEB: requires total assistance with feeding    Inadequate oral intake r/t swallowing difficulty, mental status AEB pt with <25% intake of food/beverage     Active [ X  ]  Resolved [   ]    If resolved, new PES:     Goal: 1.) Keep nutrition goals/interventions consistent with GOC     Recommendations: Continue NPO diet; nutrition intervention will be consistent with GOC     Education: Not appropriate for education 2/2 mental status     Risk Level: High [   ] Moderate [ X ] Low [   ]

## 2017-12-14 NOTE — PROGRESS NOTE ADULT - PROBLEM SELECTOR PLAN 9
NPO- unable to tolerate PO medications, all meds changed to IV/IM  DNR/DNI      DVT: Dc/d Lovenox and HSQ yesterday 2/2 platelets downtrending.   RMF

## 2017-12-14 NOTE — PROGRESS NOTE ADULT - PROBLEM SELECTOR PLAN 4
-Unknown etiology, based on urine studies likely 2/2 SIADH as patient's urine has urine osm>100 and urine Na>20  -Continue with fluid restricted diet. Continue to trend BMP  - f/u AM BMP

## 2017-12-14 NOTE — PROGRESS NOTE ADULT - SUBJECTIVE AND OBJECTIVE BOX
Chief Complaint:  63 yo M with PMHx of Renal Cell Carcinoma with mets presenting to the ED with altered mental status, admitted to [facility] for further management.     OVERNIGHT EVENTS: Pt hgb remained stable at 7.5, otherwise no complaints     SUBJECTIVE / INTERVAL HPI: Patient seen and examined at bedside. NAD. Patient lying comfortably in bed. Patient not responding to questions so unable to assess mental status. ROS not obtainable as patient not responding appropriately to questions. Pt can follow simple commands of squeezing hands and smiling. Arms are rigid and tremor in place     VITAL SIGNS:  T(F): 98.2 (12-14-17 @ 08:45)  HR: 108 (12-14-17 @ 08:45)  BP: 128/75 (12-14-17 @ 08:45)  RR: 18 (12-14-17 @ 08:45)  SpO2: 100% (12-14-17 @ 08:45)  Wt(kg): --    PHYSICAL EXAM:    Constitutional: WDWN, NAD  Eyes: PERRL, EOMI, sclera non-icteric  Neck: supple, trachea midline, no masses, no JVD  Respiratory: CTA b/l, good air entry b/l, no wheezing, no rhonchi, no rales, without accessory muscle use and no intercostal retractions  Cardiovascular: RRR, normal S1S2, no M/R/G  Gastrointestinal: soft, NTND, no masses palpable, BS normal  Extremities: Warm, well perfused, pulses equal bilateral upper and lower extremities, no edema, no clubbing  Neurological: Nonverbal, unable to assess, arms are rigid and contracted, tremor in upper extremities   Skin: Normal temperature, warm, dry    MEDICATIONS  (STANDING):  BACItracin   Ointment 1 Application(s) Topical daily  BACItracin   Ointment 1 Application(s) Topical daily  dexamethasone  Injectable 4 milliGRAM(s) IV Push two times a day  dextrose 5%. 1000 milliLiter(s) (50 mL/Hr) IV Continuous <Continuous>  dextrose 50% Injectable 12.5 Gram(s) IV Push once  dextrose 50% Injectable 25 Gram(s) IV Push once  dextrose 50% Injectable 25 Gram(s) IV Push once  insulin lispro (HumaLOG) corrective regimen sliding scale   SubCutaneous Before meals and at bedtime  lacosamide IVPB 200 milliGRAM(s) IV Intermittent every 12 hours  latanoprost 0.005% Ophthalmic Solution 1 Drop(s) Both EYES at bedtime  levETIRAcetam  IVPB 500 milliGRAM(s) IV Intermittent every 12 hours  levothyroxine Injectable 25 MICROGram(s) IV Push daily  megestrol Suspension 400 milliGRAM(s) Oral daily  minocycline 200 milliGRAM(s) Oral every 12 hours  polyethylene glycol 3350 17 Gram(s) Oral daily    MEDICATIONS  (PRN):  dextrose Gel 1 Dose(s) Oral once PRN Blood Glucose LESS THAN 70 milliGRAM(s)/deciliter  glucagon  Injectable 1 milliGRAM(s) IntraMuscular once PRN Glucose LESS THAN 70 milligrams/deciliter      Allergies    No Known Allergies    Intolerances        LABS:                        7.5    22.7  )-----------( 176      ( 13 Dec 2017 23:09 )             22.6     12-13    129<L>  |  97  |  55<H>  ----------------------------<  158<H>  5.0   |  19<L>  |  1.38<H>    Ca    7.7<L>      13 Dec 2017 11:28  Mg     2.0     12-13    TPro  4.1<L>  /  Alb  2.3<L>  /  TBili  0.5  /  DBili  <0.2  /  AST  49<H>  /  ALT  112<H>  /  AlkPhos  152<H>  12-13          RADIOLOGY & ADDITIONAL TESTS:

## 2017-12-14 NOTE — PROGRESS NOTE ADULT - PROBLEM SELECTOR PLAN 5
Thrombocytopenia on admission, however platelets downtrending, With concern for HIT, heparin and lovenox was dc/d. HIT workup negative. Lovenox restarted 12/8. However platelets continued to downtrend. Lovenox and HSQ dc/d 12/11Dr Bhakti from heme/onc following. Possibility of thrombocytopenia to be a side effect of the Depakote patient was receiving. Depakote dc/d 12/11. Dr. Bhatt from epilepsy consulted regarding alternative AEDs. Keppra added in place of Depakote. - f/u epilepsy recs  -Continue to trend CBC  - continue to hold AC

## 2017-12-14 NOTE — PROGRESS NOTE ADULT - SUBJECTIVE AND OBJECTIVE BOX
Neurology Follow up note    Name  CORIN PERDOMO    HPI:  62M with metastatic RCC (s/p nephrectomy, s/p craniotomy x2, s/p gamma knife x3 most recently march 2017, currently on Opdivo and Avastin with Dr. 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 Presenting to the ED brought in by wife for AMS. Patient is at bedside however unable to give history. Patient wife states that he was in his normal state of health until today when she was called by the home health aid stating that he had episodes of staring into space and not as responsive as he is on a regular basis. Patient denies HA, change in vision, cough, CP, abdominal pain, back pain, dysuria, hematuria, constipation, diarrhea.     In ED T 97.8 P100 /89 R18 O2Sat 97% RA, patient was seen and evaluated by Neuro (04 Dec 2017 19:08)      Interval History - lethargic and poorly responsive to verbal commands        REVIEW OF SYSTEMS    Vital Signs Last 24 Hrs  T(C): 36.8 (14 Dec 2017 05:11), Max: 36.8 (13 Dec 2017 09:51)  T(F): 98.3 (14 Dec 2017 05:11), Max: 98.3 (13 Dec 2017 09:51)  HR: 126 (14 Dec 2017 05:11) (111 - 126)  BP: 90/52 (14 Dec 2017 05:11) (90/52 - 128/64)  BP(mean): --  RR: 18 (14 Dec 2017 05:11) (16 - 20)  SpO2: 96% (14 Dec 2017 05:11) (91% - 96%)    Physical Exam-     Mental Status- lethargic and not following commands    Cranial Nerves- no diplopia    Gait and station-n/a    Motor- decrease movement LE    Reflexes- decreased    Sensation-no sensory level    Coordination- no new tremors    Vascular -no bruits    Medications  BACItracin   Ointment 1 Application(s) Topical daily  BACItracin   Ointment 1 Application(s) Topical daily  bisacodyl Suppository 10 milliGRAM(s) Rectal daily  dexamethasone  Injectable 4 milliGRAM(s) IV Push two times a day  dextrose 5%. 1000 milliLiter(s) IV Continuous <Continuous>  dextrose 50% Injectable 12.5 Gram(s) IV Push once  dextrose 50% Injectable 25 Gram(s) IV Push once  dextrose 50% Injectable 25 Gram(s) IV Push once  dextrose Gel 1 Dose(s) Oral once PRN  glucagon  Injectable 1 milliGRAM(s) IntraMuscular once PRN  insulin lispro (HumaLOG) corrective regimen sliding scale   SubCutaneous Before meals and at bedtime  lacosamide IVPB 200 milliGRAM(s) IV Intermittent every 12 hours  latanoprost 0.005% Ophthalmic Solution 1 Drop(s) Both EYES at bedtime  levETIRAcetam  IVPB 250 milliGRAM(s) IV Intermittent every 12 hours  levothyroxine Injectable 25 MICROGram(s) IV Push daily  megestrol Suspension 400 milliGRAM(s) Oral daily  minocycline 200 milliGRAM(s) Oral every 12 hours  polyethylene glycol 3350 17 Gram(s) Oral daily      Lab      Radiology    Assessment- radiation necrosis    Plan- as per Dr Ya

## 2017-12-14 NOTE — PROGRESS NOTE ADULT - SUBJECTIVE AND OBJECTIVE BOX
The patient is alert but has minimal ability to respond.  He is increasingly posturing with extreme rigidity in flexion.    Examination of the head ears eyes nose and throat demonstrates a 2 x 2 centimeter by 1 cm fluctuant area on the patient's scalp just above the occiput and to the right of midline and the seizure surveillance leads bandaged to his head.  Examination of the neck reveals no palpable lymphadenopathy, jugular venous distention or thyroidomegaly.  The lungs are clear to percussion and auscultation.  The heart sounds are regular with no auscultatory evidence for murmur, rub or gallop.  The patient has no truncal obesity and there is no palpable or percussible hepatomegaly or splenomegaly.  The patient has no chronic venous stasis changes in the lower extremities.  Neurologically the patient has some response to  verbal command and when roused will only attempt to say no or yes. He is able to blink his eyes on command, open his mouth and stick out his tongue.  His left facial droop is reversible on command.  He is posturing in flexion with cogwheel rigidity.

## 2017-12-14 NOTE — PROGRESS NOTE ADULT - PROBLEM SELECTOR PLAN 1
Hgb remains stable from prior acute drop. Unknown source. No hematemesis, no melena, no hemoptysis, no other sources of bleeding identified. CT abd/pelvis ordered to r/o RP bleed which was negative.  - Active T&S in case of blood transfusion.   - Lactic acid done on 12/12 WNL.   - f/u hemolysis labs   - monitor CBC  - monitor vitals for hypotension, currently back to baseline  - for possible endoscope to r/o UGI bleed  - continue to hold AC Hgb remains stable from prior acute drop. Unknown source. No hematemesis, no melena, no hemoptysis, no other sources of bleeding identified. CT abd/pelvis ordered to r/o RP bleed which was negative.  - Active T&S in case of blood transfusion.   - Lactic acid done on 12/12 WNL.   - f/u hemolysis labs   - monitor CBC  - monitor vitals for hypotension, currently back to baseline  - for possible endoscope to r/o UGI bleed  - continue to hold AC    UPDATE   Pt hgb 6.8, consent obtained verbally over the phone from wife regarding blood transfusion- will transfuse 1 unit PRBC  Spoke to Dr. Jiménez- he was consulted for sudden anemia two days ago- aware of pt , no plan for intervention at this time   Palliative care consulted and speaking to family regarding goals of care  No signs of active bleeding Hgb remains stable from prior acute drop. Unknown source. No hematemesis, no melena, no hemoptysis, no other sources of bleeding identified. CT abd/pelvis ordered to r/o RP bleed which was negative. ICU was consulted at the time  - Active T&S in case of blood transfusion.   - Lactic acid done on 12/12 WNL.   - f/u hemolysis labs   - monitor CBC  - monitor vitals for hypotension, currently back to baseline  - for possible endoscope to r/o UGI bleed  - continue to hold AC    UPDATE   Pt hgb 6.8, consent obtained verbally over the phone from wife regarding blood transfusion- will transfuse 1 unit PRBC  Spoke to Dr. Jiménez- he was consulted for sudden anemia two days ago- aware of pt , no plan for intervention at this time   Palliative care consulted and speaking to family regarding goals of care  No signs of active bleeding

## 2017-12-14 NOTE — PROGRESS NOTE ADULT - PROBLEM SELECTOR PLAN 6
-Patient last chemotherapy on Sept 2017.   -Awaiting Dr. Ya's recs regarding further management.  - no furhter chemo at this time   - c/w decadron

## 2017-12-14 NOTE — PROGRESS NOTE ADULT - PROBLEM SELECTOR PLAN 2
Waxing and Waning. Patient presenting to the ED with 1 day AMS in association with 1 week urine/bowel incontinence.  -CT head showing no interval change from prior study.   -MRI head showing no new brain lesions, but new midline suboccipital subcutaneous collection. Progressive interval increase in ventricular size. Progressive interval increase in the periventricular FLAIR/T2 signal and left frontal lobe signal.  -RPR negative, Vitamin B12 WNL, folate slightly below normal limits and may consider supplementation  -Dr. Ya from heme/onc following, most likely diagnosis considered to be radiation necrosis. Minocycline started on 12/11. Plan for chemo once patient stable.  - no activity on vEEG, d/c yesterday, keppra increased to 500 BID per neuro recs, c/w Vimpat

## 2017-12-14 NOTE — PROGRESS NOTE ADULT - ASSESSMENT
The patient Is becoming increasingly "Locked in".  The patient has evidence of likely upper GI bleed.  He will need to be endoscoped.  In a perfect world, the patient will be treated for his upper GI distress ulcerations, sent to rehabilitation with daily hyperbaric oxygen  therapy, Avastin every 14 days, minocycline on a daily basis.  The scant evidence available for checkpoint inhibitor radiation necrosis demonstrates that it is at least partially  reversible.  The ethics on this therapy in a man whose disease in the brain is presently completely controlled and whose tumor is measurable in 1 lung metastasis which is responding to treatment may be controversial.

## 2017-12-14 NOTE — PROGRESS NOTE ADULT - ATTENDING COMMENTS
Palliative care To be involved with the patient and family.  Endoscopy to determine cause of blood losses.

## 2017-12-14 NOTE — PROGRESS NOTE ADULT - ASSESSMENT
63 yo M with PMHx of Renal Cell Carcinoma with mets presenting to the ED with altered mental status, admitted to [facility] for further management.

## 2017-12-15 VITALS
DIASTOLIC BLOOD PRESSURE: 90 MMHG | RESPIRATION RATE: 16 BRPM | HEART RATE: 111 BPM | TEMPERATURE: 98 F | SYSTOLIC BLOOD PRESSURE: 139 MMHG | OXYGEN SATURATION: 100 %

## 2017-12-15 LAB
ANION GAP SERPL CALC-SCNC: 13 MMOL/L — SIGNIFICANT CHANGE UP (ref 5–17)
BUN SERPL-MCNC: 43 MG/DL — HIGH (ref 7–23)
CALCIUM SERPL-MCNC: 7.8 MG/DL — LOW (ref 8.4–10.5)
CHLORIDE SERPL-SCNC: 107 MMOL/L — SIGNIFICANT CHANGE UP (ref 96–108)
CO2 SERPL-SCNC: 20 MMOL/L — LOW (ref 22–31)
CREAT SERPL-MCNC: 1.04 MG/DL — SIGNIFICANT CHANGE UP (ref 0.5–1.3)
GLUCOSE BLDC GLUCOMTR-MCNC: 124 MG/DL — HIGH (ref 70–99)
GLUCOSE BLDC GLUCOMTR-MCNC: 135 MG/DL — HIGH (ref 70–99)
GLUCOSE SERPL-MCNC: 139 MG/DL — HIGH (ref 70–99)
HCT VFR BLD CALC: 28.7 % — LOW (ref 39–50)
HGB BLD-MCNC: 9.4 G/DL — LOW (ref 13–17)
MAGNESIUM SERPL-MCNC: 2.2 MG/DL — SIGNIFICANT CHANGE UP (ref 1.6–2.6)
MCHC RBC-ENTMCNC: 28.7 PG — SIGNIFICANT CHANGE UP (ref 27–34)
MCHC RBC-ENTMCNC: 32.8 G/DL — SIGNIFICANT CHANGE UP (ref 32–36)
MCV RBC AUTO: 87.8 FL — SIGNIFICANT CHANGE UP (ref 80–100)
PLATELET # BLD AUTO: 152 K/UL — SIGNIFICANT CHANGE UP (ref 150–400)
POTASSIUM SERPL-MCNC: 4.7 MMOL/L — SIGNIFICANT CHANGE UP (ref 3.5–5.3)
POTASSIUM SERPL-SCNC: 4.7 MMOL/L — SIGNIFICANT CHANGE UP (ref 3.5–5.3)
RBC # BLD: 3.27 M/UL — LOW (ref 4.2–5.8)
RBC # FLD: 15.1 % — SIGNIFICANT CHANGE UP (ref 10.3–16.9)
SODIUM SERPL-SCNC: 140 MMOL/L — SIGNIFICANT CHANGE UP (ref 135–145)
WBC # BLD: 21.5 K/UL — HIGH (ref 3.8–10.5)
WBC # FLD AUTO: 21.5 K/UL — HIGH (ref 3.8–10.5)

## 2017-12-15 PROCEDURE — 99233 SBSQ HOSP IP/OBS HIGH 50: CPT

## 2017-12-15 RX ORDER — MORPHINE SULFATE 50 MG/1
2 CAPSULE, EXTENDED RELEASE ORAL EVERY 4 HOURS
Qty: 0 | Refills: 0 | Status: DISCONTINUED | OUTPATIENT
Start: 2017-12-15 | End: 2017-12-18

## 2017-12-15 RX ADMIN — Medication 4 MILLIGRAM(S): at 06:11

## 2017-12-15 RX ADMIN — Medication 1 APPLICATION(S): at 13:44

## 2017-12-15 RX ADMIN — Medication 4 MILLIGRAM(S): at 18:15

## 2017-12-15 RX ADMIN — MORPHINE SULFATE 2 MILLIGRAM(S): 50 CAPSULE, EXTENDED RELEASE ORAL at 16:44

## 2017-12-15 RX ADMIN — LACOSAMIDE 140 MILLIGRAM(S): 50 TABLET ORAL at 06:12

## 2017-12-15 RX ADMIN — MORPHINE SULFATE 2 MILLIGRAM(S): 50 CAPSULE, EXTENDED RELEASE ORAL at 16:56

## 2017-12-15 RX ADMIN — Medication 25 MICROGRAM(S): at 06:12

## 2017-12-15 RX ADMIN — LATANOPROST 1 DROP(S): 0.05 SOLUTION/ DROPS OPHTHALMIC; TOPICAL at 22:07

## 2017-12-15 RX ADMIN — LEVETIRACETAM 420 MILLIGRAM(S): 250 TABLET, FILM COATED ORAL at 19:21

## 2017-12-15 RX ADMIN — LACOSAMIDE 140 MILLIGRAM(S): 50 TABLET ORAL at 22:06

## 2017-12-15 RX ADMIN — LEVETIRACETAM 420 MILLIGRAM(S): 250 TABLET, FILM COATED ORAL at 06:12

## 2017-12-15 NOTE — PROGRESS NOTE ADULT - SUBJECTIVE AND OBJECTIVE BOX
CORIN PERDOMO   MRN-[id number]         CC: Patient is a 62y old  Male who presents with a chief complaint of AMS (04 Dec 2017 19:08) Worsening MS since admit per wife, s/p 1U PRBC transfusion yesterday.  No po intake for days    HPI:  62M with metastatic RCC (s/p nephrectomy, s/p craniotomy x2, s/p gamma knife x3 most recently march 2017, currently on Opdivo and Avastin with Dr. 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 Presenting to the ED brought in by wife for AMS. Patient is at bedside however unable to give history. Patient wife states that he was in his normal state of health until today when she was called by the home health aid stating that he had episodes of staring into space and not as responsive as he is on a regular basis. Patient denies HA, change in vision, cough, CP, abdominal pain, back pain, dysuria, hematuria, constipation, diarrhea.     In ED T 97.8 P100 /89 R18 O2Sat 97% RA, patient was seen and evaluated by Neuro (04 Dec 2017 19:08)    ROS:  UNABLE TO OBTAIN  due to: stupor    DYSPNEA (Y/N):	  N/V (Y/N):	  SECRETIONS (Y/N):	  AGITATION (Y/N):  PAIN(Y/N):        -Provocation/Palliation:     -Quality/Quantity:     -Radiating:     -Severity:     -Timing/Frequency:     -Impact on ADLs:     OTHER REVIEW OF SYSTEMS:  ALLERGIES:  No Known Allergies    OPIATE NAÏVE (Y/N): y  -iStop reviewed (Y/N): Y    MEDICATIONS: reviewed  MEDICATIONS  (STANDING):  BACItracin   Ointment 1 Application(s) Topical daily  BACItracin   Ointment 1 Application(s) Topical daily  dexamethasone  Injectable 4 milliGRAM(s) IV Push two times a day  lacosamide IVPB 200 milliGRAM(s) IV Intermittent every 12 hours  latanoprost 0.005% Ophthalmic Solution 1 Drop(s) Both EYES at bedtime  levETIRAcetam  IVPB 500 milliGRAM(s) IV Intermittent every 12 hours  levothyroxine Injectable 25 MICROGram(s) IV Push daily    MEDICATIONS  (PRN):    PHYSICAL EXAM:  T(C): 36.8 (12-15-17 @ 05:43), Max: 37.7 (12-14-17 @ 22:50)  T(F): 98.2 (12-15-17 @ 05:43), Max: 99.8 (12-14-17 @ 22:50)  HR: 111 (12-15-17 @ 05:43) (99 - 111)  BP: 139/90 (12-15-17 @ 05:43) (137/92 - 155/78)  RR: 16 (12-15-17 @ 05:43) (15 - 19)  SpO2: 100% (12-15-17 @ 05:43) (100% - 100%)    GENERAL: Pt with eyes closed, appearing very comfortable upon entering.  Children and wife at bedside   HEENT: mild spont OU opening with mod tactile stimuli unable to keep it open continuously     	        CVS: no JVD          	  RESP: 2LNC, resp even and not labored       	             	  : incontinent with [place] cath           	  MUSC: no spont. mov't noted during this assessment       	  NEURO: +stupor    	  PSYCH: +stupor            LABS: reviewed                        9.4    21.5  )-----------( 152      ( 15 Dec 2017 08:24 )             28.7     12-15    140  |  107  |  43<H>  ----------------------------<  139<H>  4.7   |  20<L>  |  1.04    Ca    7.8<L>      15 Dec 2017 08:24  Mg     2.2     12-15    PT/INR - ( 14 Dec 2017 13:46 )   PT: 12.8 sec;   INR: 1.15     PTT - ( 14 Dec 2017 13:46 )  PTT:27.4 sec    IMAGING: reviewed  none new    ADVANCE DIRECTIVES:  DNR/DNI       Decision maker:  Legal surrogate: Leia Perdomo    PSYCHOSOCIAL-SPIRITUAL ASSESSMENT:  Reviewed    GOALS OF CARE DISCUSSION:  Palliative care info/counseling provided	      Family meeting-with wife, son, and daughter  Documentation of GOC: comfort, for inpt hospice    AGENCY CHOICE DISCUSSED:     Hospice  [facility]    REFERRALS:	   Palliative Med   Unit SW/Case Mgmt  -family reports family  visiting, declines need for hospital   Speech/Swallow  Music Therapy  Hospice  PT/OT    [ ]CRITICAL CARE TIME PROVIDED TO UNSTABLE PT W/ ORGAN FAILURE            [x]> 50% OF THE TIME SPENT IN COUNSELING AND COORDINATING CARE     [ ]PROLONGED SERVICE       FACE TO FACE: [x]PT     [x ]PT & FAMILY    Start:               End:  	       Minutes:

## 2017-12-15 NOTE — PROGRESS NOTE ADULT - ASSESSMENT
61 yo M with PMHx of Renal Cell Carcinoma with mets presenting to the ED with altered mental status, admitted to [facility] for further management.

## 2017-12-15 NOTE — PROGRESS NOTE ADULT - PROBLEM SELECTOR PLAN 2
Waxing and Waning. Patient presenting to the ED with 1 day AMS in association with 1 week urine/bowel incontinence.  -CT head showing no interval change from prior study.   -MRI head showing no new brain lesions, but new midline suboccipital subcutaneous collection. Progressive interval increase in ventricular size. Progressive interval increase in the periventricular FLAIR/T2 signal and left frontal lobe signal.  -RPR negative, Vitamin B12 WNL, folate slightly below normal limits and may consider supplementation  - c/w keppra and vimpat   FULL COMFORT CARE  - no intervention at this time

## 2017-12-15 NOTE — PROGRESS NOTE ADULT - PROBLEM SELECTOR PLAN 4
-Unknown etiology, based on urine studies likely 2/2 SIADH as patient's urine has urine osm>100 and urine Na>20  Resolved- sodium is 140  - no longer trensing BMP

## 2017-12-15 NOTE — PROGRESS NOTE ADULT - SUBJECTIVE AND OBJECTIVE BOX
Neurology Follow up note    Name  CORIN PERDOMO    HPI:  62M with metastatic RCC (s/p nephrectomy, s/p craniotomy x2, s/p gamma knife x3 most recently march 2017, currently on Opdivo and Avastin with Dr. 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 Presenting to the ED brought in by wife for AMS. Patient is at bedside however unable to give history. Patient wife states that he was in his normal state of health until today when she was called by the home health aid stating that he had episodes of staring into space and not as responsive as he is on a regular basis. Patient denies HA, change in vision, cough, CP, abdominal pain, back pain, dysuria, hematuria, constipation, diarrhea.     In ED T 97.8 P100 /89 R18 O2Sat 97% RA, patient was seen and evaluated by Neuro (04 Dec 2017 19:08)      Interval History - lethargic- poorly responsive - no headaches         REVIEW OF SYSTEMS    Vital Signs Last 24 Hrs  T(C): 36.8 (15 Dec 2017 05:43), Max: 37.7 (14 Dec 2017 22:50)  T(F): 98.2 (15 Dec 2017 05:43), Max: 99.8 (14 Dec 2017 22:50)  HR: 111 (15 Dec 2017 05:43) (99 - 114)  BP: 139/90 (15 Dec 2017 05:43) (128/75 - 155/78)  BP(mean): --  RR: 16 (15 Dec 2017 05:43) (15 - 19)  SpO2: 100% (15 Dec 2017 05:43) (100% - 100%)    Physical Exam-     Mental Status- stuporous    Cranial Nerves- no dysconjugate eye movements    Gait and station-n/a    Motor- decrease movement    Reflexes- decreased    Sensation- no sensory level    Coordination- no tremors    Vascular -    Medications  BACItracin   Ointment 1 Application(s) Topical daily  BACItracin   Ointment 1 Application(s) Topical daily  dexamethasone  Injectable 4 milliGRAM(s) IV Push two times a day  dextrose 5%. 1000 milliLiter(s) IV Continuous <Continuous>  dextrose 50% Injectable 12.5 Gram(s) IV Push once  dextrose 50% Injectable 25 Gram(s) IV Push once  dextrose 50% Injectable 25 Gram(s) IV Push once  dextrose Gel 1 Dose(s) Oral once PRN  glucagon  Injectable 1 milliGRAM(s) IntraMuscular once PRN  insulin lispro (HumaLOG) corrective regimen sliding scale   SubCutaneous Before meals and at bedtime  lacosamide IVPB 200 milliGRAM(s) IV Intermittent every 12 hours  latanoprost 0.005% Ophthalmic Solution 1 Drop(s) Both EYES at bedtime  levETIRAcetam  IVPB 500 milliGRAM(s) IV Intermittent every 12 hours  levothyroxine Injectable 25 MICROGram(s) IV Push daily  megestrol Suspension 400 milliGRAM(s) Oral daily  minocycline 200 milliGRAM(s) Oral every 12 hours  polyethylene glycol 3350 17 Gram(s) Oral daily      Lab      Radiology    Assessment- Radiation necrosis    Plan- as per Dr Ya

## 2017-12-15 NOTE — PROGRESS NOTE ADULT - PROBLEM SELECTOR PLAN 5
Resolved- have remained stable for past two days   NO AC for pt   No further monitoring of platelets

## 2017-12-15 NOTE — PROGRESS NOTE ADULT - SUBJECTIVE AND OBJECTIVE BOX
62M with metastatic RCC (s/p nephrectomy, s/p craniotomy x2, s/p gamma knife x3 most recently 2017, currently on Opdivo and Avastin with Dr. 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 Presenting to the ED brought in by wife for AMS. Patient is at bedside however unable to give history. Patient wife states that he was in his normal state of health until today when she was called by the home health aid stating that he had episodes of staring into space and not as responsive as he is on a regular basis. Patient denies HA, change in vision, cough, CP, abdominal pain, back pain, dysuria, hematuria, constipation, diarrhea.     Discussion held with palliative care today: as per discussion:  -face to face mtg from [phone] with family (wife, son, and daughter).  I discussed pt's overall worsening clinical course even since admit and they shared their concerns for pt being subjected to further burdensome testing/treatment.  Per son, pt was a very talkative person who "loved life and valued his quality of life" which family feels pt still had some prior to admit.  All state that pt would not want to cont. living in his current state.  Wife states, "we must stop all of this and focus on his comfort".  All present are agreeable to foregoing any further labs/venipunctures, tests, and anything that will not contribute to his comfort, including but limited to endoscopy and feeding tubes.  All are also in favor of hospice placement and are aware that pt's life is limited regardless of nutrition/hydration and will initiate  arrangements. DNR/DNI, comfort measures only    ICU Vital Signs Last 24 Hrs  T(C): 36.8 (15 Dec 2017 05:43), Max: 37.7 (14 Dec 2017 22:50)  T(F): 98.2 (15 Dec 2017 05:43), Max: 99.8 (14 Dec 2017 22:50)  HR: 111 (15 Dec 2017 05:43) (99 - 111)  BP: 139/90 (15 Dec 2017 05:43) (139/90 - 144/84)  BP(mean): --  ABP: --  ABP(mean): --  RR: 16 (15 Dec 2017 05:43) (16 - 16)  SpO2: 100% (15 Dec 2017 05:43) (100% - 100%)    PHYSICAL EXAM:    Constitutional:NAD  Eyes: sclera non-icteric  Neck: supple, trachea midline, no masses, no JVD  Respiratory: CTA b/l, good air entry b/l, no wheezing, no rhonchi, no rales, without accessory muscle use and no intercostal retractions  Cardiovascular: RRR, normal S1S2, no M/R/G  Gastrointestinal: soft, NTND, no masses palpable, BS normal  Extremities: well perfused, pulses equal bilateral upper and lower extremities, no edema, no clubbing  Neurological: Nonverbal, unable to assess, arms are rigid and contracted, tremor in upper extremities   Skin: Normal temperature, warm, dry    MEDICATIONS  (STANDING):  BACItracin   Ointment 1 Application(s) Topical daily  BACItracin   Ointment 1 Application(s) Topical daily  dexamethasone  Injectable 4 milliGRAM(s) IV Push two times a day  lacosamide IVPB 200 milliGRAM(s) IV Intermittent every 12 hours  latanoprost 0.005% Ophthalmic Solution 1 Drop(s) Both EYES at bedtime  levETIRAcetam  IVPB 500 milliGRAM(s) IV Intermittent every 12 hours  levothyroxine Injectable 25 MICROGram(s) IV Push daily    MEDICATIONS  (PRN):      Allergies    No Known Allergies    Intolerances        LABS:                        9.4    21.5  )-----------( 152      ( 15 Dec 2017 08:24 )             28.7     12-15    140  |  107  |  43<H>  ----------------------------<  139<H>  4.7   |  20<L>  |  1.04    Ca    7.8<L>      15 Dec 2017 08:24  Mg     2.2     12-15      PT/INR - ( 14 Dec 2017 13:46 )   PT: 12.8 sec;   INR: 1.15          PTT - ( 14 Dec 2017 13:46 )  PTT:27.4 sec      · Assessment      63 yo M with PMHx of Renal Cell Carcinoma with mets presenting to the ED with altered mental status, admitted to [facility] for further management.     Problem/Plan - 1:  ·  Problem: Acute blood loss anemia.  Plan: Hgb remains stable from prior acute drop. Unknown source.  Pt received 1 unit PRBC yesterday.  Pt anf family no longer want further intervention. No more blood draws, no more transfusions, no further intervention such as endoscopy  Pt is now full comfort care, no longer trending CBC  No current signs of active bleeding.     Problem/Plan - 2:  ·  Problem: Altered mental status.  Plan: Waxing and Waning. Patient presenting to the ED with 1 day AMS in association with 1 week urine/bowel incontinence.  -CT head showing no interval change from prior study.   -MRI head showing no new brain lesions, but new midline suboccipital subcutaneous collection. Progressive interval increase in ventricular size. Progressive interval increase in the periventricular FLAIR/T2 signal and left frontal lobe signal.  -RPR negative, Vitamin B12 WNL, folate slightly below normal limits and may consider supplementation  - c/w keppra and vimpat   FULL COMFORT CARE  - no intervention at this time.     Problem/Plan - 3:  ·  Problem: Tachycardia.  Plan: -Unknown etiology. Differentials could include PE (however no hypoxia, no tachypnea) vs infection (however no fever and WBC elevated likely 2/2 steroids) vs dehydration vs acute blood loss as Hgb dropped by 2 points  -Echo shows severe concentric left ventricular hypertrophy, no pericardial effusion seen  - will monitor vitals q12 hr.    Problem/Plan - 4:  ·  Problem: Hyponatremia.  Plan: -Unknown etiology, based on urine studies likely 2/2 SIADH as patient's urine has urine osm>100 and urine Na>20  Resolved- sodium is 140  - no longer trensing BMP.     Problem/Plan - 5:  ·  Problem: Thrombocytopenia.  Plan: Resolved- have remained stable for past two days   NO AC for pt   No further monitoring of platelets.     Problem/Plan - 6:  Problem: Malignant neoplasm of kidney. Plan: -Patient last chemotherapy on 2017.   - no further chemo at this time   - c/w decadron.    Problem/Plan - 7:  ·  Problem: Weakness.  Plan: -Worsening since last admission  -F/u PT recs.     Problem/Plan - 8:  ·  Problem: Essential hypertension.  Plan: -Norvasc dc/d given Hgb drop to avoid hypotension. No longer monitoring vitals.     Problem/Plan - 9:  ·  Problem: Nutrition, metabolism, and development symptoms.  Plan: NPO- unable to tolerate PO medications, all meds changed to IV/IM  DNR/DNI  Full Comfort measures      DVT: Dc/d Lovenox and HSQ 2/2 platelets downtrending.   RMF to hospice facility.     Problem/Plan - 10:  Problem: Goals of care, counseling/discussion. Plan; face to face mtg from [phone] with family (wife, son, and daughter).  I discussed pt's overall worsening clinical course even since admit and they shared their concerns for pt being subjected to further burdensome testing/treatment.  Per son, pt was a very talkative person who "loved life and valued his quality of life" which family feels pt still had some prior to admit.  All state that pt would not want to cont. living in his current state.  Wife states, "we must stop all of this and focus on his comfort".   - NO further labs/venipunctures, tests, and anything that will not contribute to his comfort, including but limited to endoscopy and feeding tubes.    - in favor of hospice placement   -d/c all meds not contributing to comfort.

## 2017-12-15 NOTE — PROGRESS NOTE ADULT - PROBLEM SELECTOR PLAN 3
-face to face mtg from [phone] with family (wife, son, and daughter).  I discussed pt's overall worsening clinical course even since admit and they shared their concerns for pt being subjected to further burdensome testing/treatment.  Per son, pt was a very talkative person who "loved life and valued his quality of life" which family feels pt still had some prior to admit.  All state that pt would not want to cont. living in his current state.  Wife states, "we must stop all of this and focus on his comfort".  All present are agreeable to foregoing any further labs/venipunctures, tests, and anything that will not contribute to his comfort, including but limited to endoscopy and feeding tubes.  All are also in favor of hospice placement and are aware that pt's life is limited regardless of nutrition/hydration and will initiate  arrangements. DNR/DNI, comfort measures only    -above d/w primary team and all meds not contributing to comfort will be d/black.  Case also d/w unit SW/CM -face to face mtg from [phone] with family (wife, son, and daughter).  I discussed pt's overall worsening clinical course even since admit and they shared their concerns for pt being subjected to further burdensome testing/treatment.  Per son, pt was a very talkative person who "loved life and valued his quality of life" which family feels pt still had some prior to admit.  All state that pt would not want to cont. living in his current state.  Wife states, "we must stop all of this and focus on his comfort".  All present are agreeable to foregoing any further labs/venipunctures, tests, and anything that will not contribute to his comfort, including but limited to endoscopy and feeding tubes.  All are also in favor of hospice placement and are aware that pt's life is limited regardless of nutrition/hydration and will initiate  arrangements. DNR/DNI, comfort measures only    -above d/w primary team and all meds not contributing to comfort will be d/black.  Case also d/w unit SW/CM    As identified during the patients PSSA screening the patient would benefit from: Music Therapy and Palliative Care SW.  Family decline any hospital  visit, states family  has been and will cont. to visit. Support provided to family. Patient to have access to supportive services during rest of hospital stay as the pt/family deems necessary i.e. Chaplaincy, Massage Therapy, Music Therapy, Pt/family supportive services, Palliative SW, etc. -face to face mtg from [phone] with family (wife, son, and daughter).  I discussed pt's overall worsening clinical course even since admit and they shared their concerns for pt being subjected to further burdensome testing/treatment.  Per son, pt was a very talkative person who "loved life and valued his quality of life" which family feels pt still had some prior to admit.  All state that pt would not want to cont. living in his current state.  Wife states, "we must stop all of this and focus on his comfort".  All present are agreeable to foregoing any further labs/venipunctures, tests, and anything that will not contribute to his comfort, including endoscopy and feeding tubes.  All are also in favor of hospice placement and are aware that pt's life is limited regardless of nutrition/hydration and will initiate  arrangements. DNR/DNI, comfort measures only    -above d/w primary team and all meds not contributing to comfort will be d/black.  Case also d/w unit SW/CM    As identified during the patients PSSA screening the patient would benefit from: Music Therapy and Palliative Care SW.  Family decline any hospital  visit, states family  has been and will cont. to visit. Support provided to family. Patient to have access to supportive services during rest of hospital stay as the pt/family deems necessary i.e. Chaplaincy, Massage Therapy, Music Therapy, Pt/family supportive services, Palliative SW, etc.

## 2017-12-15 NOTE — PROGRESS NOTE ADULT - PROBLEM SELECTOR PLAN 3
-Unknown etiology. Differentials could include PE (however no hypoxia, no tachypnea) vs infection (however no fever and WBC elevated likely 2/2 steroids) vs dehydration vs acute blood loss as Hgb dropped by 2 points  -Echo shows severe concentric left ventricular hypertrophy, no pericardial effusion seen  - no longer monitoring vitals per family request -Unknown etiology. Differentials could include PE (however no hypoxia, no tachypnea) vs infection (however no fever and WBC elevated likely 2/2 steroids) vs dehydration vs acute blood loss as Hgb dropped by 2 points  -Echo shows severe concentric left ventricular hypertrophy, no pericardial effusion seen  - will monitor vitals q12 hr

## 2017-12-15 NOTE — PROGRESS NOTE ADULT - SUBJECTIVE AND OBJECTIVE BOX
Physical Medicine and Rehabilitation Progress Note    CORIN PERDOMO    MRN-[id number]    Patient is a 62y old  Male who presents with a chief complaint of AMS (04 Dec 2017 19:08)      Vital Signs Last 24 Hrs  T(C): 36.8 (15 Dec 2017 05:43), Max: 37.7 (14 Dec 2017 22:50)  T(F): 98.2 (15 Dec 2017 05:43), Max: 99.8 (14 Dec 2017 22:50)  HR: 111 (15 Dec 2017 05:43) (99 - 111)  BP: 139/90 (15 Dec 2017 05:43) (137/92 - 155/78)  BP(mean): --  RR: 16 (15 Dec 2017 05:43) (15 - 19)  SpO2: 100% (15 Dec 2017 05:43) (100% - 100%)    Current Functional Status in Physical Therapy: awke, non-verbal. Lt. facial weakness. Upper ext. increased tone /spastic in flexon elbows, passively normal range. Lowers; edema, severe wekness. Seen by PT on 12/14/17. Bed mobility totally  dependent.    Bed Mobility: maximal assistance.      Transfers:    Ambulation:      Impression: RCC, mets to brain and spine      Recommendations:  Bed side PT as tolerated

## 2017-12-15 NOTE — PROGRESS NOTE ADULT - PROBLEM SELECTOR PLAN 9
NPO- unable to tolerate PO medications, all meds changed to IV/IM  DNR/DNI  Full Comfort measures      DVT: Dc/d Lovenox and HSQ 2/2 platelets downtrending.   RMF to hospice facility

## 2017-12-15 NOTE — PROGRESS NOTE ADULT - SUBJECTIVE AND OBJECTIVE BOX
Chief Complaint:  63 yo M with PMHx of Renal Cell Carcinoma with mets presenting to the ED with altered mental status, admitted to [facility] for further management.     OVERNIGHT EVENTS: Hgb returned 9.5 s/p blood transfusion. Pt had no overnight events     SUBJECTIVE / INTERVAL HPI: Patient seen and examined at bedside. NAD. Patient lying comfortably in bed. Patient not responding to questions so unable to assess mental status. ROS not obtainable. Pt more awake and alert today. Pt can follow simple commands of squeezing hands and smiling. Arms are rigid and tremor in place, but can squeeze hands.     Discussion held with palliative care today: as per discussion:  -face to face mtg from [phone] with family (wife, son, and daughter).  I discussed pt's overall worsening clinical course even since admit and they shared their concerns for pt being subjected to further burdensome testing/treatment.  Per son, pt was a very talkative person who "loved life and valued his quality of life" which family feels pt still had some prior to admit.  All state that pt would not want to cont. living in his current state.  Wife states, "we must stop all of this and focus on his comfort".  All present are agreeable to foregoing any further labs/venipunctures, tests, and anything that will not contribute to his comfort, including but limited to endoscopy and feeding tubes.  All are also in favor of hospice placement and are aware that pt's life is limited regardless of nutrition/hydration and will initiate  arrangements. DNR/DNI, comfort measures only    VITAL SIGNS:  T(F): 98.2 (12-15-17 @ 05:43)  HR: 111 (12-15-17 @ 05:43)  BP: 139/90 (12-15-17 @ 05:43)  RR: 16 (12-15-17 @ 05:43)  SpO2: 100% (12-15-17 @ 05:43)  Wt(kg): --    PHYSICAL EXAM:    Constitutional: WDWN, NAD  Eyes: sclera non-icteric  Neck: supple, trachea midline, no masses, no JVD  Respiratory: CTA b/l, good air entry b/l, no wheezing, no rhonchi, no rales, without accessory muscle use and no intercostal retractions  Cardiovascular: RRR, normal S1S2, no M/R/G  Gastrointestinal: soft, NTND, no masses palpable, BS normal  Extremities: well perfused, pulses equal bilateral upper and lower extremities, no edema, no clubbing  Neurological: Nonverbal, unable to assess, arms are rigid and contracted, tremor in upper extremities   Skin: Normal temperature, warm, dry    MEDICATIONS  (STANDING):  BACItracin   Ointment 1 Application(s) Topical daily  BACItracin   Ointment 1 Application(s) Topical daily  dexamethasone  Injectable 4 milliGRAM(s) IV Push two times a day  lacosamide IVPB 200 milliGRAM(s) IV Intermittent every 12 hours  latanoprost 0.005% Ophthalmic Solution 1 Drop(s) Both EYES at bedtime  levETIRAcetam  IVPB 500 milliGRAM(s) IV Intermittent every 12 hours  levothyroxine Injectable 25 MICROGram(s) IV Push daily    MEDICATIONS  (PRN):      Allergies    No Known Allergies    Intolerances        LABS:                        9.4    21.5  )-----------( 152      ( 15 Dec 2017 08:24 )             28.7     12-15    140  |  107  |  43<H>  ----------------------------<  139<H>  4.7   |  20<L>  |  1.04    Ca    7.8<L>      15 Dec 2017 08:24  Mg     2.2     12-15      PT/INR - ( 14 Dec 2017 13:46 )   PT: 12.8 sec;   INR: 1.15          PTT - ( 14 Dec 2017 13:46 )  PTT:27.4 sec      RADIOLOGY & ADDITIONAL TESTS:

## 2017-12-15 NOTE — PROGRESS NOTE ADULT - PROBLEM SELECTOR PLAN 1
Hgb remains stable from prior acute drop. Unknown source.  Pt received 1 unit PRBC yesterday.  Pt anf family no longer want further intervention. No more blood draws, no more transfusions, no further intervention such as endoscopy  Pt is now full comfort care, no longer trending CBC  No current signs of active bleeding

## 2017-12-16 RX ADMIN — MORPHINE SULFATE 2 MILLIGRAM(S): 50 CAPSULE, EXTENDED RELEASE ORAL at 10:14

## 2017-12-16 RX ADMIN — LEVETIRACETAM 420 MILLIGRAM(S): 250 TABLET, FILM COATED ORAL at 06:59

## 2017-12-16 RX ADMIN — MORPHINE SULFATE 2 MILLIGRAM(S): 50 CAPSULE, EXTENDED RELEASE ORAL at 18:00

## 2017-12-16 RX ADMIN — MORPHINE SULFATE 2 MILLIGRAM(S): 50 CAPSULE, EXTENDED RELEASE ORAL at 17:15

## 2017-12-16 RX ADMIN — MORPHINE SULFATE 2 MILLIGRAM(S): 50 CAPSULE, EXTENDED RELEASE ORAL at 10:47

## 2017-12-16 RX ADMIN — LEVETIRACETAM 420 MILLIGRAM(S): 250 TABLET, FILM COATED ORAL at 18:13

## 2017-12-16 RX ADMIN — Medication 1 APPLICATION(S): at 12:51

## 2017-12-16 RX ADMIN — LACOSAMIDE 140 MILLIGRAM(S): 50 TABLET ORAL at 10:48

## 2017-12-16 RX ADMIN — Medication 4 MILLIGRAM(S): at 06:59

## 2017-12-16 RX ADMIN — Medication 4 MILLIGRAM(S): at 18:13

## 2017-12-16 RX ADMIN — LATANOPROST 1 DROP(S): 0.05 SOLUTION/ DROPS OPHTHALMIC; TOPICAL at 22:06

## 2017-12-16 RX ADMIN — Medication 25 MICROGRAM(S): at 06:59

## 2017-12-16 RX ADMIN — LACOSAMIDE 140 MILLIGRAM(S): 50 TABLET ORAL at 23:07

## 2017-12-17 ENCOUNTER — TRANSCRIPTION ENCOUNTER (OUTPATIENT)
Age: 62
End: 2017-12-17

## 2017-12-17 LAB
CULTURE RESULTS: SIGNIFICANT CHANGE UP
CULTURE RESULTS: SIGNIFICANT CHANGE UP
SPECIMEN SOURCE: SIGNIFICANT CHANGE UP
SPECIMEN SOURCE: SIGNIFICANT CHANGE UP

## 2017-12-17 RX ORDER — BACITRACIN ZINC 500 UNIT/G
1 OINTMENT IN PACKET (EA) TOPICAL
Qty: 0 | Refills: 0 | COMMUNITY
Start: 2017-12-17

## 2017-12-17 RX ORDER — MEGESTROL ACETATE 40 MG/ML
5 SUSPENSION ORAL
Qty: 0 | Refills: 0 | COMMUNITY

## 2017-12-17 RX ORDER — LACOSAMIDE 50 MG/1
1 TABLET ORAL
Qty: 0 | Refills: 0 | COMMUNITY

## 2017-12-17 RX ORDER — LACOSAMIDE 50 MG/1
20 TABLET ORAL
Qty: 0 | Refills: 0 | COMMUNITY
Start: 2017-12-17

## 2017-12-17 RX ORDER — LATANOPROST 0.05 MG/ML
1 SOLUTION/ DROPS OPHTHALMIC; TOPICAL
Qty: 0 | Refills: 0 | COMMUNITY
Start: 2017-12-17

## 2017-12-17 RX ORDER — BEVACIZUMAB 400 MG/16ML
0 INJECTION, SOLUTION INTRAVENOUS
Qty: 0 | Refills: 0 | COMMUNITY

## 2017-12-17 RX ORDER — DIVALPROEX SODIUM 500 MG/1
250 TABLET, DELAYED RELEASE ORAL
Qty: 0 | Refills: 0 | COMMUNITY

## 2017-12-17 RX ORDER — MORPHINE SULFATE 50 MG/1
2 CAPSULE, EXTENDED RELEASE ORAL
Qty: 0 | Refills: 0 | COMMUNITY
Start: 2017-12-17

## 2017-12-17 RX ORDER — MORPHINE SULFATE 50 MG/1
3 CAPSULE, EXTENDED RELEASE ORAL
Qty: 0 | Refills: 0 | COMMUNITY
Start: 2017-12-17 | End: 2017-12-22

## 2017-12-17 RX ORDER — CITALOPRAM 10 MG/1
40 TABLET, FILM COATED ORAL DAILY
Qty: 0 | Refills: 0 | Status: DISCONTINUED | OUTPATIENT
Start: 2017-12-17 | End: 2017-12-17

## 2017-12-17 RX ORDER — MINOCYCLINE HYDROCHLORIDE 45 MG/1
2 TABLET, EXTENDED RELEASE ORAL
Qty: 0 | Refills: 0 | COMMUNITY

## 2017-12-17 RX ORDER — ASPIRIN/CALCIUM CARB/MAGNESIUM 324 MG
300 TABLET ORAL DAILY
Qty: 0 | Refills: 0 | Status: DISCONTINUED | OUTPATIENT
Start: 2017-12-17 | End: 2017-12-17

## 2017-12-17 RX ORDER — NIVOLUMAB 10 MG/ML
0 INJECTION INTRAVENOUS
Qty: 0 | Refills: 0 | COMMUNITY

## 2017-12-17 RX ORDER — LEVETIRACETAM 250 MG/1
5 TABLET, FILM COATED ORAL
Qty: 0 | Refills: 0 | COMMUNITY
Start: 2017-12-17

## 2017-12-17 RX ADMIN — LEVETIRACETAM 420 MILLIGRAM(S): 250 TABLET, FILM COATED ORAL at 06:12

## 2017-12-17 RX ADMIN — MORPHINE SULFATE 2 MILLIGRAM(S): 50 CAPSULE, EXTENDED RELEASE ORAL at 22:00

## 2017-12-17 RX ADMIN — LACOSAMIDE 140 MILLIGRAM(S): 50 TABLET ORAL at 22:34

## 2017-12-17 RX ADMIN — MORPHINE SULFATE 2 MILLIGRAM(S): 50 CAPSULE, EXTENDED RELEASE ORAL at 21:33

## 2017-12-17 RX ADMIN — LEVETIRACETAM 420 MILLIGRAM(S): 250 TABLET, FILM COATED ORAL at 18:07

## 2017-12-17 RX ADMIN — MORPHINE SULFATE 2 MILLIGRAM(S): 50 CAPSULE, EXTENDED RELEASE ORAL at 17:09

## 2017-12-17 RX ADMIN — MORPHINE SULFATE 2 MILLIGRAM(S): 50 CAPSULE, EXTENDED RELEASE ORAL at 07:45

## 2017-12-17 RX ADMIN — MORPHINE SULFATE 2 MILLIGRAM(S): 50 CAPSULE, EXTENDED RELEASE ORAL at 07:20

## 2017-12-17 RX ADMIN — Medication 4 MILLIGRAM(S): at 18:09

## 2017-12-17 RX ADMIN — Medication 1 APPLICATION(S): at 13:14

## 2017-12-17 RX ADMIN — LATANOPROST 1 DROP(S): 0.05 SOLUTION/ DROPS OPHTHALMIC; TOPICAL at 21:33

## 2017-12-17 RX ADMIN — MORPHINE SULFATE 2 MILLIGRAM(S): 50 CAPSULE, EXTENDED RELEASE ORAL at 11:45

## 2017-12-17 RX ADMIN — MORPHINE SULFATE 2 MILLIGRAM(S): 50 CAPSULE, EXTENDED RELEASE ORAL at 16:48

## 2017-12-17 RX ADMIN — Medication 4 MILLIGRAM(S): at 06:12

## 2017-12-17 RX ADMIN — MORPHINE SULFATE 2 MILLIGRAM(S): 50 CAPSULE, EXTENDED RELEASE ORAL at 11:16

## 2017-12-17 RX ADMIN — LACOSAMIDE 140 MILLIGRAM(S): 50 TABLET ORAL at 10:10

## 2017-12-17 NOTE — PROGRESS NOTE ADULT - SUBJECTIVE AND OBJECTIVE BOX
INTERVAL HPI/OVERNIGHT EVENTS:  Patient was seen and examined at bedside. As per nurse and patient, no o/n events, patient resting comfortably. No complaints at this time. Patient denies: fever, chills, dizziness, weakness, HA, Changes in vision, CP, palpitations, SOB, cough, N/V/D/C, dysuria, changes in bowel movements, LE edema.    VITAL SIGNS:  T(F): --  HR: --  BP: --  RR: --  SpO2: --  Wt(kg): --    PHYSICAL EXAM:    Constitutional: WDWN, NAD  Eyes: PERRL, EOMI, sclera non-icteric  Neck: supple, trachea midline, no masses, no JVD  Respiratory: CTA b/l, good air entry b/l, no wheezing, no rhonchi, no rales, without accessory muscle use and no intercostal retractions  Cardiovascular: RRR, normal S1S2, no M/R/G  Gastrointestinal: soft, NTND, no masses palpable, BS normal  Extremities: Warm, well perfused, pulses equal bilateral upper and lower extremities, no edema, no clubbing  Neurological: AAOx3, CN Grossly intact  Skin: Normal temperature, warm, dry    MEDICATIONS  (STANDING):  BACItracin   Ointment 1 Application(s) Topical daily  BACItracin   Ointment 1 Application(s) Topical daily  dexamethasone  Injectable 4 milliGRAM(s) IV Push two times a day  lacosamide IVPB 200 milliGRAM(s) IV Intermittent every 12 hours  latanoprost 0.005% Ophthalmic Solution 1 Drop(s) Both EYES at bedtime  levETIRAcetam  IVPB 500 milliGRAM(s) IV Intermittent every 12 hours  levothyroxine Injectable 25 MICROGram(s) IV Push daily    MEDICATIONS  (PRN):  morphine  - Injectable 2 milliGRAM(s) IV Push every 4 hours PRN Mild Pain (1 - 3)      Allergies    No Known Allergies    Intolerances        LABS:                RADIOLOGY & ADDITIONAL TESTS:

## 2017-12-17 NOTE — DISCHARGE NOTE ADULT - CARE PLAN
Principal Discharge DX:	Metastatic renal cell carcinoma to brain  Goal:	Discharge to inpatient hospice.  Instructions for follow-up, activity and diet:	You presented with metastatic renal cell carcinoma to the brain. This  Secondary Diagnosis:	Altered mental status Principal Discharge DX:	Metastatic renal cell carcinoma to brain  Goal:	Discharge to inpatient hospice.  Instructions for follow-up, activity and diet:	You presented with metastatic renal cell carcinoma to the brain. During your hospitalization, you started to experience more pain and were controlled on morphine and fentanyl patch. Please continue to take your pain medication as prescribed.  Secondary Diagnosis:	Altered mental status  Goal:	Stable.  Instructions for follow-up, activity and diet:	You presented with altered mental status which is likely from the mets to the brain. During your hospitalization, you were able to follow commands, however it became difficult to speak and answer questions. You were placed on seizure medications to prevent further seizure activities. Please continue to take as prescribed to prevent seizure activity. Principal Discharge DX:	Metastatic renal cell carcinoma to brain  Goal:	Discharge to inpatient hospice.  Instructions for follow-up, activity and diet:	You presented with metastatic renal cell carcinoma to the brain. During your hospitalization, you started to experience more pain and were controlled on morphine and fentanyl patch. Please continue to take your pain medication as needed for your continued pain.  Secondary Diagnosis:	Altered mental status  Goal:	Stable.  Instructions for follow-up, activity and diet:	You presented with altered mental status which is likely from the mets to the brain. During your hospitalization, you were able to follow commands, however it became difficult to speak and answer questions. You were placed on seizure medications to prevent further seizure activities. Please continue to take medications as prescribed to prevent seizure activity.

## 2017-12-17 NOTE — PROGRESS NOTE ADULT - ASSESSMENT
Date & Time: 6/24/2024, 4:25 PM  Patient: Nathalie Harper  Encounter Provider(s):    Bernadette Lujan APRN       To Whom It May Concern:    Nathalie Harper was seen and treated in our department on 6/24/2024. She can return to work July 1, 2024.    If you have any questions or concerns, please do not hesitate to call.        _____________________________  Physician/APC Signature            61 yo M with PMHx of Renal Cell Carcinoma with mets presenting to the ED with altered mental status, admitted to [facility] for further management.

## 2017-12-17 NOTE — DISCHARGE NOTE ADULT - HOSPITAL COURSE
63 yo M with PMHx of Renal Cell Carcinoma with mets presenting to the ED with altered mental status, admitted to [facility] for further management. CT head showing no interval change from prior study. MRI head showing no new brain lesions. Patient was placed on EEG monitoring initially when admitted for an hour which was negative however is back on EEG monitoring given medications adjustments as mentioned in the plan below . Dr. 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 Patient persistently tachy ever since admission, hgb downtrending with every repeat lab. Unknown source. CT abd/pelvis ordered to r/o RP bleed which was negative. Due to decompensation of mental status, family decided pt for comfort care. All unnecessary medications were withdrawn, no vitals or lab draws. Pt discharged to inpatient hospice

## 2017-12-17 NOTE — PROGRESS NOTE ADULT - PROBLEM SELECTOR PLAN 1
No further lab draws, no signs of acute bleeding  Pt anf family no longer want further intervention. No more blood draws, no more transfusions, no further intervention such as endoscopy  Pt is now full comfort care, no longer trending CBC  No current signs of active bleeding

## 2017-12-17 NOTE — DISCHARGE NOTE ADULT - CARE PROVIDER_API CALL
Clyde Ya), Internal Medicine; Medical Oncology  [address] [place] [address]  Phone: [phone]  Fax: [phone]

## 2017-12-17 NOTE — DISCHARGE NOTE ADULT - PATIENT PORTAL LINK FT
“You can access the Follow[employer] Patient Portal, offered by [facility], by registering with the following website: [email or web address]”

## 2017-12-17 NOTE — DISCHARGE NOTE ADULT - MEDICATION SUMMARY - MEDICATIONS TO TAKE
I will START or STAY ON the medications listed below when I get home from the hospital:    dexamethasone  -- 4 milligram(s) intravenous 2 times a day  -- Indication: For Metastatic renal cell carcinoma to brain    fentaNYL 12 mcg/hr transdermal film, extended release  -- 1 patch by transdermal patch every 72 hours  -- Indication: For Pain    morphine  -- 3 milligram(s) intravenous every 4 hours  -- Indication: For Pain    Keppra 100 mg/mL intravenous solution  -- 5 milliliter(s) intravenous every 12 hours  -- Indication: For Seizure    valproic acid (as valproate sodium) 100 mg/mL intravenous solution  -- 2.5 milliliter(s) intravenous every 8 hours  -- Indication: For Seizure    bacitracin 500 units/g topical ointment  -- 1 application on skin once a day  -- Indication: For Prophylactic measure    bacitracin 500 units/g topical ointment  -- 1 application on skin once a day  -- Indication: For Prophylactic measure     latanoprost 0.005% ophthalmic solution  -- 1 drop(s) to each affected eye once a day (at bedtime)  -- Indication: For Dry Eyes

## 2017-12-17 NOTE — DISCHARGE NOTE ADULT - PLAN OF CARE
Discharge to inpatient hospice. You presented with metastatic renal cell carcinoma to the brain. This You presented with metastatic renal cell carcinoma to the brain. During your hospitalization, you started to experience more pain and were controlled on morphine and fentanyl patch. Please continue to take your pain medication as prescribed. Stable. You presented with altered mental status which is likely from the mets to the brain. During your hospitalization, you were able to follow commands, however it became difficult to speak and answer questions. You were placed on seizure medications to prevent further seizure activities. Please continue to take as prescribed to prevent seizure activity. You presented with metastatic renal cell carcinoma to the brain. During your hospitalization, you started to experience more pain and were controlled on morphine and fentanyl patch. Please continue to take your pain medication as needed for your continued pain. You presented with altered mental status which is likely from the mets to the brain. During your hospitalization, you were able to follow commands, however it became difficult to speak and answer questions. You were placed on seizure medications to prevent further seizure activities. Please continue to take medications as prescribed to prevent seizure activity.

## 2017-12-18 DIAGNOSIS — Z51.5 ENCOUNTER FOR PALLIATIVE CARE: ICD-10-CM

## 2017-12-18 PROCEDURE — 99233 SBSQ HOSP IP/OBS HIGH 50: CPT

## 2017-12-18 RX ORDER — FENTANYL CITRATE 50 UG/ML
1 INJECTION INTRAVENOUS
Qty: 0 | Refills: 0 | Status: DISCONTINUED | OUTPATIENT
Start: 2017-12-18 | End: 2017-12-22

## 2017-12-18 RX ORDER — MORPHINE SULFATE 50 MG/1
2 CAPSULE, EXTENDED RELEASE ORAL
Qty: 0 | Refills: 0 | Status: DISCONTINUED | OUTPATIENT
Start: 2017-12-18 | End: 2017-12-19

## 2017-12-18 RX ORDER — MORPHINE SULFATE 50 MG/1
2 CAPSULE, EXTENDED RELEASE ORAL EVERY 4 HOURS
Qty: 0 | Refills: 0 | Status: DISCONTINUED | OUTPATIENT
Start: 2017-12-18 | End: 2017-12-18

## 2017-12-18 RX ADMIN — LACOSAMIDE 140 MILLIGRAM(S): 50 TABLET ORAL at 22:49

## 2017-12-18 RX ADMIN — MORPHINE SULFATE 2 MILLIGRAM(S): 50 CAPSULE, EXTENDED RELEASE ORAL at 18:30

## 2017-12-18 RX ADMIN — MORPHINE SULFATE 2 MILLIGRAM(S): 50 CAPSULE, EXTENDED RELEASE ORAL at 08:00

## 2017-12-18 RX ADMIN — FENTANYL CITRATE 1 PATCH: 50 INJECTION INTRAVENOUS at 11:23

## 2017-12-18 RX ADMIN — MORPHINE SULFATE 2 MILLIGRAM(S): 50 CAPSULE, EXTENDED RELEASE ORAL at 11:22

## 2017-12-18 RX ADMIN — MORPHINE SULFATE 2 MILLIGRAM(S): 50 CAPSULE, EXTENDED RELEASE ORAL at 12:17

## 2017-12-18 RX ADMIN — Medication 1 APPLICATION(S): at 11:23

## 2017-12-18 RX ADMIN — LEVETIRACETAM 420 MILLIGRAM(S): 250 TABLET, FILM COATED ORAL at 07:35

## 2017-12-18 RX ADMIN — MORPHINE SULFATE 2 MILLIGRAM(S): 50 CAPSULE, EXTENDED RELEASE ORAL at 21:35

## 2017-12-18 RX ADMIN — MORPHINE SULFATE 2 MILLIGRAM(S): 50 CAPSULE, EXTENDED RELEASE ORAL at 19:37

## 2017-12-18 RX ADMIN — MORPHINE SULFATE 2 MILLIGRAM(S): 50 CAPSULE, EXTENDED RELEASE ORAL at 21:22

## 2017-12-18 RX ADMIN — MORPHINE SULFATE 2 MILLIGRAM(S): 50 CAPSULE, EXTENDED RELEASE ORAL at 07:38

## 2017-12-18 RX ADMIN — Medication 4 MILLIGRAM(S): at 17:36

## 2017-12-18 RX ADMIN — MORPHINE SULFATE 2 MILLIGRAM(S): 50 CAPSULE, EXTENDED RELEASE ORAL at 13:15

## 2017-12-18 RX ADMIN — LACOSAMIDE 140 MILLIGRAM(S): 50 TABLET ORAL at 13:16

## 2017-12-18 RX ADMIN — MORPHINE SULFATE 2 MILLIGRAM(S): 50 CAPSULE, EXTENDED RELEASE ORAL at 14:51

## 2017-12-18 RX ADMIN — LEVETIRACETAM 420 MILLIGRAM(S): 250 TABLET, FILM COATED ORAL at 19:25

## 2017-12-18 RX ADMIN — LATANOPROST 1 DROP(S): 0.05 SOLUTION/ DROPS OPHTHALMIC; TOPICAL at 21:22

## 2017-12-18 RX ADMIN — MORPHINE SULFATE 2 MILLIGRAM(S): 50 CAPSULE, EXTENDED RELEASE ORAL at 17:36

## 2017-12-18 RX ADMIN — Medication 4 MILLIGRAM(S): at 06:57

## 2017-12-18 NOTE — PROGRESS NOTE ADULT - PROBLEM SELECTOR PLAN 4
comfort measures only, await hospice placement. DNR/DNI.     -As identified during the patients PSSA screening the patient would benefit from: Music Therapy and Palliative Care SW.  Family decline any hospital  visit, states family  has been and will cont. to visit. Support provided to family. Patient to have access to supportive services during rest of hospital stay as the pt/family deems necessary i.e. Chaplaincy, Massage Therapy, Music Therapy, Pt/family supportive services, Palliative SW, etc.

## 2017-12-18 NOTE — PROGRESS NOTE ADULT - ASSESSMENT
61 y/o gentleman with h/o metastatic RCC to brain and lung s/p crani x2, gamma knife (most recently 3/2017), and Avastin/opdivo, right nephrectomy, tonsillectomy, glaucoma, and HTN, presenting from home with AMS and progressive functional/mental decline notable even during this admit, seen initially for family support

## 2017-12-18 NOTE — PROGRESS NOTE ADULT - PROBLEM SELECTOR PLAN 3
-Unknown etiology. Differentials could include PE (however no hypoxia, no tachypnea) vs infection (however no fever and WBC elevated likely 2/2 steroids) vs dehydration vs acute blood loss as Hgb dropped by 2 points  -Echo shows severe concentric left ventricular hypertrophy, no pericardial effusion seen  - will monitor vitals as family requests, currently denying all lab draws

## 2017-12-18 NOTE — PROGRESS NOTE ADULT - ASSESSMENT
61 yo M with PMHx of Renal Cell Carcinoma with mets presenting to the ED with altered mental status, admitted to [facility] for further management.

## 2017-12-18 NOTE — PROGRESS NOTE ADULT - SUBJECTIVE AND OBJECTIVE BOX
Neurology Follow up note    Name  CORIN PERDOMO    HPI:  62M with metastatic RCC (s/p nephrectomy, s/p craniotomy x2, s/p gamma knife x3 most recently march 2017, currently on Opdivo and Avastin with Dr. 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 Presenting to the ED brought in by wife for AMS. Patient is at bedside however unable to give history. Patient wife states that he was in his normal state of health until today when she was called by the home health aid stating that he had episodes of staring into space and not as responsive as he is on a regular basis. Patient denies HA, change in vision, cough, CP, abdominal pain, back pain, dysuria, hematuria, constipation, diarrhea.     In ED T 97.8 P100 /89 R18 O2Sat 97% RA, patient was seen and evaluated by Neuro (04 Dec 2017 19:08)      Interval History - lethargic and poorly responsive to commands        REVIEW OF SYSTEMS    Vital Signs Last 24 Hrs  T(C): --  T(F): --  HR: --  BP: --  BP(mean): --  RR: --  SpO2: --    Physical Exam-     Mental Status- follows few simple commands    Cranial Nerves- no diplopia    Gait and station-n/a    Motor- moves all 4 extremities- 1-2/5    Reflexes- decreased    Sensation-n/a    Coordination- no abnormal posturing    Vascular -    Medications  BACItracin   Ointment 1 Application(s) Topical daily  BACItracin   Ointment 1 Application(s) Topical daily  dexamethasone  Injectable 4 milliGRAM(s) IV Push two times a day  lacosamide IVPB 200 milliGRAM(s) IV Intermittent every 12 hours  latanoprost 0.005% Ophthalmic Solution 1 Drop(s) Both EYES at bedtime  levETIRAcetam  IVPB 500 milliGRAM(s) IV Intermittent every 12 hours  morphine  - Injectable 2 milliGRAM(s) IV Push every 4 hours PRN      Lab      Radiology    Assessment- brain mets and radiation necrosis    Plan- daughter says hospice care

## 2017-12-18 NOTE — PROGRESS NOTE ADULT - SUBJECTIVE AND OBJECTIVE BOX
INTERVAL HPI/OVERNIGHT EVENTS:  Patient was seen and examined at bedside. As per nurse and patient, no o/n events, patient resting comfortably. No complaints at this time. Patient denies: fever, chills, dizziness, weakness, HA, Changes in vision, CP, palpitations, SOB, cough, N/V/D/C, dysuria, changes in bowel movements, LE edema.    VITAL SIGNS:  T(F): --  HR: --  BP: --  RR: --  SpO2: --  Wt(kg): --    PHYSICAL EXAM:    Constitutional: WDWN, NAD  Neck: supple, trachea midline  Respiratory: CTA b/l, good air entry b/l, no wheezing, no rhonchi, no rales, without accessory muscle use and no intercostal retractions  Cardiovascular: RRR, normal S1S2, no M/R/G  Gastrointestinal: soft, NTND, no masses palpable, BS normal  Extremities: arms contracted with mild tremor present   Neurological: can follow simple commands  Skin: Normal temperature, warm, dry    MEDICATIONS  (STANDING):  BACItracin   Ointment 1 Application(s) Topical daily  BACItracin   Ointment 1 Application(s) Topical daily  dexamethasone  Injectable 4 milliGRAM(s) IV Push two times a day  lacosamide IVPB 200 milliGRAM(s) IV Intermittent every 12 hours  latanoprost 0.005% Ophthalmic Solution 1 Drop(s) Both EYES at bedtime  levETIRAcetam  IVPB 500 milliGRAM(s) IV Intermittent every 12 hours    MEDICATIONS  (PRN):  morphine  - Injectable 2 milliGRAM(s) IV Push every 4 hours PRN Mild Pain (1 - 3)      Allergies    No Known Allergies    Intolerances        LABS:                RADIOLOGY & ADDITIONAL TESTS:

## 2017-12-18 NOTE — PROGRESS NOTE ADULT - PROBLEM SELECTOR PLAN 1
No further lab draws, no signs of acute bleeding  Pt and family no longer want further intervention. No more blood draws, no more transfusions, no further intervention such as endoscopy  Pt is now full comfort care, no longer trending CBC  No current signs of active bleeding

## 2017-12-18 NOTE — PROGRESS NOTE ADULT - SUBJECTIVE AND OBJECTIVE BOX
The patient communicates little.  He remains with arms flexed to the upper chest with coarse tremor worse on the right than left.  He obeys very simple commands.  He appears comfortable and sleepy.    Examination of the head ears eyes nose and throat demonstrates a 2 x 2 centimeter by 1 cm fluctuant area on the patient's scalp just above the occiput and to the right of midline and the seizure surveillance leads bandaged to his head.  Examination of the neck reveals no palpable lymphadenopathy, jugular venous distention or thyroidomegaly.  The lungs are clear to percussion and auscultation.  The heart sounds are regular with no auscultatory evidence for murmur, rub or gallop.  The patient has no truncal obesity and there is no palpable or percussible hepatomegaly or splenomegaly.  The patient has no chronic venous stasis changes in the lower extremities.  Neurologically the patient has some response to  verbal command and when roused will only attempt to say no or yes. He is able to blink his eyes on command, open his mouth and stick out his tongue.  His left facial droop is reversible on command.  He is posturing in flexion with cogwheel rigidity.

## 2017-12-18 NOTE — PROGRESS NOTE ADULT - SUBJECTIVE AND OBJECTIVE BOX
CORIN PERDOMO   MRN-[id number]         CC: Patient is a 62y old  Male who presents with a chief complaint of AMS (04 Dec 2017 19:08) Daughter reports pt intermittently awake with spont. eye opening indicating appropriately to yes/no questions. Used ~10mg IV morphine/24 hrs    HPI:  62M with metastatic RCC (s/p nephrectomy, s/p craniotomy x2, s/p gamma knife x3 most recently march 2017, currently on Opdivo and Avastin with Dr. 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 Presenting to the ED brought in by wife for AMS. Patient is at bedside however unable to give history. Patient wife states that he was in his normal state of health until today when she was called by the home health aid stating that he had episodes of staring into space and not as responsive as he is on a regular basis. Patient denies HA, change in vision, cough, CP, abdominal pain, back pain, dysuria, hematuria, constipation, diarrhea.     In ED T 97.8 P100 /89 R18 O2Sat 97% RA, patient was seen and evaluated by Neuro (04 Dec 2017 19:08)    ROS:  UNABLE TO OBTAIN  due to: AMS    DYSPNEA (Y/N):	  N/V (Y/N):	  SECRETIONS (Y/N):	  AGITATION (Y/N):  PAIN(Y/N):        -Provocation/Palliation:     -Quality/Quantity:     -Radiating:     -Severity:     -Timing/Frequency:     -Impact on ADLs:     OTHER REVIEW OF SYSTEMS:  ALLERGIES:  No Known Allergies    OPIATE NAÏVE (Y/N): y  -iStop reviewed (Y/N): Y    MEDICATIONS: reviewed  MEDICATIONS  (STANDING):  BACItracin   Ointment 1 Application(s) Topical daily  BACItracin   Ointment 1 Application(s) Topical daily  dexamethasone  Injectable 4 milliGRAM(s) IV Push two times a day  fentaNYL   Patch  12 MICROgram(s)/Hr 1 Patch Transdermal every 72 hours  lacosamide IVPB 200 milliGRAM(s) IV Intermittent every 12 hours  latanoprost 0.005% Ophthalmic Solution 1 Drop(s) Both EYES at bedtime  levETIRAcetam  IVPB 500 milliGRAM(s) IV Intermittent every 12 hours    MEDICATIONS  (PRN):  morphine  - Injectable 2 milliGRAM(s) IV Push every 4 hours PRN Moderate Pain (4 - 6)    PHYSICAL EXAM:  T(C): --  T(F): --  HR: --  BP: --  RR: --  SpO2: --  Wt(kg): --  GENERAL: Awake, tracking very briefly when spoken to.  Appears fairly comfortable  HEENT: old cranial incisions, short episodes of spont. eye opening, no nasal discharge     	  CVS: no JVD          	  RESP: resp even and not labored      	  	  : incont.           	  MUSC: min. spont mov't to extremities noted      	  NEURO: sleeps mostly with intermittent periods of wakefulness    	  PSYCH: calm               LABS: reviewed  none new    IMAGING: reviewed  none new    ADVANCE DIRECTIVES:  DNR/DNI      Decision maker:  Legal surrogate: wife Leia [phone]    GOALS OF CARE DISCUSSION:  Palliative care info/counseling provided	      Family meeting-with wife, son, and daughter  Documentation of GOC: comfort, for inpt hospice    AGENCY CHOICE DISCUSSED:     Hospice  [facility]    REFERRALS:	   Palliative Med   Unit SW/Case Mgmt  -family reports family  visiting, declines need for hospital   Speech/Swallow  Music Therapy  Hospice  PT/OT    [ ]CRITICAL CARE TIME PROVIDED TO UNSTABLE PT W/ ORGAN FAILURE            [x]> 50% OF THE TIME SPENT IN COUNSELING AND COORDINATING CARE     [ ]PROLONGED SERVICE       FACE TO FACE: [x]PT     [x ]PT & FAMILY    Start:               End:  	       Minutes:

## 2017-12-18 NOTE — PROGRESS NOTE ADULT - PROBLEM SELECTOR PLAN 4
-Unknown etiology, based on urine studies likely 2/2 SIADH as patient's urine has urine osm>100 and urine Na>20  Resolved- sodium is 140  - no longer trending BMP

## 2017-12-18 NOTE — PROGRESS NOTE ADULT - PROBLEM SELECTOR PLAN 3
used ~10mg IV morphine/24 hrs, slightly less than ~equivalent of Fentanyl 12mcg TD.  Agree with starting Fentanyl 12mcg/hr TD q72h, cont. with prn morphine

## 2017-12-19 DIAGNOSIS — R40.1 STUPOR: ICD-10-CM

## 2017-12-19 PROCEDURE — 80048 BASIC METABOLIC PNL TOTAL CA: CPT

## 2017-12-19 PROCEDURE — 84300 ASSAY OF URINE SODIUM: CPT

## 2017-12-19 PROCEDURE — 96374 THER/PROPH/DIAG INJ IV PUSH: CPT

## 2017-12-19 PROCEDURE — 86901 BLOOD TYPING SEROLOGIC RH(D): CPT

## 2017-12-19 PROCEDURE — 82436 ASSAY OF URINE CHLORIDE: CPT

## 2017-12-19 PROCEDURE — 83735 ASSAY OF MAGNESIUM: CPT

## 2017-12-19 PROCEDURE — 86850 RBC ANTIBODY SCREEN: CPT

## 2017-12-19 PROCEDURE — 96375 TX/PRO/DX INJ NEW DRUG ADDON: CPT

## 2017-12-19 PROCEDURE — 97530 THERAPEUTIC ACTIVITIES: CPT

## 2017-12-19 PROCEDURE — 86900 BLOOD TYPING SEROLOGIC ABO: CPT

## 2017-12-19 PROCEDURE — 95951: CPT

## 2017-12-19 PROCEDURE — 97161 PT EVAL LOW COMPLEX 20 MIN: CPT

## 2017-12-19 PROCEDURE — 70450 CT HEAD/BRAIN W/O DYE: CPT

## 2017-12-19 PROCEDURE — 82962 GLUCOSE BLOOD TEST: CPT

## 2017-12-19 PROCEDURE — 83935 ASSAY OF URINE OSMOLALITY: CPT

## 2017-12-19 PROCEDURE — 99233 SBSQ HOSP IP/OBS HIGH 50: CPT

## 2017-12-19 PROCEDURE — 99285 EMERGENCY DEPT VISIT HI MDM: CPT | Mod: 25

## 2017-12-19 PROCEDURE — 82330 ASSAY OF CALCIUM: CPT

## 2017-12-19 PROCEDURE — 85027 COMPLETE CBC AUTOMATED: CPT

## 2017-12-19 PROCEDURE — 81001 URINALYSIS AUTO W/SCOPE: CPT

## 2017-12-19 PROCEDURE — 84132 ASSAY OF SERUM POTASSIUM: CPT

## 2017-12-19 PROCEDURE — 84540 ASSAY OF URINE/UREA-N: CPT

## 2017-12-19 PROCEDURE — 84443 ASSAY THYROID STIM HORMONE: CPT

## 2017-12-19 PROCEDURE — 93005 ELECTROCARDIOGRAM TRACING: CPT

## 2017-12-19 PROCEDURE — 85730 THROMBOPLASTIN TIME PARTIAL: CPT

## 2017-12-19 PROCEDURE — 70552 MRI BRAIN STEM W/DYE: CPT

## 2017-12-19 PROCEDURE — 82570 ASSAY OF URINE CREATININE: CPT

## 2017-12-19 PROCEDURE — 71045 X-RAY EXAM CHEST 1 VIEW: CPT

## 2017-12-19 PROCEDURE — 80053 COMPREHEN METABOLIC PANEL: CPT

## 2017-12-19 PROCEDURE — A9585: CPT

## 2017-12-19 PROCEDURE — 85610 PROTHROMBIN TIME: CPT

## 2017-12-19 PROCEDURE — 82803 BLOOD GASES ANY COMBINATION: CPT

## 2017-12-19 PROCEDURE — 84295 ASSAY OF SERUM SODIUM: CPT

## 2017-12-19 PROCEDURE — 36415 COLL VENOUS BLD VENIPUNCTURE: CPT

## 2017-12-19 PROCEDURE — 82040 ASSAY OF SERUM ALBUMIN: CPT

## 2017-12-19 PROCEDURE — 85025 COMPLETE CBC W/AUTO DIFF WBC: CPT

## 2017-12-19 PROCEDURE — 97116 GAIT TRAINING THERAPY: CPT

## 2017-12-19 PROCEDURE — 84100 ASSAY OF PHOSPHORUS: CPT

## 2017-12-19 RX ORDER — VALPROIC ACID (AS SODIUM SALT) 250 MG/5ML
250 SOLUTION, ORAL ORAL EVERY 8 HOURS
Qty: 0 | Refills: 0 | Status: DISCONTINUED | OUTPATIENT
Start: 2017-12-19 | End: 2017-12-22

## 2017-12-19 RX ORDER — MORPHINE SULFATE 50 MG/1
3 CAPSULE, EXTENDED RELEASE ORAL
Qty: 0 | Refills: 0 | Status: DISCONTINUED | OUTPATIENT
Start: 2017-12-19 | End: 2017-12-22

## 2017-12-19 RX ORDER — DIVALPROEX SODIUM 500 MG/1
250 TABLET, DELAYED RELEASE ORAL THREE TIMES A DAY
Qty: 0 | Refills: 0 | Status: DISCONTINUED | OUTPATIENT
Start: 2017-12-19 | End: 2017-12-19

## 2017-12-19 RX ADMIN — MORPHINE SULFATE 3 MILLIGRAM(S): 50 CAPSULE, EXTENDED RELEASE ORAL at 22:30

## 2017-12-19 RX ADMIN — Medication 52.5 MILLIGRAM(S): at 15:00

## 2017-12-19 RX ADMIN — MORPHINE SULFATE 2 MILLIGRAM(S): 50 CAPSULE, EXTENDED RELEASE ORAL at 06:41

## 2017-12-19 RX ADMIN — MORPHINE SULFATE 3 MILLIGRAM(S): 50 CAPSULE, EXTENDED RELEASE ORAL at 17:41

## 2017-12-19 RX ADMIN — MORPHINE SULFATE 3 MILLIGRAM(S): 50 CAPSULE, EXTENDED RELEASE ORAL at 18:02

## 2017-12-19 RX ADMIN — Medication 4 MILLIGRAM(S): at 05:34

## 2017-12-19 RX ADMIN — MORPHINE SULFATE 2 MILLIGRAM(S): 50 CAPSULE, EXTENDED RELEASE ORAL at 15:00

## 2017-12-19 RX ADMIN — LEVETIRACETAM 420 MILLIGRAM(S): 250 TABLET, FILM COATED ORAL at 05:34

## 2017-12-19 RX ADMIN — MORPHINE SULFATE 2 MILLIGRAM(S): 50 CAPSULE, EXTENDED RELEASE ORAL at 06:45

## 2017-12-19 RX ADMIN — MORPHINE SULFATE 2 MILLIGRAM(S): 50 CAPSULE, EXTENDED RELEASE ORAL at 16:37

## 2017-12-19 RX ADMIN — MORPHINE SULFATE 2 MILLIGRAM(S): 50 CAPSULE, EXTENDED RELEASE ORAL at 00:55

## 2017-12-19 RX ADMIN — LEVETIRACETAM 420 MILLIGRAM(S): 250 TABLET, FILM COATED ORAL at 17:41

## 2017-12-19 RX ADMIN — MORPHINE SULFATE 2 MILLIGRAM(S): 50 CAPSULE, EXTENDED RELEASE ORAL at 14:01

## 2017-12-19 RX ADMIN — LATANOPROST 1 DROP(S): 0.05 SOLUTION/ DROPS OPHTHALMIC; TOPICAL at 22:28

## 2017-12-19 RX ADMIN — MORPHINE SULFATE 2 MILLIGRAM(S): 50 CAPSULE, EXTENDED RELEASE ORAL at 12:00

## 2017-12-19 RX ADMIN — Medication 1 APPLICATION(S): at 12:00

## 2017-12-19 RX ADMIN — MORPHINE SULFATE 2 MILLIGRAM(S): 50 CAPSULE, EXTENDED RELEASE ORAL at 00:40

## 2017-12-19 RX ADMIN — Medication 4 MILLIGRAM(S): at 17:41

## 2017-12-19 RX ADMIN — Medication 52.5 MILLIGRAM(S): at 22:28

## 2017-12-19 NOTE — PROGRESS NOTE ADULT - ASSESSMENT
The patient's family appear comfortable with hospice but the volume of tumor is very small and there may be a long process of dying.

## 2017-12-19 NOTE — PROGRESS NOTE ADULT - SUBJECTIVE AND OBJECTIVE BOX
INTERVAL HPI/OVERNIGHT EVENTS:  Patient was seen and examined at bedside. As per nurse and patient, no o/n events, patient resting comfortably. No complaints at this time. Patient denies: fever, chills, dizziness, weakness, HA, Changes in vision, CP, palpitations, SOB, cough, N/V/D/C, dysuria, changes in bowel movements, LE edema by squeezing hand.     VITAL SIGNS:  T(F): --  HR: --  BP: --  RR: --  SpO2: --  Wt(kg): --    PHYSICAL EXAM:    Constitutional: WDWN, NAD  Neck: supple, trachea midline  Respiratory: CTA b/l, good air entry b/l, no wheezing, no rhonchi, no rales, without accessory muscle use and no intercostal retractions  Cardiovascular: RRR, normal S1S2, no M/R/G  Gastrointestinal: soft, NTND, no masses palpable, BS normal  Extremities: arms contracted with mild tremor present   Neurological: can follow simple commands  Skin: Normal temperature, warm, dry    MEDICATIONS  (STANDING):  BACItracin   Ointment 1 Application(s) Topical daily  BACItracin   Ointment 1 Application(s) Topical daily  dexamethasone  Injectable 4 milliGRAM(s) IV Push two times a day  fentaNYL   Patch  12 MICROgram(s)/Hr 1 Patch Transdermal every 72 hours  lacosamide IVPB 200 milliGRAM(s) IV Intermittent every 12 hours  latanoprost 0.005% Ophthalmic Solution 1 Drop(s) Both EYES at bedtime  levETIRAcetam  IVPB 500 milliGRAM(s) IV Intermittent every 12 hours    MEDICATIONS  (PRN):  morphine  - Injectable 2 milliGRAM(s) IV Push every 1 hour PRN Moderate Pain (4 - 6)      Allergies    No Known Allergies    Intolerances        LABS:                RADIOLOGY & ADDITIONAL TESTS:

## 2017-12-19 NOTE — PROGRESS NOTE ADULT - SUBJECTIVE AND OBJECTIVE BOX
CORIN PERDOMO   MRN-[id number]         CC: Patient is a 62y old  Male who presents with a chief complaint of AMS (04 Dec 2017 19:08) No symptom related complaints from son from overnight. PRN morphine usage dropped significantly since Fentanyl TD placed yesterday ~11A took effect    HPI:  62M with metastatic RCC (s/p nephrectomy, s/p craniotomy x2, s/p gamma knife x3 most recently march 2017, currently on Opdivo and Avastin with Dr. 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 Presenting to the ED brought in by wife for AMS. Patient is at bedside however unable to give history. Patient wife states that he was in his normal state of health until today when she was called by the home health aid stating that he had episodes of staring into space and not as responsive as he is on a regular basis. Patient denies HA, change in vision, cough, CP, abdominal pain, back pain, dysuria, hematuria, constipation, diarrhea.     In ED T 97.8 P100 /89 R18 O2Sat 97% RA, patient was seen and evaluated by Neuro (04 Dec 2017 19:08)    ROS:  UNABLE TO OBTAIN  due to: stupor    DYSPNEA (Y/N):	  N/V (Y/N):	  SECRETIONS (Y/N):	  AGITATION (Y/N):  PAIN(Y/N):        -Provocation/Palliation:     -Quality/Quantity:     -Radiating:     -Severity:     -Timing/Frequency:     -Impact on ADLs:     OTHER REVIEW OF SYSTEMS:  ALLERGIES:  No Known Allergies    OPIATE NAÏVE (Y/N): y  -iStop reviewed (Y/N): Y    MEDICATIONS: reviewed  MEDICATIONS  (STANDING):  BACItracin   Ointment 1 Application(s) Topical daily  BACItracin   Ointment 1 Application(s) Topical daily  dexamethasone  Injectable 4 milliGRAM(s) IV Push two times a day  fentaNYL   Patch  12 MICROgram(s)/Hr 1 Patch Transdermal every 72 hours  lacosamide IVPB 200 milliGRAM(s) IV Intermittent every 12 hours  latanoprost 0.005% Ophthalmic Solution 1 Drop(s) Both EYES at bedtime  levETIRAcetam  IVPB 500 milliGRAM(s) IV Intermittent every 12 hours    MEDICATIONS  (PRN):  morphine  - Injectable 2 milliGRAM(s) IV Push every 1 hour PRN Moderate Pain (4 - 6)    PHYSICAL EXAM:  T(C): --  T(F): --  HR: --  BP: --  RR: --  SpO2: --  Wt(kg): --    GENERAL: Sleeping upon entering, appearing very comfortable.  Son at bedside   HEENT: min. OU opening with verbal stimuli      	     	  RESP: RA, resp even and not labored       	            	  : incont.           	  MUSC: no spont. mov't to extremities noted during this assessment      	  NEURO: +stupor   	  PSYCH: +stupor           LABS: reviewed  none new    IMAGING: reviewed  none new    ADVANCE DIRECTIVES:  DNR/DNI      Decision maker:  Legal surrogate: wife Leia [phone]    GOALS OF CARE DISCUSSION:  Palliative care info/counseling provided	      Documentation of GOC: comfort, for inpt hospice    AGENCY CHOICE DISCUSSED:     Hospice  [facility]    REFERRALS:	   Palliative Med   Unit SW/Case Mgmt  -family reports family  visiting, declines need for hospital   Speech/Swallow  Music Therapy  Hospice  PT/OT    [ ]CRITICAL CARE TIME PROVIDED TO UNSTABLE PT W/ ORGAN FAILURE            [x]> 50% OF THE TIME SPENT IN COUNSELING AND COORDINATING CARE     [ ]PROLONGED SERVICE       FACE TO FACE: [x]PT     [x ]PT & FAMILY    Start:               End:  	       Minutes:

## 2017-12-19 NOTE — PROGRESS NOTE ADULT - ASSESSMENT
63 yo M with PMHx of Renal Cell Carcinoma with mets presenting to the ED with altered mental status, admitted to [facility] for further management.

## 2017-12-19 NOTE — PROGRESS NOTE ADULT - SUBJECTIVE AND OBJECTIVE BOX
n Neurology Follow up note    Name  CORIN PERDOMO    HPI:  62M with metastatic RCC (s/p nephrectomy, s/p craniotomy x2, s/p gamma knife x3 most recently march 2017, currently on Opdivo and Avastin with Dr. 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 Presenting to the ED brought in by wife for AMS. Patient is at bedside however unable to give history. Patient wife states that he was in his normal state of health until today when she was called by the home health aid stating that he had episodes of staring into space and not as responsive as he is on a regular basis. Patient denies HA, change in vision, cough, CP, abdominal pain, back pain, dysuria, hematuria, constipation, diarrhea.     In ED T 97.8 P100 /89 R18 O2Sat 97% RA, patient was seen and evaluated by Neuro (04 Dec 2017 19:08)      Interval History - lethargic - non verbal- responding to voice        REVIEW OF SYSTEMS    Vital Signs Last 24 Hrs  T(C): --  T(F): --  HR: --  BP: --  BP(mean): --  RR: --  SpO2: --    Physical Exam-     Mental Status- lethargic    Cranial Nerves- no diplopia    Gait and station-n/a    Motor- decrease all 4 extremities    Reflexes-decreased    Sensation-n/a    Coordination-no abnormal postures    Vascular -    Medications  BACItracin   Ointment 1 Application(s) Topical daily  BACItracin   Ointment 1 Application(s) Topical daily  dexamethasone  Injectable 4 milliGRAM(s) IV Push two times a day  fentaNYL   Patch  12 MICROgram(s)/Hr 1 Patch Transdermal every 72 hours  lacosamide IVPB 200 milliGRAM(s) IV Intermittent every 12 hours  latanoprost 0.005% Ophthalmic Solution 1 Drop(s) Both EYES at bedtime  levETIRAcetam  IVPB 500 milliGRAM(s) IV Intermittent every 12 hours  morphine  - Injectable 2 milliGRAM(s) IV Push every 1 hour PRN      Lab      Radiology    Assessment- Brain mets and radiation changes    Plan- Comfort care

## 2017-12-19 NOTE — PROGRESS NOTE ADULT - PROBLEM SELECTOR PLAN 4
comfort measures only, await hospice placement. DNR/DNI.     -As identified during the patients PSSA screening the patient would benefit from: Music Therapy and Palliative Care SW.  Family declines any hospital  visit, states family  has been and will cont. to visit. Support provided to family. Patient to have access to supportive services during rest of hospital stay as the pt/family deems necessary i.e. Chaplaincy, Massage Therapy, Music Therapy, Pt/family supportive services, Palliative SW, etc.

## 2017-12-19 NOTE — CHART NOTE - NSCHARTNOTEFT_GEN_A_CORE
Admitting Diagnosis:   63 y/o gentleman with h/o metastatic RCC to brain and lung s/p crani x2, gamma knife (most recently 3/2017), and Avastin/opdivo, right nephrectomy, tonsillectomy, glaucoma, and HTN, presenting from home with AMS and progressive functional/mental decline. Pt is now comfort measures only.       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 metastases  Malignant neoplasm of kidney: Renal cell cancer  Glaucoma: Dx 2014, s/p b/l surgical repair  Essential hypertension: HTN (hypertension)  History of nephrectomy, unilateral: right nephrectomy  Other postprocedural status: S/P craniotomy  Other postprocedural status: S/P tonsillectomy      Current Nutrition Order: NPO     PO Intake: Good (%) [   ]  Fair (50-75%) [   ] Poor (<25%) [   ] N/A - NPO     GI Issues: No n/v/d/c     Pain: Pt comfortable     Skin Integrity: WDL     Labs:         CAPILLARY BLOOD GLUCOSE          Medications:  MEDICATIONS  (STANDING):  BACItracin   Ointment 1 Application(s) Topical daily  BACItracin   Ointment 1 Application(s) Topical daily  dexamethasone  Injectable 4 milliGRAM(s) IV Push two times a day  fentaNYL   Patch  12 MICROgram(s)/Hr 1 Patch Transdermal every 72 hours  latanoprost 0.005% Ophthalmic Solution 1 Drop(s) Both EYES at bedtime  levETIRAcetam  IVPB 500 milliGRAM(s) IV Intermittent every 12 hours  valproate sodium IVPB 250 milliGRAM(s) IV Intermittent every 8 hours    MEDICATIONS  (PRN):  morphine  - Injectable 2 milliGRAM(s) IV Push every 1 hour PRN Moderate Pain (4 - 6)      Weight: 71.7 kg       Weight Change: No new wt to assess     Estimated energy needs:     Estimated energy needs: moderate protein needs 2/2 CKD; increased tomer 2/2 cancer   Estimated Energy Needs (30-35 calories/kg):  · Weight  (lbs)	158 lb  · Weight (kg)	71.6 kg  · From (30 tomer/kg)	2148  · To (35 calories/kg)	2506     Estimated Protein Needs (0.75-1.0 gm/kg):  · Weight  (lbs)	158  · Weight (kg)	71.6 kg  · From (0.75 g/kg)	53.7  · To (1 g/kg)	71.6     Estimated Fluid Needs (25-30 ml/kg):  · Weight  (lbs)	158  · Weight (kg)	71.6  · From (25 ml/kg)	1790  · To (30 ml/kg)	2148        Subjective: Pt now comfort measures only. Currently NPO as was unable to tolerated food PO.     Previous Nutrition Diagnosis:  Self-feeding difficulty R/T change in MS/Mets to brain AEB: requires total assistance with feeding    Active [   ]  Resolved [ x  ] Not pertinent as pt is now comfort care and NPO     If resolved, new PES: No new dx     Goal: 1.) Nutrition goals and interventions will remain consistent with GOC     Recommendations: Continue current plan.     Education: Not appropriate for education     Risk Level: High [   ] Moderate [ X  ] Low [   ]

## 2017-12-19 NOTE — PROGRESS NOTE ADULT - SUBJECTIVE AND OBJECTIVE BOX
The patient remains in a somnolent state but is arousable but rapidly falls into sleep    .Examination of the head ears eyes nose and throat demonstrates a 2 x 2 centimeter by 1 cm fluctuant area on the patient's scalp just above the occiput and to the right of midline.  Examination of the neck reveals no palpable lymphadenopathy, jugular venous distention or thyroidomegaly.  The lungs are clear to percussion and auscultation.  The heart sounds are regular with no auscultatory evidence for murmur, rub or gallop.  The patient has no truncal obesity and there is no palpable or percussible hepatomegaly or splenomegaly.  The patient has no chronic venous stasis changes in the lower extremities.  Neurologically the patient has some response to  verbal command and when roused will only attempt to say no or yes. He is able to blink his eyes on command, open his mouth and stick out his tongue.  His left facial droop is reversible on command.  He is posturing in flexion with cogwheel rigidity.

## 2017-12-19 NOTE — PROGRESS NOTE ADULT - ASSESSMENT
61 y/o gentleman with h/o metastatic RCC to brain and lung s/p crani x2, gamma knife (most recently 3/2017), and Avastin/opdivo, right nephrectomy, tonsillectomy, glaucoma, and HTN, presenting from home with AMS and progressive functional/mental decline notable even during this admit, seen initially for family support, dying, for comfort measures only

## 2017-12-20 RX ORDER — VALPROIC ACID (AS SODIUM SALT) 250 MG/5ML
2.5 SOLUTION, ORAL ORAL
Qty: 0 | Refills: 0 | COMMUNITY
Start: 2017-12-20

## 2017-12-20 RX ORDER — FENTANYL CITRATE 50 UG/ML
1 INJECTION INTRAVENOUS
Qty: 0 | Refills: 0 | COMMUNITY
Start: 2017-12-20

## 2017-12-20 RX ORDER — DEXAMETHASONE 0.5 MG/5ML
4 ELIXIR ORAL
Qty: 0 | Refills: 0 | COMMUNITY
Start: 2017-12-20

## 2017-12-20 RX ORDER — DEXAMETHASONE 0.5 MG/5ML
1 ELIXIR ORAL
Qty: 0 | Refills: 0 | COMMUNITY

## 2017-12-20 RX ADMIN — MORPHINE SULFATE 3 MILLIGRAM(S): 50 CAPSULE, EXTENDED RELEASE ORAL at 00:29

## 2017-12-20 RX ADMIN — LATANOPROST 1 DROP(S): 0.05 SOLUTION/ DROPS OPHTHALMIC; TOPICAL at 22:42

## 2017-12-20 RX ADMIN — Medication 1 APPLICATION(S): at 14:53

## 2017-12-20 RX ADMIN — Medication 52.5 MILLIGRAM(S): at 14:53

## 2017-12-20 RX ADMIN — MORPHINE SULFATE 3 MILLIGRAM(S): 50 CAPSULE, EXTENDED RELEASE ORAL at 12:18

## 2017-12-20 RX ADMIN — MORPHINE SULFATE 3 MILLIGRAM(S): 50 CAPSULE, EXTENDED RELEASE ORAL at 07:24

## 2017-12-20 RX ADMIN — LEVETIRACETAM 420 MILLIGRAM(S): 250 TABLET, FILM COATED ORAL at 18:10

## 2017-12-20 RX ADMIN — MORPHINE SULFATE 3 MILLIGRAM(S): 50 CAPSULE, EXTENDED RELEASE ORAL at 12:03

## 2017-12-20 RX ADMIN — MORPHINE SULFATE 3 MILLIGRAM(S): 50 CAPSULE, EXTENDED RELEASE ORAL at 07:49

## 2017-12-20 RX ADMIN — MORPHINE SULFATE 3 MILLIGRAM(S): 50 CAPSULE, EXTENDED RELEASE ORAL at 18:09

## 2017-12-20 RX ADMIN — Medication 4 MILLIGRAM(S): at 18:09

## 2017-12-20 RX ADMIN — Medication 52.5 MILLIGRAM(S): at 22:42

## 2017-12-20 RX ADMIN — LEVETIRACETAM 420 MILLIGRAM(S): 250 TABLET, FILM COATED ORAL at 06:50

## 2017-12-20 RX ADMIN — Medication 4 MILLIGRAM(S): at 06:51

## 2017-12-20 RX ADMIN — Medication 52.5 MILLIGRAM(S): at 06:49

## 2017-12-20 RX ADMIN — MORPHINE SULFATE 3 MILLIGRAM(S): 50 CAPSULE, EXTENDED RELEASE ORAL at 10:13

## 2017-12-20 RX ADMIN — MORPHINE SULFATE 3 MILLIGRAM(S): 50 CAPSULE, EXTENDED RELEASE ORAL at 10:40

## 2017-12-20 RX ADMIN — MORPHINE SULFATE 3 MILLIGRAM(S): 50 CAPSULE, EXTENDED RELEASE ORAL at 18:39

## 2017-12-20 RX ADMIN — MORPHINE SULFATE 3 MILLIGRAM(S): 50 CAPSULE, EXTENDED RELEASE ORAL at 20:46

## 2017-12-20 RX ADMIN — MORPHINE SULFATE 3 MILLIGRAM(S): 50 CAPSULE, EXTENDED RELEASE ORAL at 21:05

## 2017-12-20 NOTE — PROGRESS NOTE ADULT - SUBJECTIVE AND OBJECTIVE BOX
The patient is more alert today.  The patient actually responds partially to commands.    .Examination of the head ears eyes nose and throat demonstrates a 2 x 2 centimeter by 1 cm fluctuant area on the patient's scalp just above the occiput and to the right of midline.  Examination of the neck reveals no palpable lymphadenopathy, jugular venous distention or thyroidomegaly.  The lungs are clear to percussion and auscultation.  The heart sounds are regular with no auscultatory evidence for murmur, rub or gallop.  The patient has no truncal obesity and there is no palpable or percussible hepatomegaly or splenomegaly.  The patient has no chronic venous stasis changes in the lower extremities.  Neurologically the patient has some response to  verbal command and when roused will only attempt to say no or yes. He is able to blink his eyes on command, open his mouth and stick out his tongue.  His left facial droop is reversible on command.  He is posturing in flexion with cogwheel rigidity.

## 2017-12-20 NOTE — PROGRESS NOTE ADULT - ASSESSMENT
The patient will be going to hospice with comfort measures.  Although he does not complain of pain or up here to be feeling pain he is receiving morphine on a regular basis.

## 2017-12-20 NOTE — PROGRESS NOTE ADULT - ASSESSMENT
61 yo M with PMHx of Renal Cell Carcinoma with mets presenting to the ED with altered mental status, admitted to [email or web address] for further management.

## 2017-12-20 NOTE — PROGRESS NOTE ADULT - SUBJECTIVE AND OBJECTIVE BOX
Neurology Follow up note    Name  CORIN PERDOMO    HPI:  62M with metastatic RCC (s/p nephrectomy, s/p craniotomy x2, s/p gamma knife x3 most recently march 2017, currently on Opdivo and Avastin with Dr. 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 Presenting to the ED brought in by wife for AMS. Patient is at bedside however unable to give history. Patient wife states that he was in his normal state of health until today when she was called by the home health aid stating that he had episodes of staring into space and not as responsive as he is on a regular basis. Patient denies HA, change in vision, cough, CP, abdominal pain, back pain, dysuria, hematuria, constipation, diarrhea.     In ED T 97.8 P100 /89 R18 O2Sat 97% RA, patient was seen and evaluated by Neuro (04 Dec 2017 19:08)      Interval History -lethargy- follows simple commands        REVIEW OF SYSTEMS    Vital Signs Last 24 Hrs  T(C): --  T(F): --  HR: --  BP: --  BP(mean): --  RR: --  SpO2: --    Physical Exam-     Mental Status- awake - aphasia    Cranial Nerves- full EOM    Gait and station-n/a    Motor- decrease movement    Reflexes-decreased    Sensation-no sensory level    Coordination-no tremors    Vascular -    Medications  BACItracin   Ointment 1 Application(s) Topical daily  BACItracin   Ointment 1 Application(s) Topical daily  dexamethasone  Injectable 4 milliGRAM(s) IV Push two times a day  fentaNYL   Patch  12 MICROgram(s)/Hr 1 Patch Transdermal every 72 hours  latanoprost 0.005% Ophthalmic Solution 1 Drop(s) Both EYES at bedtime  levETIRAcetam  IVPB 500 milliGRAM(s) IV Intermittent every 12 hours  morphine  - Injectable 3 milliGRAM(s) IV Push every 1 hour PRN  valproate sodium IVPB 250 milliGRAM(s) IV Intermittent every 8 hours      Lab      Radiology    Assessment- Brain mets- radiation necrosis    Plan- comfort care

## 2017-12-20 NOTE — PROGRESS NOTE ADULT - SUBJECTIVE AND OBJECTIVE BOX
INTERVAL HPI/OVERNIGHT EVENTS:  Patient was seen and examined at bedside. As per nurse and patient, no o/n events, patient resting comfortably.Unable to obtain ROS    VITAL SIGNS:  T(F): --  HR: --  BP: --  RR: --  SpO2: --  Wt(kg): --    PHYSICAL EXAM:    Constitutional: WDWN, NAD  Neck: supple, trachea midline  Respiratory: CTA b/l, good air entry b/l, no wheezing, no rhonchi, no rales  Cardiovascular: RRR, normal S1S2, no M/R/G  Gastrointestinal: soft, NTND, no masses palpable, BS normal  Extremities: arms contracted with mild tremor present   Neurological: can follow simple commands  Skin: Normal temperature, warm, dry    MEDICATIONS  (STANDING):  BACItracin   Ointment 1 Application(s) Topical daily  BACItracin   Ointment 1 Application(s) Topical daily  dexamethasone  Injectable 4 milliGRAM(s) IV Push two times a day  fentaNYL   Patch  12 MICROgram(s)/Hr 1 Patch Transdermal every 72 hours  latanoprost 0.005% Ophthalmic Solution 1 Drop(s) Both EYES at bedtime  levETIRAcetam  IVPB 500 milliGRAM(s) IV Intermittent every 12 hours  valproate sodium IVPB 250 milliGRAM(s) IV Intermittent every 8 hours    MEDICATIONS  (PRN):  morphine  - Injectable 3 milliGRAM(s) IV Push every 1 hour PRN Moderate Pain (4 - 6)      Allergies    No Known Allergies    Intolerances        LABS:                RADIOLOGY & ADDITIONAL TESTS:

## 2017-12-21 RX ADMIN — MORPHINE SULFATE 3 MILLIGRAM(S): 50 CAPSULE, EXTENDED RELEASE ORAL at 18:33

## 2017-12-21 RX ADMIN — MORPHINE SULFATE 3 MILLIGRAM(S): 50 CAPSULE, EXTENDED RELEASE ORAL at 11:05

## 2017-12-21 RX ADMIN — MORPHINE SULFATE 3 MILLIGRAM(S): 50 CAPSULE, EXTENDED RELEASE ORAL at 07:25

## 2017-12-21 RX ADMIN — MORPHINE SULFATE 3 MILLIGRAM(S): 50 CAPSULE, EXTENDED RELEASE ORAL at 00:33

## 2017-12-21 RX ADMIN — MORPHINE SULFATE 3 MILLIGRAM(S): 50 CAPSULE, EXTENDED RELEASE ORAL at 15:30

## 2017-12-21 RX ADMIN — Medication 1 APPLICATION(S): at 13:25

## 2017-12-21 RX ADMIN — FENTANYL CITRATE 1 PATCH: 50 INJECTION INTRAVENOUS at 11:05

## 2017-12-21 RX ADMIN — MORPHINE SULFATE 3 MILLIGRAM(S): 50 CAPSULE, EXTENDED RELEASE ORAL at 18:12

## 2017-12-21 RX ADMIN — LATANOPROST 1 DROP(S): 0.05 SOLUTION/ DROPS OPHTHALMIC; TOPICAL at 22:41

## 2017-12-21 RX ADMIN — MORPHINE SULFATE 3 MILLIGRAM(S): 50 CAPSULE, EXTENDED RELEASE ORAL at 20:30

## 2017-12-21 RX ADMIN — Medication 52.5 MILLIGRAM(S): at 06:45

## 2017-12-21 RX ADMIN — Medication 4 MILLIGRAM(S): at 18:23

## 2017-12-21 RX ADMIN — MORPHINE SULFATE 3 MILLIGRAM(S): 50 CAPSULE, EXTENDED RELEASE ORAL at 11:07

## 2017-12-21 RX ADMIN — LEVETIRACETAM 420 MILLIGRAM(S): 250 TABLET, FILM COATED ORAL at 18:23

## 2017-12-21 RX ADMIN — MORPHINE SULFATE 3 MILLIGRAM(S): 50 CAPSULE, EXTENDED RELEASE ORAL at 17:23

## 2017-12-21 RX ADMIN — MORPHINE SULFATE 3 MILLIGRAM(S): 50 CAPSULE, EXTENDED RELEASE ORAL at 14:15

## 2017-12-21 RX ADMIN — MORPHINE SULFATE 3 MILLIGRAM(S): 50 CAPSULE, EXTENDED RELEASE ORAL at 20:04

## 2017-12-21 RX ADMIN — MORPHINE SULFATE 3 MILLIGRAM(S): 50 CAPSULE, EXTENDED RELEASE ORAL at 13:47

## 2017-12-21 RX ADMIN — Medication 4 MILLIGRAM(S): at 06:45

## 2017-12-21 RX ADMIN — LEVETIRACETAM 420 MILLIGRAM(S): 250 TABLET, FILM COATED ORAL at 06:45

## 2017-12-21 RX ADMIN — MORPHINE SULFATE 3 MILLIGRAM(S): 50 CAPSULE, EXTENDED RELEASE ORAL at 07:09

## 2017-12-21 RX ADMIN — MORPHINE SULFATE 3 MILLIGRAM(S): 50 CAPSULE, EXTENDED RELEASE ORAL at 09:47

## 2017-12-21 RX ADMIN — MORPHINE SULFATE 3 MILLIGRAM(S): 50 CAPSULE, EXTENDED RELEASE ORAL at 11:10

## 2017-12-21 RX ADMIN — Medication 52.5 MILLIGRAM(S): at 22:41

## 2017-12-21 RX ADMIN — Medication 52.5 MILLIGRAM(S): at 13:25

## 2017-12-21 RX ADMIN — MORPHINE SULFATE 3 MILLIGRAM(S): 50 CAPSULE, EXTENDED RELEASE ORAL at 00:15

## 2017-12-21 NOTE — PROGRESS NOTE ADULT - PROBLEM SELECTOR PLAN 10
Discussion with family needed to decide what goals of care are   - long term goals?  - will speak to family before reconsulting palliative
face to face mtg from [phone] with family (wife, son, and daughter).  I discussed pt's overall worsening clinical course even since admit and they shared their concerns for pt being subjected to further burdensome testing/treatment.  Per son, pt was a very talkative person who "loved life and valued his quality of life" which family feels pt still had some prior to admit.  All state that pt would not want to cont. living in his current state.  Wife states, "we must stop all of this and focus on his comfort".   - NO further labs/venipunctures, tests, and anything that will not contribute to his comfort, including but limited to endoscopy and feeding tubes.    - in favor of hospice placement   -d/c all meds not contributing to comfort
face to face mtg from [phone] with family (wife, son, and daughter).  I discussed pt's overall worsening clinical course even since admit and they shared their concerns for pt being subjected to further burdensome testing/treatment.  Per son, pt was a very talkative person who "loved life and valued his quality of life" which family feels pt still had some prior to admit.  All state that pt would not want to cont. living in his current state.  Wife states, "we must stop all of this and focus on his comfort".   - NO further labs/venipunctures, tests, and anything that will not contribute to his comfort, including but limited to endoscopy and feeding tubes.    - in favor of hospice placement   -d/c all meds not contributing to comfort
face to face mtg from [phone] with family (wife, son, and daughter).  I discussed pt's overall worsening clinical course even since admit and they shared their concerns for pt being subjected to further burdensome testing/treatment.  Per son, pt was a very talkative person who "loved life and valued his quality of life" which family feels pt still had some prior to admit.  All state that pt would not want to cont. living in his current state.  Wife states, "we must stop all of this and focus on his comfort".   - NO further labs/venipunctures, tests, and anything that will not contribute to his comfort, including but limited to endoscopy and feeding tubes.    - in favor of hospice placement   -d/c all meds not contributing to comfort
face to face mtg from [phone] with family (wife, son, and daughter).  I discussed pt's overall worsening clinical course even since admit and they shared their concerns for pt being subjected to further burdensome testing/treatment.  Per son, pt was a very talkative person who "loved life and valued his quality of life" which family feels pt still had some prior to admit.  All state that pt would not want to cont. living in his current state.  Wife states, "we must stop all of this and focus on his comfort".   - NO further labs/venipunctures, tests, and anything that will not contribute to his comfort, including but limited to endoscopy and feeding tubes.    - in favor of hospice placement   -d/c all meds not contributing to comfort
face to face mtg from [phone] with family (wife, son, and daughter).  I discussed pt's overall worsening clinical course even since admit and they shared their concerns for pt being subjected to further burdensome testing/treatment.  Per son, pt was a very talkative person who "loved life and valued his quality of life" which family feels pt still had some prior to admit.  All state that pt would not want to cont. living in his current state.  Wife states, "we must stop all of this and focus on his comfort".   - NO further labs/venipunctures, tests, and anything that will not contribute to his comfort, including but limited to endoscopy and feeding tubes.    - in favor of hospice placement   -d/c all meds not contributing to comfort
face to face mtg from [phone] with family (wife, son, and daughter).  I discussed pt's overall worsening clinical course even since admit and they shared their concerns for pt being subjected to further burdensome testing/treatment.  Per son, pt was a very talkative person who "loved life and valued his quality of life" which family feels pt still had some prior to admit.  All state that pt would not want to cont. living in his current state.  Wife states, "we must stop all of this and focus on his comfort".   - NO further labs/venipunctures, tests, and anything that will not contribute to his comfort, including but limited to endoscopy and feeding tubes.    - in favor of hospice placement   -d/c all meds not contributing to comfort
Discussion with family needed to decide what goals of care are   - dnr/dni?  - long term goals?  - will speak to family before reconsulting palliative

## 2017-12-21 NOTE — PROGRESS NOTE ADULT - SUBJECTIVE AND OBJECTIVE BOX
The patient is clearly more alert today.  As I entered the room he saw me and said "I am coming back".    Examination of the head ears eyes nose and throat demonstrates a 1.5x1.5 centimeter by 1 cm fluctuant area on the patient's scalp just above the occiput and to the right of midline.  Examination of the neck reveals no palpable lymphadenopathy, jugular venous distention or thyroidomegaly.  The lungs are clear to percussion and auscultation.  The heart sounds are regular with no auscultatory evidence for murmur, rub or gallop.  The patient has no truncal obesity and there is no palpable or percussible hepatomegaly or splenomegaly.  The patient has no chronic venous stasis changes in the lower extremities.  Neurologically the patient has some response to  verbal command and when roused will only attempt to say no or yes. He is able to blink his eyes on command, open his mouth and stick out his tongue.  His left facial droop is reversible on command.  He is posturing in flexion with cogwheel rigidity.

## 2017-12-21 NOTE — PROGRESS NOTE ADULT - PROBLEM SELECTOR PROBLEM 6
Malignant neoplasm of kidney
Weakness
Nutrition, metabolism, and development symptoms
Malignant neoplasm of kidney
Nutrition, metabolism, and development symptoms
Weakness
Weakness
Malignant neoplasm of kidney
Nutrition, metabolism, and development symptoms

## 2017-12-21 NOTE — PROGRESS NOTE ADULT - SUBJECTIVE AND OBJECTIVE BOX
Neurology Follow up note    Name  CORIN PERDOMO    HPI:  62M with metastatic RCC (s/p nephrectomy, s/p craniotomy x2, s/p gamma knife x3 most recently march 2017, currently on Opdivo and Avastin with Dr. 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 Presenting to the ED brought in by wife for AMS. Patient is at bedside however unable to give history. Patient wife states that he was in his normal state of health until today when she was called by the home health aid stating that he had episodes of staring into space and not as responsive as he is on a regular basis. Patient denies HA, change in vision, cough, CP, abdominal pain, back pain, dysuria, hematuria, constipation, diarrhea.     In ED T 97.8 P100 /89 R18 O2Sat 97% RA, patient was seen and evaluated by Neuro (04 Dec 2017 19:08)      Interval History -no change in neuro status- lethargic        REVIEW OF SYSTEMS    Vital Signs Last 24 Hrs  T(C): --  T(F): --  HR: --  BP: --  BP(mean): --  RR: --  SpO2: --    Physical Exam-     Mental Status- follows simple commands- aphasia    Cranial Nerves- full EOM    Gait and station-n/a    Motor-decrease strength    Reflexes- decreased    Sensation-no sensory level    Coordination-no tremors    Vascular -    Medications  BACItracin   Ointment 1 Application(s) Topical daily  BACItracin   Ointment 1 Application(s) Topical daily  dexamethasone  Injectable 4 milliGRAM(s) IV Push two times a day  fentaNYL   Patch  12 MICROgram(s)/Hr 1 Patch Transdermal every 72 hours  latanoprost 0.005% Ophthalmic Solution 1 Drop(s) Both EYES at bedtime  levETIRAcetam  IVPB 500 milliGRAM(s) IV Intermittent every 12 hours  morphine  - Injectable 3 milliGRAM(s) IV Push every 1 hour PRN  valproate sodium IVPB 250 milliGRAM(s) IV Intermittent every 8 hours      Lab      Radiology    Assessment- Brain mets- radiation changes    Plan- await hospice

## 2017-12-21 NOTE — PROGRESS NOTE ADULT - PROBLEM SELECTOR PLAN 3
-Unknown etiology. Differentials could include PE (however no hypoxia, no tachypnea) vs infection (however no fever and WBC elevated likely 2/2 steroids) vs dehydration vs acute blood loss as Hgb dropped by 2 points  -Echo shows severe concentric left ventricular hypertrophy, no pericardial effusion seen  - will monitor vitals as family requests, currently denying all vitals

## 2017-12-21 NOTE — PROGRESS NOTE ADULT - SUBJECTIVE AND OBJECTIVE BOX
INTERVAL HPI/OVERNIGHT EVENTS:  Patient was seen and examined at bedside. As per nurse and patient, no o/n events, patient resting comfortably. Unable to obtain ROS, however pt eyes are more open today and can follow simple commands    VITAL SIGNS:  T(F): --  HR: --  BP: --  RR: --  SpO2: --  Wt(kg): --    PHYSICAL EXAM:    Constitutional: WDWN, NAD  Neck: supple, trachea midline  Respiratory: CTA b/l, good air entry b/l, no wheezing, no rhonchi, no rales  Cardiovascular: RRR, normal S1S2, no M/R/G  Gastrointestinal: soft, NTND, no masses palpable, BS normal  Extremities: arms contracted   Neurological: can follow simple commands  Skin: Normal temperature, warm, dry    MEDICATIONS  (STANDING):  BACItracin   Ointment 1 Application(s) Topical daily  BACItracin   Ointment 1 Application(s) Topical daily  dexamethasone  Injectable 4 milliGRAM(s) IV Push two times a day  fentaNYL   Patch  12 MICROgram(s)/Hr 1 Patch Transdermal every 72 hours  latanoprost 0.005% Ophthalmic Solution 1 Drop(s) Both EYES at bedtime  levETIRAcetam  IVPB 500 milliGRAM(s) IV Intermittent every 12 hours  valproate sodium IVPB 250 milliGRAM(s) IV Intermittent every 8 hours    MEDICATIONS  (PRN):  morphine  - Injectable 3 milliGRAM(s) IV Push every 1 hour PRN Moderate Pain (4 - 6)      Allergies    No Known Allergies    Intolerances        LABS:                RADIOLOGY & ADDITIONAL TESTS:

## 2017-12-21 NOTE — PROGRESS NOTE ADULT - ASSESSMENT
The patient feels comfortable acceptable or no pain.  He may have to spend substantial time on hospice although not eating.  He occasionally has sips of coffee.

## 2017-12-21 NOTE — PROGRESS NOTE ADULT - NSHPATTENDINGPLANDISCUSS_GEN_ALL_CORE
Edgardo English,Rho
.HS
Edgardo English
Edgardo English.HS
HS
family - son and daughter
patient
The patient's spouse, the patient with minimal if any understanding, the house staff in person
The patient spouse in front of the patient, Dr. Reynoso in person
The patient's daughter in front of the patient
The patient's daughter in front of the patient in person
The patient's daughter in front of the patient in person and Dr. Reynoso in per
The patient's daughter in front of the patient.
The patient, his spouse and Dr. Nain Reynoso in person and the house staff by telephone.
The patient, his spouse, Dr. Reynoso and house staff in person and Dr. Rdz by telephone
The patient, the nursing staff and the house staff  in person
primary team and Palliative Care SW

## 2017-12-21 NOTE — PROGRESS NOTE ADULT - ASSESSMENT
63 yo M with PMHx of Renal Cell Carcinoma with mets presenting to the ED with altered mental status, admitted to [facility] for further management.

## 2017-12-21 NOTE — PROGRESS NOTE ADULT - PROBLEM SELECTOR PROBLEM 7
Weakness
Essential hypertension
Essential hypertension
Weakness
Essential hypertension
Weakness

## 2017-12-21 NOTE — PROGRESS NOTE ADULT - PROBLEM SELECTOR PROBLEM 8
Essential hypertension
Nutrition, metabolism, and development symptoms
Essential hypertension
Nutrition, metabolism, and development symptoms
Nutrition, metabolism, and development symptoms
Essential hypertension

## 2017-12-22 PROCEDURE — 97530 THERAPEUTIC ACTIVITIES: CPT

## 2017-12-22 PROCEDURE — 83935 ASSAY OF URINE OSMOLALITY: CPT

## 2017-12-22 PROCEDURE — 74177 CT ABD & PELVIS W/CONTRAST: CPT

## 2017-12-22 PROCEDURE — 86850 RBC ANTIBODY SCREEN: CPT

## 2017-12-22 PROCEDURE — 95951: CPT

## 2017-12-22 PROCEDURE — 92610 EVALUATE SWALLOWING FUNCTION: CPT | Mod: GN

## 2017-12-22 PROCEDURE — 86923 COMPATIBILITY TEST ELECTRIC: CPT

## 2017-12-22 PROCEDURE — P9016: CPT

## 2017-12-22 PROCEDURE — 97161 PT EVAL LOW COMPLEX 20 MIN: CPT

## 2017-12-22 PROCEDURE — 86780 TREPONEMA PALLIDUM: CPT

## 2017-12-22 PROCEDURE — 82607 VITAMIN B-12: CPT

## 2017-12-22 PROCEDURE — 83930 ASSAY OF BLOOD OSMOLALITY: CPT

## 2017-12-22 PROCEDURE — 72142 MRI NECK SPINE W/DYE: CPT

## 2017-12-22 PROCEDURE — 97112 NEUROMUSCULAR REEDUCATION: CPT

## 2017-12-22 PROCEDURE — 71045 X-RAY EXAM CHEST 1 VIEW: CPT

## 2017-12-22 PROCEDURE — 93970 EXTREMITY STUDY: CPT

## 2017-12-22 PROCEDURE — 95819 EEG AWAKE AND ASLEEP: CPT

## 2017-12-22 PROCEDURE — 84100 ASSAY OF PHOSPHORUS: CPT

## 2017-12-22 PROCEDURE — 72147 MRI CHEST SPINE W/DYE: CPT

## 2017-12-22 PROCEDURE — 85610 PROTHROMBIN TIME: CPT

## 2017-12-22 PROCEDURE — 80076 HEPATIC FUNCTION PANEL: CPT

## 2017-12-22 PROCEDURE — 97162 PT EVAL MOD COMPLEX 30 MIN: CPT

## 2017-12-22 PROCEDURE — 80053 COMPREHEN METABOLIC PANEL: CPT

## 2017-12-22 PROCEDURE — 80164 ASSAY DIPROPYLACETIC ACD TOT: CPT

## 2017-12-22 PROCEDURE — 87086 URINE CULTURE/COLONY COUNT: CPT

## 2017-12-22 PROCEDURE — 36415 COLL VENOUS BLD VENIPUNCTURE: CPT

## 2017-12-22 PROCEDURE — 85730 THROMBOPLASTIN TIME PARTIAL: CPT

## 2017-12-22 PROCEDURE — 82010 KETONE BODYS QUAN: CPT

## 2017-12-22 PROCEDURE — 83036 HEMOGLOBIN GLYCOSYLATED A1C: CPT

## 2017-12-22 PROCEDURE — 83880 ASSAY OF NATRIURETIC PEPTIDE: CPT

## 2017-12-22 PROCEDURE — 93306 TTE W/DOPPLER COMPLETE: CPT

## 2017-12-22 PROCEDURE — P9045: CPT

## 2017-12-22 PROCEDURE — 87103 BLOOD FUNGUS CULTURE: CPT

## 2017-12-22 PROCEDURE — 86901 BLOOD TYPING SEROLOGIC RH(D): CPT

## 2017-12-22 PROCEDURE — 83615 LACTATE (LD) (LDH) ENZYME: CPT

## 2017-12-22 PROCEDURE — 70450 CT HEAD/BRAIN W/O DYE: CPT

## 2017-12-22 PROCEDURE — 83605 ASSAY OF LACTIC ACID: CPT

## 2017-12-22 PROCEDURE — 36430 TRANSFUSION BLD/BLD COMPNT: CPT

## 2017-12-22 PROCEDURE — 82962 GLUCOSE BLOOD TEST: CPT

## 2017-12-22 PROCEDURE — 84300 ASSAY OF URINE SODIUM: CPT

## 2017-12-22 PROCEDURE — 70552 MRI BRAIN STEM W/DYE: CPT

## 2017-12-22 PROCEDURE — A9585: CPT

## 2017-12-22 PROCEDURE — 85025 COMPLETE CBC W/AUTO DIFF WBC: CPT

## 2017-12-22 PROCEDURE — 80048 BASIC METABOLIC PNL TOTAL CA: CPT

## 2017-12-22 PROCEDURE — 99285 EMERGENCY DEPT VISIT HI MDM: CPT | Mod: 25

## 2017-12-22 PROCEDURE — 93005 ELECTROCARDIOGRAM TRACING: CPT

## 2017-12-22 PROCEDURE — 85027 COMPLETE CBC AUTOMATED: CPT

## 2017-12-22 PROCEDURE — C9254: CPT

## 2017-12-22 PROCEDURE — 86900 BLOOD TYPING SEROLOGIC ABO: CPT

## 2017-12-22 PROCEDURE — 81001 URINALYSIS AUTO W/SCOPE: CPT

## 2017-12-22 PROCEDURE — 83735 ASSAY OF MAGNESIUM: CPT

## 2017-12-22 PROCEDURE — 82570 ASSAY OF URINE CREATININE: CPT

## 2017-12-22 PROCEDURE — 87040 BLOOD CULTURE FOR BACTERIA: CPT

## 2017-12-22 PROCEDURE — 84443 ASSAY THYROID STIM HORMONE: CPT

## 2017-12-22 PROCEDURE — 80235 DRUG ASSAY LACOSAMIDE: CPT

## 2017-12-22 PROCEDURE — 86022 PLATELET ANTIBODIES: CPT

## 2017-12-22 PROCEDURE — 82746 ASSAY OF FOLIC ACID SERUM: CPT

## 2017-12-22 PROCEDURE — 83010 ASSAY OF HAPTOGLOBIN QUANT: CPT

## 2017-12-22 PROCEDURE — 92526 ORAL FUNCTION THERAPY: CPT

## 2017-12-22 RX ADMIN — Medication 52.5 MILLIGRAM(S): at 06:59

## 2017-12-22 RX ADMIN — MORPHINE SULFATE 3 MILLIGRAM(S): 50 CAPSULE, EXTENDED RELEASE ORAL at 11:12

## 2017-12-22 RX ADMIN — MORPHINE SULFATE 3 MILLIGRAM(S): 50 CAPSULE, EXTENDED RELEASE ORAL at 02:50

## 2017-12-22 RX ADMIN — MORPHINE SULFATE 3 MILLIGRAM(S): 50 CAPSULE, EXTENDED RELEASE ORAL at 06:30

## 2017-12-22 RX ADMIN — MORPHINE SULFATE 3 MILLIGRAM(S): 50 CAPSULE, EXTENDED RELEASE ORAL at 08:30

## 2017-12-22 RX ADMIN — Medication 4 MILLIGRAM(S): at 06:31

## 2017-12-22 RX ADMIN — LEVETIRACETAM 420 MILLIGRAM(S): 250 TABLET, FILM COATED ORAL at 06:35

## 2017-12-22 RX ADMIN — MORPHINE SULFATE 3 MILLIGRAM(S): 50 CAPSULE, EXTENDED RELEASE ORAL at 02:34

## 2017-12-22 RX ADMIN — MORPHINE SULFATE 3 MILLIGRAM(S): 50 CAPSULE, EXTENDED RELEASE ORAL at 06:45

## 2017-12-22 RX ADMIN — MORPHINE SULFATE 3 MILLIGRAM(S): 50 CAPSULE, EXTENDED RELEASE ORAL at 10:32

## 2017-12-22 RX ADMIN — MORPHINE SULFATE 3 MILLIGRAM(S): 50 CAPSULE, EXTENDED RELEASE ORAL at 12:09

## 2017-12-22 RX ADMIN — MORPHINE SULFATE 3 MILLIGRAM(S): 50 CAPSULE, EXTENDED RELEASE ORAL at 10:01

## 2017-12-22 NOTE — PROGRESS NOTE ADULT - SUBJECTIVE AND OBJECTIVE BOX
Neurology Follow up note    Name  CORIN PERDOMO    HPI:  62M with metastatic RCC (s/p nephrectomy, s/p craniotomy x2, s/p gamma knife x3 most recently march 2017, currently on Opdivo and Avastin with Dr. 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 Presenting to the ED brought in by wife for AMS. Patient is at bedside however unable to give history. Patient wife states that he was in his normal state of health until today when she was called by the home health aid stating that he had episodes of staring into space and not as responsive as he is on a regular basis. Patient denies HA, change in vision, cough, CP, abdominal pain, back pain, dysuria, hematuria, constipation, diarrhea.     In ED T 97.8 P100 /89 R18 O2Sat 97% RA, patient was seen and evaluated by Neuro (04 Dec 2017 19:08)      Interval History - lethargic follows simple commands- no complaints of pain- no headaches        REVIEW OF SYSTEMS    Vital Signs Last 24 Hrs  T(C): --  T(F): --  HR: --  BP: --  BP(mean): --  RR: --  SpO2: --    Physical Exam-     Mental Status- lethargic and aphasia    Cranial Nerves-full EOM    Gait and station-n/a    Motor-moves all 4 extremities    Reflexes- decreased    Sensation-no sensory level    Coordination-no new tremors    Vascular -    Medications  BACItracin   Ointment 1 Application(s) Topical daily  BACItracin   Ointment 1 Application(s) Topical daily  dexamethasone  Injectable 4 milliGRAM(s) IV Push two times a day  fentaNYL   Patch  12 MICROgram(s)/Hr 1 Patch Transdermal every 72 hours  latanoprost 0.005% Ophthalmic Solution 1 Drop(s) Both EYES at bedtime  levETIRAcetam  IVPB 500 milliGRAM(s) IV Intermittent every 12 hours  morphine  - Injectable 3 milliGRAM(s) IV Push every 1 hour PRN  valproate sodium IVPB 250 milliGRAM(s) IV Intermittent every 8 hours      Lab      Radiology    Assessment- Brain mets    Plan- Comfort care

## 2017-12-26 DIAGNOSIS — I95.9 HYPOTENSION, UNSPECIFIED: ICD-10-CM

## 2017-12-26 DIAGNOSIS — Z80.0 FAMILY HISTORY OF MALIGNANT NEOPLASM OF DIGESTIVE ORGANS: ICD-10-CM

## 2017-12-26 DIAGNOSIS — C79.51 SECONDARY MALIGNANT NEOPLASM OF BONE: ICD-10-CM

## 2017-12-26 DIAGNOSIS — R41.82 ALTERED MENTAL STATUS, UNSPECIFIED: ICD-10-CM

## 2017-12-26 DIAGNOSIS — D62 ACUTE POSTHEMORRHAGIC ANEMIA: ICD-10-CM

## 2017-12-26 DIAGNOSIS — Z51.5 ENCOUNTER FOR PALLIATIVE CARE: ICD-10-CM

## 2017-12-26 DIAGNOSIS — Z92.21 PERSONAL HISTORY OF ANTINEOPLASTIC CHEMOTHERAPY: ICD-10-CM

## 2017-12-26 DIAGNOSIS — C79.31 SECONDARY MALIGNANT NEOPLASM OF BRAIN: ICD-10-CM

## 2017-12-26 DIAGNOSIS — Z66 DO NOT RESUSCITATE: ICD-10-CM

## 2017-12-26 DIAGNOSIS — C64.9 MALIGNANT NEOPLASM OF UNSPECIFIED KIDNEY, EXCEPT RENAL PELVIS: ICD-10-CM
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
## 2017-12-26 DIAGNOSIS — C78.00 SECONDARY MALIGNANT NEOPLASM OF UNSPECIFIED LUNG: ICD-10-CM

## 2017-12-26 DIAGNOSIS — D69.6 THROMBOCYTOPENIA, UNSPECIFIED: ICD-10-CM

## 2017-12-26 DIAGNOSIS — E86.0 DEHYDRATION: ICD-10-CM

## 2017-12-26 DIAGNOSIS — Z90.5 ACQUIRED ABSENCE OF KIDNEY: ICD-10-CM

## 2017-12-26 DIAGNOSIS — N18.9 CHRONIC KIDNEY DISEASE, UNSPECIFIED: ICD-10-CM

## 2017-12-26 DIAGNOSIS — I10 ESSENTIAL (PRIMARY) HYPERTENSION: ICD-10-CM

## 2017-12-26 DIAGNOSIS — E87.2 ACIDOSIS: ICD-10-CM

## 2017-12-26 DIAGNOSIS — T66.XXXA RADIATION SICKNESS, UNSPECIFIED, INITIAL ENCOUNTER: ICD-10-CM

## 2017-12-26 DIAGNOSIS — K92.2 GASTROINTESTINAL HEMORRHAGE, UNSPECIFIED: ICD-10-CM

## 2017-12-26 DIAGNOSIS — R56.9 UNSPECIFIED CONVULSIONS: ICD-10-CM

## 2017-12-26 DIAGNOSIS — R00.0 TACHYCARDIA, UNSPECIFIED: ICD-10-CM

## 2017-12-26 DIAGNOSIS — E87.1 HYPO-OSMOLALITY AND HYPONATREMIA: ICD-10-CM

## 2017-12-26 DIAGNOSIS — E03.2 HYPOTHYROIDISM DUE TO MEDICAMENTS AND OTHER EXOGENOUS SUBSTANCES: ICD-10-CM

## 2018-01-06 LAB
CULTURE RESULTS: SIGNIFICANT CHANGE UP
SPECIMEN SOURCE: SIGNIFICANT CHANGE UP

## 2018-03-28 NOTE — PROGRESS NOTE ADULT - PROBLEM SELECTOR PLAN 4
Called and conveyed INR results to patient 1.9, advised to continue same dose of coumadin and repeat INR lab draw in 3-4 weeks.    Patient on Norvasc 5mg at home, BP in acceptable range at this time  - Continue Norvasc 5mg daily Worsening since last admission  - Physical Therapy Consult

## 2018-09-09 NOTE — PROGRESS NOTE ADULT - PROBLEM SELECTOR PLAN 5
Thrombocytopenia on admission, however platelets downtrending, With concern for HIT, heparin and lovenox was dc/d. HIT workup negative. Lovenox restarted 12/8. However platelets continued to downtrend. Lovenox and HSQ dc/d 12/11Dr Bhakti from heme/onc following. Possibility of thrombocytopenia to be a side effect of the Depakote patient was receiving. Depakote dc/d 12/11. Dr. Bhatt from epilepsy consulted regarding alternative AEDs. Keppra added in place of Depakote. - f/u epilepsy recs  -Continue to trend CBC Admission

## 2019-02-12 NOTE — PROGRESS NOTE ADULT - PROBLEM SELECTOR PLAN 4
OSMAN attempted to contact pt's primary caregiver to confirm pt's caregiver plans. OSMAN was only able to leave a detailed message. OSMAN remains available and will follow up.    JULIAN on CKD - likely 2/2 dehydration vs. nephrotic syndrome given proteinuria and edema   - trend Cr, avoid nephrotoxic medications

## 2019-03-01 NOTE — PROGRESS NOTE ADULT - PROBLEM/PLAN-2
DISPLAY PLAN FREE TEXT
Alert and oriented to person, place and time/Awake

## 2019-06-28 NOTE — PROGRESS NOTE ADULT - PROBLEM SELECTOR PLAN 7
His recent chemistry test was fine, however his lipid profile was reviewed and triglycerides at 377    It appears that he has been traveling a lot regarding work thus eating out     discussed above moderating his diet as well as taking omega-3 fatty acids such as Nordic all to me to make a 2 capsules a day    Will probably have the lipid profile repeated in 4 months and will review afterwards   -Worsening since last admission  -F/u PT recs

## 2019-08-19 NOTE — ED PROVIDER NOTE - CONSTITUTIONAL, MLM
normal... Well appearing, well nourished, awake, alert, oriented to person, place, time/situation and in no apparent distress. with patient

## 2019-09-16 NOTE — PROGRESS NOTE ADULT - ASSESSMENT
63 y/o gentleman with h/o metastatic RCC to brain and lung s/p crani x2, gamma knife (most recently 3/2017), and Avastin/opdivo, right nephrectomy, tonsillectomy, glaucoma, and HTN, presenting from home with AMS and progressive functional/mental decline notable even during this admit, seen initially for family support Patient/surrogate refused vaccine...
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
## 2020-06-23 NOTE — H&P ADULT - HISTORY OF PRESENT ILLNESS
Body Location Override (Optional - Billing Will Still Be Based On Selected Body Map Location If Applicable): left lateral frontal scalp Detail Level: Simple Depth Of Biopsy: dermis Was A Bandage Applied: Yes Size Of Lesion In Cm: 0.5 X Size Of Lesion In Cm: 0 Biopsy Type: H and E Biopsy Method: Personna blade Anesthesia Type: 1% lidocaine with epinephrine Hemostasis: Electrocautery Wound Care: Bacitracin 62M PMH metastatic RCC (s/p R nephrectomy, s/p craniotomy x2, s/p gamma knife x3 most recently March 2017, currently on Opdivo and Avastin w/ Dr. 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 of nausea, vomiting, confusion, and dizziness. Patient was in usual state of health until last night around 9PM, at which time he felt weak and nauseous. He required assistance from wife and niece to get to bathroom and he was noted to be diaphoretic at the time. Patient then vomited (white-royce, NBNB emesis) and continued to feel weak. Patient was then noted to be confused, forgetting the names of one of his children and with limited understanding of what was going on. Wife noted that patient had a "distant look in his eyes" which she has noted preceding past seizures. Pt did not have any tonic/clonic movements and remained responsive throughout the entirety of this episode, though wife did note that patient had mild b/l UE tremors of the hands. Last meal was dinner 3-4 hours prior to the episode and patient had normal appetite prior. Denies any recent fever, chills, diarrhea, constipation, bloody or tarry stools, URI sx, dysuria, increased frequency, cough, SOB, chest pain, palpitations, lower extremity edema. No recent NSAID use or history of ulcers. Only recent medication change was addition of dexamethasone 4mg BID on 10/25/17.     Upon arrival to the ED, patient's vitals were as follows: Tm 98F, HR 60-75, -163/, RR 18, SpO2 97-98% on RA.  Labs notable for mild leukocytosis w/ left shift (WBC 13.8, PMH 82%), mild hyponatremia (Na 130), significant hyperkalemia (K 6.6), and elevated BUN/Cr 58/1.80.   CT head performed w/ no acute changes when compared to 3/8/17 but findings concerning for metastatic disease.    Patient received 1g calcium gluconate, 10u insulin + D50, 50mEq sodium bicarb, and NS 1L bolus x1 with improvement of K to 5.7. Dressing: bandage Destruction After The Procedure: No Type Of Destruction Used: Cryotherapy Curettage Text: The wound bed was treated with curettage after the biopsy was performed. Cryotherapy Text: The wound bed was treated with cryotherapy after the biopsy was performed. Electrodesiccation Text: The wound bed was treated with electrodesiccation after the biopsy was performed. Electrodesiccation And Curettage Text: The wound bed was treated with electrodesiccation and curettage after the biopsy was performed. Silver Nitrate Text: The wound bed was treated with silver nitrate after the biopsy was performed. Lab: [id number] Lab Facility: [id number] Path Notes (To The Dermatopathologist): Size: 0.5cm R/O: BCC vs SCC Consent: Written consent was obtained and risks were reviewed including but not limited to scarring, infection, bleeding, scabbing, incomplete removal, nerve damage and allergy to anesthesia. Post-Care Instructions: I reviewed with the patient in detail post-care instructions. Patient is to keep the biopsy site dry overnight, and then apply bacitracin twice daily until healed. Patient may apply hydrogen peroxide soaks to remove any crusting. Notification Instructions: Patient will be notified of biopsy results. However, patient instructed to call the office if not contacted within 2 weeks. Billing Type: [employer] Information: Selecting Yes will display possible errors in your note based on the variables you have selected. This validation is only offered as a suggestion for you. PLEASE NOTE THAT THE VALIDATION TEXT WILL BE REMOVED WHEN YOU FINALIZE YOUR NOTE. IF YOU WANT TO FAX A PRELIMINARY NOTE YOU WILL NEED TO TOGGLE THIS TO 'NO' IF YOU DO NOT WANT IT IN YOUR FAXED NOTE. Body Location Override (Optional - Billing Will Still Be Based On Selected Body Map Location If Applicable): right medial cheek Lab Facility: [id number] Billing Type: Third-Party Bill Body Location Override (Optional - Billing Will Still Be Based On Selected Body Map Location If Applicable): right medial knee Size Of Lesion In Cm: 0.8 Lab Facility: [id number] Path Notes (To The Dermatopathologist): Size: 0.8cm R/O: BCC vs SCC

## 2020-07-26 NOTE — PROGRESS NOTE ADULT - PROBLEM/PLAN-6
DISPLAY PLAN FREE TEXT
Yes

## 2020-09-30 NOTE — PROGRESS NOTE ADULT - PROBLEM SELECTOR PROBLEM 9
Protocol For Uva1: The patient received UVA1.
Nutrition, metabolism, and development symptoms

## 2020-11-13 NOTE — ED ADULT NURSE NOTE - PAIN RATING/NUMBER SCALE (0-10): ACTIVITY
Patient educated on discharge instructions as well as new medications use, dosage, administration and possible side effects. Patient verified knowledge. IV removed without difficulty and dry dressing in place. Telemetry monitor removed and returned to [facility]. Pt left facility in stable condition to Home with all of their personal belongings. Jaimie Seay 0

## 2021-05-06 NOTE — H&P ADULT. - GENITOURINARY COMMENTS
Xerosis Aggressive Treatment: I recommended application of Cetaphil or CeraVe numerous times a day and before going to bed to all dry areas. I also prescribed a topical steroid for twice daily use. tenderness to palpation of L. inguinal region, no warmth, erythema, mass, or LAD tenderness to palpation of L. inguinal region, no warmth, erythema, mass, hernia or LAD

## 2021-07-28 NOTE — PHYSICAL THERAPY INITIAL EVALUATION ADULT - ASR WT BEARING STATUS EVAL
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   Chart reviewed at time of initial evaluation (relevant co-morbidities, allergies, tests and medications listed): Past Medical History:  No date: Arthritis  No date: Cataract  No date: Diabetes mellitus (CMS/[facility])  No date: Difficult intubation  No date: Essential (primary) hypertension  No date: Failed moderate sedation during procedure  1989: Fracture lumbar vertebra-closed (CMS/[facility])      Comment:  history of in 1989  No date: Gastroesophageal reflux disease  No date: Heart murmur  No date: Malignant hyperthermia  No date: PONV (postoperative nausea and vomiting)  No date: Thyroid disease  Diagnostic Tests: X-ray: reviewed.      SUBJECTIVE                                                                                                             Problem:  R ankle mobility loss; lack of sensation  In foot/ankle   Onset: April 200   Distribution: Toes, foot and ankle   Aggravating factors: prolonged sitting, sleeping   alleviating factors: exercises and elevation   Pain level: Current 5  Best 0 Worst 8  Duration couple hours   Difficulties:  Walking and baance  Pain / Symptoms:  Pain rating (out of 10): Current: 5 ; Best: 0; Worst: 8Location: R ankle     Quality / Description: tight, numbness.  Alleviating Factors:. Exercises and stretching    Progression since onset: worsening  Function:   Limitations / Exacerbation Factors: pain, difficulty  Prior Level of  76 [id number])  Gait Training (79363)  Manual Therapy (25242)  Neuromuscular Re-Education (85828)  Therapeutic Activity (77978)  Therapeutic Exercise (15218)  Electrical Stimulation (13069//78887)  Heat/Cold (27820)  Aquatic  Therapy (92277)    Frequency / Duration: 2 times per week tapering as patient progresses for an estimated total of 5 visits for 2 weeks    Patient involved in and agreed to plan of care and goals.  Patient given attendance policy at time of initial evaluation.  Suggestions for next session as indicated: Progress per plan of care      GOALS                                                                                                                           Long Term Goals: to be met by end of plan of care  1. R ankle DF to neutral for gait   2. Pain to 2/10 at worst for QoL   3. Strength globally 4/5 for functional mobility   4. Patient will be independent with progressed and modified home exercise program.      Therapy procedure time and total treatment time can be found documented on the Time Entry flowsheet   4 no weight-bearing restrictions

## 2021-08-25 NOTE — PHYSICAL THERAPY INITIAL EVALUATION ADULT - REHAB POTENTIAL, PT EVAL
Tresiba Pending    Insurance response  Prescription Drug Insurance: [employer]  Notes: Prior authorization submitted - will update provider when decision has been made by insurance.          good, to achieve stated therapy goals

## 2021-10-14 NOTE — DIETITIAN INITIAL EVALUATION ADULT. - PROBLEM SELECTOR PLAN 6
room air
Pt w/ hx of glaucoma, on home latanoprost drops at bedtime.   - c/w latanoprost drops 1 drop each eye b/l qHS

## 2022-03-15 NOTE — H&P ADULT - PROBLEM SELECTOR PLAN 3
Spoke with Officer Jerome Lee from [employer] -[phone] and was initially told to call [phone], informed officer of the location where reported event took place and location of hospital. Given 2 numbers for out of city reports [phone]. Worsening since last admission  - Physical Therapy Consult in AM

## 2022-04-22 NOTE — PROGRESS NOTE ADULT - PROBLEM SELECTOR PLAN 5
Anesthesia Post Evaluation    Patient: Shahram Hills    Procedure(s) Performed: * No procedures listed *    Final Anesthesia Type: general      Level of consciousness: awake and alert  Post-procedure vital signs: reviewed and stable  Pain control: Pain has been treated.  Airway patency: patent    PONV status: Absent or treated.  Anesthetic complications: no      Cardiovascular status: hemodynamically stable  Respiratory status: unassisted  Hydration status: euvolemic            Vitals Value Taken Time   /72 04/22/22 1647     04/22/22 1658   Pulse 87 04/22/22 1657   Resp 16 04/22/22 1657   SpO2 95 % 04/22/22 1657   Vitals shown include unvalidated device data.      No case tracking events are documented in the log.      Pain/Juan Score: Juan Score: 9 (4/22/2022  3:15 PM)         Resolving.  JULIAN on CKD - likely 2/2 dehydration vs. nephrotic syndrome given proteinuria and edema   - trend Cr, avoid nephrotoxic medications

## 2022-05-03 NOTE — CONSULT NOTE ADULT - PROBLEM SELECTOR PROBLEM 2
Partially impaired: cannot see medication labels or newsprint, but can see obstacles in path, and the surrounding layout; can count fingers at arm's length
Malignant neoplasm of right kidney

## 2022-09-30 NOTE — PROGRESS NOTE ADULT - PROBLEM SELECTOR PLAN 3
Anantt talking with pt at this time -Unknown etiology. Differentials could include PE (however no hypoxia, no tachypnea) vs infection (however no fever and WBC elevated likely 2/2 steroids) vs dehydration vs acute blood loss as Hgb dropped by 2 points  -Echo shows severe concentric left ventricular hypertrophy, no pericardial effusion seen  - will monitor vitals as family requests, currently denying all lab draws

## 2022-10-03 NOTE — PROGRESS NOTE ADULT - PROBLEM SELECTOR PROBLEM 9
Emergency Department Attending Note    Patient: Fern Vega Age: 13 year old Sex: female   MRN: [id number] : 2009 Encounter Date: 10/02/2022     History     Chief Complaint   Patient presents with   • Abdominal Pain   • Suspected Coronavirus (Covid-19)         HISTORY OF PRESENT ILLNESS    Fern Vega is a 13 year old female presenting to the emergency department today for evaluation of abdominal cramping.  She had nausea, chills.  She has had URI symptoms.  Her mother is COVID-19 positive.  She has been symptomatic for at least 4 days now.  She is not vaccinated for COVID-19.  She tested positive for COVID-19 at home.  Her last menstrual period was 3 weeks ago.  No vaginal bleeding or discharge.      PAST HISTORY         No past medical history on file. No past surgical history on file.   Additional PMH: Additional PSH:          Social History     Tobacco Use   • Smoking status: Never Smoker   • Smokeless tobacco: Never Used   Substance Use Topics   • Alcohol use: Never   • Drug use: Never    No family history on file.   Additional SH: Additional FH:          Prior to Admission medications    Not on File    Allergies   Allergen Reactions   • Penicillins HIVES          Review of Systems     Constitutional: Negative for fevers or chills.   HENT: Negative for sore throat, ear pain.    Eyes: Negative for discharge or pain.  Respiratory: Negative for shortness of breath or cough  Cardiovascular: Negative for palpitations or chest pain  Gastrointestinal:  Positive for lower abdominal cramps.  Genitourinary: Negative for dysuria or hematuria.   Neurological: Negative for seizures and speech difficulty.   Musculoskeletal: Negative for joint pain or injury.   Skin: Negative for rash or open skin injuries.        Physical Exam     Vitals with min/max:      Vital Last Value 24 Hour Range   Temperature 98.1 °F (36.7 °C) (10/02/22 2121) Temp  Min: 98.1 °F (36.7 °C)  Max: 98.1 °F (36.7 °C)   Pulse 76 (10/02/22 2121)  Pulse  Min: 76  Max: 76   Respiratory 18 (10/02/22 2121) Resp  Min: 18  Max: 18   Non-Invasive  Blood Pressure 121/72 (10/02/22 2210) BP  Min: 121/72  Max: 146/106   Pulse Oximetry 99 % (10/02/22 2121) SpO2  Min: 99 %  Max: 99 %   Arterial   Blood Pressure   No data recorded         Constitutional:Alert and interactive, in no acute distress  Head: Normocephalic, atraumatic  Neck: Supple, No swelling  Eyes: No acute changes in extra ocular movements, normal conjunctiva  ENT: External ears and nose are within normal limits without acute changes, No ear discharge  Cardiovascular: Regular rate and rhythm without abnormal heart sounds  Pulmonary: No apparent distress, Lungs are clear to auscultation?  Gastrointestinal: Soft, nontender, negative Leal sign, no tenderness at McBurney's point.  Bowel sounds are present  Neurologic: No focal deficits, level of consciousness appropriate  Musculoskeletal: No acute deformity, No acute changes in range of motion      Testing Results     Results for orders placed or performed during the hospital encounter of 10/02/22   COVID/Flu/RSV panel   Result Value Ref Range    Rapid SARS-COV-2 by PCR Detected (A) Not Detected / Detected / Presumptive Positive / Inhibitors present    Influenza A by PCR Not Detected Not Detected    Influenza B by PCR Not Detected Not Detected    RSV BY PCR Not Detected Not Detected    Procedural Comment      SARS-CoV-2 Ct Value 25.4    Urinalysis & Reflex Microscopy With Culture If Indicated   Result Value Ref Range    COLOR, URINALYSIS Yellow     APPEARANCE, URINALYSIS Clear     GLUCOSE, URINALYSIS Negative Negative mg/dL    BILIRUBIN, URINALYSIS Negative Negative    KETONES, URINALYSIS Negative Negative mg/dL    SPECIFIC GRAVITY, URINALYSIS 1.020 1.005 - 1.030    OCCULT BLOOD, URINALYSIS Negative Negative    PH, URINALYSIS 6.5 5.0 - 7.0    PROTEIN, URINALYSIS Negative Negative mg/dL    UROBILINOGEN, URINALYSIS 1.0 0.2, 1.0 mg/dL    NITRITE, URINALYSIS  Negative Negative    LEUKOCYTE ESTERASE, URINALYSIS Negative Negative   Pregnancy Test, Urine   Result Value Ref Range    Pregnancy, Urine Negative Negative   Comprehensive Metabolic Panel   Result Value Ref Range    Fasting Status      Sodium 137 135 - 145 mmol/L    Potassium 3.9 3.4 - 5.1 mmol/L    Chloride 102 97 - 110 mmol/L    Carbon Dioxide 29 21 - 32 mmol/L    Anion Gap 10 7 - 19 mmol/L    Glucose 103 (H) 70 - 99 mg/dL    BUN 14 6 - 20 mg/dL    Creatinine 0.69 0.39 - 0.90 mg/dL    Glomerular Filtration Rate      BUN/ Creatinine Ratio 20 7 - 25    Calcium 8.8 8.0 - 11.0 mg/dL    Bilirubin, Total 0.3 0.2 - 1.0 mg/dL    GOT/AST 27 10 - 45 Units/L    GPT/ALT 35 6 - 35 Units/L    Alkaline Phosphatase 90 60 - 195 Units/L    Albumin 4.1 3.6 - 5.1 g/dL    Protein, Total 7.9 6.0 - 8.3 g/dL    Globulin 3.8 2.0 - 4.0 g/dL    A/G Ratio 1.1 1.0 - 2.4   Lipase   Result Value Ref Range    Lipase 70 (L) 73 - 393 Units/L   CBC with Automated Differential (performable only)   Result Value Ref Range    WBC 5.5 4.2 - 11.0 K/mcL    RBC 4.86 3.90 - 5.30 mil/mcL    HGB 14.5 12.0 - 15.5 g/dL    HCT 43.5 36.0 - 46.5 %    MCV 89.5 78.0 - 100.0 fl    MCH 29.8 26.0 - 34.0 pg    MCHC 33.3 32.0 - 36.5 g/dL    RDW-CV 12.9 11.0 - 15.0 %    RDW-SD 42.4 35.0 - 47.0 fL     140 - 450 K/mcL    NRBC 0 <=0 /100 WBC    Neutrophil, Percent 48 %    Lymphocytes, Percent 35 %    Mono, Percent 15 %    Eosinophils, Percent 1 %    Basophils, Percent 1 %    Immature Granulocytes 0 %    Absolute Neutrophils 2.7 1.8 - 8.0 K/mcL    Absolute Lymphocytes 1.9 1.5 - 6.5 K/mcL    Absolute Monocytes 0.8 0.0 - 0.8 K/mcL    Absolute Eosinophils  0.1 0.0 - 0.5 K/mcL    Absolute Basophils 0.0 0.0 - 0.2 K/mcL    Absolute Immmature Granulocytes 0.0 0.0 - 0.2 K/mcL         ED Medication Orders (From admission, onward)    None            Imaging Results    None           Assessment/Plan     Medical Decision Making:    Fern Vega is a 13 year old female presenting  to the ED with hx and physical exam as above.     I have reviewed the patient's medical records and past history has been reviewed in Epic EHR.     Based on her presentation, I suspect her symptoms are secondary to COVID-19.  Her abdomen is soft.  She has no significant tenderness on exam.  Recommend oral hydration and Tylenol and following COVID-19 guidelines.      She has tested positive for home.      Blood work has been done and reviewed as above.    Urinalysis is negative for UTI.  Urine pregnancy test is negative.      She has no other comorbidities.  I do not think she will benefit with Paxlovid therapy.    I have reviewed the test results and Imaging done during this visit     I find her stable for discharge.  She can return to ER for evaluation if she has any worsening of her symptoms otherwise to follow with her primary care provider as needed.        Clinical Impression     ED Diagnosis   1. COVID-19 virus infection           Disposition        Discharge 10/2/2022 11:16 PM  Fern Vega discharge to home/self care.        Yolanda Foster NP  [address] [facility] [address]    Schedule an appointment as soon as possible for a visit   Follow up in 5-7 days, ED follow up    [facility] - ED  [address] [facility] [id number]  Go to   If your symptoms are worsening or if you develop any new concerning symptom needing emergent medical care         Ken Linder MD  10/02/22 2306     Need for prophylactic measure

## 2022-10-24 NOTE — H&P ADULT. - NEGATIVE CARDIOVASCULAR SYMPTOMS
· CONSTITUTIONAL: no fever and no chills.  · CARDIOVASCULAR: no chest pain, no palpitations  · RESPIRATORY: no sob  · GASTROINTESTINAL: epigastric and RUQ abdominal pain. nausea. no vomiting.  · MUSCULOSKELETAL: no musculoskeletal pain  · SKIN: no abrasions, no rashes  · NEURO: no headache, no weakness  All other systems negative no palpitations/no chest pain/no dyspnea on exertion

## 2022-11-09 NOTE — PROGRESS NOTE ADULT - SUBJECTIVE AND OBJECTIVE BOX
62M with metastatic RCC (s/p nephrectomy, s/p craniotomy x2, s/p gamma knife x3 most recently 2017, currently on Opdivo and Avastin with Dr. 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 Presenting to the ED brought in by wife for AMS. Patient is at bedside however unable to give history. Patient wife states that he was in his normal state of health until today when she was called by the home health aid stating that he had episodes of staring into space and not as responsive as he is on a regular basis. Patient denies HA, change in vision, cough, CP, abdominal pain, back pain, dysuria, hematuria, constipation, diarrhea.     Discussion held with palliative care today: as per discussion:  -face to face mtg from [phone] with family (wife, son, and daughter).  I discussed pt's overall worsening clinical course even since admit and they shared their concerns for pt being subjected to further burdensome testing/treatment.  Per son, pt was a very talkative person who "loved life and valued his quality of life" which family feels pt still had some prior to admit.  All state that pt would not want to cont. living in his current state.  Wife states, "we must stop all of this and focus on his comfort".  All present are agreeable to foregoing any further labs/venipunctures, tests, and anything that will not contribute to his comfort, including but limited to endoscopy and feeding tubes.  All are also in favor of hospice placement and are aware that pt's life is limited regardless of nutrition/hydration and will initiate  arrangements. DNR/DNI, comfort measures only      Pt.S&E@BS in AM  	  MEDICATIONS:        lacosamide IVPB 200 milliGRAM(s) IV Intermittent every 12 hours  levETIRAcetam  IVPB 500 milliGRAM(s) IV Intermittent every 12 hours  morphine  - Injectable 2 milliGRAM(s) IV Push every 4 hours PRN      dexamethasone  Injectable 4 milliGRAM(s) IV Push two times a day  levothyroxine Injectable 25 MICROGram(s) IV Push daily    BACItracin   Ointment 1 Application(s) Topical daily  BACItracin   Ointment 1 Application(s) Topical daily  latanoprost 0.005% Ophthalmic Solution 1 Drop(s) Both EYES at bedtime      Complaint:     Otherwise 12 point review of systems is normal.    PHYSICAL EXAM:    Constitutional: NAD  Eyes: PERRL, EOMI, sclera non-icteric  Neck: supple, no masses, no JVD  Respiratory: CTA b/l, good air entry b/l, no wheezing, rhonchi, rales,   Cardiovascular: RRR, normal S1S2, no M/R/G  Gastrointestinal: soft, NTND, no masses palpable, BS normal in all four quadrants,   Extremities:  no c/c/e      ICU Vital Signs Last 24 Hrs  T(C): --  T(F): --  HR: --  BP: --  BP(mean): --  ABP: --  ABP(mean): --  RR: --  SpO2: --      ECG:      CHEST X RAY    CT    MRI    MRA    CT ANGIO    CAROTID DUPLEX    DUPLEX     Echocardiogram    Catheterization:    Stress Test:     LABS:	 	  CARDIAC MARKERS:                              9.4    21.5  )-----------( 152      ( 15 Dec 2017 08:24 )             28.7     12-15    140  |  107  |  43<H>  ----------------------------<  139<H>  4.7   |  20<L>  |  1.04    Ca    7.8<L>      15 Dec 2017 08:24  Mg     2.2     12-15          ASSESSMENT/PLAN: 	    Hospice ADMIT

## 2023-05-17 NOTE — PROGRESS NOTE ADULT - RS GEN PE MLT RESP DETAILS PC
good air movement
airway patent/breath sounds equal
Propranolol Counseling:  I discussed with the patient the risks of propranolol including but not limited to low heart rate, low blood pressure, low blood sugar, restlessness and increased cold sensitivity. They should call the office if they experience any of these side effects.

## 2023-05-23 NOTE — PROGRESS NOTE ADULT - PROBLEM SELECTOR PROBLEM 10
done
Goals of care, counseling/discussion

## 2023-06-18 NOTE — H&P ADULT - PSH
History of nephrectomy, unilateral  right nephrectomy  Other postprocedural status  S/P craniotomy  Other postprocedural status  S/P tonsillectomy present

## 2023-09-01 NOTE — PATIENT PROFILE ADULT. - AS [facility]
[FreeTextEntry1] : Patient has been diagnosed with otitis externa or swimmers ear. Use ear drops as prescribed (ciprodex not , OK to use 5 drops left ear BID x 7 days) and avoid contact with water. Pain should resolve within 36-48 hours after initiation of therapy. If pain is not improving, patient should contact provider. Can use OTC pain medication such as Tylenol or Ibuprofen as needed for pain.  Discussed with family that current strep testing is NEGATIVE . A regular throat culture will be sent to the lab for further testing, with results obtained in 24-48 hours. If the throat culture is positive, a prescription will be sent to the designated  pharmacy. If the throat culture is negative after 48 hours and the patient is not better, the child should be rechecked. Discussed with family the etiology, natural course and treatment options for sore throat-pharyngitis. Recommended OTC therapy with pain/fever control products, topical products (lozenges, sprays, gargles) as needed per manufacturers recommendation.  If throat culture is positive give- cefadroxil 250mg/5ml, 10ml BID x 10 days 
(3) slightly limited

## 2023-10-30 NOTE — ED PROVIDER NOTE - CPE EDP HEAD NORM PED
Please let the patient know that I reviewed her cervical spine MRI and it does show some disc bulging out to the left side particularly at C5/6 that could be causing her left neck and left arm symptoms.  I recommend that we set up a cervical epidural steroid injection to see if we can help alleviate this pain.    Physician - Dr rKuse    Type of Procedure/Injection - Cervical Epidural  C7/T1   to the left      Laterality - Left      Anxiolysis- RNIV      Need to hold medication - Yes      NSAIDs for 2 days          Clearance needed - No      Follow up - 3 week      Head atraumatic, normal cephalic shape.

## 2024-02-21 NOTE — PROGRESS NOTE ADULT - ASSESSMENT
Prior to immunization administration, verified patients identity using patient s name and date of birth. Please see Immunization Activity for additional information.     Screening Questionnaire for Pediatric Immunization    Is the child sick today?   Yes   Does the child have allergies to medications, food, a vaccine component, or latex?   No   Has the child had a serious reaction to a vaccine in the past?   No   Does the child have a long-term health problem with lung, heart, kidney or metabolic disease (e.g., diabetes), asthma, a blood disorder, no spleen, complement component deficiency, a cochlear implant, or a spinal fluid leak?  Is he/she on long-term aspirin therapy?   No   If the child to be vaccinated is 2 through 4 years of age, has a healthcare provider told you that the child had wheezing or asthma in the  past 12 months?   No   If your child is a baby, have you ever been told he or she has had intussusception?   No   Has the child, sibling or parent had a seizure, has the child had brain or other nervous system problems?   No   Does the child have cancer, leukemia, AIDS, or any immune system         problem?   No   Does the child have a parent, brother, or sister with an immune system problem?   No   In the past 3 months, has the child taken medications that affect the immune system such as prednisone, other steroids, or anticancer drugs; drugs for the treatment of rheumatoid arthritis, Crohn s disease, or psoriasis; or had radiation treatments?   No   In the past year, has the child received a transfusion of blood or blood products, or been given immune (gamma) globulin or an antiviral drug?   No   Is the child/teen pregnant or is there a chance that she could become       pregnant during the next month?   No   Has the child received any vaccinations in the past 4 weeks?   Yes               Immunization questionnaire was positive for at least one answer.  Notified Dr. Phan.      Patient instructed to 
62M PMH metastatic RCC (s/p R nephrectomy, s/p craniotomy x2, s/p gamma knife x3 most recently March 2017, currently on Opdivo and Avastin w/ Dr. 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 of nausea, vomiting, confusion, and dizziness, found to have electrolyte derangements and admitted for correction of lab abnormalities as well as work up of dizziness.
remain in clinic for 15 minutes afterwards, and to report any adverse reactions.     Screening performed by Jamie Amaro on 2/21/2024 at 10:13 AM.          
62M PMH metastatic RCC (s/p R nephrectomy, s/p craniotomy x2, s/p gamma knife x3 most recently March 2017, currently on Opdivo and Avastin w/ Dr. 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 of nausea, vomiting, confusion, and dizziness, found to have electrolyte derangements and admitted for correction of lab abnormalities as well as work up of dizziness.
62M PMH metastatic RCC (s/p R nephrectomy, s/p craniotomy x2, s/p gamma knife x3 most recently March 2017, currently on Opdivo and Avastin w/ Dr. 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 of nausea, vomiting, confusion, and dizziness, found to have electrolyte derangements and admitted for correction of lab abnormalities as well as work up of dizziness.
The patient is a candidate for TKI therapy with Votrient which has been demonstrated to cross the blood-brain barrier, especially after prior radiation therapies to remit progressive renal cancer there.  The patient will have a number of risks which includes leukoencephalopathy and cardiac toxicity which he has had on Sutent and other TKR therapies.  It will be important to titrate the dose upward.  I will also try to enhance his immunotherapy with Opdivo and Yervoy.  This will have to be done carefully because of the potential for increasing radiation necrosis recently recognized as toxicities from these latter agents.
The patient is likely not having seizures but likely is left hemispherical signs from progressive disease in the Brain.    Examination of the head ears eyes nose and throat demonstrates no abnormality.  Examination of the neck reveals no palpable lymphadenopathy, jugular venous distention or thyroidomegaly.  The lungs are clear to percussion and auscultation.  The heart sounds are regular with no auscultatory evidence for murmur, rub or gallop.  The patient has no truncal obesity and there is no palpable or percussible hepatomegaly or splenomegaly.  The patient has no chronic venous stasis changes in the lower extremities.  Neurologically the patient however has proximal muscle weakness especially in the pelvic girdle muscles.  The patient has a right pronator drift and extension of his tongue deviates to the right.  The patient has left hemispherical signs which are somewhat less pronounced today..
The patient will go home today and is scheduled for outpatient whole brain radiation therapy.  The patient is scheduled to receive Pazopanib at a dose of 200 mg which will be increased to 400 mg, then 600 mg and finally 800 mg if there is no obvious contraindicating toxicity.  The patient will have home rehabilitation.
62M PMH metastatic RCC (s/p R nephrectomy, s/p craniotomy x2, s/p gamma knife x3 most recently March 2017, currently on Opdivo and Avastin w/ Dr. 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 of nausea, vomiting, confusion, and dizziness, found to have electrolyte derangements and admitted for correction of lab abnormalities as well as work up of dizziness.

## 2024-06-18 NOTE — PROGRESS NOTE ADULT - PROBLEM SELECTOR PLAN 2
Patient last chemotherapy on Sept 2017. Patients wife reports plans for chemo in Jan 2018  - On Opdivo and Avastin during last regimen  - Hold off on chemo at this time  - Dr Ya to follow 0 Pt w/ platelets of 123 on admission. Trended down to 92 today. On previous admissions platelets noted to ~200s. Pt received only 1 dose of SQH last night.   -will hold off on giving patient any further SQH  -SCDs  -f/u repeat platelet count   -f/u serotonin release assay   -f/u heparin induced platelet antibody test

## 2025-05-30 NOTE — ED ADULT NURSE NOTE - GENITOURINARY WDL
Spoke to pt states he has congestion when breathe in deep and will start coughing. Advised pt he should make an appt with his PCP or go to urgent care. Pt stated he planned on going to urgent care after work. Advised pt to f/u with us Monday on the outcome.    Shad GREEN   Bladder non-tender and non-distended. Urine clear yellow.